# Patient Record
Sex: MALE | Race: WHITE | NOT HISPANIC OR LATINO | Employment: OTHER | URBAN - METROPOLITAN AREA
[De-identification: names, ages, dates, MRNs, and addresses within clinical notes are randomized per-mention and may not be internally consistent; named-entity substitution may affect disease eponyms.]

---

## 2017-01-12 ENCOUNTER — GENERIC CONVERSION - ENCOUNTER (OUTPATIENT)
Dept: OTHER | Facility: OTHER | Age: 62
End: 2017-01-12

## 2017-01-16 ENCOUNTER — APPOINTMENT (OUTPATIENT)
Dept: LAB | Facility: MEDICAL CENTER | Age: 62
End: 2017-01-16
Payer: COMMERCIAL

## 2017-01-16 ENCOUNTER — TRANSCRIBE ORDERS (OUTPATIENT)
Dept: ADMINISTRATIVE | Facility: HOSPITAL | Age: 62
End: 2017-01-16

## 2017-01-16 DIAGNOSIS — I10 ESSENTIAL (PRIMARY) HYPERTENSION: ICD-10-CM

## 2017-01-16 DIAGNOSIS — E03.9 HYPOTHYROIDISM: ICD-10-CM

## 2017-01-16 DIAGNOSIS — L29.9 PRURITUS: ICD-10-CM

## 2017-01-16 LAB
ALBUMIN SERPL BCP-MCNC: 3.9 G/DL (ref 3.5–5)
ALP SERPL-CCNC: 67 U/L (ref 46–116)
ALT SERPL W P-5'-P-CCNC: 40 U/L (ref 12–78)
ANION GAP SERPL CALCULATED.3IONS-SCNC: 7 MMOL/L (ref 4–13)
AST SERPL W P-5'-P-CCNC: 14 U/L (ref 5–45)
BASOPHILS # BLD MANUAL: 0.09 THOUSAND/UL (ref 0–0.1)
BASOPHILS NFR MAR MANUAL: 1 % (ref 0–1)
BILIRUB SERPL-MCNC: 0.5 MG/DL (ref 0.2–1)
BUN SERPL-MCNC: 20 MG/DL (ref 5–25)
CALCIUM SERPL-MCNC: 9.2 MG/DL (ref 8.3–10.1)
CHLORIDE SERPL-SCNC: 108 MMOL/L (ref 100–108)
CHOLEST SERPL-MCNC: 200 MG/DL (ref 50–200)
CO2 SERPL-SCNC: 28 MMOL/L (ref 21–32)
CREAT SERPL-MCNC: 1.1 MG/DL (ref 0.6–1.3)
EOSINOPHIL # BLD MANUAL: 0 THOUSAND/UL (ref 0–0.4)
EOSINOPHIL NFR BLD MANUAL: 0 % (ref 0–6)
ERYTHROCYTE [DISTWIDTH] IN BLOOD BY AUTOMATED COUNT: 13.3 % (ref 11.6–15.1)
GFR SERPL CREATININE-BSD FRML MDRD: >60 ML/MIN/1.73SQ M
GLUCOSE SERPL-MCNC: 96 MG/DL (ref 65–140)
HCT VFR BLD AUTO: 48.4 % (ref 36.5–49.3)
HDLC SERPL-MCNC: 45 MG/DL (ref 40–60)
HGB BLD-MCNC: 17 G/DL (ref 12–17)
LDLC SERPL CALC-MCNC: 122 MG/DL (ref 0–100)
LYMPHOCYTES # BLD AUTO: 2.72 THOUSAND/UL (ref 0.6–4.47)
LYMPHOCYTES # BLD AUTO: 32 % (ref 14–44)
MCH RBC QN AUTO: 31.1 PG (ref 26.8–34.3)
MCHC RBC AUTO-ENTMCNC: 35.1 G/DL (ref 31.4–37.4)
MCV RBC AUTO: 89 FL (ref 82–98)
METAMYELOCYTES NFR BLD MANUAL: 1 % (ref 0–1)
MONOCYTES # BLD AUTO: 0.43 THOUSAND/UL (ref 0–1.22)
MONOCYTES NFR BLD: 5 % (ref 4–12)
NEUTROPHILS # BLD MANUAL: 5.19 THOUSAND/UL (ref 1.85–7.62)
NEUTS BAND NFR BLD MANUAL: 1 % (ref 0–8)
NEUTS SEG NFR BLD AUTO: 60 % (ref 43–75)
NRBC BLD AUTO-RTO: 0 /100 WBCS
PLATELET # BLD AUTO: 168 THOUSANDS/UL (ref 149–390)
PLATELET BLD QL SMEAR: ADEQUATE
PMV BLD AUTO: 9.7 FL (ref 8.9–12.7)
POTASSIUM SERPL-SCNC: 4.6 MMOL/L (ref 3.5–5.3)
PROT SERPL-MCNC: 7.5 G/DL (ref 6.4–8.2)
RBC # BLD AUTO: 5.47 MILLION/UL (ref 3.88–5.62)
RBC MORPH BLD: NORMAL
SODIUM SERPL-SCNC: 143 MMOL/L (ref 136–145)
T4 FREE SERPL-MCNC: 0.98 NG/DL (ref 0.76–1.46)
TRIGL SERPL-MCNC: 164 MG/DL
TSH SERPL DL<=0.05 MIU/L-ACNC: 4.29 UIU/ML (ref 0.36–3.74)
WBC # BLD AUTO: 8.5 THOUSAND/UL (ref 4.31–10.16)

## 2017-01-16 PROCEDURE — 80061 LIPID PANEL: CPT

## 2017-01-16 PROCEDURE — 80053 COMPREHEN METABOLIC PANEL: CPT

## 2017-01-16 PROCEDURE — 84439 ASSAY OF FREE THYROXINE: CPT

## 2017-01-16 PROCEDURE — 85007 BL SMEAR W/DIFF WBC COUNT: CPT

## 2017-01-16 PROCEDURE — 36415 COLL VENOUS BLD VENIPUNCTURE: CPT

## 2017-01-16 PROCEDURE — 85027 COMPLETE CBC AUTOMATED: CPT

## 2017-01-16 PROCEDURE — 84443 ASSAY THYROID STIM HORMONE: CPT

## 2017-01-17 ENCOUNTER — GENERIC CONVERSION - ENCOUNTER (OUTPATIENT)
Dept: OTHER | Facility: OTHER | Age: 62
End: 2017-01-17

## 2017-01-19 ENCOUNTER — GENERIC CONVERSION - ENCOUNTER (OUTPATIENT)
Dept: OTHER | Facility: OTHER | Age: 62
End: 2017-01-19

## 2017-02-06 ENCOUNTER — ALLSCRIPTS OFFICE VISIT (OUTPATIENT)
Dept: OTHER | Facility: OTHER | Age: 62
End: 2017-02-06

## 2017-02-06 DIAGNOSIS — M79.672 PAIN OF LEFT FOOT: ICD-10-CM

## 2017-02-06 DIAGNOSIS — T46.5X1A POISONING BY OTHER ANTIHYPERTENSIVE DRUGS, ACCIDENTAL (UNINTENTIONAL), INITIAL ENCOUNTER: ICD-10-CM

## 2017-02-06 DIAGNOSIS — L29.9 PRURITUS: ICD-10-CM

## 2017-02-06 DIAGNOSIS — R20.2 PARESTHESIA OF SKIN: ICD-10-CM

## 2017-02-06 DIAGNOSIS — D64.9 ANEMIA: ICD-10-CM

## 2017-02-06 DIAGNOSIS — E55.9 VITAMIN D DEFICIENCY: ICD-10-CM

## 2017-02-06 DIAGNOSIS — M54.9 DORSALGIA: ICD-10-CM

## 2017-02-06 DIAGNOSIS — G62.9 POLYNEUROPATHY: ICD-10-CM

## 2017-02-06 DIAGNOSIS — E03.9 HYPOTHYROIDISM: ICD-10-CM

## 2017-02-11 ENCOUNTER — APPOINTMENT (OUTPATIENT)
Dept: LAB | Facility: MEDICAL CENTER | Age: 62
End: 2017-02-11
Payer: COMMERCIAL

## 2017-02-11 DIAGNOSIS — E03.9 HYPOTHYROIDISM: ICD-10-CM

## 2017-02-11 DIAGNOSIS — R20.2 PARESTHESIA OF SKIN: ICD-10-CM

## 2017-02-11 DIAGNOSIS — M79.672 PAIN OF LEFT FOOT: ICD-10-CM

## 2017-02-11 LAB
ALBUMIN SERPL BCP-MCNC: 3.8 G/DL (ref 3.5–5)
ALP SERPL-CCNC: 72 U/L (ref 46–116)
ALT SERPL W P-5'-P-CCNC: 50 U/L (ref 12–78)
ANION GAP SERPL CALCULATED.3IONS-SCNC: 7 MMOL/L (ref 4–13)
AST SERPL W P-5'-P-CCNC: 29 U/L (ref 5–45)
BASOPHILS # BLD AUTO: 0.03 THOUSANDS/ΜL (ref 0–0.1)
BASOPHILS NFR BLD AUTO: 1 % (ref 0–1)
BILIRUB SERPL-MCNC: 0.67 MG/DL (ref 0.2–1)
BUN SERPL-MCNC: 13 MG/DL (ref 5–25)
CALCIUM SERPL-MCNC: 9 MG/DL (ref 8.3–10.1)
CHLORIDE SERPL-SCNC: 109 MMOL/L (ref 100–108)
CO2 SERPL-SCNC: 28 MMOL/L (ref 21–32)
CREAT SERPL-MCNC: 1.05 MG/DL (ref 0.6–1.3)
CRP SERPL QL: <3 MG/L
EOSINOPHIL # BLD AUTO: 0 THOUSAND/ΜL (ref 0–0.61)
EOSINOPHIL NFR BLD AUTO: 0 % (ref 0–6)
ERYTHROCYTE [DISTWIDTH] IN BLOOD BY AUTOMATED COUNT: 12.9 % (ref 11.6–15.1)
GFR SERPL CREATININE-BSD FRML MDRD: >60 ML/MIN/1.73SQ M
GLUCOSE SERPL-MCNC: 97 MG/DL (ref 65–140)
HCT VFR BLD AUTO: 46.3 % (ref 36.5–49.3)
HGB BLD-MCNC: 15.9 G/DL (ref 12–17)
LYMPHOCYTES # BLD AUTO: 1.97 THOUSANDS/ΜL (ref 0.6–4.47)
LYMPHOCYTES NFR BLD AUTO: 33 % (ref 14–44)
MAGNESIUM SERPL-MCNC: 2.5 MG/DL (ref 1.6–2.6)
MCH RBC QN AUTO: 30.4 PG (ref 26.8–34.3)
MCHC RBC AUTO-ENTMCNC: 34.3 G/DL (ref 31.4–37.4)
MCV RBC AUTO: 89 FL (ref 82–98)
MONOCYTES # BLD AUTO: 0.42 THOUSAND/ΜL (ref 0.17–1.22)
MONOCYTES NFR BLD AUTO: 7 % (ref 4–12)
NEUTROPHILS # BLD AUTO: 3.47 THOUSANDS/ΜL (ref 1.85–7.62)
NEUTS SEG NFR BLD AUTO: 59 % (ref 43–75)
NRBC BLD AUTO-RTO: 0 /100 WBCS
PLATELET # BLD AUTO: 139 THOUSANDS/UL (ref 149–390)
PMV BLD AUTO: 8.9 FL (ref 8.9–12.7)
POTASSIUM SERPL-SCNC: 4.5 MMOL/L (ref 3.5–5.3)
PROT SERPL-MCNC: 7.1 G/DL (ref 6.4–8.2)
RBC # BLD AUTO: 5.23 MILLION/UL (ref 3.88–5.62)
SODIUM SERPL-SCNC: 144 MMOL/L (ref 136–145)
TSH SERPL DL<=0.05 MIU/L-ACNC: 2.21 UIU/ML (ref 0.36–3.74)
URATE SERPL-MCNC: 4.6 MG/DL (ref 4.2–8)
VIT B12 SERPL-MCNC: 500 PG/ML (ref 100–900)
WBC # BLD AUTO: 5.9 THOUSAND/UL (ref 4.31–10.16)

## 2017-02-11 PROCEDURE — 83735 ASSAY OF MAGNESIUM: CPT

## 2017-02-11 PROCEDURE — 85025 COMPLETE CBC W/AUTO DIFF WBC: CPT

## 2017-02-11 PROCEDURE — 36415 COLL VENOUS BLD VENIPUNCTURE: CPT

## 2017-02-11 PROCEDURE — 84443 ASSAY THYROID STIM HORMONE: CPT

## 2017-02-11 PROCEDURE — 86140 C-REACTIVE PROTEIN: CPT

## 2017-02-11 PROCEDURE — 82607 VITAMIN B-12: CPT

## 2017-02-11 PROCEDURE — 80053 COMPREHEN METABOLIC PANEL: CPT

## 2017-02-11 PROCEDURE — 84550 ASSAY OF BLOOD/URIC ACID: CPT

## 2017-02-11 PROCEDURE — 82652 VIT D 1 25-DIHYDROXY: CPT

## 2017-02-13 LAB — 1,25(OH)2D3 SERPL-MCNC: 46.2 PG/ML (ref 19.9–79.3)

## 2017-03-02 ENCOUNTER — ALLSCRIPTS OFFICE VISIT (OUTPATIENT)
Dept: OTHER | Facility: OTHER | Age: 62
End: 2017-03-02

## 2017-03-04 ENCOUNTER — TRANSCRIBE ORDERS (OUTPATIENT)
Dept: ADMINISTRATIVE | Facility: HOSPITAL | Age: 62
End: 2017-03-04

## 2017-03-04 ENCOUNTER — LAB (OUTPATIENT)
Dept: LAB | Facility: MEDICAL CENTER | Age: 62
End: 2017-03-04
Payer: COMMERCIAL

## 2017-03-04 DIAGNOSIS — T46.5X1A POISONING BY OTHER ANTIHYPERTENSIVE DRUGS, ACCIDENTAL (UNINTENTIONAL), INITIAL ENCOUNTER: ICD-10-CM

## 2017-03-04 DIAGNOSIS — E55.9 UNSPECIFIED VITAMIN D DEFICIENCY: ICD-10-CM

## 2017-03-04 DIAGNOSIS — E55.9 VITAMIN D DEFICIENCY: ICD-10-CM

## 2017-03-04 DIAGNOSIS — D64.9 ANEMIA: ICD-10-CM

## 2017-03-04 DIAGNOSIS — L29.9 PRURITUS: ICD-10-CM

## 2017-03-04 DIAGNOSIS — D64.9 ANEMIA, UNSPECIFIED: ICD-10-CM

## 2017-03-04 DIAGNOSIS — R20.2 PARESTHESIA: ICD-10-CM

## 2017-03-04 DIAGNOSIS — E55.9 UNSPECIFIED VITAMIN D DEFICIENCY: Primary | ICD-10-CM

## 2017-03-04 DIAGNOSIS — R20.2 PARESTHESIA OF SKIN: ICD-10-CM

## 2017-03-04 DIAGNOSIS — G62.9 POLYNEUROPATHY: ICD-10-CM

## 2017-03-04 LAB
FOLATE SERPL-MCNC: >20 NG/ML (ref 3.1–17.5)
HCYS SERPL-SCNC: 9.1 UMOL/L (ref 5.3–14.2)
IRON SERPL-MCNC: 105 UG/DL (ref 65–175)
T3FREE SERPL-MCNC: 2.23 PG/ML (ref 2.3–4.2)

## 2017-03-04 PROCEDURE — 86431 RHEUMATOID FACTOR QUANT: CPT

## 2017-03-04 PROCEDURE — 82175 ASSAY OF ARSENIC: CPT

## 2017-03-04 PROCEDURE — 84165 PROTEIN E-PHORESIS SERUM: CPT

## 2017-03-04 PROCEDURE — 83090 ASSAY OF HOMOCYSTEINE: CPT

## 2017-03-04 PROCEDURE — 83825 ASSAY OF MERCURY: CPT

## 2017-03-04 PROCEDURE — 84432 ASSAY OF THYROGLOBULIN: CPT

## 2017-03-04 PROCEDURE — 82746 ASSAY OF FOLIC ACID SERUM: CPT

## 2017-03-04 PROCEDURE — 86430 RHEUMATOID FACTOR TEST QUAL: CPT

## 2017-03-04 PROCEDURE — 86376 MICROSOMAL ANTIBODY EACH: CPT

## 2017-03-04 PROCEDURE — 36415 COLL VENOUS BLD VENIPUNCTURE: CPT

## 2017-03-04 PROCEDURE — 86618 LYME DISEASE ANTIBODY: CPT

## 2017-03-04 PROCEDURE — 83540 ASSAY OF IRON: CPT

## 2017-03-04 PROCEDURE — 86800 THYROGLOBULIN ANTIBODY: CPT

## 2017-03-04 PROCEDURE — 83655 ASSAY OF LEAD: CPT

## 2017-03-04 PROCEDURE — 84481 FREE ASSAY (FT-3): CPT

## 2017-03-04 PROCEDURE — 86235 NUCLEAR ANTIGEN ANTIBODY: CPT

## 2017-03-04 PROCEDURE — 86038 ANTINUCLEAR ANTIBODIES: CPT

## 2017-03-05 LAB — THYROPEROXIDASE AB SERPL-ACNC: 13 IU/ML (ref 0–34)

## 2017-03-06 ENCOUNTER — GENERIC CONVERSION - ENCOUNTER (OUTPATIENT)
Dept: OTHER | Facility: OTHER | Age: 62
End: 2017-03-06

## 2017-03-06 LAB
B BURGDOR IGG SER IA-ACNC: 0.19
B BURGDOR IGM SER IA-ACNC: 0.42
CRYOGLOB RF SER-ACNC: ABNORMAL [IU]/ML
ENA SCL70 AB SER-ACNC: <0.2 AI (ref 0–0.9)
ENA SS-A AB SER-ACNC: <0.2 AI (ref 0–0.9)
ENA SS-B AB SER-ACNC: <0.2 AI (ref 0–0.9)
RHEUMATOID FACT SER QL LA: POSITIVE
RYE IGE QN: NEGATIVE

## 2017-03-07 LAB
ALBUMIN SERPL ELPH-MCNC: 4.52 G/DL (ref 3.5–5)
ALBUMIN SERPL ELPH-MCNC: 64.5 % (ref 52–65)
ALPHA1 GLOB SERPL ELPH-MCNC: 0.27 G/DL (ref 0.1–0.4)
ALPHA1 GLOB SERPL ELPH-MCNC: 3.8 % (ref 2.5–5)
ALPHA2 GLOB SERPL ELPH-MCNC: 0.66 G/DL (ref 0.4–1.2)
ALPHA2 GLOB SERPL ELPH-MCNC: 9.4 % (ref 7–13)
ARSENIC BLD-MCNC: 11 UG/L (ref 2–23)
BETA GLOB ABNORMAL SERPL ELPH-MCNC: 0.37 G/DL (ref 0.4–0.8)
BETA1 GLOB SERPL ELPH-MCNC: 5.3 % (ref 5–13)
BETA2 GLOB SERPL ELPH-MCNC: 4.1 % (ref 2–8)
BETA2+GAMMA GLOB SERPL ELPH-MCNC: 0.29 G/DL (ref 0.2–0.5)
GAMMA GLOB ABNORMAL SERPL ELPH-MCNC: 0.9 G/DL (ref 0.5–1.6)
GAMMA GLOB SERPL ELPH-MCNC: 12.9 % (ref 12–22)
IGG/ALB SER: 1.82 {RATIO} (ref 1.1–1.8)
LEAD BLD-MCNC: NORMAL UG/DL (ref 0–19)
MERCURY BLD-MCNC: NORMAL UG/L (ref 0–14.9)
PROT PATTERN SERPL ELPH-IMP: ABNORMAL
PROT SERPL-MCNC: 7 G/DL (ref 6.4–8.2)
THYROGLOB AB SERPL-ACNC: <1 IU/ML (ref 0–0.9)
THYROGLOB SERPL-MCNC: 0.1 NG/ML (ref 1.4–29.2)

## 2017-03-11 ENCOUNTER — TRANSCRIBE ORDERS (OUTPATIENT)
Dept: ADMINISTRATIVE | Facility: HOSPITAL | Age: 62
End: 2017-03-11

## 2017-03-11 ENCOUNTER — APPOINTMENT (OUTPATIENT)
Dept: LAB | Facility: HOSPITAL | Age: 62
End: 2017-03-11
Attending: PSYCHIATRY & NEUROLOGY
Payer: COMMERCIAL

## 2017-03-11 DIAGNOSIS — G62.9 ACQUIRED POLYNEUROPATHY: Primary | ICD-10-CM

## 2017-03-11 DIAGNOSIS — G62.9 ACQUIRED POLYNEUROPATHY: ICD-10-CM

## 2017-03-11 LAB
GLUCOSE 2H P 75 G GLC PO SERPL-MCNC: 84 MG/DL (ref 70–183)
GLUCOSE P FAST SERPL-MCNC: 111 MG/DL (ref 65–99)

## 2017-03-11 PROCEDURE — 36415 COLL VENOUS BLD VENIPUNCTURE: CPT

## 2017-03-11 PROCEDURE — 82950 GLUCOSE TEST: CPT

## 2017-03-11 PROCEDURE — 82947 ASSAY GLUCOSE BLOOD QUANT: CPT

## 2017-03-15 DIAGNOSIS — E03.9 HYPOTHYROIDISM: ICD-10-CM

## 2017-03-25 ENCOUNTER — TRANSCRIBE ORDERS (OUTPATIENT)
Dept: ADMINISTRATIVE | Facility: HOSPITAL | Age: 62
End: 2017-03-25

## 2017-03-25 ENCOUNTER — APPOINTMENT (OUTPATIENT)
Dept: LAB | Facility: MEDICAL CENTER | Age: 62
End: 2017-03-25
Payer: COMMERCIAL

## 2017-03-25 DIAGNOSIS — R10.11 ABDOMINAL PAIN, RIGHT UPPER QUADRANT: Primary | ICD-10-CM

## 2017-03-25 DIAGNOSIS — E03.9 HYPOTHYROIDISM: ICD-10-CM

## 2017-03-25 DIAGNOSIS — R10.11 ABDOMINAL PAIN, RIGHT UPPER QUADRANT: ICD-10-CM

## 2017-03-25 LAB — TSH SERPL DL<=0.05 MIU/L-ACNC: 1.6 UIU/ML (ref 0.36–3.74)

## 2017-03-25 PROCEDURE — 86200 CCP ANTIBODY: CPT

## 2017-03-25 PROCEDURE — 36415 COLL VENOUS BLD VENIPUNCTURE: CPT

## 2017-03-25 PROCEDURE — 84443 ASSAY THYROID STIM HORMONE: CPT

## 2017-03-26 LAB — CCP IGA+IGG SERPL IA-ACNC: 7 UNITS (ref 0–19)

## 2017-04-10 ENCOUNTER — ALLSCRIPTS OFFICE VISIT (OUTPATIENT)
Dept: OTHER | Facility: OTHER | Age: 62
End: 2017-04-10

## 2017-05-01 ENCOUNTER — ALLSCRIPTS OFFICE VISIT (OUTPATIENT)
Dept: OTHER | Facility: OTHER | Age: 62
End: 2017-05-01

## 2017-06-20 ENCOUNTER — TRANSCRIBE ORDERS (OUTPATIENT)
Dept: ADMINISTRATIVE | Facility: HOSPITAL | Age: 62
End: 2017-06-20

## 2017-06-20 DIAGNOSIS — R31.0 GROSS HEMATURIA: Primary | ICD-10-CM

## 2017-06-20 DIAGNOSIS — N20.0 URIC ACID NEPHROLITHIASIS: ICD-10-CM

## 2017-06-26 ENCOUNTER — HOSPITAL ENCOUNTER (OUTPATIENT)
Dept: CT IMAGING | Facility: HOSPITAL | Age: 62
Discharge: HOME | End: 2017-06-26
Attending: UROLOGY
Payer: COMMERCIAL

## 2017-06-26 DIAGNOSIS — R31.0 GROSS HEMATURIA: ICD-10-CM

## 2017-06-26 PROCEDURE — 74178 CT ABD&PLV WO CNTR FLWD CNTR: CPT

## 2017-06-26 RX ADMIN — IOHEXOL 100 ML: 350 INJECTION, SOLUTION INTRAVENOUS at 17:12

## 2017-07-01 ENCOUNTER — APPOINTMENT (OUTPATIENT)
Dept: RADIOLOGY | Facility: MEDICAL CENTER | Age: 62
End: 2017-07-01
Payer: COMMERCIAL

## 2017-07-01 ENCOUNTER — APPOINTMENT (OUTPATIENT)
Dept: LAB | Facility: MEDICAL CENTER | Age: 62
End: 2017-07-01
Payer: COMMERCIAL

## 2017-07-01 ENCOUNTER — TRANSCRIBE ORDERS (OUTPATIENT)
Dept: ADMINISTRATIVE | Facility: HOSPITAL | Age: 62
End: 2017-07-01

## 2017-07-01 DIAGNOSIS — N20.0 URIC ACID NEPHROLITHIASIS: ICD-10-CM

## 2017-07-01 DIAGNOSIS — G60.3 IDIOPATHIC PROGRESSIVE POLYNEUROPATHY: ICD-10-CM

## 2017-07-01 DIAGNOSIS — M25.572 LEFT ANKLE PAIN, UNSPECIFIED CHRONICITY: Primary | ICD-10-CM

## 2017-07-01 DIAGNOSIS — R31.0 GROSS HEMATURIA: ICD-10-CM

## 2017-07-01 DIAGNOSIS — M25.572 LEFT ANKLE PAIN, UNSPECIFIED CHRONICITY: ICD-10-CM

## 2017-07-01 LAB
CRP SERPL QL: <3 MG/L
ERYTHROCYTE [SEDIMENTATION RATE] IN BLOOD: 5 MM/HOUR (ref 0–10)

## 2017-07-01 PROCEDURE — 86200 CCP ANTIBODY: CPT

## 2017-07-01 PROCEDURE — 86430 RHEUMATOID FACTOR TEST QUAL: CPT

## 2017-07-01 PROCEDURE — 86038 ANTINUCLEAR ANTIBODIES: CPT

## 2017-07-01 PROCEDURE — 85652 RBC SED RATE AUTOMATED: CPT

## 2017-07-01 PROCEDURE — 86039 ANTINUCLEAR ANTIBODIES (ANA): CPT

## 2017-07-01 PROCEDURE — 36415 COLL VENOUS BLD VENIPUNCTURE: CPT

## 2017-07-01 PROCEDURE — 86140 C-REACTIVE PROTEIN: CPT

## 2017-07-01 PROCEDURE — 74000 HB X-RAY EXAM OF ABDOMEN (SINGLE ANTEROPOSTERIOR VIEW): CPT

## 2017-07-01 PROCEDURE — 86431 RHEUMATOID FACTOR QUANT: CPT

## 2017-07-01 PROCEDURE — 86235 NUCLEAR ANTIGEN ANTIBODY: CPT

## 2017-07-03 LAB
ANA HOMOGEN SER QL IF: NORMAL
ANA HOMOGEN TITR SER: NORMAL {TITER}
CRYOGLOB RF SER-ACNC: ABNORMAL [IU]/ML
ENA SS-A AB SER-ACNC: <0.2 AI (ref 0–0.9)
ENA SS-B AB SER-ACNC: <0.2 AI (ref 0–0.9)
RHEUMATOID FACT SER QL LA: POSITIVE
RYE IGE QN: POSITIVE

## 2017-07-04 LAB — CCP IGA+IGG SERPL IA-ACNC: 8 UNITS (ref 0–19)

## 2017-07-06 ENCOUNTER — ALLSCRIPTS OFFICE VISIT (OUTPATIENT)
Dept: OTHER | Facility: OTHER | Age: 62
End: 2017-07-06

## 2017-07-07 ENCOUNTER — HOSPITAL ENCOUNTER (OUTPATIENT)
Dept: RADIOLOGY | Facility: HOSPITAL | Age: 62
Discharge: HOME | End: 2017-07-07
Attending: UROLOGY
Payer: COMMERCIAL

## 2017-07-07 ENCOUNTER — TRANSCRIBE ORDERS (OUTPATIENT)
Dept: ADMINISTRATIVE | Facility: HOSPITAL | Age: 62
End: 2017-07-07

## 2017-07-07 DIAGNOSIS — N20.0 URIC ACID NEPHROLITHIASIS: Primary | ICD-10-CM

## 2017-07-07 DIAGNOSIS — N20.0 URIC ACID NEPHROLITHIASIS: ICD-10-CM

## 2017-07-07 PROCEDURE — 74000 HB X-RAY EXAM OF ABDOMEN (SINGLE ANTEROPOSTERIOR VIEW): CPT

## 2017-07-20 ENCOUNTER — ANESTHESIA EVENT (OUTPATIENT)
Dept: PERIOP | Facility: HOSPITAL | Age: 62
End: 2017-07-20
Payer: COMMERCIAL

## 2017-07-20 ENCOUNTER — TRANSCRIBE ORDERS (OUTPATIENT)
Dept: ADMINISTRATIVE | Facility: HOSPITAL | Age: 62
End: 2017-07-20

## 2017-07-20 ENCOUNTER — APPOINTMENT (OUTPATIENT)
Dept: LAB | Facility: HOSPITAL | Age: 62
End: 2017-07-20
Attending: UROLOGY
Payer: COMMERCIAL

## 2017-07-20 ENCOUNTER — APPOINTMENT (OUTPATIENT)
Dept: PREADMISSION TESTING | Facility: HOSPITAL | Age: 62
End: 2017-07-20
Payer: COMMERCIAL

## 2017-07-20 VITALS
TEMPERATURE: 97.2 F | SYSTOLIC BLOOD PRESSURE: 130 MMHG | HEIGHT: 74 IN | HEART RATE: 65 BPM | DIASTOLIC BLOOD PRESSURE: 80 MMHG | WEIGHT: 290 LBS | BODY MASS INDEX: 37.22 KG/M2 | RESPIRATION RATE: 18 BRPM

## 2017-07-20 DIAGNOSIS — N20.0 URIC ACID NEPHROLITHIASIS: ICD-10-CM

## 2017-07-20 DIAGNOSIS — Z01.818 PREOP EXAMINATION: ICD-10-CM

## 2017-07-20 DIAGNOSIS — Z01.818 PREOP EXAMINATION: Primary | ICD-10-CM

## 2017-07-20 LAB
ALBUMIN SERPL BCP-MCNC: 4.1 G/DL (ref 3.5–5)
ALP SERPL-CCNC: 66 U/L (ref 46–116)
ALT SERPL W P-5'-P-CCNC: 41 U/L (ref 12–78)
ANION GAP SERPL CALCULATED.3IONS-SCNC: 6 MMOL/L (ref 4–13)
AST SERPL W P-5'-P-CCNC: 22 U/L (ref 5–45)
BASOPHILS # BLD AUTO: 0.04 THOUSANDS/ΜL (ref 0–0.1)
BASOPHILS NFR BLD AUTO: 0 % (ref 0–1)
BILIRUB SERPL-MCNC: 0.66 MG/DL (ref 0.2–1)
BUN SERPL-MCNC: 23 MG/DL (ref 5–25)
CALCIUM SERPL-MCNC: 9 MG/DL (ref 8.3–10.1)
CHLORIDE SERPL-SCNC: 106 MMOL/L (ref 100–108)
CO2 SERPL-SCNC: 28 MMOL/L (ref 21–32)
CREAT SERPL-MCNC: 1.13 MG/DL (ref 0.6–1.3)
EOSINOPHIL # BLD AUTO: 0 THOUSAND/ΜL (ref 0–0.61)
EOSINOPHIL NFR BLD AUTO: 0 % (ref 0–6)
ERYTHROCYTE [DISTWIDTH] IN BLOOD BY AUTOMATED COUNT: 13.2 % (ref 11.6–15.1)
GFR SERPL CREATININE-BSD FRML MDRD: >60 ML/MIN/1.73SQ M
GLUCOSE SERPL-MCNC: 92 MG/DL (ref 65–140)
HCT VFR BLD AUTO: 43.7 % (ref 36.5–49.3)
HGB BLD-MCNC: 15.4 G/DL (ref 12–17)
LYMPHOCYTES # BLD AUTO: 2.31 THOUSANDS/ΜL (ref 0.6–4.47)
LYMPHOCYTES NFR BLD AUTO: 24 % (ref 14–44)
MCH RBC QN AUTO: 31.2 PG (ref 26.8–34.3)
MCHC RBC AUTO-ENTMCNC: 35.2 G/DL (ref 31.4–37.4)
MCV RBC AUTO: 89 FL (ref 82–98)
MONOCYTES # BLD AUTO: 0.65 THOUSAND/ΜL (ref 0.17–1.22)
MONOCYTES NFR BLD AUTO: 7 % (ref 4–12)
NEUTROPHILS # BLD AUTO: 6.73 THOUSANDS/ΜL (ref 1.85–7.62)
NEUTS SEG NFR BLD AUTO: 69 % (ref 43–75)
NRBC BLD AUTO-RTO: 0 /100 WBCS
PLATELET # BLD AUTO: 136 THOUSANDS/UL (ref 149–390)
PMV BLD AUTO: 8.9 FL (ref 8.9–12.7)
POTASSIUM SERPL-SCNC: 4.4 MMOL/L (ref 3.5–5.3)
PROT SERPL-MCNC: 7.2 G/DL (ref 6.4–8.2)
RBC # BLD AUTO: 4.93 MILLION/UL (ref 3.88–5.62)
SODIUM SERPL-SCNC: 140 MMOL/L (ref 136–145)
WBC # BLD AUTO: 9.73 THOUSAND/UL (ref 4.31–10.16)

## 2017-07-20 PROCEDURE — 85025 COMPLETE CBC W/AUTO DIFF WBC: CPT

## 2017-07-20 PROCEDURE — 36415 COLL VENOUS BLD VENIPUNCTURE: CPT

## 2017-07-20 PROCEDURE — 80053 COMPREHEN METABOLIC PANEL: CPT

## 2017-07-20 RX ORDER — LEVOTHYROXINE SODIUM 0.2 MG/1
200 TABLET ORAL DAILY
COMMUNITY
End: 2018-02-14 | Stop reason: SDUPTHER

## 2017-07-20 RX ORDER — MULTIVITAMIN
1 TABLET ORAL DAILY
COMMUNITY

## 2017-07-20 RX ORDER — OMEPRAZOLE 20 MG/1
20 CAPSULE, DELAYED RELEASE ORAL DAILY PRN
COMMUNITY
End: 2018-02-14 | Stop reason: SDUPTHER

## 2017-07-20 RX ORDER — VERAPAMIL HYDROCHLORIDE 180 MG/1
180 CAPSULE, EXTENDED RELEASE ORAL
COMMUNITY
End: 2018-02-14 | Stop reason: SDUPTHER

## 2017-07-20 RX ORDER — GABAPENTIN 300 MG/1
100 CAPSULE ORAL
COMMUNITY
End: 2018-03-27 | Stop reason: SDUPTHER

## 2017-07-20 RX ORDER — DUTASTERIDE 0.5 MG/1
0.5 CAPSULE, LIQUID FILLED ORAL
COMMUNITY
End: 2020-07-01 | Stop reason: SDUPTHER

## 2017-07-20 RX ORDER — SILODOSIN 8 MG/1
8 CAPSULE ORAL
COMMUNITY
End: 2020-07-01 | Stop reason: SDUPTHER

## 2017-07-20 RX ORDER — IBUPROFEN 200 MG
200 TABLET ORAL EVERY 6 HOURS PRN
COMMUNITY
End: 2019-08-15

## 2017-07-20 RX ORDER — ACETAMINOPHEN 325 MG/1
650 TABLET ORAL EVERY 6 HOURS PRN
COMMUNITY

## 2017-07-20 RX ORDER — CHLORDIAZEPOXIDE HYDROCHLORIDE AND CLIDINIUM BROMIDE 5; 2.5 MG/1; MG/1
1 CAPSULE ORAL AS NEEDED
COMMUNITY

## 2017-07-20 RX ORDER — LEVOTHYROXINE SODIUM 0.03 MG/1
25 TABLET ORAL DAILY
COMMUNITY
End: 2018-02-14 | Stop reason: ALTCHOICE

## 2017-07-20 RX ORDER — SODIUM CHLORIDE 9 MG/ML
125 INJECTION, SOLUTION INTRAVENOUS CONTINUOUS
Status: CANCELLED | OUTPATIENT
Start: 2017-07-27

## 2017-07-27 ENCOUNTER — APPOINTMENT (OUTPATIENT)
Dept: RADIOLOGY | Facility: HOSPITAL | Age: 62
End: 2017-07-27
Payer: COMMERCIAL

## 2017-07-27 ENCOUNTER — HOSPITAL ENCOUNTER (OUTPATIENT)
Facility: HOSPITAL | Age: 62
Setting detail: OUTPATIENT SURGERY
Discharge: HOME | End: 2017-07-27
Attending: UROLOGY | Admitting: UROLOGY
Payer: COMMERCIAL

## 2017-07-27 ENCOUNTER — ANESTHESIA (OUTPATIENT)
Dept: PERIOP | Facility: HOSPITAL | Age: 62
End: 2017-07-27
Payer: COMMERCIAL

## 2017-07-27 VITALS
DIASTOLIC BLOOD PRESSURE: 73 MMHG | OXYGEN SATURATION: 96 % | TEMPERATURE: 97.5 F | SYSTOLIC BLOOD PRESSURE: 118 MMHG | HEIGHT: 74 IN | BODY MASS INDEX: 37.22 KG/M2 | HEART RATE: 56 BPM | WEIGHT: 290 LBS | RESPIRATION RATE: 20 BRPM

## 2017-07-27 PROCEDURE — 74000 HB X-RAY EXAM OF ABDOMEN (SINGLE ANTEROPOSTERIOR VIEW): CPT

## 2017-07-27 RX ORDER — CIPROFLOXACIN 2 MG/ML
400 INJECTION, SOLUTION INTRAVENOUS ONCE
Status: COMPLETED | OUTPATIENT
Start: 2017-07-27 | End: 2017-07-27

## 2017-07-27 RX ORDER — ONDANSETRON 2 MG/ML
4 INJECTION INTRAMUSCULAR; INTRAVENOUS EVERY 6 HOURS PRN
Status: DISCONTINUED | OUTPATIENT
Start: 2017-07-27 | End: 2017-07-27 | Stop reason: HOSPADM

## 2017-07-27 RX ORDER — ACETAMINOPHEN 325 MG/1
650 TABLET ORAL EVERY 6 HOURS PRN
Status: DISCONTINUED | OUTPATIENT
Start: 2017-07-27 | End: 2017-07-27 | Stop reason: HOSPADM

## 2017-07-27 RX ORDER — FENTANYL CITRATE/PF 50 MCG/ML
50 SYRINGE (ML) INJECTION
Status: DISCONTINUED | OUTPATIENT
Start: 2017-07-27 | End: 2017-07-27 | Stop reason: HOSPADM

## 2017-07-27 RX ORDER — PROPOFOL 10 MG/ML
INJECTION, EMULSION INTRAVENOUS AS NEEDED
Status: DISCONTINUED | OUTPATIENT
Start: 2017-07-27 | End: 2017-07-27 | Stop reason: SURG

## 2017-07-27 RX ORDER — ONDANSETRON 2 MG/ML
4 INJECTION INTRAMUSCULAR; INTRAVENOUS ONCE AS NEEDED
Status: COMPLETED | OUTPATIENT
Start: 2017-07-27 | End: 2017-07-27

## 2017-07-27 RX ORDER — SODIUM CHLORIDE 9 MG/ML
125 INJECTION, SOLUTION INTRAVENOUS CONTINUOUS
Status: DISCONTINUED | OUTPATIENT
Start: 2017-07-27 | End: 2017-07-27 | Stop reason: HOSPADM

## 2017-07-27 RX ORDER — FENTANYL CITRATE 50 UG/ML
INJECTION, SOLUTION INTRAMUSCULAR; INTRAVENOUS AS NEEDED
Status: DISCONTINUED | OUTPATIENT
Start: 2017-07-27 | End: 2017-07-27 | Stop reason: SURG

## 2017-07-27 RX ORDER — GLYCOPYRROLATE 0.2 MG/ML
INJECTION INTRAMUSCULAR; INTRAVENOUS AS NEEDED
Status: DISCONTINUED | OUTPATIENT
Start: 2017-07-27 | End: 2017-07-27 | Stop reason: SURG

## 2017-07-27 RX ORDER — ALBUTEROL SULFATE 2.5 MG/3ML
2.5 SOLUTION RESPIRATORY (INHALATION) ONCE AS NEEDED
Status: DISCONTINUED | OUTPATIENT
Start: 2017-07-27 | End: 2017-07-27 | Stop reason: HOSPADM

## 2017-07-27 RX ADMIN — ONDANSETRON 4 MG: 2 INJECTION INTRAMUSCULAR; INTRAVENOUS at 10:06

## 2017-07-27 RX ADMIN — FENTANYL CITRATE 25 MCG: 50 INJECTION, SOLUTION INTRAMUSCULAR; INTRAVENOUS at 09:17

## 2017-07-27 RX ADMIN — LIDOCAINE HYDROCHLORIDE 60 MG: 20 INJECTION, SOLUTION INTRAVENOUS at 08:56

## 2017-07-27 RX ADMIN — SODIUM CHLORIDE: 0.9 INJECTION, SOLUTION INTRAVENOUS at 08:49

## 2017-07-27 RX ADMIN — FENTANYL CITRATE 25 MCG: 50 INJECTION, SOLUTION INTRAMUSCULAR; INTRAVENOUS at 09:14

## 2017-07-27 RX ADMIN — CIPROFLOXACIN: 2 INJECTION INTRAVENOUS at 08:49

## 2017-07-27 RX ADMIN — SODIUM CHLORIDE 125 ML/HR: 0.9 INJECTION, SOLUTION INTRAVENOUS at 07:14

## 2017-07-27 RX ADMIN — GLYCOPYRROLATE 0.2 MG: 0.2 INJECTION, SOLUTION INTRAMUSCULAR; INTRAVENOUS at 09:02

## 2017-07-27 RX ADMIN — SODIUM CHLORIDE: 0.9 INJECTION, SOLUTION INTRAVENOUS at 09:40

## 2017-07-27 RX ADMIN — FENTANYL CITRATE 50 MCG: 50 INJECTION, SOLUTION INTRAMUSCULAR; INTRAVENOUS at 08:56

## 2017-07-27 RX ADMIN — PROPOFOL 300 MG: 10 INJECTION, EMULSION INTRAVENOUS at 08:56

## 2017-08-05 ENCOUNTER — TRANSCRIBE ORDERS (OUTPATIENT)
Dept: ADMINISTRATIVE | Facility: HOSPITAL | Age: 62
End: 2017-08-05

## 2017-08-05 ENCOUNTER — HOSPITAL ENCOUNTER (OUTPATIENT)
Dept: RADIOLOGY | Facility: HOSPITAL | Age: 62
Discharge: HOME | End: 2017-08-05
Attending: UROLOGY
Payer: COMMERCIAL

## 2017-08-05 DIAGNOSIS — N20.0 URIC ACID NEPHROLITHIASIS: Primary | ICD-10-CM

## 2017-08-05 DIAGNOSIS — N20.0 URIC ACID NEPHROLITHIASIS: ICD-10-CM

## 2017-08-05 PROCEDURE — 74000 HB X-RAY EXAM OF ABDOMEN (SINGLE ANTEROPOSTERIOR VIEW): CPT

## 2017-08-10 ENCOUNTER — ALLSCRIPTS OFFICE VISIT (OUTPATIENT)
Dept: OTHER | Facility: OTHER | Age: 62
End: 2017-08-10

## 2017-08-10 DIAGNOSIS — G57.93 MONONEUROPATHY OF BOTH LOWER EXTREMITIES: ICD-10-CM

## 2017-08-10 DIAGNOSIS — I10 ESSENTIAL (PRIMARY) HYPERTENSION: ICD-10-CM

## 2017-08-10 DIAGNOSIS — E03.9 HYPOTHYROIDISM: ICD-10-CM

## 2017-08-10 DIAGNOSIS — K21.9 GASTRO-ESOPHAGEAL REFLUX DISEASE WITHOUT ESOPHAGITIS: ICD-10-CM

## 2017-08-10 DIAGNOSIS — M79.672 PAIN OF LEFT FOOT: ICD-10-CM

## 2017-10-09 ENCOUNTER — TRANSCRIBE ORDERS (OUTPATIENT)
Dept: ADMINISTRATIVE | Facility: HOSPITAL | Age: 62
End: 2017-10-09

## 2017-10-09 ENCOUNTER — HOSPITAL ENCOUNTER (OUTPATIENT)
Dept: RADIOLOGY | Facility: HOSPITAL | Age: 62
Discharge: HOME | End: 2017-10-09
Attending: UROLOGY
Payer: COMMERCIAL

## 2017-10-09 DIAGNOSIS — N20.0 KIDNEY STONES: ICD-10-CM

## 2017-10-09 DIAGNOSIS — N20.0 KIDNEY STONES: Primary | ICD-10-CM

## 2017-10-09 PROCEDURE — 74000 HB X-RAY EXAM OF ABDOMEN (SINGLE ANTEROPOSTERIOR VIEW): CPT

## 2018-01-12 NOTE — RESULT NOTES
Message  Elevated RF    WIll refer to rheumatology      Plan  Elevated rheumatoid factor    · *1 - [facility] Physician Referral  Consult Only: the expectation is that the  referring provider will communicate back to the patient on treatment options  Evaluation  and Treatment: the expectation is that the referred to provider will communicate back  to the patient on treatment options    Status: Active  Requested for: [id number]  Care Summary provided  : Yes    Results/Data     (1) RHEUMATOID FACTOR SCREEN   RHEUMATOID FACTOR: Positive  Abnormal Reference Range Negative  RF QUANTITATION: 40 IU/mL  Abnormal Reference Range (none)    Signatures   Electronically signed by : Dorian Calderón MD; Mar  6 2017 12:25PM EST                       (Author)

## 2018-01-13 VITALS
SYSTOLIC BLOOD PRESSURE: 129 MMHG | BODY MASS INDEX: 38.12 KG/M2 | RESPIRATION RATE: 20 BRPM | DIASTOLIC BLOOD PRESSURE: 85 MMHG | HEART RATE: 61 BPM | HEIGHT: 74 IN | WEIGHT: 297 LBS

## 2018-01-13 VITALS
WEIGHT: 302.38 LBS | TEMPERATURE: 98.2 F | BODY MASS INDEX: 38.81 KG/M2 | SYSTOLIC BLOOD PRESSURE: 132 MMHG | HEIGHT: 74 IN | DIASTOLIC BLOOD PRESSURE: 70 MMHG

## 2018-01-13 VITALS
WEIGHT: 289 LBS | BODY MASS INDEX: 35.93 KG/M2 | DIASTOLIC BLOOD PRESSURE: 68 MMHG | HEIGHT: 75 IN | SYSTOLIC BLOOD PRESSURE: 124 MMHG

## 2018-01-13 VITALS
HEIGHT: 74 IN | WEIGHT: 302.38 LBS | SYSTOLIC BLOOD PRESSURE: 120 MMHG | BODY MASS INDEX: 38.81 KG/M2 | DIASTOLIC BLOOD PRESSURE: 82 MMHG | TEMPERATURE: 97.8 F

## 2018-01-13 VITALS
SYSTOLIC BLOOD PRESSURE: 137 MMHG | HEIGHT: 74 IN | BODY MASS INDEX: 37.73 KG/M2 | DIASTOLIC BLOOD PRESSURE: 81 MMHG | RESPIRATION RATE: 18 BRPM | WEIGHT: 294 LBS | HEART RATE: 65 BPM

## 2018-01-13 VITALS — BODY MASS INDEX: 39.16 KG/M2 | WEIGHT: 305 LBS | DIASTOLIC BLOOD PRESSURE: 68 MMHG | SYSTOLIC BLOOD PRESSURE: 132 MMHG

## 2018-01-15 NOTE — RESULT NOTES
Message   Call patient  Patient with borderline elevated blood sugar as well as elevated cholesterol on laboratory studies  Recommend low-fat low-cholesterol diet and watching sugar intake  Patient will need laboratory studies at follow-up visit in 6 months     Verified Results  (1) COMPREHENSIVE METABOLIC PANEL [id number] 77:90MM Terrence Marie Order Number: [id number]  TW Order Number: [id number] Order Number: [id number] Order Number: [id number] Order Number: [id number]     Test Name Result Flag Reference   GLUCOSE,RANDM 112 mg/dL     If the patient is fasting, the ADA then defines impaired fasting glucose as > 100 mg/dL and diabetes as > or equal to 123 mg/dL  SODIUM 142 mmol/L  136-145   POTASSIUM 4 5 mmol/L  3 5-5 3   CHLORIDE 107 mmol/L  100-108   CARBON DIOXIDE 28 mmol/L  21-32   ANION GAP (CALC) 7 mmol/L  4-13   BLOOD UREA NITROGEN 19 mg/dL  5-25   CREATININE 0 97 mg/dL  0 60-1 30   Standardized to IDMS reference method   CALCIUM 8 5 mg/dL  8 3-10 1   BILI, TOTAL 0 73 mg/dL  0 20-1 00   ALK PHOSPHATAS 67 U/L     ALT (SGPT) 56 U/L  12-78   AST(SGOT) 29 U/L  5-45   ALBUMIN 4 1 g/dL  3 5-5 0   TOTAL PROTEIN 7 1 g/dL  6 4-8 2   eGFR Non-African American      >60 0 ml/min/1 73sq m   [facility] [employer] Disease Education Program recommendations are as follows:  GFR calculation is accurate only with a steady state creatinine  Chronic Kidney disease less than 60 ml/min/1 73 sq  meters  Kidney failure less than 15 ml/min/1 73 sq  meters       (1) CBC/PLT/DIFF [id number] 10:10AM Terrence Marie Order Number: [id number]  TW Order Number: [id number]     Test Name Result Flag Reference   WBC COUNT 7 84 Thousand/uL  4 31-10 16   RBC COUNT 5 33 Million/uL  3 88-5 62   HEMOGLOBIN 16 1 g/dL  12 0-17 0   HEMATOCRIT 46 6 %  36 5-49 3   MCV 87 fL  82-98   MCH 30 2 pg  26 8-34 3   MCHC 34 5 g/dL  31 4-37 4   RDW 13 0 %  11 6-15 1   MPV 8 8 fL L 8 9-12 7   PLATELET COUNT 534 Thousands/uL  149-390 nRBC AUTOMATED 0 /100 WBCs     NEUTROPHILS RELATIVE PERCENT 65 %  43-75   LYMPHOCYTES RELATIVE PERCENT 28 %  14-44   MONOCYTES RELATIVE PERCENT 6 %  4-12   EOSINOPHILS RELATIVE PERCENT 0 %  0-6   BASOPHILS RELATIVE PERCENT 1 %  0-1   NEUTROPHILS ABSOLUTE COUNT 5 09 Thousands/?L  1 85-7 62   LYMPHOCYTES ABSOLUTE COUNT 2 16 Thousands/?L  0 60-4 47   MONOCYTES ABSOLUTE COUNT 0 50 Thousand/?L  0 17-1 22   EOSINOPHILS ABSOLUTE COUNT 0 01 Thousand/?L  0 00-0 61   BASOPHILS ABSOLUTE COUNT 0 05 Thousands/?L  0 00-0 10     (1) LIPID PANEL FASTING W DIRECT LDL REFLEX [id number] 10:10AM Sylvia Kehr Order Number: [id number]  TW Order Number: [id number] Order Number: [id number] Order Number: [id number] Order Number: [id number]     Test Name Result Flag Reference   CHOLESTEROL 216 mg/dL H    LDL CHOLESTEROL CALCULATED 147 mg/dL H 0-100   Triglyceride:         Normal              <150 mg/dl       Borderline High    150-199 mg/dl       High               200-499 mg/dl       Very High          >499 mg/dl  Cholesterol:         Desirable        <200 mg/dl      Borderline High  200-239 mg/dl      High             >239 mg/dl  HDL Cholesterol:        High    >59 mg/dL      Low     <41 mg/dL  LDL Cholesterol:        Optimal          <100 mg/dl        Near Optimal     100-129 mg/dl        Above Optimal          Borderline High   130-159 mg/dl          High              160-189 mg/dl          Very High        >189 mg/dl  LDL CALCULATED:    This screening LDL is a calculated result  It does not have the accuracy of the Direct Measured LDL in the monitoring of patients with hyperlipidemia and/or statin therapy  Direct Measure LDL (OWY253) must be ordered separately in these patients  TRIGLYCERIDES 103 mg/dL  <=150   Specimen collection should occur prior to N-Acetylcysteine or Metamizole administration due to the potential for falsely depressed results     HDL,DIRECT 48 mg/dL  40-60   Specimen collection should occur prior to Metamizole administration due to the potential for falsely depressed results       (1) TSH WITH FT4 REFLEX [id number] 10:10AM Wily Porteous   TW Order Number: [id number]  TW Order Number: [id number] Order Number: [id number] Order Number: [id number] Order Number: [id number]     Test Name Result Flag Reference   TSH 2 390 uIU/mL  0 358-3 740     (1) VITAMIN B12 [id number] 10:10AM Delphine Flash Order Number: [id number]  TW Order Number: [id number] Order Number: [id number] Order Number: [id number] Order Number: [id number]     Test Name Result Flag Reference   VITAMIN B12 406 pg/mL  100-900     (1) VITAMIN D 25-HYDROXY [id number] 10:10AM Delphine Flash Order Number: [id number]  TW Order Number: [id number]     Test Name Result Flag Reference   VIT D 25-HYDROX 24 5 ng/mL L 30 0-100 0     (1) PSA (SCREEN) (Dx V76 44 Screen for Prostate Cancer) [id number] 10:10AM Delphine Flash Order Number: [id number]  TW Order Number: [id number]     Test Name Result Flag Reference   PROSTATE SPECIFIC ANTIGEN 0 3 ng/mL  0 0-4 0

## 2018-02-10 ENCOUNTER — APPOINTMENT (OUTPATIENT)
Dept: LAB | Facility: MEDICAL CENTER | Age: 63
End: 2018-02-10
Payer: COMMERCIAL

## 2018-02-10 ENCOUNTER — TRANSCRIBE ORDERS (OUTPATIENT)
Dept: ADMINISTRATIVE | Facility: HOSPITAL | Age: 63
End: 2018-02-10

## 2018-02-10 DIAGNOSIS — N13.8 ENLARGED PROSTATE WITH URINARY OBSTRUCTION: ICD-10-CM

## 2018-02-10 DIAGNOSIS — R39.12 WEAK URINARY STREAM: ICD-10-CM

## 2018-02-10 DIAGNOSIS — I10 ESSENTIAL (PRIMARY) HYPERTENSION: ICD-10-CM

## 2018-02-10 DIAGNOSIS — N40.1 ENLARGED PROSTATE WITH URINARY OBSTRUCTION: ICD-10-CM

## 2018-02-10 DIAGNOSIS — R35.1 NOCTURIA: ICD-10-CM

## 2018-02-10 DIAGNOSIS — K21.9 GASTRO-ESOPHAGEAL REFLUX DISEASE WITHOUT ESOPHAGITIS: ICD-10-CM

## 2018-02-10 DIAGNOSIS — R39.12 WEAK URINARY STREAM: Primary | ICD-10-CM

## 2018-02-10 DIAGNOSIS — G57.93 MONONEUROPATHY OF BOTH LOWER EXTREMITIES: ICD-10-CM

## 2018-02-10 DIAGNOSIS — M79.672 PAIN OF LEFT FOOT: ICD-10-CM

## 2018-02-10 DIAGNOSIS — E03.9 HYPOTHYROIDISM: ICD-10-CM

## 2018-02-10 LAB
ALBUMIN SERPL BCP-MCNC: 3.7 G/DL (ref 3.5–5)
ALP SERPL-CCNC: 61 U/L (ref 46–116)
ALT SERPL W P-5'-P-CCNC: 39 U/L (ref 12–78)
ANION GAP SERPL CALCULATED.3IONS-SCNC: 8 MMOL/L (ref 4–13)
AST SERPL W P-5'-P-CCNC: 23 U/L (ref 5–45)
BASOPHILS # BLD AUTO: 0.02 THOUSANDS/ΜL (ref 0–0.1)
BASOPHILS NFR BLD AUTO: 0 % (ref 0–1)
BILIRUB SERPL-MCNC: 0.32 MG/DL (ref 0.2–1)
BUN SERPL-MCNC: 19 MG/DL (ref 5–25)
CALCIUM SERPL-MCNC: 8.7 MG/DL (ref 8.3–10.1)
CHLORIDE SERPL-SCNC: 108 MMOL/L (ref 100–108)
CHOLEST SERPL-MCNC: 201 MG/DL (ref 50–200)
CO2 SERPL-SCNC: 27 MMOL/L (ref 21–32)
CREAT SERPL-MCNC: 0.94 MG/DL (ref 0.6–1.3)
EOSINOPHIL # BLD AUTO: 0.01 THOUSAND/ΜL (ref 0–0.61)
EOSINOPHIL NFR BLD AUTO: 0 % (ref 0–6)
ERYTHROCYTE [DISTWIDTH] IN BLOOD BY AUTOMATED COUNT: 13.2 % (ref 11.6–15.1)
GFR SERPL CREATININE-BSD FRML MDRD: 87 ML/MIN/1.73SQ M
GLUCOSE P FAST SERPL-MCNC: 97 MG/DL (ref 65–99)
HCT VFR BLD AUTO: 45.3 % (ref 36.5–49.3)
HDLC SERPL-MCNC: 44 MG/DL (ref 40–60)
HGB BLD-MCNC: 15.7 G/DL (ref 12–17)
LDLC SERPL CALC-MCNC: 129 MG/DL (ref 0–100)
LYMPHOCYTES # BLD AUTO: 2.27 THOUSANDS/ΜL (ref 0.6–4.47)
LYMPHOCYTES NFR BLD AUTO: 32 % (ref 14–44)
MCH RBC QN AUTO: 30.8 PG (ref 26.8–34.3)
MCHC RBC AUTO-ENTMCNC: 34.7 G/DL (ref 31.4–37.4)
MCV RBC AUTO: 89 FL (ref 82–98)
MONOCYTES # BLD AUTO: 0.5 THOUSAND/ΜL (ref 0.17–1.22)
MONOCYTES NFR BLD AUTO: 7 % (ref 4–12)
NEUTROPHILS # BLD AUTO: 4.25 THOUSANDS/ΜL (ref 1.85–7.62)
NEUTS SEG NFR BLD AUTO: 61 % (ref 43–75)
NRBC BLD AUTO-RTO: 0 /100 WBCS
PLATELET # BLD AUTO: 141 THOUSANDS/UL (ref 149–390)
PMV BLD AUTO: 8.7 FL (ref 8.9–12.7)
POTASSIUM SERPL-SCNC: 4.5 MMOL/L (ref 3.5–5.3)
PROT SERPL-MCNC: 6.9 G/DL (ref 6.4–8.2)
PSA SERPL-MCNC: 0.2 NG/ML (ref 0–4)
RBC # BLD AUTO: 5.09 MILLION/UL (ref 3.88–5.62)
SODIUM SERPL-SCNC: 143 MMOL/L (ref 136–145)
T4 FREE SERPL-MCNC: 0.85 NG/DL (ref 0.76–1.46)
TRIGL SERPL-MCNC: 142 MG/DL
TSH SERPL DL<=0.05 MIU/L-ACNC: 3.76 UIU/ML (ref 0.36–3.74)
WBC # BLD AUTO: 7.06 THOUSAND/UL (ref 4.31–10.16)

## 2018-02-10 PROCEDURE — 84439 ASSAY OF FREE THYROXINE: CPT

## 2018-02-10 PROCEDURE — 80061 LIPID PANEL: CPT

## 2018-02-10 PROCEDURE — 84153 ASSAY OF PSA TOTAL: CPT

## 2018-02-10 PROCEDURE — 85025 COMPLETE CBC W/AUTO DIFF WBC: CPT

## 2018-02-10 PROCEDURE — 36415 COLL VENOUS BLD VENIPUNCTURE: CPT

## 2018-02-10 PROCEDURE — 84443 ASSAY THYROID STIM HORMONE: CPT

## 2018-02-10 PROCEDURE — 80053 COMPREHEN METABOLIC PANEL: CPT

## 2018-02-14 ENCOUNTER — OFFICE VISIT (OUTPATIENT)
Dept: FAMILY MEDICINE CLINIC | Facility: CLINIC | Age: 63
End: 2018-02-14
Payer: COMMERCIAL

## 2018-02-14 VITALS
WEIGHT: 300 LBS | SYSTOLIC BLOOD PRESSURE: 130 MMHG | BODY MASS INDEX: 38.5 KG/M2 | TEMPERATURE: 96.8 F | HEART RATE: 74 BPM | OXYGEN SATURATION: 96 % | DIASTOLIC BLOOD PRESSURE: 82 MMHG | HEIGHT: 74 IN

## 2018-02-14 DIAGNOSIS — G57.93 NEUROPATHY INVOLVING BOTH LOWER EXTREMITIES: ICD-10-CM

## 2018-02-14 DIAGNOSIS — E78.5 DYSLIPIDEMIA: ICD-10-CM

## 2018-02-14 DIAGNOSIS — I10 ESSENTIAL HYPERTENSION: ICD-10-CM

## 2018-02-14 DIAGNOSIS — E03.9 HYPOTHYROIDISM, UNSPECIFIED TYPE: Primary | ICD-10-CM

## 2018-02-14 PROBLEM — R79.89 LOW VITAMIN D LEVEL: Status: ACTIVE | Noted: 2017-03-02

## 2018-02-14 PROCEDURE — 99214 OFFICE O/P EST MOD 30 MIN: CPT | Performed by: FAMILY MEDICINE

## 2018-02-14 RX ORDER — LEVOTHYROXINE SODIUM 0.2 MG/1
200 TABLET ORAL DAILY
Qty: 90 TABLET | Refills: 1 | Status: SHIPPED | OUTPATIENT
Start: 2018-02-14 | End: 2018-04-12 | Stop reason: SDUPTHER

## 2018-02-14 RX ORDER — OMEPRAZOLE 20 MG/1
20 CAPSULE, DELAYED RELEASE ORAL DAILY
Qty: 90 CAPSULE | Refills: 1 | Status: SHIPPED | OUTPATIENT
Start: 2018-02-14 | End: 2020-02-26 | Stop reason: SDUPTHER

## 2018-02-14 RX ORDER — VERAPAMIL HYDROCHLORIDE 180 MG/1
180 CAPSULE, EXTENDED RELEASE ORAL
Qty: 90 CAPSULE | Refills: 1 | Status: SHIPPED | OUTPATIENT
Start: 2018-02-14 | End: 2018-07-23

## 2018-02-14 NOTE — PROGRESS NOTES
Assessment/Plan:   labs reviewed with patient  Patient still with neuropathy  Patient using Motrin and Tylenol as needed  Patient will have the labs prior to next visit  No problem-specific Assessment & Plan notes found for this encounter  Diagnoses and all orders for this visit:    Hypothyroidism, unspecified type  -     TSH, 3rd generation with T4 reflex; Future  -     levothyroxine 200 mcg tablet; Take 1 tablet (200 mcg total) by mouth daily    Essential hypertension  -     Comprehensive metabolic panel; Future  -     Hemoglobin A1c; Future  -     Lipid panel; Future  -     TSH, 3rd generation with T4 reflex; Future  -     Microalbumin / creatinine urine ratio  -     verapamil (VERELAN PM) 180 MG 24 hr capsule; Take 1 capsule (180 mg total) by mouth daily at bedtime    Dyslipidemia  -     Lipid panel; Future  -     TSH, 3rd generation with T4 reflex; Future    Neuropathy involving both lower extremities  -     Comprehensive metabolic panel; Future  -     Hemoglobin A1c; Future  -     Lipid panel; Future  -     TSH, 3rd generation with T4 reflex; Future  -     Microalbumin / creatinine urine ratio  -     omeprazole (PriLOSEC) 20 mg delayed release capsule; Take 1 capsule (20 mg total) by mouth daily          Subjective:      Patient ID: Jagdish Francis  is a 58 y o  male  Patient follow-up on hypothyroidism hypertension hyperlipidemia  Patient doing fairly well overall  Patient still with some neuropathy in the feet day   Patient otherwise feeling well  All review systems negative  The following portions of the patient's history were reviewed and updated as appropriate: allergies, current medications, past family history, past medical history, past social history, past surgical history and problem list     Review of Systems   Constitutional: Negative  HENT: Negative  Eyes: Negative  Respiratory: Negative  Cardiovascular: Negative  Gastrointestinal: Negative      Endocrine: Negative  Genitourinary: Negative  Musculoskeletal: Negative  Skin: Negative  Allergic/Immunologic: Negative  Neurological: Positive for numbness  Hematological: Negative  Psychiatric/Behavioral: Negative  Objective:    Vitals:    02/14/18 1530   BP: 130/82   Pulse: 74   Temp: (!) 96 8 °F (36 °C)   SpO2: 96%        Physical Exam   Constitutional: He is oriented to person, place, and time  He appears well-developed and well-nourished  No distress  HENT:   Head: Normocephalic  Right Ear: External ear normal    Left Ear: External ear normal    Mouth/Throat: Oropharynx is clear and moist  No oropharyngeal exudate  Eyes: EOM are normal  Pupils are equal, round, and reactive to light  Right eye exhibits no discharge  Left eye exhibits no discharge  No scleral icterus  Neck: Normal range of motion  Neck supple  No thyromegaly present  Cardiovascular: Normal rate, regular rhythm, normal heart sounds and intact distal pulses  Exam reveals no gallop and no friction rub  No murmur heard  Pulmonary/Chest: Effort normal and breath sounds normal  No respiratory distress  He has no wheezes  He has no rales  He exhibits no tenderness  Abdominal: Soft  Bowel sounds are normal  He exhibits no distension  There is no tenderness  There is no rebound and no guarding  Musculoskeletal: Normal range of motion  He exhibits tenderness  He exhibits no edema  Pain with palpation left foot laterally   Lymphadenopathy:     He has no cervical adenopathy  Neurological: He is oriented to person, place, and time  No cranial nerve deficit  He exhibits normal muscle tone  Coordination normal    Skin: Skin is warm and dry  No rash noted  He is not diaphoretic  No erythema  No pallor  Psychiatric: He has a normal mood and affect  His behavior is normal  Judgment and thought content normal    Nursing note and vitals reviewed

## 2018-02-22 ENCOUNTER — OFFICE VISIT (OUTPATIENT)
Dept: FAMILY MEDICINE CLINIC | Facility: CLINIC | Age: 63
End: 2018-02-22
Payer: COMMERCIAL

## 2018-02-22 VITALS
DIASTOLIC BLOOD PRESSURE: 80 MMHG | BODY MASS INDEX: 39.45 KG/M2 | WEIGHT: 307.4 LBS | SYSTOLIC BLOOD PRESSURE: 130 MMHG | HEIGHT: 74 IN

## 2018-02-22 DIAGNOSIS — M54.50 ACUTE LOW BACK PAIN WITHOUT SCIATICA, UNSPECIFIED BACK PAIN LATERALITY: Primary | ICD-10-CM

## 2018-02-22 PROCEDURE — 99213 OFFICE O/P EST LOW 20 MIN: CPT | Performed by: FAMILY MEDICINE

## 2018-02-22 RX ORDER — CYCLOBENZAPRINE HCL 10 MG
10 TABLET ORAL
Qty: 30 TABLET | Refills: 0 | Status: SHIPPED | OUTPATIENT
Start: 2018-02-22 | End: 2018-03-21 | Stop reason: SDUPTHER

## 2018-02-22 RX ORDER — NAPROXEN 250 MG/1
250 TABLET ORAL 2 TIMES DAILY WITH MEALS
COMMUNITY
End: 2018-02-22 | Stop reason: SDUPTHER

## 2018-02-22 RX ORDER — NAPROXEN 250 MG/1
500 TABLET ORAL 2 TIMES DAILY WITH MEALS
Qty: 60 TABLET | Refills: 0 | Status: SHIPPED | OUTPATIENT
Start: 2018-02-22 | End: 2018-06-07

## 2018-02-22 NOTE — PROGRESS NOTES
Assessment/Plan:   note for patient to return to work on Monday  No problem-specific Assessment & Plan notes found for this encounter  Diagnoses and all orders for this visit:    Acute low back pain without sciatica, unspecified back pain laterality  -     naproxen (NAPROSYN) 250 mg tablet; Take 2 tablets (500 mg total) by mouth 2 (two) times a day with meals  -     cyclobenzaprine (FLEXERIL) 10 mg tablet; Take 1 tablet (10 mg total) by mouth daily at bedtime    Other orders  -     Discontinue: naproxen (NAPROSYN) 250 mg tablet; Take 250 mg by mouth 2 (two) times a day with meals          Subjective:      Patient ID: Rohit Miguel  is a 58 y o  male  Patient is here having low back pain and lumbar region beginning Saturday and worsening since then  Patient without any radicular symptoms in the legs  No change in urination or defecation  Patient is symptoms may have started after doing deep knee bends prior to exercise  Patient has used  The Motrin, naproxen and Tylenol  Back Pain         The following portions of the patient's history were reviewed and updated as appropriate: allergies, current medications, past family history, past medical history, past social history, past surgical history and problem list     Review of Systems   Constitutional: Negative  HENT: Negative  Eyes: Negative  Respiratory: Negative  Cardiovascular: Negative  Gastrointestinal: Negative  Endocrine: Negative  Genitourinary: Negative  Musculoskeletal: Positive for back pain  Skin: Negative  Allergic/Immunologic: Negative  Neurological: Negative  Hematological: Negative  Psychiatric/Behavioral: Negative  Objective:      /80 (BP Location: Left arm, Patient Position: Sitting, Cuff Size: Standard)   Ht 6' 2" (1 88 m)   Wt (!) 139 kg (307 lb 6 4 oz)   BMI 39 47 kg/m²          Physical Exam   Constitutional: He is oriented to person, place, and time   He appears well-developed and well-nourished  No distress  HENT:   Head: Normocephalic  Right Ear: External ear normal    Left Ear: External ear normal    Mouth/Throat: Oropharynx is clear and moist  No oropharyngeal exudate  Eyes: EOM are normal  Pupils are equal, round, and reactive to light  Right eye exhibits no discharge  Left eye exhibits no discharge  No scleral icterus  Neck: Normal range of motion  Neck supple  No thyromegaly present  Cardiovascular: Normal rate, regular rhythm, normal heart sounds and intact distal pulses  Exam reveals no gallop and no friction rub  No murmur heard  Pulmonary/Chest: Effort normal and breath sounds normal  No respiratory distress  He has no wheezes  He has no rales  He exhibits no tenderness  Abdominal: Soft  Bowel sounds are normal  He exhibits no distension  There is no tenderness  There is no rebound and no guarding  Musculoskeletal: Normal range of motion  He exhibits tenderness  He exhibits no edema  Paravertebral muscle spasm and tenderness in lumbar spine bilaterally  Lymphadenopathy:     He has no cervical adenopathy  Neurological: He is oriented to person, place, and time  No cranial nerve deficit  He exhibits normal muscle tone  Coordination normal    Skin: Skin is warm and dry  No rash noted  He is not diaphoretic  No erythema  No pallor  Psychiatric: He has a normal mood and affect  His behavior is normal  Judgment and thought content normal    Nursing note and vitals reviewed

## 2018-03-21 DIAGNOSIS — M54.50 ACUTE LOW BACK PAIN WITHOUT SCIATICA, UNSPECIFIED BACK PAIN LATERALITY: ICD-10-CM

## 2018-03-21 RX ORDER — CYCLOBENZAPRINE HCL 10 MG
TABLET ORAL
Qty: 30 TABLET | Refills: 0 | Status: SHIPPED | OUTPATIENT
Start: 2018-03-21 | End: 2018-04-19 | Stop reason: SDUPTHER

## 2018-03-22 ENCOUNTER — OFFICE VISIT (OUTPATIENT)
Dept: FAMILY MEDICINE CLINIC | Facility: CLINIC | Age: 63
End: 2018-03-22
Payer: COMMERCIAL

## 2018-03-22 VITALS
HEIGHT: 74 IN | SYSTOLIC BLOOD PRESSURE: 142 MMHG | RESPIRATION RATE: 20 BRPM | WEIGHT: 305 LBS | BODY MASS INDEX: 39.14 KG/M2 | DIASTOLIC BLOOD PRESSURE: 74 MMHG

## 2018-03-22 DIAGNOSIS — G89.29 CHRONIC PAIN OF BOTH KNEES: Primary | ICD-10-CM

## 2018-03-22 DIAGNOSIS — M25.562 CHRONIC PAIN OF BOTH KNEES: Primary | ICD-10-CM

## 2018-03-22 DIAGNOSIS — M25.561 CHRONIC PAIN OF BOTH KNEES: Primary | ICD-10-CM

## 2018-03-22 PROCEDURE — 99213 OFFICE O/P EST LOW 20 MIN: CPT | Performed by: FAMILY MEDICINE

## 2018-03-22 RX ORDER — TRAMADOL HYDROCHLORIDE 50 MG/1
50 TABLET ORAL EVERY 6 HOURS PRN
Qty: 30 TABLET | Refills: 0 | Status: SHIPPED | OUTPATIENT
Start: 2018-03-22 | End: 2018-06-07

## 2018-03-22 NOTE — PROGRESS NOTES
Assessment/Plan:    70-year-old male with:  Chronic bilateral knee pain  Discussed supportive care including heat and cold, stretching, anti-inflammatories with Tylenol and tramadol for breakthrough pain  Patient declines referral to physical therapy  Discussed use of bracing  Patient declines a shot at this point but will call back if he changes his mind  Patient also declines imaging at this point but will call back if no improvement or worsening within 4 weeks  No problem-specific Assessment & Plan notes found for this encounter  Diagnoses and all orders for this visit:    Chronic pain of both knees  -     traMADol (ULTRAM) 50 mg tablet; Take 1 tablet (50 mg total) by mouth every 6 (six) hours as needed for moderate pain          Subjective:   Chief Complaint   Patient presents with    Knee Pain     Right knee sudden onset of Meniscus pain X 1 week    Knee Pain     bothering right hamstring          Patient ID: Allan Singh  is a 58 y o  male  Patient is a 70-year-old male who presents for follow-up on worsening bilateral knee pain, right greater than left  Patient admits a longstanding history of knee injuries in the past including bilateral knee surgeries and torn meniscus  Patient admits he is very physically active but denies specific precipitating trauma or injury  No weakness or feelings of instability  Patient has been using naproxen and Flexeril with little relief  Knee Pain          The following portions of the patient's history were reviewed and updated as appropriate: allergies, current medications, past family history, past medical history, past social history, past surgical history and problem list     Review of Systems   Constitutional: Negative  HENT: Negative  Eyes: Negative  Respiratory: Negative  Cardiovascular: Negative  Gastrointestinal: Negative  Endocrine: Negative  Genitourinary: Negative  Musculoskeletal: Positive for arthralgias  Allergic/Immunologic: Negative  Neurological: Negative  Hematological: Negative  Psychiatric/Behavioral: Negative  All other systems reviewed and are negative  Objective:      /74 (BP Location: Right arm)   Resp 20   Ht 6' 2" (1 88 m)   Wt (!) 138 kg (305 lb)   BMI 39 16 kg/m²          Physical Exam   Constitutional: He is oriented to person, place, and time  He appears well-developed and well-nourished  HENT:   Head: Atraumatic  Right Ear: External ear normal    Left Ear: External ear normal    Eyes: Conjunctivae and EOM are normal  Pupils are equal, round, and reactive to light  Neck: Normal range of motion  Cardiovascular: Normal rate, regular rhythm and normal heart sounds  Pulmonary/Chest: Effort normal and breath sounds normal  No respiratory distress  Abdominal: Soft  Bowel sounds are normal  He exhibits no distension  There is no tenderness  There is no rebound and no guarding  Musculoskeletal: Normal range of motion  Decreased range of motion bilateral knees with palpable crepitus   Neurological: He is alert and oriented to person, place, and time  No cranial nerve deficit  Skin: Skin is warm and dry  Psychiatric: He has a normal mood and affect   His behavior is normal  Judgment and thought content normal

## 2018-03-27 DIAGNOSIS — G62.9 NEUROPATHY: Primary | ICD-10-CM

## 2018-03-27 RX ORDER — GABAPENTIN 300 MG/1
CAPSULE ORAL
Qty: 90 CAPSULE | Refills: 1 | Status: SHIPPED | OUTPATIENT
Start: 2018-03-27 | End: 2018-10-01 | Stop reason: SDUPTHER

## 2018-04-12 DIAGNOSIS — E03.9 HYPOTHYROIDISM, UNSPECIFIED TYPE: ICD-10-CM

## 2018-04-13 ENCOUNTER — OFFICE VISIT (OUTPATIENT)
Dept: FAMILY MEDICINE CLINIC | Facility: CLINIC | Age: 63
End: 2018-04-13
Payer: COMMERCIAL

## 2018-04-13 VITALS
HEIGHT: 74 IN | WEIGHT: 307.4 LBS | HEART RATE: 67 BPM | BODY MASS INDEX: 39.45 KG/M2 | SYSTOLIC BLOOD PRESSURE: 152 MMHG | DIASTOLIC BLOOD PRESSURE: 90 MMHG | OXYGEN SATURATION: 98 %

## 2018-04-13 DIAGNOSIS — M25.561 CHRONIC PAIN OF RIGHT KNEE: Primary | ICD-10-CM

## 2018-04-13 DIAGNOSIS — G89.29 CHRONIC PAIN OF RIGHT KNEE: Primary | ICD-10-CM

## 2018-04-13 PROCEDURE — 20610 DRAIN/INJ JOINT/BURSA W/O US: CPT | Performed by: FAMILY MEDICINE

## 2018-04-13 PROCEDURE — 99213 OFFICE O/P EST LOW 20 MIN: CPT | Performed by: FAMILY MEDICINE

## 2018-04-13 RX ORDER — LIDOCAINE HYDROCHLORIDE 10 MG/ML
2 INJECTION, SOLUTION INFILTRATION; PERINEURAL
Status: COMPLETED | OUTPATIENT
Start: 2018-04-13 | End: 2018-04-13

## 2018-04-13 RX ORDER — METHYLPREDNISOLONE ACETATE 40 MG/ML
1 INJECTION, SUSPENSION INTRA-ARTICULAR; INTRALESIONAL; INTRAMUSCULAR; SOFT TISSUE
Status: COMPLETED | OUTPATIENT
Start: 2018-04-13 | End: 2018-04-13

## 2018-04-13 RX ADMIN — METHYLPREDNISOLONE ACETATE 1 ML: 40 INJECTION, SUSPENSION INTRA-ARTICULAR; INTRALESIONAL; INTRAMUSCULAR; SOFT TISSUE at 15:02

## 2018-04-13 RX ADMIN — LIDOCAINE HYDROCHLORIDE 2 ML: 10 INJECTION, SOLUTION INFILTRATION; PERINEURAL at 15:02

## 2018-04-13 NOTE — PROGRESS NOTES
Assessment/Plan:   Betadine use  Arthrocentesis done  Diagnoses and all orders for this visit:    Chronic pain of right knee    Other orders  -     Large joint arthrocentesis          Subjective:      Patient ID: Duran Baker  is a 61 y o  male  Patient is here with right knee pain greater than left knee pain  Patient did have meniscal tear with surgery done roughly 35 years ago  Patient with worsening of pain over the past 3 weeks  Patient with difficulty with range of motion  Patient having difficulty doing job due to ambulation  No direct trauma  The patient is using Motrin        The following portions of the patient's history were reviewed and updated as appropriate: allergies, current medications, past family history, past medical history, past social history, past surgical history and problem list     Review of Systems   Constitutional: Negative  HENT: Negative  Eyes: Negative  Respiratory: Negative  Cardiovascular: Negative  Gastrointestinal: Negative  Endocrine: Negative  Genitourinary: Negative  Musculoskeletal: Positive for arthralgias and gait problem  Skin: Negative  Allergic/Immunologic: Negative  Hematological: Negative  Psychiatric/Behavioral: Negative  Objective:      /90 (BP Location: Right arm, Patient Position: Sitting, Cuff Size: Standard)   Pulse 67   Ht 6' 2 02" (1 88 m)   Wt (!) 139 kg (307 lb 6 4 oz)   SpO2 98%   BMI 39 45 kg/m²          Physical Exam   Constitutional: He is oriented to person, place, and time  He appears well-developed and well-nourished  No distress  HENT:   Head: Normocephalic  Right Ear: External ear normal    Left Ear: External ear normal    Mouth/Throat: Oropharynx is clear and moist  No oropharyngeal exudate  Eyes: EOM are normal  Pupils are equal, round, and reactive to light  Right eye exhibits no discharge  Left eye exhibits no discharge  No scleral icterus     Neck: Normal range of motion  Neck supple  No thyromegaly present  Cardiovascular: Normal rate, regular rhythm, normal heart sounds and intact distal pulses  Exam reveals no gallop and no friction rub  No murmur heard  Pulmonary/Chest: Effort normal and breath sounds normal  No respiratory distress  He has no wheezes  He has no rales  He exhibits no tenderness  Abdominal: Soft  Bowel sounds are normal  He exhibits no distension  There is no tenderness  There is no rebound and no guarding  Musculoskeletal: Normal range of motion  He exhibits tenderness  He exhibits no edema or deformity  Cicatrix midline right knee  Pain with palpation over the medial joint line  No pain over the lateral joint line  No calf tenderness  Lymphadenopathy:     He has no cervical adenopathy  Neurological: He is oriented to person, place, and time  No cranial nerve deficit  He exhibits normal muscle tone  Coordination normal    Skin: Skin is warm and dry  No rash noted  He is not diaphoretic  No erythema  No pallor  Psychiatric: He has a normal mood and affect  His behavior is normal  Judgment and thought content normal    Nursing note and vitals reviewed      Large joint arthrocentesis  Date/Time: 4/13/2018 3:02 PM  Consent given by: patient  Site marked: site marked  Supporting Documentation  Indications: pain   Procedure Details  Location: knee - R knee  Needle size: 25 G  Ultrasound guidance: no  Approach: anterolateral  Medications administered: 2 mL lidocaine 1 %; 1 mL methylPREDNISolone acetate 40 mg/mL    Patient tolerance: patient tolerated the procedure well with no immediate complications  Dressing:  Sterile dressing applied

## 2018-04-14 ENCOUNTER — APPOINTMENT (OUTPATIENT)
Dept: RADIOLOGY | Facility: MEDICAL CENTER | Age: 63
End: 2018-04-14
Payer: COMMERCIAL

## 2018-04-14 ENCOUNTER — TRANSCRIBE ORDERS (OUTPATIENT)
Dept: ADMINISTRATIVE | Facility: HOSPITAL | Age: 63
End: 2018-04-14

## 2018-04-14 DIAGNOSIS — M25.561 CHRONIC PAIN OF RIGHT KNEE: ICD-10-CM

## 2018-04-14 DIAGNOSIS — G89.29 CHRONIC PAIN OF RIGHT KNEE: ICD-10-CM

## 2018-04-14 PROCEDURE — 73562 X-RAY EXAM OF KNEE 3: CPT

## 2018-04-16 RX ORDER — LEVOTHYROXINE SODIUM 200 MCG
TABLET ORAL
Qty: 90 TABLET | Refills: 1 | Status: SHIPPED | OUTPATIENT
Start: 2018-04-16 | End: 2018-08-08 | Stop reason: SDUPTHER

## 2018-04-17 ENCOUNTER — TELEPHONE (OUTPATIENT)
Dept: FAMILY MEDICINE CLINIC | Facility: CLINIC | Age: 63
End: 2018-04-17

## 2018-04-17 DIAGNOSIS — G89.29 CHRONIC PAIN OF RIGHT KNEE: Primary | ICD-10-CM

## 2018-04-17 DIAGNOSIS — M17.11 OSTEOARTHRITIS OF RIGHT KNEE, UNSPECIFIED OSTEOARTHRITIS TYPE: Primary | ICD-10-CM

## 2018-04-17 DIAGNOSIS — M25.561 CHRONIC PAIN OF RIGHT KNEE: Primary | ICD-10-CM

## 2018-04-17 NOTE — TELEPHONE ENCOUNTER
----- Message from Rashmi Hernandez DO sent at 4/17/2018 12:18 PM EDT -----  Call patient  Patient with mild-to-moderate osteoarthritis right knee

## 2018-04-17 NOTE — TELEPHONE ENCOUNTER
----- Message from Mary Prieto DO sent at 4/17/2018 12:18 PM EDT -----  Call patient  Patient with mild-to-moderate osteoarthritis right knee

## 2018-04-19 DIAGNOSIS — M54.50 ACUTE LOW BACK PAIN WITHOUT SCIATICA, UNSPECIFIED BACK PAIN LATERALITY: ICD-10-CM

## 2018-04-19 RX ORDER — CYCLOBENZAPRINE HCL 10 MG
TABLET ORAL
Qty: 30 TABLET | Refills: 0 | Status: SHIPPED | OUTPATIENT
Start: 2018-04-19 | End: 2018-06-07

## 2018-05-07 NOTE — PROGRESS NOTES
Call patient  Patient with total tear of ACL right knee as well as osteoarthritis    Recommend referral to Orthopedics

## 2018-05-12 ENCOUNTER — APPOINTMENT (OUTPATIENT)
Dept: LAB | Facility: MEDICAL CENTER | Age: 63
End: 2018-05-12
Payer: COMMERCIAL

## 2018-05-12 DIAGNOSIS — E03.9 HYPOTHYROIDISM, UNSPECIFIED TYPE: ICD-10-CM

## 2018-05-12 DIAGNOSIS — G57.93 NEUROPATHY INVOLVING BOTH LOWER EXTREMITIES: ICD-10-CM

## 2018-05-12 DIAGNOSIS — E78.5 DYSLIPIDEMIA: ICD-10-CM

## 2018-05-12 DIAGNOSIS — I10 ESSENTIAL HYPERTENSION: ICD-10-CM

## 2018-05-12 LAB
ALBUMIN SERPL BCP-MCNC: 3.6 G/DL (ref 3.5–5)
ALP SERPL-CCNC: 61 U/L (ref 46–116)
ALT SERPL W P-5'-P-CCNC: 38 U/L (ref 12–78)
ANION GAP SERPL CALCULATED.3IONS-SCNC: 5 MMOL/L (ref 4–13)
AST SERPL W P-5'-P-CCNC: 16 U/L (ref 5–45)
BILIRUB SERPL-MCNC: 0.56 MG/DL (ref 0.2–1)
BUN SERPL-MCNC: 18 MG/DL (ref 5–25)
CALCIUM SERPL-MCNC: 8.3 MG/DL (ref 8.3–10.1)
CHLORIDE SERPL-SCNC: 111 MMOL/L (ref 100–108)
CHOLEST SERPL-MCNC: 176 MG/DL (ref 50–200)
CO2 SERPL-SCNC: 27 MMOL/L (ref 21–32)
CREAT SERPL-MCNC: 0.91 MG/DL (ref 0.6–1.3)
CREAT UR-MCNC: 108 MG/DL
EST. AVERAGE GLUCOSE BLD GHB EST-MCNC: 108 MG/DL
GFR SERPL CREATININE-BSD FRML MDRD: 89 ML/MIN/1.73SQ M
GLUCOSE P FAST SERPL-MCNC: 96 MG/DL (ref 65–99)
HBA1C MFR BLD: 5.4 % (ref 4.2–6.3)
HDLC SERPL-MCNC: 47 MG/DL (ref 40–60)
LDLC SERPL CALC-MCNC: 93 MG/DL (ref 0–100)
MICROALBUMIN UR-MCNC: 7.7 MG/L (ref 0–20)
MICROALBUMIN/CREAT 24H UR: 7 MG/G CREATININE (ref 0–30)
NONHDLC SERPL-MCNC: 129 MG/DL
POTASSIUM SERPL-SCNC: 4.7 MMOL/L (ref 3.5–5.3)
PROT SERPL-MCNC: 6.6 G/DL (ref 6.4–8.2)
SODIUM SERPL-SCNC: 143 MMOL/L (ref 136–145)
TRIGL SERPL-MCNC: 181 MG/DL
TSH SERPL DL<=0.05 MIU/L-ACNC: 3.13 UIU/ML (ref 0.36–3.74)

## 2018-05-12 PROCEDURE — 36415 COLL VENOUS BLD VENIPUNCTURE: CPT

## 2018-05-12 PROCEDURE — 80053 COMPREHEN METABOLIC PANEL: CPT

## 2018-05-12 PROCEDURE — 82043 UR ALBUMIN QUANTITATIVE: CPT | Performed by: FAMILY MEDICINE

## 2018-05-12 PROCEDURE — 84443 ASSAY THYROID STIM HORMONE: CPT

## 2018-05-12 PROCEDURE — 82570 ASSAY OF URINE CREATININE: CPT | Performed by: FAMILY MEDICINE

## 2018-05-12 PROCEDURE — 83036 HEMOGLOBIN GLYCOSYLATED A1C: CPT

## 2018-05-12 PROCEDURE — 80061 LIPID PANEL: CPT

## 2018-05-23 ENCOUNTER — OFFICE VISIT (OUTPATIENT)
Dept: FAMILY MEDICINE CLINIC | Facility: CLINIC | Age: 63
End: 2018-05-23
Payer: COMMERCIAL

## 2018-05-23 VITALS
HEIGHT: 74 IN | RESPIRATION RATE: 18 BRPM | BODY MASS INDEX: 39.01 KG/M2 | OXYGEN SATURATION: 95 % | TEMPERATURE: 98 F | WEIGHT: 304 LBS | SYSTOLIC BLOOD PRESSURE: 132 MMHG | HEART RATE: 70 BPM | DIASTOLIC BLOOD PRESSURE: 72 MMHG

## 2018-05-23 DIAGNOSIS — L30.9 DERMATITIS: ICD-10-CM

## 2018-05-23 DIAGNOSIS — S83.511A RUPTURE OF ANTERIOR CRUCIATE LIGAMENT OF RIGHT KNEE, INITIAL ENCOUNTER: Primary | ICD-10-CM

## 2018-05-23 PROCEDURE — 99213 OFFICE O/P EST LOW 20 MIN: CPT | Performed by: FAMILY MEDICINE

## 2018-05-23 RX ORDER — BETAMETHASONE DIPROPIONATE 0.5 MG/G
CREAM TOPICAL 2 TIMES DAILY
Qty: 30 G | Refills: 0 | Status: SHIPPED | OUTPATIENT
Start: 2018-05-23 | End: 2019-04-10 | Stop reason: SDUPTHER

## 2018-05-23 NOTE — PROGRESS NOTES
Assessment/Plan:   MRI discussed with the patient  Patient with total ACL tear right knee  Patient also with meniscal tear laterally  Patient with severe osteoarthritis medially  Diagnoses and all orders for this visit:    Rupture of anterior cruciate ligament of right knee, initial encounter  -     Ambulatory referral to Orthopedic Surgery; Future          Subjective:      Patient ID: Amanda Benítez  is a 61 y o  male  Patient is here to follow-up on right knee pain  Patient status post MR I  Patient does notice instability and pain  The following portions of the patient's history were reviewed and updated as appropriate: allergies, current medications, past family history, past medical history, past social history, past surgical history and problem list     Review of Systems   Constitutional: Negative  HENT: Negative  Eyes: Negative  Respiratory: Negative  Cardiovascular: Negative  Gastrointestinal: Negative  Endocrine: Negative  Genitourinary: Negative  Musculoskeletal: Positive for arthralgias  Skin: Negative  Allergic/Immunologic: Negative  Neurological: Negative  Hematological: Negative  Psychiatric/Behavioral: Negative  Objective:      /72 (BP Location: Right arm)   Pulse 70   Temp 98 °F (36 7 °C)   Resp 18   Ht 6' 2" (1 88 m)   Wt (!) 138 kg (304 lb)   SpO2 (!) 68%   BMI 39 03 kg/m²          Physical Exam   Constitutional: He appears well-developed and well-nourished  HENT:   Head: Normocephalic and atraumatic  Cardiovascular: Normal rate and regular rhythm  Pulmonary/Chest: Effort normal and breath sounds normal    Musculoskeletal: He exhibits tenderness  The right knee positive Lachman  Nursing note and vitals reviewed

## 2018-05-31 ENCOUNTER — OFFICE VISIT (OUTPATIENT)
Dept: OBGYN CLINIC | Facility: OTHER | Age: 63
End: 2018-05-31
Payer: COMMERCIAL

## 2018-05-31 VITALS
HEART RATE: 57 BPM | DIASTOLIC BLOOD PRESSURE: 68 MMHG | SYSTOLIC BLOOD PRESSURE: 130 MMHG | WEIGHT: 307 LBS | BODY MASS INDEX: 39.42 KG/M2

## 2018-05-31 DIAGNOSIS — M17.11 PRIMARY OSTEOARTHRITIS OF RIGHT KNEE: Primary | ICD-10-CM

## 2018-05-31 PROCEDURE — 99203 OFFICE O/P NEW LOW 30 MIN: CPT | Performed by: ORTHOPAEDIC SURGERY

## 2018-05-31 NOTE — PROGRESS NOTES
Assessment  Diagnoses and all orders for this visit:    Primary osteoarthritis of right knee        Discussion and Plan:    The patient has osteoarthritis of his right knee and I explained to him that his ACL deficiency and meniscal pathology are not something that surgical intervention will likely improve instead the only surgical intervention that is indicated for his knee would be total knee arthroplasty  He does not require that at this time and will be seen in 2 to 3 months for repeat evaluation and a repeat corticosteroid injection could be considered, viscosupplementation was also discussed and he will continue wear the brace for comfort  We will check his progress in 2 to 3 months    Subjective:   Patient ID: Jack Banks  is a 61 y o  male      The patient presents with a chief complaint of right knee pain  He has an MRI scan and x-rays showing osteoarthritis of the right knee and was sent to see me for further evaluation  I performed arthroscopic rotator cuff repair of his left shoulder in 2015 with excellent results  He says the pain is knee is sharp is localized the medial and lateral joint line, it is intermittent timing, it is worse activity relieved by rest   It is not associated with catching or locking or giving way, he does find wearing a brace is helpful when he is at work  He did receive a corticosteroid injection 6 weeks ago from his primary physician and it did help his symptoms            The following portions of the patient's history were reviewed and updated as appropriate: allergies, current medications, past family history, past medical history, past social history, past surgical history and problem list     Review of Systems   Constitutional: Negative for chills and fever  HENT: Negative for hearing loss  Eyes: Negative for visual disturbance  Respiratory: Negative for shortness of breath  Cardiovascular: Negative for chest pain     Gastrointestinal: Negative for abdominal pain  Musculoskeletal:        As reviewed in the HPI   Skin: Negative for rash  Neurological:        As reviewed in the HPI   Psychiatric/Behavioral: Negative for agitation  Objective:  Right Knee Exam   Swelling: None  Effusion: No    Tenderness   The patient is experiencing tenderness in the medial joint line, lateral joint line  Range of Motion   Extension: 0  Flexion:     110    Tests   McMurrays:  Medial - Positive      Lateral - Positive  Lachman:  Anterior - Positive      Drawer:       Posterior - Negative  Varus:  Negative  Valgus: Negative  Pivot Shift: Negative  Patellar Apprehension: No    Comments:  Well-healed transverse incision over medial joint consistent with prior open meniscectomy          Physical Exam   Constitutional: He is oriented to person, place, and time  He appears well-developed and well-nourished  HENT:   Head: Normocephalic and atraumatic  Neck: Normal range of motion  Neck supple  Cardiovascular: Normal rate and regular rhythm  Pulmonary/Chest: Effort normal  No respiratory distress  Abdominal: Soft  He exhibits no distension  Musculoskeletal:        Right knee: Medial joint line and lateral joint line tenderness noted  Neurological: He is alert and oriented to person, place, and time  Skin: Skin is warm and dry  Psychiatric: He has a normal mood and affect  His behavior is normal    Nursing note and vitals reviewed  I have personally reviewed pertinent films in PACS and my interpretation is as follows      Three views right knee weight-bearing show loss of medial joint space with osteophyte formation as well as degenerative change been on the patellofemoral joint    MRI scan right knee shows an ACL deficiency with extensive degenerative change tricompartmentally including meniscal pathology

## 2018-06-07 ENCOUNTER — PROCEDURE VISIT (OUTPATIENT)
Dept: FAMILY MEDICINE CLINIC | Facility: CLINIC | Age: 63
End: 2018-06-07
Payer: COMMERCIAL

## 2018-06-07 VITALS
HEIGHT: 74 IN | TEMPERATURE: 97.7 F | BODY MASS INDEX: 39.5 KG/M2 | SYSTOLIC BLOOD PRESSURE: 124 MMHG | WEIGHT: 307.8 LBS | DIASTOLIC BLOOD PRESSURE: 80 MMHG

## 2018-06-07 DIAGNOSIS — D49.2 SKIN NEOPLASM: Primary | ICD-10-CM

## 2018-06-07 PROCEDURE — 88305 TISSUE EXAM BY PATHOLOGIST: CPT | Performed by: PATHOLOGY

## 2018-06-07 PROCEDURE — 11301 SHAVE SKIN LESION 0.6-1.0 CM: CPT | Performed by: FAMILY MEDICINE

## 2018-06-07 NOTE — PROGRESS NOTES
Assessment/Plan:         Diagnoses and all orders for this visit:    Skin neoplasm  -     Tissue Exam; Future  -     ciclopirox (LOPROX) 0 77 % cream; Apply topically 2 (two) times a day    Other orders  -     Shave Removal          Subjective:      Patient ID: Jack Banks  is a 61 y o  male  Patient is here for removal of lesion on left leg  The following portions of the patient's history were reviewed and updated as appropriate: allergies, current medications, past family history, past medical history, past social history, past surgical history and problem list     Review of Systems   Constitutional: Negative  HENT: Negative  Eyes: Negative  Respiratory: Negative  Cardiovascular: Negative  Gastrointestinal: Negative  Endocrine: Negative  Genitourinary: Negative  Musculoskeletal: Negative  Allergic/Immunologic: Negative  Neurological: Negative  Hematological: Negative  Psychiatric/Behavioral: Negative  Objective:      /80 (BP Location: Left arm, Patient Position: Sitting, Cuff Size: Large)   Temp 97 7 °F (36 5 °C) (Tympanic)   Ht 6' 2" (1 88 m)   Wt (!) 140 kg (307 lb 12 8 oz)   BMI 39 52 kg/m²          Physical Exam   Constitutional: He appears well-developed  Skin:   Skin neoplasm medial aspect of left lower leg   Nursing note and vitals reviewed  Shave lesion  Date/Time: 6/7/2018 5:02 PM  Performed by: Joanne Rocha  Authorized by: Joanne Rocha     Number of Lesions: 1  Lesion 1:     Body area: lower extremity    Lower extremity location: L lower leg    Initial size (mm): 8    Final defect size (mm): 10    Malignancy: benign lesion      Destruction method: shave removal      Comments:  Informed consent obtained  Betadine and alcohol use  Shave excision done after 1 cc of lidocaine 1 percent with epinephrine used  Patient tolerated well  Neosporin and Band-Aid applied  Patient to keep area clean and dry use Neosporin daily

## 2018-06-22 ENCOUNTER — OFFICE VISIT (OUTPATIENT)
Dept: FAMILY MEDICINE CLINIC | Facility: CLINIC | Age: 63
End: 2018-06-22

## 2018-06-22 VITALS
SYSTOLIC BLOOD PRESSURE: 122 MMHG | WEIGHT: 305 LBS | BODY MASS INDEX: 39.14 KG/M2 | DIASTOLIC BLOOD PRESSURE: 80 MMHG | TEMPERATURE: 96 F | HEIGHT: 74 IN

## 2018-06-22 DIAGNOSIS — L82.1 SEBORRHEIC KERATOSIS: Primary | ICD-10-CM

## 2018-06-22 PROCEDURE — 3008F BODY MASS INDEX DOCD: CPT | Performed by: FAMILY MEDICINE

## 2018-06-22 PROCEDURE — 99024 POSTOP FOLLOW-UP VISIT: CPT | Performed by: FAMILY MEDICINE

## 2018-06-22 NOTE — PROGRESS NOTES
Assessment/Plan:  Pathology discussed with the pt  Diagnoses and all orders for this visit:    Seborrheic keratosis          Subjective:      Patient ID: Schuyler Lamas  is a 61 y o  male  Pt  Is here for followup on biopsy  Pt is doing well without redness or discharge  The following portions of the patient's history were reviewed and updated as appropriate: allergies, current medications, past family history, past medical history, past social history, past surgical history and problem list     Review of Systems   Constitutional: Negative  HENT: Negative  Eyes: Negative  Respiratory: Negative  Cardiovascular: Negative  Gastrointestinal: Negative  Endocrine: Negative  Genitourinary: Negative  Musculoskeletal: Negative  Skin: Negative  Allergic/Immunologic: Negative  Neurological: Negative  Hematological: Negative  Psychiatric/Behavioral: Negative  Objective:      /80 (BP Location: Left arm, Patient Position: Sitting, Cuff Size: Large)   Temp (!) 96 °F (35 6 °C)   Ht 6' 2" (1 88 m)   Wt (!) 138 kg (305 lb)   BMI 39 16 kg/m²          Physical Exam   Constitutional: He appears well-developed  Skin:   Left leg biopsy site healed well

## 2018-06-24 DIAGNOSIS — I10 ESSENTIAL HYPERTENSION: Primary | ICD-10-CM

## 2018-07-23 RX ORDER — VITAMIN E 268 MG
400 CAPSULE ORAL DAILY
COMMUNITY

## 2018-07-24 ENCOUNTER — ANESTHESIA EVENT (OUTPATIENT)
Dept: PERIOP | Facility: HOSPITAL | Age: 63
End: 2018-07-24
Payer: COMMERCIAL

## 2018-07-25 ENCOUNTER — HOSPITAL ENCOUNTER (OUTPATIENT)
Facility: HOSPITAL | Age: 63
Setting detail: OUTPATIENT SURGERY
Discharge: HOME | End: 2018-07-25
Attending: SURGERY | Admitting: SURGERY
Payer: COMMERCIAL

## 2018-07-25 ENCOUNTER — ANESTHESIA (OUTPATIENT)
Dept: PERIOP | Facility: HOSPITAL | Age: 63
End: 2018-07-25
Payer: COMMERCIAL

## 2018-07-25 VITALS
WEIGHT: 305 LBS | HEART RATE: 60 BPM | OXYGEN SATURATION: 96 % | SYSTOLIC BLOOD PRESSURE: 135 MMHG | HEIGHT: 74 IN | TEMPERATURE: 97.4 F | BODY MASS INDEX: 39.14 KG/M2 | DIASTOLIC BLOOD PRESSURE: 66 MMHG | RESPIRATION RATE: 18 BRPM

## 2018-07-25 DIAGNOSIS — R10.32 LEFT LOWER QUADRANT PAIN: ICD-10-CM

## 2018-07-25 PROBLEM — Z12.11 SCREENING FOR COLORECTAL CANCER: Status: ACTIVE | Noted: 2018-07-25

## 2018-07-25 PROBLEM — Z12.11 SCREENING FOR COLORECTAL CANCER: Status: RESOLVED | Noted: 2018-07-25 | Resolved: 2018-07-25

## 2018-07-25 PROBLEM — Z12.12 SCREENING FOR COLORECTAL CANCER: Status: RESOLVED | Noted: 2018-07-25 | Resolved: 2018-07-25

## 2018-07-25 PROBLEM — Z12.12 SCREENING FOR COLORECTAL CANCER: Status: ACTIVE | Noted: 2018-07-25

## 2018-07-25 PROCEDURE — 88305 TISSUE EXAM BY PATHOLOGIST: CPT | Performed by: PATHOLOGY

## 2018-07-25 RX ORDER — PROPOFOL 10 MG/ML
INJECTION, EMULSION INTRAVENOUS CONTINUOUS PRN
Status: DISCONTINUED | OUTPATIENT
Start: 2018-07-25 | End: 2018-07-25

## 2018-07-25 RX ORDER — PROPOFOL 10 MG/ML
INJECTION, EMULSION INTRAVENOUS AS NEEDED
Status: DISCONTINUED | OUTPATIENT
Start: 2018-07-25 | End: 2018-07-25 | Stop reason: SURG

## 2018-07-25 RX ORDER — SODIUM CHLORIDE 9 MG/ML
50 INJECTION, SOLUTION INTRAVENOUS CONTINUOUS
Status: DISCONTINUED | OUTPATIENT
Start: 2018-07-25 | End: 2018-07-26 | Stop reason: HOSPADM

## 2018-07-25 RX ORDER — SODIUM CHLORIDE 9 MG/ML
50 INJECTION, SOLUTION INTRAVENOUS CONTINUOUS
Status: DISCONTINUED | OUTPATIENT
Start: 2018-07-26 | End: 2018-07-25

## 2018-07-25 RX ADMIN — SODIUM CHLORIDE 50 ML/HR: 9 INJECTION, SOLUTION INTRAVENOUS at 07:53

## 2018-07-25 RX ADMIN — PROPOFOL 20 MG: 10 INJECTION, EMULSION INTRAVENOUS at 09:23

## 2018-07-25 RX ADMIN — PROPOFOL 20 MG: 10 INJECTION, EMULSION INTRAVENOUS at 09:36

## 2018-07-25 RX ADMIN — PROPOFOL 30 MG: 10 INJECTION, EMULSION INTRAVENOUS at 09:24

## 2018-07-25 RX ADMIN — PROPOFOL 50 MG: 10 INJECTION, EMULSION INTRAVENOUS at 09:33

## 2018-07-25 RX ADMIN — PROPOFOL 100 MG: 10 INJECTION, EMULSION INTRAVENOUS at 09:22

## 2018-07-25 RX ADMIN — PROPOFOL 100 MG: 10 INJECTION, EMULSION INTRAVENOUS at 09:17

## 2018-07-25 RX ADMIN — PROPOFOL 40 MG: 10 INJECTION, EMULSION INTRAVENOUS at 09:29

## 2018-07-25 NOTE — ANESTHESIA POSTPROCEDURE EVALUATION
Post-Op Assessment Note      CV Status:  Stable    Mental Status:  Alert and awake    Hydration Status:  Euvolemic    PONV Controlled:  Controlled    Airway Patency:  Patent    Post Op Vitals Reviewed: Yes          Staff: CRNA           /64 (07/25/18 0945)    Temp     Pulse (!) 54 (07/25/18 0945)   Resp 20 (07/25/18 0945)    SpO2 95 % (07/25/18 0945)

## 2018-07-25 NOTE — DISCHARGE INSTRUCTIONS
Colonoscopy   WHAT YOU NEED TO KNOW:   A colonoscopy is a procedure to examine the inside of your colon (intestine) with a scope  Polyps or tissue growths may have been removed during your colonoscopy  It is normal to feel bloated and to have some abdominal discomfort  You should be passing gas  If you have hemorrhoids or you had polyps removed, you may have a small amount of bleeding  DISCHARGE INSTRUCTIONS:   Seek care immediately if:   · You have a large amount of bright red blood in your bowel movements  · Your abdomen is hard and firm and you have severe pain  · You have sudden trouble breathing  Contact your healthcare provider if:   · You develop a rash or hives  · You have a fever within 24 hours of your procedure  · You have not had a bowel movement for 3 days after your procedure  · You have questions or concerns about your condition or care  Activity:   · Do not lift, strain, or run  for 3 days after your procedure  · Rest after your procedure  You have been given medicine to relax you  Do not  drive or make important decisions until the day after your procedure  Return to your normal activity as directed  · Relieve gas and discomfort from bloating  by lying on your right side with a heating pad on your abdomen  You may need to take short walks to help the gas move out  Eat small meals until bloating is relieved  If you had polyps removed: For 7 days after your procedure:  · Do not  take aspirin  · Do not  go on long car rides  Help prevent constipation:   · Eat a variety of healthy foods  Healthy foods include fruit, vegetables, whole-grain breads, low-fat dairy products, beans, lean meat, and fish  Ask if you need to be on a special diet  Your healthcare provider may recommend that you eat high-fiber foods such as cooked beans  Fiber helps you have regular bowel movements  · Drink liquids as directed    Adults should drink between 9 and 13 eight-ounce cups of liquid every day  Ask what amount is best for you  For most people, good liquids to drink are water, juice, and milk  · Exercise as directed  Talk to your healthcare provider about the best exercise plan for you  Exercise can help prevent constipation, decrease your blood pressure and improve your health  Follow up with your healthcare provider as directed:  Write down your questions so you remember to ask them during your visits  © 2017 [employer] Information is for End User's use only and may not be sold, redistributed or otherwise used for commercial purposes  All illustrations and images included in CareNotes® are the copyrighted property of [employer]  or Johnnie Mendiola  The above information is an  only  It is not intended as medical advice for individual conditions or treatments  Talk to your doctor, nurse or pharmacist before following any medical regimen to see if it is safe and effective for you

## 2018-07-25 NOTE — ANESTHESIA PREPROCEDURE EVALUATION
Review of Systems/Medical History      No history of anesthetic complications     Cardiovascular  Exercise tolerance (METS): >4,  No Hyperlipidemia, Hypertension ,    Pulmonary       GI/Hepatic    GERD ,             Endo/Other  History of thyroid disease (had total thyroidectomy for thyroid CA) , hypothyroidism,      GYN       Hematology   Musculoskeletal    Arthritis     Neurology   Psychology   Anxiety,                   Anesthesia Plan  ASA Score- 2     Anesthesia Type- IV sedation with anesthesia with ASA Monitors  Additional Monitors:   Airway Plan:         Plan Factors-Patient not instructed to abstain from smoking on day of procedure  Patient did not smoke on day of surgery  Induction- intravenous  Postoperative Plan-     Informed Consent- Anesthetic plan and risks discussed with patient                Lab Results   Component Value Date    HGBA1C 5 4 05/12/2018       Lab Results   Component Value Date     05/12/2018    K 4 7 05/12/2018     (H) 05/12/2018    CO2 27 05/12/2018    ANIONGAP 5 05/12/2018    BUN 18 05/12/2018    CREATININE 0 91 05/12/2018    GLUCOSE 92 07/20/2017    GLUF 96 05/12/2018    CALCIUM 8 3 05/12/2018    AST 16 05/12/2018    ALT 38 05/12/2018    ALKPHOS 61 05/12/2018    PROT 6 6 05/12/2018    BILITOT 0 56 05/12/2018    EGFR 89 05/12/2018       Lab Results   Component Value Date    WBC 7 06 02/10/2018    HGB 15 7 02/10/2018    HCT 45 3 02/10/2018    MCV 89 02/10/2018     (L) 02/10/2018

## 2018-07-25 NOTE — OP NOTE
OPERATIVE REPORT  PATIENT NAME: Jonah Josue  :  1955  MRN: [id number]  Pt Location:  GI ROOM 02    SURGERY DATE: 2018    Surgeon(s) and Role:     DO Sarah Nunez Primary    Preop Diagnosis:  Left lower quadrant pain [R10 32]    Post-Op Diagnosis Codes:     * Left lower quadrant pain [R10 32]    Procedure(s) (LRB):  COLONOSCOPY (N/A)    Specimen(s):  ID Type Source Tests Collected by Time Destination   1 : 50cm descending colon Tissue Polyp, Colorectal TISSUE EXAM Opal Cooks, DO 2018 0773    2 : proximal transverse colon Tissue Polyp, Colorectal TISSUE EXAM Opal Swarm Mobiles, DO 2018 0931        Estimated Blood Loss:   Minimal    Drains:       Anesthesia Type:   IV Sedation with Anesthesia    Operative Indications:  Left lower quadrant pain [R10 32]  History of colonic polyps  Operative Findings:  Diverticulosis coli  Colonic polyp descending colon 50 cm from the anal verge  Polyp proximal transverse colon  Complications:   None    Procedure and Technique: With the patient in the left lateral position digital rectal exam was carried out there was no bleeding on the examining finger no palpable masses were present  The flexible fiberoptic colonoscope was inserted its entire length until tip of the scope within the cecum  A pedunculated polyp approximately 2-3 mm in size was removed in the descending colon 50 cm from the anal verge with snare cautery technique  It was saved and sent to pathology for tissue diagnosis  A 2nd polyp in the proximal transverse colon was removed with 2 passes of the cold biopsy forceps this tissue also was saved and sent to pathology lab for tissue diagnosis  However the tip of the scope in the cecum 2  Normal again very carefully inspecting the mucosa scattered diverticula were noted throughout the entire colon  The scope was then brought back to the rectum no lesions were seen    Scope was removed and she was taken to the repeat cover area in stable condition     I was present for the entire procedure    Patient Disposition:  hemodynamically stable    SIGNATURE: Jarad Maya DO  DATE: July 25, 2018  TIME: 9:43 AM

## 2018-07-25 NOTE — NURSING NOTE
Patient tolerated po food/fluids  Patient out of bed to bathroom, voided  Passing flatus  Patient and wife seen previously by Dr Sharda Kessler  Patient and wife verbalized understanding of discharge instructions

## 2018-08-08 DIAGNOSIS — I10 ESSENTIAL HYPERTENSION: ICD-10-CM

## 2018-08-08 DIAGNOSIS — E03.9 HYPOTHYROIDISM, UNSPECIFIED TYPE: ICD-10-CM

## 2018-08-08 RX ORDER — LEVOTHYROXINE SODIUM 200 MCG
200 TABLET ORAL DAILY
Qty: 90 TABLET | Refills: 0 | Status: SHIPPED | OUTPATIENT
Start: 2018-08-08 | End: 2018-10-02 | Stop reason: SDUPTHER

## 2018-08-09 ENCOUNTER — OFFICE VISIT (OUTPATIENT)
Dept: OBGYN CLINIC | Facility: OTHER | Age: 63
End: 2018-08-09
Payer: COMMERCIAL

## 2018-08-09 VITALS
DIASTOLIC BLOOD PRESSURE: 82 MMHG | BODY MASS INDEX: 37.75 KG/M2 | WEIGHT: 294 LBS | SYSTOLIC BLOOD PRESSURE: 133 MMHG | HEART RATE: 59 BPM

## 2018-08-09 DIAGNOSIS — M17.11 PRIMARY OSTEOARTHRITIS OF RIGHT KNEE: Primary | ICD-10-CM

## 2018-08-09 PROCEDURE — 99213 OFFICE O/P EST LOW 20 MIN: CPT | Performed by: ORTHOPAEDIC SURGERY

## 2018-08-09 PROCEDURE — 20610 DRAIN/INJ JOINT/BURSA W/O US: CPT | Performed by: ORTHOPAEDIC SURGERY

## 2018-08-09 RX ORDER — BETAMETHASONE SODIUM PHOSPHATE AND BETAMETHASONE ACETATE 3; 3 MG/ML; MG/ML
6 INJECTION, SUSPENSION INTRA-ARTICULAR; INTRALESIONAL; INTRAMUSCULAR; SOFT TISSUE
Status: COMPLETED | OUTPATIENT
Start: 2018-08-09 | End: 2018-08-09

## 2018-08-09 RX ORDER — BUPIVACAINE HYDROCHLORIDE 2.5 MG/ML
2 INJECTION, SOLUTION INFILTRATION; PERINEURAL
Status: COMPLETED | OUTPATIENT
Start: 2018-08-09 | End: 2018-08-09

## 2018-08-09 RX ADMIN — BUPIVACAINE HYDROCHLORIDE 2 ML: 2.5 INJECTION, SOLUTION INFILTRATION; PERINEURAL at 11:24

## 2018-08-09 RX ADMIN — BETAMETHASONE SODIUM PHOSPHATE AND BETAMETHASONE ACETATE 6 MG: 3; 3 INJECTION, SUSPENSION INTRA-ARTICULAR; INTRALESIONAL; INTRAMUSCULAR; SOFT TISSUE at 11:24

## 2018-08-09 NOTE — PATIENT INSTRUCTIONS

## 2018-08-09 NOTE — PROGRESS NOTES
Assessment:  1  Primary osteoarthritis of right knee  Large joint arthrocentesis       Plan:  Right knee injected today  Ice today  Patient will call when needed      To do next visit:  Return if symptoms worsen or fail to improve  Scribe Attestation    I,:   Era Ricks am acting as a scribe while in the presence of the attending physician :        I,:   Florencia Lai MD personally performed the services described in this documentation    as scribed in my presence :              Subjective:   Shani Lennon  is a 61 y o  male who presents for follow of right knee pain  Patient is treating conservatively for right knee arthritis  It was explained to the patient that  His ACL deficiency and meniscal pathology are not something that surgical intervention will likely improve instead the only surgical intervention that is indicated for his knee would be total knee arthroplasty  Patient has been treating conservatively with cortisone injection  Patient states he has been trying to loose weight and had been biking for exercise  He plans on going on a hike in [place] in the near future         Review of systems negative unless otherwise specified in HPI      Past Medical History:   Diagnosis Date    Anxiety     Arthritis     Athlete's foot     Benign polyp of large intestine     Cancer (HCC)     thyroid    Curvature of spine     DDD (degenerative disc disease), lumbar     Disease of thyroid gland     removed    Dyslipidemia     Enlarged prostate     GERD (gastroesophageal reflux disease)     Hypertension     IBS (irritable bowel syndrome)     Kidney stone     Migraines     Neuropathy     Numbness and tingling of both feet     Seasonal allergies     Tinea pedis     Urinary frequency     Urinary urgency     Vitamin D deficiency     Wears glasses        Past Surgical History:   Procedure Laterality Date    CHOLECYSTECTOMY      COLONOSCOPY      KNEE ARTHROSCOPY Bilateral knee tim    WY COLONOSCOPY FLX DX W/COLLJ SPEC WHEN PFRMD N/A 7/25/2018    Procedure: COLONOSCOPY;  Surgeon: Deep Anders DO;  Location: [facility]; Service: General    WY FRAGMENT KIDNEY STONE/ ESWL Left 7/27/2017    Procedure: LITHROTRIPSY EXTRACORPORAL SHOCKWAVE (ESWL); Surgeon: Jennie Cook DO;  Location: [facility];  Service: Urology    ROTATOR CUFF REPAIR Left     THYROIDECTOMY      Subtotal and Total Thyroidectomy both mentioned in allscripts       Family History   Problem Relation Age of Onset    Diabetes Mother     Heart disease Father     Breast cancer Sister        Social History     Occupational History    Not on file       Social History Main Topics    Smoking status: Former Smoker     Years: 4 00     Quit date: 6/20/2014    Smokeless tobacco: Never Used    Alcohol use Yes      Comment: occasionally    Drug use: No    Sexual activity: Not on file         Current Outpatient Prescriptions:     acetaminophen (TYLENOL) 325 mg tablet, Take 650 mg by mouth every 6 (six) hours as needed for mild pain, Disp: , Rfl:     betamethasone dipropionate (DIPROSONE) 0 05 % cream, Apply topically 2 (two) times a day, Disp: 30 g, Rfl: 0    chlordiazepoxide-clidinium (LIBRAX) 5-2 5 mg per capsule, Take 1 capsule by mouth as needed for indigestion, Disp: , Rfl:     Cholecalciferol (VITAMIN D3 PO), Take 1 tablet by mouth daily, Disp: , Rfl:     ciclopirox (LOPROX) 0 77 % cream, Apply topically 2 (two) times a day, Disp: 15 g, Rfl: 0    dutasteride (AVODART) 0 5 mg capsule, Take 0 5 mg by mouth daily at bedtime  , Disp: , Rfl:     gabapentin (NEURONTIN) 300 mg capsule, TAKE ONE CAPSULE AT BEDTIME, Disp: 90 capsule, Rfl: 1    ibuprofen (MOTRIN) 200 mg tablet, Take 200 mg by mouth every 6 (six) hours as needed for mild pain  , Disp: , Rfl:     Multiple Vitamin (MULTIVITAMIN) tablet, Take 1 tablet by mouth daily, Disp: , Rfl:     Omega-3 Fatty Acids (FISH OIL PO), Take 1 capsule by mouth daily, Disp: , Rfl:     omeprazole (PriLOSEC) 20 mg delayed release capsule, Take 1 capsule (20 mg total) by mouth daily, Disp: 90 capsule, Rfl: 1    Silodosin (RAPAFLO) 8 MG CAPS, Take 8 mg by mouth daily at bedtime  , Disp: , Rfl:     SYNTHROID 200 MCG tablet, Take 1 tablet (200 mcg total) by mouth daily, Disp: 90 tablet, Rfl: 0    verapamil (CALAN-SR) 180 mg CR tablet, Take 1 tablet (180 mg total) by mouth daily As directed, Disp: 90 tablet, Rfl: 0    vitamin E, tocopherol, 400 units capsule, Take 400 Units by mouth daily, Disp: , Rfl:     No Known Allergies         Vitals:    08/09/18 1101   BP: 133/82   Pulse: 59       Objective:          Physical Exam                    Right Knee Exam     Tenderness   The patient is experiencing tenderness in the medial joint line and lateral joint line  Range of Motion   Extension: 0   Flexion: 110     Tests   Wesley:  Medial - positive Lateral - positive  Lachman:  Anterior - positive      Drawer:       Posterior - negative  Varus: negative  Valgus: negative  Pivot Shift: negative  Patellar Apprehension: negative    Comments:   Well healed scar from prior surgery            Diagnostics, reviewed and taken today if performed as documented:    None performed      Procedures, if performed today:  Large joint arthrocentesis  Date/Time: 8/9/2018 11:24 AM  Consent given by: patient  Site marked: site marked  Timeout: Immediately prior to procedure a time out was called to verify the correct patient, procedure, equipment, support staff and site/side marked as required   Supporting Documentation  Indications: pain   Procedure Details  Location: knee - R knee  Preparation: Patient was prepped and draped in the usual sterile fashion  Needle size: 22 G  Approach: lateral  Medications administered: 2 mL bupivacaine 0 25 %; 6 mg betamethasone acetate-betamethasone sodium phosphate 6 (3-3) mg/mL    Patient tolerance: patient tolerated the procedure well with no immediate complications  Dressing:  Sterile dressing applied

## 2018-08-15 ENCOUNTER — OFFICE VISIT (OUTPATIENT)
Dept: FAMILY MEDICINE CLINIC | Facility: CLINIC | Age: 63
End: 2018-08-15
Payer: COMMERCIAL

## 2018-08-15 VITALS
WEIGHT: 290.8 LBS | HEIGHT: 73 IN | BODY MASS INDEX: 38.54 KG/M2 | SYSTOLIC BLOOD PRESSURE: 126 MMHG | DIASTOLIC BLOOD PRESSURE: 80 MMHG

## 2018-08-15 DIAGNOSIS — E06.3 HYPOTHYROIDISM DUE TO HASHIMOTO'S THYROIDITIS: ICD-10-CM

## 2018-08-15 DIAGNOSIS — E78.5 DYSLIPIDEMIA: ICD-10-CM

## 2018-08-15 DIAGNOSIS — E03.8 HYPOTHYROIDISM DUE TO HASHIMOTO'S THYROIDITIS: ICD-10-CM

## 2018-08-15 DIAGNOSIS — I10 ESSENTIAL HYPERTENSION: Primary | ICD-10-CM

## 2018-08-15 PROCEDURE — 99214 OFFICE O/P EST MOD 30 MIN: CPT | Performed by: FAMILY MEDICINE

## 2018-08-15 PROCEDURE — 3074F SYST BP LT 130 MM HG: CPT | Performed by: FAMILY MEDICINE

## 2018-08-15 PROCEDURE — 3079F DIAST BP 80-89 MM HG: CPT | Performed by: FAMILY MEDICINE

## 2018-08-15 PROCEDURE — 1036F TOBACCO NON-USER: CPT | Performed by: FAMILY MEDICINE

## 2018-08-15 NOTE — PROGRESS NOTES
Assessment/Plan:    Blood pressure, hypothyroidism, hyperlipidemia all stable at present time  Continue current regimen of medications  Guidance given overall  Patient will observe contusion to left leg  Refills will be given as needed  Follow-up in 6 months       Diagnoses and all orders for this visit:    Essential hypertension    Hypothyroidism due to Hashimoto's thyroiditis    Dyslipidemia          Subjective:      Patient ID: Cisco Cerna  is a 61 y o  male  Patient follow-up on hypertension hyperlipidemia hypothyroidism  Patient doing well overall  Patient status post contusion when on a ladder to his left lower leg with some swelling and black and blue area  Patient also with varicose veins of bilateral lower extremities  Patient's  right knee is doing well with cortisone injections right knee  No chest pain or shortness of breath  No difficulty with urination or defecation  Patient is trying to lose weight  Vision stable overall  The all other review systems negative  Labs reviewed  The following portions of the patient's history were reviewed and updated as appropriate: allergies, current medications, past family history, past medical history, past social history, past surgical history and problem list     Review of Systems   Constitutional: Negative  HENT: Negative  Eyes: Negative  Respiratory: Negative  Cardiovascular: Negative  Gastrointestinal: Negative  Endocrine: Negative  Genitourinary: Negative  Musculoskeletal: Positive for arthralgias  Skin: Negative  Allergic/Immunologic: Negative  Neurological: Negative  Hematological: Negative  Psychiatric/Behavioral: Negative  Objective:      /80 (BP Location: Right arm, Patient Position: Sitting, Cuff Size: Large)   Ht 6' 1 25" (1 861 m)   Wt 132 kg (290 lb 12 8 oz)   BMI 38 10 kg/m²          Physical Exam   Constitutional: He is oriented to person, place, and time   He appears well-developed and well-nourished  No distress  HENT:   Head: Normocephalic  Right Ear: External ear normal    Left Ear: External ear normal    Mouth/Throat: Oropharynx is clear and moist  No oropharyngeal exudate  Eyes: EOM are normal  Pupils are equal, round, and reactive to light  Right eye exhibits no discharge  Left eye exhibits no discharge  No scleral icterus  Neck: Normal range of motion  Neck supple  No thyromegaly present  Cardiovascular: Normal rate, regular rhythm, normal heart sounds and intact distal pulses  Exam reveals no gallop and no friction rub  No murmur heard  Pulmonary/Chest: Effort normal and breath sounds normal  No respiratory distress  He has no wheezes  He has no rales  He exhibits no tenderness  Abdominal: Soft  Bowel sounds are normal  He exhibits no distension  There is no tenderness  There is no rebound and no guarding  Musculoskeletal: Normal range of motion  He exhibits no edema or tenderness  Lymphadenopathy:     He has no cervical adenopathy  Neurological: He is oriented to person, place, and time  No cranial nerve deficit  He exhibits normal muscle tone  Coordination normal    Skin: Skin is warm and dry  No rash noted  He is not diaphoretic  No erythema  No pallor  Psychiatric: He has a normal mood and affect  His behavior is normal  Judgment and thought content normal    Nursing note and vitals reviewed

## 2018-10-01 DIAGNOSIS — G62.9 NEUROPATHY: ICD-10-CM

## 2018-10-02 DIAGNOSIS — E03.9 HYPOTHYROIDISM, UNSPECIFIED TYPE: ICD-10-CM

## 2018-10-02 RX ORDER — GABAPENTIN 300 MG/1
300 CAPSULE ORAL
Qty: 90 CAPSULE | Refills: 1 | Status: SHIPPED | OUTPATIENT
Start: 2018-10-02 | End: 2019-03-13 | Stop reason: SDUPTHER

## 2018-10-02 RX ORDER — LEVOTHYROXINE SODIUM 200 MCG
200 TABLET ORAL DAILY
Qty: 90 TABLET | Refills: 1 | Status: SHIPPED | OUTPATIENT
Start: 2018-10-02 | End: 2019-05-24 | Stop reason: SDUPTHER

## 2018-11-19 ENCOUNTER — TRANSCRIBE ORDERS (OUTPATIENT)
Dept: ADMINISTRATIVE | Facility: HOSPITAL | Age: 63
End: 2018-11-19

## 2018-11-19 DIAGNOSIS — N20.0 URIC ACID NEPHROLITHIASIS: Primary | ICD-10-CM

## 2018-11-23 ENCOUNTER — APPOINTMENT (OUTPATIENT)
Dept: RADIOLOGY | Facility: MEDICAL CENTER | Age: 63
End: 2018-11-23
Payer: COMMERCIAL

## 2018-11-23 ENCOUNTER — HOSPITAL ENCOUNTER (OUTPATIENT)
Dept: ULTRASOUND IMAGING | Facility: MEDICAL CENTER | Age: 63
Discharge: HOME | End: 2018-11-23
Payer: COMMERCIAL

## 2018-11-23 DIAGNOSIS — N20.0 URIC ACID NEPHROLITHIASIS: ICD-10-CM

## 2018-11-23 PROCEDURE — 76770 US EXAM ABDO BACK WALL COMP: CPT

## 2018-11-23 PROCEDURE — 74018 RADEX ABDOMEN 1 VIEW: CPT

## 2018-12-17 DIAGNOSIS — I10 ESSENTIAL HYPERTENSION: ICD-10-CM

## 2019-02-15 ENCOUNTER — OFFICE VISIT (OUTPATIENT)
Dept: FAMILY MEDICINE CLINIC | Facility: CLINIC | Age: 64
End: 2019-02-15
Payer: COMMERCIAL

## 2019-02-15 VITALS
SYSTOLIC BLOOD PRESSURE: 120 MMHG | BODY MASS INDEX: 40.42 KG/M2 | HEIGHT: 73 IN | DIASTOLIC BLOOD PRESSURE: 84 MMHG | WEIGHT: 305 LBS | TEMPERATURE: 98.4 F

## 2019-02-15 DIAGNOSIS — G57.93 NEUROPATHY INVOLVING BOTH LOWER EXTREMITIES: ICD-10-CM

## 2019-02-15 DIAGNOSIS — E78.5 DYSLIPIDEMIA: ICD-10-CM

## 2019-02-15 DIAGNOSIS — D49.2 SKIN NEOPLASM: ICD-10-CM

## 2019-02-15 DIAGNOSIS — G57.52 TARSAL TUNNEL SYNDROME OF LEFT SIDE: ICD-10-CM

## 2019-02-15 DIAGNOSIS — E06.3 HYPOTHYROIDISM DUE TO HASHIMOTO'S THYROIDITIS: ICD-10-CM

## 2019-02-15 DIAGNOSIS — Z23 ENCOUNTER FOR VACCINATION: Primary | ICD-10-CM

## 2019-02-15 DIAGNOSIS — E03.8 HYPOTHYROIDISM DUE TO HASHIMOTO'S THYROIDITIS: ICD-10-CM

## 2019-02-15 DIAGNOSIS — J01.00 ACUTE NON-RECURRENT MAXILLARY SINUSITIS: ICD-10-CM

## 2019-02-15 DIAGNOSIS — G57.51 TARSAL TUNNEL SYNDROME OF RIGHT SIDE: ICD-10-CM

## 2019-02-15 DIAGNOSIS — I10 ESSENTIAL HYPERTENSION: ICD-10-CM

## 2019-02-15 DIAGNOSIS — B35.1 ONYCHOMYCOSIS: ICD-10-CM

## 2019-02-15 DIAGNOSIS — E66.01 SEVERE OBESITY (BMI 35.0-39.9) WITH COMORBIDITY (HCC): ICD-10-CM

## 2019-02-15 PROCEDURE — 99214 OFFICE O/P EST MOD 30 MIN: CPT | Performed by: FAMILY MEDICINE

## 2019-02-15 PROCEDURE — 3074F SYST BP LT 130 MM HG: CPT | Performed by: FAMILY MEDICINE

## 2019-02-15 PROCEDURE — 3079F DIAST BP 80-89 MM HG: CPT | Performed by: FAMILY MEDICINE

## 2019-02-15 RX ORDER — AMOXICILLIN 500 MG/1
1000 CAPSULE ORAL EVERY 12 HOURS SCHEDULED
Qty: 40 CAPSULE | Refills: 0 | Status: SHIPPED | OUTPATIENT
Start: 2019-02-15 | End: 2019-02-25

## 2019-02-15 NOTE — PROGRESS NOTES
Assessment/Plan:  Nails trimmed the  Patient will be set up for removal of skin lesion  The patient go for laboratory studies  Refills for other chronic conditions as needed  Follow-up in 6 months     Diagnoses and all orders for this visit:    Encounter for vaccination  -     PNEUMOCOCCAL CONJUGATE VACCINE 13-VALENT GREATER THAN 6 MONTHS    Hypothyroidism due to Hashimoto's thyroiditis  -     CBC and differential; Future  -     Comprehensive metabolic panel; Future  -     Hemoglobin A1C; Future  -     Lipid panel; Future  -     TSH, 3rd generation with Free T4 reflex; Future  -     Microalbumin / creatinine urine ratio    Essential hypertension  -     CBC and differential; Future  -     Comprehensive metabolic panel; Future  -     Hemoglobin A1C; Future  -     Lipid panel; Future  -     TSH, 3rd generation with Free T4 reflex; Future  -     Microalbumin / creatinine urine ratio    Tarsal tunnel syndrome of right side    Tarsal tunnel syndrome of left side    Neuropathy involving both lower extremities  -     CBC and differential; Future  -     Comprehensive metabolic panel; Future  -     Hemoglobin A1C; Future  -     Lipid panel; Future  -     TSH, 3rd generation with Free T4 reflex; Future  -     Microalbumin / creatinine urine ratio    Dyslipidemia  -     CBC and differential; Future  -     Comprehensive metabolic panel; Future  -     Hemoglobin A1C; Future  -     Lipid panel; Future  -     TSH, 3rd generation with Free T4 reflex; Future  -     Microalbumin / creatinine urine ratio    Skin neoplasm    Onychomycosis    Acute non-recurrent maxillary sinusitis  -     amoxicillin (AMOXIL) 500 mg capsule; Take 2 capsules (1,000 mg total) by mouth every 12 (twelve) hours for 10 days          Subjective:      Patient ID: Angela Murphy  is a 61 y o  male  Patient here to follow-up on chronic conditions including hypertension hyperlipidemia hypothyroidism    Patient also with toenails which need to be clipped on the right toe 1st and 2nd  Patient also lesion behind right ear over the past month or so  No bleeding or itching  No treatment used for this  Patient's meds reviewed at this time  Some labs reviewed also  The patient is getting some nasal bleeding from the bilateral nares  Patient with some sinus issues also  The patient has used Vicks NyQuil  The following portions of the patient's history were reviewed and updated as appropriate: allergies, current medications, past family history, past medical history, past social history, past surgical history and problem list     Review of Systems   Constitutional: Negative  HENT: Positive for congestion and postnasal drip  Eyes: Negative  Respiratory: Negative  Cardiovascular: Negative  Gastrointestinal: Negative  Endocrine: Negative  Genitourinary: Negative  Musculoskeletal: Negative  Skin: Positive for color change  Allergic/Immunologic: Negative  Neurological: Positive for headaches  Hematological: Negative  Psychiatric/Behavioral: Negative  Objective:      /84 (BP Location: Left arm, Patient Position: Sitting, Cuff Size: Large)   Temp 98 4 °F (36 9 °C)   Ht 6' 1 25" (1 861 m)   Wt (!) 138 kg (305 lb)   BMI 39 97 kg/m²          Physical Exam   Constitutional: He is oriented to person, place, and time  He appears well-developed and well-nourished  No distress  HENT:   Head: Normocephalic  Right Ear: External ear normal    Left Ear: External ear normal    Mouth/Throat: Oropharynx is clear and moist  No oropharyngeal exudate  Eyes: Pupils are equal, round, and reactive to light  EOM are normal  Right eye exhibits no discharge  Left eye exhibits no discharge  No scleral icterus  Neck: Normal range of motion  Neck supple  No thyromegaly present  Cardiovascular: Normal rate, regular rhythm, normal heart sounds and intact distal pulses  Exam reveals no gallop and no friction rub     No murmur heard   Pulmonary/Chest: Effort normal and breath sounds normal  No respiratory distress  He has no wheezes  He has no rales  He exhibits no tenderness  Abdominal: Soft  Bowel sounds are normal  He exhibits no distension  There is no tenderness  There is no rebound and no guarding  Musculoskeletal: Normal range of motion  He exhibits no edema or tenderness  Lymphadenopathy:     He has no cervical adenopathy  Neurological: He is oriented to person, place, and time  No cranial nerve deficit  He exhibits normal muscle tone  Coordination normal    Skin: Skin is warm and dry  No rash noted  He is not diaphoretic  No erythema  No pallor  Irregular lesion behind right ear  Onychomycosis right toenails   Psychiatric: He has a normal mood and affect  His behavior is normal  Judgment and thought content normal    Nursing note and vitals reviewed  BMI Counseling: Body mass index is 39 97 kg/m²  Discussed the patient's BMI with him  The BMI is above average  BMI counseling and education was provided to the patient  Nutrition recommendations include reducing portion sizes

## 2019-02-15 NOTE — PATIENT INSTRUCTIONS

## 2019-03-01 ENCOUNTER — OFFICE VISIT (OUTPATIENT)
Dept: FAMILY MEDICINE CLINIC | Facility: CLINIC | Age: 64
End: 2019-03-01
Payer: COMMERCIAL

## 2019-03-01 VITALS
BODY MASS INDEX: 40.45 KG/M2 | HEIGHT: 73 IN | WEIGHT: 305.2 LBS | HEART RATE: 63 BPM | OXYGEN SATURATION: 98 % | DIASTOLIC BLOOD PRESSURE: 86 MMHG | RESPIRATION RATE: 16 BRPM | SYSTOLIC BLOOD PRESSURE: 120 MMHG

## 2019-03-01 DIAGNOSIS — D49.2 SKIN NEOPLASM: Primary | ICD-10-CM

## 2019-03-01 PROCEDURE — 11306 SHAVE SKIN LESION 0.6-1.0 CM: CPT | Performed by: FAMILY MEDICINE

## 2019-03-01 PROCEDURE — 88305 TISSUE EXAM BY PATHOLOGIST: CPT | Performed by: PATHOLOGY

## 2019-03-01 RX ORDER — AMOXICILLIN 500 MG/1
CAPSULE ORAL
Refills: 0 | Status: ON HOLD | COMMUNITY
Start: 2019-02-25 | End: 2019-07-23 | Stop reason: ALTCHOICE

## 2019-03-01 NOTE — PROGRESS NOTES
Assessment/Plan:     Diagnoses and all orders for this visit:    Skin neoplasm    Other orders  -     amoxicillin (AMOXIL) 500 mg capsule; TAKE 2 CAPSULES BY MOUTH EVERY 12 HOURS FOR 10 DAYS          Subjective:      Patient ID: Segundo Guerra  is a 61 y o  male  Patient is here for removal of skin lesion behind right ear  The following portions of the patient's history were reviewed and updated as appropriate: allergies, current medications, past family history, past medical history, past social history, past surgical history and problem list     Review of Systems   Constitutional: Negative  HENT: Negative  Eyes: Negative  Respiratory: Negative  Cardiovascular: Negative  Gastrointestinal: Negative  Endocrine: Negative  Genitourinary: Negative  Musculoskeletal: Negative  Skin: Positive for color change  Allergic/Immunologic: Negative  Neurological: Negative  Hematological: Negative  Psychiatric/Behavioral: Negative  Objective:      /86 (BP Location: Right arm, Patient Position: Sitting, Cuff Size: Large)   Pulse 63   Resp 16   Ht 6' 1 25" (1 861 m)   Wt (!) 138 kg (305 lb 3 2 oz)   SpO2 98%   BMI 39 99 kg/m²          Physical Exam   Constitutional: He appears well-developed and well-nourished  Skin:   Irregular nevi behind right ear measuring 1 cm       Shave lesion  Date/Time: 3/1/2019 4:29 PM  Performed by: Rico Cobos DO  Authorized by: Rico Cobos DO     Number of Lesions: 1  Lesion 1:     Body area: head/neck    Head/neck location: neck    Initial size (mm): 10    Final defect size (mm): 10    Malignancy: malignancy unknown      Destruction method: shave removal      Comments:  Informed consent obtained  Betadine use  1 5 cc of lidocaine with epinephrine use  Shave excision done at this time with specimen sent to pathology  Electrodesiccation done  Antibiotic ointment and dressing applied    Patient keep area clean and dry and use antibiotic ointment daily    Patient will follow up in 2 weeks

## 2019-03-13 DIAGNOSIS — G62.9 NEUROPATHY: ICD-10-CM

## 2019-03-13 RX ORDER — GABAPENTIN 300 MG/1
CAPSULE ORAL
Qty: 90 CAPSULE | Refills: 1 | Status: SHIPPED | OUTPATIENT
Start: 2019-03-13 | End: 2019-08-29 | Stop reason: SDUPTHER

## 2019-03-15 ENCOUNTER — OFFICE VISIT (OUTPATIENT)
Dept: FAMILY MEDICINE CLINIC | Facility: CLINIC | Age: 64
End: 2019-03-15
Payer: COMMERCIAL

## 2019-03-15 VITALS
WEIGHT: 306 LBS | SYSTOLIC BLOOD PRESSURE: 128 MMHG | HEIGHT: 73 IN | DIASTOLIC BLOOD PRESSURE: 80 MMHG | BODY MASS INDEX: 40.56 KG/M2 | TEMPERATURE: 97.9 F

## 2019-03-15 DIAGNOSIS — M17.11 PRIMARY OSTEOARTHRITIS OF RIGHT KNEE: Primary | ICD-10-CM

## 2019-03-15 PROCEDURE — 3008F BODY MASS INDEX DOCD: CPT | Performed by: FAMILY MEDICINE

## 2019-03-15 PROCEDURE — 99213 OFFICE O/P EST LOW 20 MIN: CPT | Performed by: FAMILY MEDICINE

## 2019-03-15 PROCEDURE — 1036F TOBACCO NON-USER: CPT | Performed by: FAMILY MEDICINE

## 2019-03-15 NOTE — PROGRESS NOTES
Assessment/Plan:  Informed consent obtained  Betadine use  Arthrocentesis right knee with 1 cc of Depo-Medrol 80 and 2 cc of lidocaine 2%  Patient use ice 10 minutes at a time 3 times a day     Diagnoses and all orders for this visit:    Primary osteoarthritis of right knee          Subjective:      Patient ID: Dash rFanks  is a 61 y o  male  The patient is here with swelling and pain right knee  Patient has noticed increasing pain  Patient has had steroid injections in the past and wishes 1 today  The following portions of the patient's history were reviewed and updated as appropriate: allergies, current medications, past family history, past medical history, past social history, past surgical history and problem list     Review of Systems   Constitutional: Negative  HENT: Negative  Eyes: Negative  Respiratory: Negative  Cardiovascular: Negative  Gastrointestinal: Negative  Endocrine: Negative  Genitourinary: Negative  Musculoskeletal: Positive for gait problem  Skin: Negative  Allergic/Immunologic: Negative  Hematological: Negative  Psychiatric/Behavioral: Negative  Objective:      /80 (BP Location: Left arm, Patient Position: Sitting, Cuff Size: Large)   Temp 97 9 °F (36 6 °C) (Tympanic)   Ht 6' 1 25" (1 861 m)   Wt (!) 139 kg (306 lb)   BMI 40 10 kg/m²          Physical Exam   Constitutional: He appears well-developed and well-nourished  Musculoskeletal: He exhibits tenderness  Osteoarthritic changes right knee with pain over medial joint line  Swelling/effusion noted

## 2019-04-10 DIAGNOSIS — L30.9 DERMATITIS: ICD-10-CM

## 2019-04-10 RX ORDER — BETAMETHASONE DIPROPIONATE 0.5 MG/G
CREAM TOPICAL 2 TIMES DAILY
Qty: 30 G | Refills: 0 | Status: SHIPPED | OUTPATIENT
Start: 2019-04-10 | End: 2020-08-26 | Stop reason: SDUPTHER

## 2019-05-24 DIAGNOSIS — E03.9 HYPOTHYROIDISM, UNSPECIFIED TYPE: ICD-10-CM

## 2019-05-24 RX ORDER — LEVOTHYROXINE SODIUM 200 MCG
TABLET ORAL
Qty: 90 TABLET | Refills: 1 | Status: SHIPPED | OUTPATIENT
Start: 2019-05-24 | End: 2019-11-20 | Stop reason: SDUPTHER

## 2019-05-28 ENCOUNTER — OFFICE VISIT (OUTPATIENT)
Dept: FAMILY MEDICINE CLINIC | Facility: CLINIC | Age: 64
End: 2019-05-28
Payer: COMMERCIAL

## 2019-05-28 VITALS
HEIGHT: 73 IN | WEIGHT: 301 LBS | DIASTOLIC BLOOD PRESSURE: 88 MMHG | TEMPERATURE: 98.2 F | SYSTOLIC BLOOD PRESSURE: 148 MMHG | BODY MASS INDEX: 39.89 KG/M2

## 2019-05-28 DIAGNOSIS — R10.31 RIGHT LOWER QUADRANT PAIN: ICD-10-CM

## 2019-05-28 DIAGNOSIS — J01.00 ACUTE NON-RECURRENT MAXILLARY SINUSITIS: Primary | ICD-10-CM

## 2019-05-28 PROCEDURE — 99213 OFFICE O/P EST LOW 20 MIN: CPT | Performed by: FAMILY MEDICINE

## 2019-05-28 PROCEDURE — 1036F TOBACCO NON-USER: CPT | Performed by: FAMILY MEDICINE

## 2019-05-28 PROCEDURE — 3008F BODY MASS INDEX DOCD: CPT | Performed by: FAMILY MEDICINE

## 2019-05-28 RX ORDER — CELECOXIB 200 MG/1
200 CAPSULE ORAL DAILY
Qty: 30 CAPSULE | Refills: 1 | Status: SHIPPED | OUTPATIENT
Start: 2019-05-28 | End: 2019-10-10

## 2019-05-28 RX ORDER — AMOXICILLIN AND CLAVULANATE POTASSIUM 875; 125 MG/1; MG/1
1 TABLET, FILM COATED ORAL EVERY 12 HOURS SCHEDULED
Qty: 14 TABLET | Refills: 0 | Status: SHIPPED | OUTPATIENT
Start: 2019-05-28 | End: 2019-06-04

## 2019-06-01 ENCOUNTER — HOSPITAL ENCOUNTER (OUTPATIENT)
Dept: CT IMAGING | Facility: HOSPITAL | Age: 64
Discharge: HOME | End: 2019-06-01
Payer: COMMERCIAL

## 2019-06-01 ENCOUNTER — APPOINTMENT (OUTPATIENT)
Dept: LAB | Facility: HOSPITAL | Age: 64
End: 2019-06-01
Payer: COMMERCIAL

## 2019-06-01 DIAGNOSIS — G57.93 NEUROPATHY INVOLVING BOTH LOWER EXTREMITIES: ICD-10-CM

## 2019-06-01 DIAGNOSIS — E78.5 DYSLIPIDEMIA: ICD-10-CM

## 2019-06-01 DIAGNOSIS — E03.8 HYPOTHYROIDISM DUE TO HASHIMOTO'S THYROIDITIS: ICD-10-CM

## 2019-06-01 DIAGNOSIS — E06.3 HYPOTHYROIDISM DUE TO HASHIMOTO'S THYROIDITIS: ICD-10-CM

## 2019-06-01 DIAGNOSIS — J01.00 ACUTE NON-RECURRENT MAXILLARY SINUSITIS: ICD-10-CM

## 2019-06-01 DIAGNOSIS — I10 ESSENTIAL HYPERTENSION: ICD-10-CM

## 2019-06-01 LAB
ALBUMIN SERPL BCP-MCNC: 3.9 G/DL (ref 3.5–5)
ALP SERPL-CCNC: 57 U/L (ref 46–116)
ALT SERPL W P-5'-P-CCNC: 42 U/L (ref 12–78)
ANION GAP SERPL CALCULATED.3IONS-SCNC: 8 MMOL/L (ref 4–13)
AST SERPL W P-5'-P-CCNC: 24 U/L (ref 5–45)
BASOPHILS # BLD AUTO: 0.05 THOUSANDS/ΜL (ref 0–0.1)
BASOPHILS NFR BLD AUTO: 1 % (ref 0–1)
BILIRUB SERPL-MCNC: 0.55 MG/DL (ref 0.2–1)
BUN SERPL-MCNC: 20 MG/DL (ref 5–25)
CALCIUM SERPL-MCNC: 9.3 MG/DL (ref 8.3–10.1)
CHLORIDE SERPL-SCNC: 107 MMOL/L (ref 100–108)
CHOLEST SERPL-MCNC: 202 MG/DL (ref 50–200)
CO2 SERPL-SCNC: 26 MMOL/L (ref 21–32)
CREAT SERPL-MCNC: 1.03 MG/DL (ref 0.6–1.3)
CREAT UR-MCNC: 195 MG/DL
EOSINOPHIL # BLD AUTO: 0.01 THOUSAND/ΜL (ref 0–0.61)
EOSINOPHIL NFR BLD AUTO: 0 % (ref 0–6)
ERYTHROCYTE [DISTWIDTH] IN BLOOD BY AUTOMATED COUNT: 12.6 % (ref 11.6–15.1)
EST. AVERAGE GLUCOSE BLD GHB EST-MCNC: 108 MG/DL
GFR SERPL CREATININE-BSD FRML MDRD: 76 ML/MIN/1.73SQ M
GLUCOSE P FAST SERPL-MCNC: 95 MG/DL (ref 65–99)
HBA1C MFR BLD: 5.4 % (ref 4.2–6.3)
HCT VFR BLD AUTO: 47.2 % (ref 36.5–49.3)
HDLC SERPL-MCNC: 41 MG/DL (ref 40–60)
HGB BLD-MCNC: 15.8 G/DL (ref 12–17)
IMM GRANULOCYTES # BLD AUTO: 0.02 THOUSAND/UL (ref 0–0.2)
IMM GRANULOCYTES NFR BLD AUTO: 0 % (ref 0–2)
LDLC SERPL CALC-MCNC: 132 MG/DL (ref 0–100)
LYMPHOCYTES # BLD AUTO: 2.08 THOUSANDS/ΜL (ref 0.6–4.47)
LYMPHOCYTES NFR BLD AUTO: 28 % (ref 14–44)
MCH RBC QN AUTO: 30.7 PG (ref 26.8–34.3)
MCHC RBC AUTO-ENTMCNC: 33.5 G/DL (ref 31.4–37.4)
MCV RBC AUTO: 92 FL (ref 82–98)
MICROALBUMIN UR-MCNC: 17.6 MG/L (ref 0–20)
MICROALBUMIN/CREAT 24H UR: 9 MG/G CREATININE (ref 0–30)
MONOCYTES # BLD AUTO: 0.49 THOUSAND/ΜL (ref 0.17–1.22)
MONOCYTES NFR BLD AUTO: 7 % (ref 4–12)
NEUTROPHILS # BLD AUTO: 4.86 THOUSANDS/ΜL (ref 1.85–7.62)
NEUTS SEG NFR BLD AUTO: 64 % (ref 43–75)
NONHDLC SERPL-MCNC: 161 MG/DL
NRBC BLD AUTO-RTO: 0 /100 WBCS
PLATELET # BLD AUTO: 171 THOUSANDS/UL (ref 149–390)
PMV BLD AUTO: 8.2 FL (ref 8.9–12.7)
POTASSIUM SERPL-SCNC: 4.6 MMOL/L (ref 3.5–5.3)
PROT SERPL-MCNC: 7.5 G/DL (ref 6.4–8.2)
RBC # BLD AUTO: 5.14 MILLION/UL (ref 3.88–5.62)
SODIUM SERPL-SCNC: 141 MMOL/L (ref 136–145)
T4 FREE SERPL-MCNC: 0.92 NG/DL (ref 0.76–1.46)
TRIGL SERPL-MCNC: 144 MG/DL
TSH SERPL DL<=0.05 MIU/L-ACNC: 5.18 UIU/ML (ref 0.36–3.74)
WBC # BLD AUTO: 7.51 THOUSAND/UL (ref 4.31–10.16)

## 2019-06-01 PROCEDURE — 80061 LIPID PANEL: CPT

## 2019-06-01 PROCEDURE — 83036 HEMOGLOBIN GLYCOSYLATED A1C: CPT

## 2019-06-01 PROCEDURE — 84439 ASSAY OF FREE THYROXINE: CPT

## 2019-06-01 PROCEDURE — 74176 CT ABD & PELVIS W/O CONTRAST: CPT

## 2019-06-01 PROCEDURE — 36415 COLL VENOUS BLD VENIPUNCTURE: CPT

## 2019-06-01 PROCEDURE — 82043 UR ALBUMIN QUANTITATIVE: CPT | Performed by: FAMILY MEDICINE

## 2019-06-01 PROCEDURE — 85025 COMPLETE CBC W/AUTO DIFF WBC: CPT

## 2019-06-01 PROCEDURE — 82570 ASSAY OF URINE CREATININE: CPT | Performed by: FAMILY MEDICINE

## 2019-06-01 PROCEDURE — 80053 COMPREHEN METABOLIC PANEL: CPT

## 2019-06-01 PROCEDURE — 84443 ASSAY THYROID STIM HORMONE: CPT

## 2019-06-03 DIAGNOSIS — E03.9 HYPOTHYROIDISM, UNSPECIFIED TYPE: Primary | ICD-10-CM

## 2019-06-03 DIAGNOSIS — L30.9 DERMATITIS: ICD-10-CM

## 2019-06-04 RX ORDER — LEVOTHYROXINE SODIUM 0.03 MG/1
25 TABLET ORAL DAILY
Qty: 90 TABLET | Refills: 1 | Status: SHIPPED | OUTPATIENT
Start: 2019-06-04 | End: 2020-02-24 | Stop reason: SDUPTHER

## 2019-07-09 ENCOUNTER — TELEPHONE (OUTPATIENT)
Dept: FAMILY MEDICINE CLINIC | Facility: CLINIC | Age: 64
End: 2019-07-09

## 2019-07-09 NOTE — TELEPHONE ENCOUNTER
Pt's wife called stating the pt went for a CT on 6/1/19  At that time they requested the results be sent to Dr Arcelia Stack  As of this point, the results have not made it there  The patient has an appt there tomorrow  It was requested the results of the CT be faxed to Dr Arcelia Stack  This has been done  The results were faxed to [phone]

## 2019-07-10 ENCOUNTER — TRANSCRIBE ORDERS (OUTPATIENT)
Dept: ADMINISTRATIVE | Facility: HOSPITAL | Age: 64
End: 2019-07-10

## 2019-07-10 ENCOUNTER — APPOINTMENT (OUTPATIENT)
Dept: RADIOLOGY | Facility: MEDICAL CENTER | Age: 64
End: 2019-07-10
Payer: COMMERCIAL

## 2019-07-10 DIAGNOSIS — N20.0 STONE, KIDNEY: Primary | ICD-10-CM

## 2019-07-10 DIAGNOSIS — N20.0 STONE, KIDNEY: ICD-10-CM

## 2019-07-10 PROCEDURE — 74018 RADEX ABDOMEN 1 VIEW: CPT

## 2019-07-22 ENCOUNTER — ANESTHESIA EVENT (OUTPATIENT)
Dept: PERIOP | Facility: HOSPITAL | Age: 64
End: 2019-07-22
Payer: COMMERCIAL

## 2019-07-22 NOTE — ANESTHESIA PREPROCEDURE EVALUATION
Review of Systems/Medical History      No history of anesthetic complications     Cardiovascular  Exercise tolerance (METS): >4,  Hypertension controlled,    Pulmonary  Negative pulmonary ROS        GI/Hepatic  Negative GI/hepatic ROS   GERD well controlled,        Kidney stones,        Endo/Other  History of thyroid disease , hypothyroidism,   Obesity    GYN       Hematology  Negative hematology ROS      Musculoskeletal  Negative musculoskeletal ROS   Arthritis     Neurology  Negative neurology ROS   Headaches,    Psychology   Negative psychology ROS Anxiety,              Physical Exam    Airway    Mallampati score: II  TM Distance: >3 FB  Neck ROM: full     Dental       Cardiovascular  Cardiovascular exam normal    Pulmonary  Pulmonary exam normal     Other Findings        Anesthesia Plan  ASA Score- 2     Anesthesia Type- general with ASA Monitors  Additional Monitors:   Airway Plan: LMA  Plan Factors-Patient not instructed to abstain from smoking on day of procedure  Patient did not smoke on day of surgery  Induction- intravenous  Postoperative Plan- Plan for postoperative opioid use  Informed Consent- Anesthetic plan and risks discussed with patient and spouse  I personally reviewed this patient with the CRNA  Discussed and agreed on the Anesthesia Plan with the CRNA             Lab Results   Component Value Date    HGBA1C 5 4 06/01/2019       Lab Results   Component Value Date     06/27/2015    K 4 6 06/01/2019     06/01/2019    CO2 26 06/01/2019    ANIONGAP 6 06/27/2015    BUN 20 06/01/2019    CREATININE 1 03 06/01/2019    GLUCOSE 91 06/27/2015    GLUF 95 06/01/2019    CALCIUM 9 3 06/01/2019    AST 24 06/01/2019    ALT 42 06/01/2019    ALKPHOS 57 06/01/2019    PROT 7 3 06/27/2015    BILITOT 0 5 06/27/2015    EGFR 76 06/01/2019       Lab Results   Component Value Date    WBC 7 51 06/01/2019    HGB 15 8 06/01/2019    HCT 47 2 06/01/2019    MCV 92 06/01/2019     06/01/2019

## 2019-07-22 NOTE — PRE-PROCEDURE INSTRUCTIONS
Pre-Surgery Instructions:   Medication Instructions    omeprazole (PriLOSEC) 20 mg delayed release capsule Instructed patient per Anesthesia Guidelines   verapamil (CALAN-SR) 180 mg CR tablet Instructed patient per Anesthesia Guidelines  Pre-op Showering Instructions for Surgery Patients    Before your operation, you play an important role in decreasing your risk for infection by washing with special antiseptic soap  This is an effective way to reduce bacteria on the skin which may help to prevent infections at the surgical site  Please read the following directions in advance  1  In the week before your operation, purchase a 4 ounce bottle of antiseptic soap containing chlorhexidine gluconate (CHG)  4%  Some brand names include: Aplicare®, Endure, and Hibiclens®  The cost is usually less than $5 00   For your convenience, the [employer] carries the soap   It may also be available at your doctors office or pre-admission testing center, and at most retail pharmacies   If you are allergic or sensitive to soaps containing CHG, please let your doctor know so another antiseptic can be suggested   CHG antiseptic soap is for external use only  2  The day before your operation, follow these instructions carefully to get ready   Please clean linens (sheets) on your bed; you should sleep on clean sheets after your evening shower   Get clean towels and washcloth ready - you need enough for 2 showers   Set aside clean underwear, pajamas, and clothing to wear after the showers     Reminders:   DO NOT use any other soap or body rinse on your skin during or after the antiseptic showers   DO NOT use lotion, powder, deodorant, or perfume/aftershave of any kind on your skin after your antiseptic shower   DO NOT shave any body parts in the 24 hours/day before your operation   DO NOT get the antiseptic soap in your eyes, ears, nose, mouth, or vaginal area    3   You will need to shower the night before AND the morning of your surgery  Shower 1:   The first evening before the operation, take the first shower   First, shampoo your hair with regular shampoo and rinse it completely before you use the antiseptic soap  After washing and rinsing your hair, rinse your body   Next, use a clean washcloth to apply the antiseptic soap and wash your body from the neck down to your toes using ½ bottle of the antiseptic soap   You will use the other ½ bottle for the second shower   Clean the area where your incision will be; lather this area well for about 2 minutes   If you are having head or neck surgery, wash areas with the antiseptic soap   Rinse yourself completely with running water   Use a clean towel to dry off   Wear clean underwear and clothing/pajamas  Shower 2   The morning of your operation, take the second shower following the same steps as Shower 1 using the second ½ of the bottle of antiseptic soap   Use clean cloths and towels to wash and dry yourself   Wear clean underwear and clothing

## 2019-07-23 ENCOUNTER — HOSPITAL ENCOUNTER (OUTPATIENT)
Facility: HOSPITAL | Age: 64
Setting detail: OUTPATIENT SURGERY
Discharge: HOME | End: 2019-07-23
Attending: SURGERY | Admitting: SURGERY
Payer: COMMERCIAL

## 2019-07-23 ENCOUNTER — ANESTHESIA (OUTPATIENT)
Dept: PERIOP | Facility: HOSPITAL | Age: 64
End: 2019-07-23
Payer: COMMERCIAL

## 2019-07-23 VITALS
WEIGHT: 297 LBS | BODY MASS INDEX: 38.12 KG/M2 | HEART RATE: 61 BPM | SYSTOLIC BLOOD PRESSURE: 140 MMHG | HEIGHT: 74 IN | OXYGEN SATURATION: 98 % | DIASTOLIC BLOOD PRESSURE: 69 MMHG | RESPIRATION RATE: 16 BRPM | TEMPERATURE: 97.1 F

## 2019-07-23 DIAGNOSIS — K40.90 UNILATERAL INGUINAL HERNIA WITHOUT OBSTRUCTION OR GANGRENE: ICD-10-CM

## 2019-07-23 LAB
ATRIAL RATE: 56 BPM
P AXIS: 14 DEGREES
PR INTERVAL: 178 MS
QRS AXIS: -12 DEGREES
QRSD INTERVAL: 84 MS
QT INTERVAL: 410 MS
QTC INTERVAL: 395 MS
T WAVE AXIS: 27 DEGREES
VENTRICULAR RATE: 56 BPM

## 2019-07-23 PROCEDURE — 93005 ELECTROCARDIOGRAM TRACING: CPT

## 2019-07-23 PROCEDURE — 88302 TISSUE EXAM BY PATHOLOGIST: CPT | Performed by: PATHOLOGY

## 2019-07-23 PROCEDURE — 93010 ELECTROCARDIOGRAM REPORT: CPT | Performed by: INTERNAL MEDICINE

## 2019-07-23 RX ORDER — LIDOCAINE HYDROCHLORIDE 10 MG/ML
INJECTION, SOLUTION INFILTRATION; PERINEURAL AS NEEDED
Status: DISCONTINUED | OUTPATIENT
Start: 2019-07-23 | End: 2019-07-23 | Stop reason: SURG

## 2019-07-23 RX ORDER — MEPERIDINE HYDROCHLORIDE 25 MG/ML
12.5 INJECTION INTRAMUSCULAR; INTRAVENOUS; SUBCUTANEOUS
Status: DISCONTINUED | OUTPATIENT
Start: 2019-07-23 | End: 2019-07-23 | Stop reason: HOSPADM

## 2019-07-23 RX ORDER — ROCURONIUM BROMIDE 10 MG/ML
INJECTION, SOLUTION INTRAVENOUS AS NEEDED
Status: DISCONTINUED | OUTPATIENT
Start: 2019-07-23 | End: 2019-07-23 | Stop reason: SURG

## 2019-07-23 RX ORDER — DEXAMETHASONE SODIUM PHOSPHATE 4 MG/ML
INJECTION, SOLUTION INTRA-ARTICULAR; INTRALESIONAL; INTRAMUSCULAR; INTRAVENOUS; SOFT TISSUE AS NEEDED
Status: DISCONTINUED | OUTPATIENT
Start: 2019-07-23 | End: 2019-07-23 | Stop reason: SURG

## 2019-07-23 RX ORDER — MORPHINE SULFATE 4 MG/ML
4 INJECTION, SOLUTION INTRAMUSCULAR; INTRAVENOUS EVERY 4 HOURS PRN
Status: DISCONTINUED | OUTPATIENT
Start: 2019-07-23 | End: 2019-07-23 | Stop reason: HOSPADM

## 2019-07-23 RX ORDER — PROMETHAZINE HYDROCHLORIDE 25 MG/ML
12.5 INJECTION, SOLUTION INTRAMUSCULAR; INTRAVENOUS ONCE AS NEEDED
Status: DISCONTINUED | OUTPATIENT
Start: 2019-07-23 | End: 2019-07-23 | Stop reason: HOSPADM

## 2019-07-23 RX ORDER — MAGNESIUM HYDROXIDE 1200 MG/15ML
LIQUID ORAL AS NEEDED
Status: DISCONTINUED | OUTPATIENT
Start: 2019-07-23 | End: 2019-07-23 | Stop reason: HOSPADM

## 2019-07-23 RX ORDER — ONDANSETRON 2 MG/ML
INJECTION INTRAMUSCULAR; INTRAVENOUS AS NEEDED
Status: DISCONTINUED | OUTPATIENT
Start: 2019-07-23 | End: 2019-07-23 | Stop reason: SURG

## 2019-07-23 RX ORDER — ONDANSETRON 2 MG/ML
4 INJECTION INTRAMUSCULAR; INTRAVENOUS ONCE AS NEEDED
Status: DISCONTINUED | OUTPATIENT
Start: 2019-07-23 | End: 2019-07-23 | Stop reason: HOSPADM

## 2019-07-23 RX ORDER — OXYCODONE HYDROCHLORIDE AND ACETAMINOPHEN 5; 325 MG/1; MG/1
1 TABLET ORAL EVERY 4 HOURS PRN
Status: DISCONTINUED | OUTPATIENT
Start: 2019-07-23 | End: 2019-07-23 | Stop reason: HOSPADM

## 2019-07-23 RX ORDER — CEFAZOLIN SODIUM 2 G/50ML
2000 SOLUTION INTRAVENOUS ONCE
Status: DISCONTINUED | OUTPATIENT
Start: 2019-07-23 | End: 2019-07-23 | Stop reason: HOSPADM

## 2019-07-23 RX ORDER — GLYCOPYRROLATE 0.2 MG/ML
INJECTION INTRAMUSCULAR; INTRAVENOUS AS NEEDED
Status: DISCONTINUED | OUTPATIENT
Start: 2019-07-23 | End: 2019-07-23 | Stop reason: SURG

## 2019-07-23 RX ORDER — SODIUM CHLORIDE, SODIUM LACTATE, POTASSIUM CHLORIDE, CALCIUM CHLORIDE 600; 310; 30; 20 MG/100ML; MG/100ML; MG/100ML; MG/100ML
50 INJECTION, SOLUTION INTRAVENOUS CONTINUOUS
Status: DISCONTINUED | OUTPATIENT
Start: 2019-07-23 | End: 2019-07-23 | Stop reason: HOSPADM

## 2019-07-23 RX ORDER — MIDAZOLAM HYDROCHLORIDE 1 MG/ML
INJECTION INTRAMUSCULAR; INTRAVENOUS AS NEEDED
Status: DISCONTINUED | OUTPATIENT
Start: 2019-07-23 | End: 2019-07-23 | Stop reason: SURG

## 2019-07-23 RX ORDER — HYDROMORPHONE HCL/PF 1 MG/ML
0.5 SYRINGE (ML) INJECTION
Status: DISCONTINUED | OUTPATIENT
Start: 2019-07-23 | End: 2019-07-23 | Stop reason: HOSPADM

## 2019-07-23 RX ORDER — PROPOFOL 10 MG/ML
INJECTION, EMULSION INTRAVENOUS AS NEEDED
Status: DISCONTINUED | OUTPATIENT
Start: 2019-07-23 | End: 2019-07-23 | Stop reason: SURG

## 2019-07-23 RX ORDER — NEOSTIGMINE METHYLSULFATE 1 MG/ML
INJECTION INTRAVENOUS AS NEEDED
Status: DISCONTINUED | OUTPATIENT
Start: 2019-07-23 | End: 2019-07-23 | Stop reason: SURG

## 2019-07-23 RX ORDER — FENTANYL CITRATE 50 UG/ML
INJECTION, SOLUTION INTRAMUSCULAR; INTRAVENOUS AS NEEDED
Status: DISCONTINUED | OUTPATIENT
Start: 2019-07-23 | End: 2019-07-23 | Stop reason: SURG

## 2019-07-23 RX ORDER — BUPIVACAINE HYDROCHLORIDE 5 MG/ML
INJECTION, SOLUTION PERINEURAL AS NEEDED
Status: DISCONTINUED | OUTPATIENT
Start: 2019-07-23 | End: 2019-07-23 | Stop reason: HOSPADM

## 2019-07-23 RX ADMIN — SODIUM CHLORIDE, SODIUM LACTATE, POTASSIUM CHLORIDE, AND CALCIUM CHLORIDE 50 ML/HR: .6; .31; .03; .02 INJECTION, SOLUTION INTRAVENOUS at 11:22

## 2019-07-23 RX ADMIN — LIDOCAINE HYDROCHLORIDE 50 MG: 10 INJECTION, SOLUTION INFILTRATION; PERINEURAL at 11:46

## 2019-07-23 RX ADMIN — NEOSTIGMINE METHYLSULFATE 4 MG: 1 INJECTION INTRAVENOUS at 12:58

## 2019-07-23 RX ADMIN — Medication 3000 MG: at 11:44

## 2019-07-23 RX ADMIN — GLYCOPYRROLATE 0.2 MG: 0.2 INJECTION, SOLUTION INTRAMUSCULAR; INTRAVENOUS at 11:49

## 2019-07-23 RX ADMIN — MIDAZOLAM HYDROCHLORIDE 2 MG: 1 INJECTION, SOLUTION INTRAMUSCULAR; INTRAVENOUS at 11:40

## 2019-07-23 RX ADMIN — FENTANYL CITRATE 50 MCG: 50 INJECTION INTRAMUSCULAR; INTRAVENOUS at 11:40

## 2019-07-23 RX ADMIN — DEXAMETHASONE SODIUM PHOSPHATE 4 MG: 4 INJECTION, SOLUTION INTRA-ARTICULAR; INTRALESIONAL; INTRAMUSCULAR; INTRAVENOUS; SOFT TISSUE at 12:26

## 2019-07-23 RX ADMIN — FENTANYL CITRATE 50 MCG: 50 INJECTION INTRAMUSCULAR; INTRAVENOUS at 11:46

## 2019-07-23 RX ADMIN — OXYCODONE HYDROCHLORIDE AND ACETAMINOPHEN 1 TABLET: 5; 325 TABLET ORAL at 14:01

## 2019-07-23 RX ADMIN — ROCURONIUM BROMIDE 10 MG: 10 INJECTION, SOLUTION INTRAVENOUS at 12:39

## 2019-07-23 RX ADMIN — ROCURONIUM BROMIDE 70 MG: 10 INJECTION, SOLUTION INTRAVENOUS at 11:46

## 2019-07-23 RX ADMIN — GLYCOPYRROLATE 0.4 MG: 0.2 INJECTION, SOLUTION INTRAMUSCULAR; INTRAVENOUS at 12:58

## 2019-07-23 RX ADMIN — HYDROMORPHONE HYDROCHLORIDE 0.5 MG: 1 INJECTION, SOLUTION INTRAMUSCULAR; INTRAVENOUS; SUBCUTANEOUS at 13:27

## 2019-07-23 RX ADMIN — PROPOFOL 200 MG: 10 INJECTION, EMULSION INTRAVENOUS at 11:46

## 2019-07-23 RX ADMIN — ONDANSETRON HYDROCHLORIDE 4 MG: 2 INJECTION, SOLUTION INTRAMUSCULAR; INTRAVENOUS at 12:26

## 2019-07-23 NOTE — OP NOTE
OPERATIVE REPORT  PATIENT NAME: Amanda Benítez  :  1955  MRN: [id number]  Pt Location: [facility] OR ROOM 10    SURGERY DATE: 2019    Surgeon(s) and Role:     Ayaan Courtney DO - Primary    Preop Diagnosis:  Unilateral inguinal hernia without obstruction or gangrene [K40 90]    Post-Op Diagnosis Codes:     * Unilateral inguinal hernia without obstruction or gangrene [K40 90]    Procedure(s) (LRB):  REPAIR INGUINAL HERNIA  DIRECT NO MESH (Right)    Specimen(s):  ID Type Source Tests Collected by Time Destination   1 : PRE-PERITONEAL HERNIA Tissue Hernia Sac, Right Inguinal TISSUE EXAM Raman Whitfield DO 2019 1228        Estimated Blood Loss:   Minimal    Drains:  * No LDAs found *    Anesthesia Type:   General    Operative Indications:  Unilateral inguinal hernia without obstruction or gangrene [K40 90]  Right groin pain  Operative Findings:  Direct right inguinal hernia    Complications:   None    Procedure and Technique: With the patient in the supine position prepped and draped in usual manner a transverse incision was made 2 fingerbreadths above the right pubic tubercle  Dissection was carried down through the subcutaneous tissue and Mary's fascia  The patient is obese and dissection was laborious  The roof of the inguinal canal was opened in the direction of its fibers and the spermatic cord was encircled with a Penrose drain at the level of the pubic tubercle  A the lipoma of the spermatic cord was dissected free  It was amputated saved and sent to pathology for tissue diagnosis  An indirect hernia was looked for however none was found  A direct hernia was noted in this floor of Hesselbach's triangle and this was repaired by invaginating it  The shelving portion of the inguinal ligament was then sewn to the conjoined tendon with a running suture of 2 0 Prolene from the pubic tubercle up to the deep inguinal ring  No mesh was used      The cord was then dropped back down into the inguinal canal into for the canal was closed with a running suture of 2 0 PDS  The subcutaneous tissue was approximated with simple sutures of 3 0 Vicryl and skin was closed carefully with skin staples  The patient tolerated the procedure well  All sponge and counts were correct  The patient was taken to PACU in stable condition     I was present for the entire procedure    Patient Disposition:  PACU  and hemodynamically stable    SIGNATURE: Autry Jeans, DO  DATE: July 23, 2019  TIME: 1:07 PM

## 2019-07-23 NOTE — ANESTHESIA POSTPROCEDURE EVALUATION
Post-Op Assessment Note    CV Status:  Stable    Pain management: adequate     Mental Status:  Alert   Hydration Status:  Stable   PONV Controlled:  Controlled   Airway Patency:  Patent   Post Op Vitals Reviewed: Yes      Staff: CRNA           BP      Temp     Pulse     Resp      SpO2

## 2019-07-23 NOTE — DISCHARGE INSTRUCTIONS
Inguinal Hernia   AMBULATORY CARE:   An inguinal hernia  happens when organs or abdominal tissue push through a weak spot in the abdominal wall  The abdominal wall is made of fat and muscle  It holds the intestines in place  The hernia may contain fluid, tissue from the abdomen, or part of an organ (such as an intestine)  Common signs and symptoms:  A hernia may happen over time or it may happen suddenly  Some movements can make symptoms worse  Examples include when you cough, sneeze, strain to have a bowel movement, lift, or stand for a long time  You may have any of the following:  · A soft lump or bulge in your groin, lower abdomen, or scrotum     · Pain or burning in your abdomen  Seek care immediately if:   · You have severe abdominal pain with nausea and vomiting  · Your abdomen is larger than usual      · Your hernia gets bigger or is purple or blue  · You see blood in your bowel movements  · You feel weak, dizzy, or faint  Contact your healthcare provider if:   · You have a fever  · You have questions or concerns about your condition or care  Treatment for an inguinal hernia  usually involves surgery  Surgery can be done to place the hernia back inside the abdominal wall  Before you have surgery, you may be given medicine or have a manual reduction  Manual reduction means your healthcare provider will use his hands to put firm, steady pressure on your hernia  He will continue until the hernia disappears inside the abdominal wall  You may  need the following:  · NSAIDs , such as ibuprofen, help decrease swelling, pain, and fever  NSAIDs can cause stomach bleeding or kidney problems in certain people  If you take blood thinner medicine, always ask your healthcare provider if NSAIDs are safe for you  Always read the medicine label and follow directions  · Take your medicine as directed    Contact your healthcare provider if you think your medicine is not helping or if you have side effects  Tell him or her if you are allergic to any medicine  Keep a list of the medicines, vitamins, and herbs you take  Include the amounts, and when and why you take them  Bring the list or the pill bottles to follow-up visits  Carry your medicine list with you in case of an emergency  Follow up with your healthcare provider as directed:  Write down your questions so you remember to ask them during your visits  Manage your symptoms and prevent another hernia:   · Do not lift anything heavy  Heavy lifting can make your hernia worse or cause another hernia  Ask your healthcare provider how much is safe for you to lift  · Drink liquids as directed  Liquids may prevent constipation and straining during a bowel movement  Ask how much liquid to drink each day and which liquids are best for you  · Eat foods high in fiber  Fiber may prevent constipation and straining during a bowel movement  Foods that contain fiber include fruits, vegetables, beans, lentils, and whole grains  · Maintain a healthy weight  If you are overweight, weight loss may prevent your hernia from getting worse  It may also prevent another hernia  Talk to your healthcare provider about exercise and how to lose weight safely if you are overweight  · Do not smoke  Nicotine and other chemicals in cigarettes and cigars can weaken the abdominal wall  This may increase your risk for another hernia  Ask your healthcare provider for information if you currently smoke and need help to quit  E-cigarettes or smokeless tobacco still contain nicotine  Talk to your healthcare provider before you use these products  © 2017 [employer] Information is for End User's use only and may not be sold, redistributed or otherwise used for commercial purposes  All illustrations and images included in CareNotes® are the copyrighted property of [employer]  or [employer] Mendiola  The above information is an  only  It is not intended as medical advice for individual conditions or treatments  Talk to your doctor, nurse or pharmacist before following any medical regimen to see if it is safe and effective for you

## 2019-07-23 NOTE — H&P
H&P Exam - General Surgery   Leno Gonzalez 59 y o  male MRN: [id number]  Unit/Bed#: OR POOL Encounter: [id number]    Assessment/Plan     Assessment:  Right inguinal hernia  Plan:  Right inguinal hernioplasty possible mesh    History of Present Illness   History, ROS and PFSH unobtainable from any source due to none  HPI:  Leno Gonzalez  is a 59 y o  male who presents with right groin pain  Found to have right inguinal hernia on CT scan and on physical exam   The patient denies nausea and vomiting  There is no change in the color size or consistency of the stool  He has no urinary complaints       Review of Systems   All other systems reviewed and are negative  Historical Information   Past Medical History:   Diagnosis Date    Anxiety     Arthritis     Athlete's foot     Benign polyp of large intestine     Cancer (HCC)     thyroid, skin    Chronic pain disorder     right knee    Colon polyp     Curvature of spine     DDD (degenerative disc disease), lumbar     Disease of thyroid gland     removed    Dyslipidemia     Enlarged prostate     GERD (gastroesophageal reflux disease)     Hypertension     IBS (irritable bowel syndrome)     Kidney stone     still has on the left    Migraines     Migraines     more sinus related    Neuropathy     Numbness and tingling of both feet     Seasonal allergies     Tinea pedis     Urinary frequency     Urinary urgency     Vitamin D deficiency     Wears glasses      Past Surgical History:   Procedure Laterality Date    CHOLECYSTECTOMY      COLONOSCOPY      KNEE ARTHROSCOPY Bilateral     knee tim    RI COLONOSCOPY FLX DX W/COLLJ SPEC WHEN PFRMD N/A 7/25/2018    Procedure: COLONOSCOPY;  Surgeon: Samira Irving DO;  Location: [facility]; Service: General    RI FRAGMENT KIDNEY STONE/ ESWL Left 7/27/2017    Procedure: LITHROTRIPSY EXTRACORPORAL SHOCKWAVE (ESWL);   Surgeon: Dexter Godoy DO;  Location: [facility];  Service: Urology    ROTATOR CUFF REPAIR Left     THYROIDECTOMY      Subtotal and Total Thyroidectomy both mentioned in allscripts     Social History   Social History     Substance and Sexual Activity   Alcohol Use Yes    Comment: occasionally     Social History     Substance and Sexual Activity   Drug Use No     Social History     Tobacco Use   Smoking Status Former Smoker    Years: 4 00    Last attempt to quit: 2014    Years since quittin 0   Smokeless Tobacco Never Used     Family History: non-contributory    Meds/Allergies   all medications and allergies reviewed  No Known Allergies    Objective   First Vitals:   Height: 6' 2" (188 cm) (19)  Weight - Scale: 135 kg (297 lb) (19)    Current Vitals:   Height: 6' 2" (188 cm) (19)  Weight - Scale: 135 kg (297 lb) (19)    No intake or output data in the 24 hours ending 19    Invasive Devices     None                 Physical Exam   Constitutional: He is oriented to person, place, and time  He appears well-developed and well-nourished  Neck: No JVD present  Cardiovascular: Normal heart sounds  Pulmonary/Chest: Breath sounds normal    Abdominal: Soft  There is no tenderness  Genitourinary:   Genitourinary Comments: Positive right inguinal hernia  Musculoskeletal: Normal range of motion  Neurological: He is alert and oriented to person, place, and time  Skin: Skin is warm and dry  Lab Results: I have personally reviewed pertinent lab results  Imaging: I have personally reviewed pertinent reports  EKG, Pathology, and Other Studies: I have personally reviewed pertinent reports  Code Status: No Order  Advance Directive and Living Will:      Power of :    POLST:      Counseling / Coordination of Care  Total floor / unit time spent today 20 minutes  Greater than 50% of total time was spent with the patient and / or family counseling and / or coordination of care    A description of the counseling / coordination of care:  Right inguinal hernioplasty possible mesh  Armando Spruce

## 2019-07-23 NOTE — PERIOPERATIVE NURSING NOTE
oob to br and voided  Dressing dry and intact  Wants to go home  Discharged via w/c after discharge instructions given and verbalizes an understanding of same

## 2019-07-25 ENCOUNTER — TELEPHONE (OUTPATIENT)
Dept: FAMILY MEDICINE CLINIC | Facility: CLINIC | Age: 64
End: 2019-07-25

## 2019-07-25 NOTE — TELEPHONE ENCOUNTER
FYI: Pt had hernia surgery on 7/23/19 @ [place]  Pt's wife indicated this was the cause of his pain on the right side  She also noted the kidney stone he has is still too small to do anything with

## 2019-08-05 ENCOUNTER — TRANSCRIBE ORDERS (OUTPATIENT)
Dept: ADMINISTRATIVE | Facility: HOSPITAL | Age: 64
End: 2019-08-05

## 2019-08-05 DIAGNOSIS — R31.0 GROSS HEMATURIA: Primary | ICD-10-CM

## 2019-08-07 ENCOUNTER — HOSPITAL ENCOUNTER (OUTPATIENT)
Dept: ULTRASOUND IMAGING | Facility: MEDICAL CENTER | Age: 64
Discharge: HOME | End: 2019-08-07
Payer: COMMERCIAL

## 2019-08-07 DIAGNOSIS — R31.0 GROSS HEMATURIA: ICD-10-CM

## 2019-08-07 PROCEDURE — 76770 US EXAM ABDO BACK WALL COMP: CPT

## 2019-08-12 ENCOUNTER — TRANSCRIBE ORDERS (OUTPATIENT)
Dept: ADMINISTRATIVE | Facility: HOSPITAL | Age: 64
End: 2019-08-12

## 2019-08-12 DIAGNOSIS — R31.0 GROSS HEMATURIA: Primary | ICD-10-CM

## 2019-08-15 ENCOUNTER — OFFICE VISIT (OUTPATIENT)
Dept: FAMILY MEDICINE CLINIC | Facility: CLINIC | Age: 64
End: 2019-08-15
Payer: COMMERCIAL

## 2019-08-15 VITALS
TEMPERATURE: 97.4 F | DIASTOLIC BLOOD PRESSURE: 80 MMHG | WEIGHT: 300 LBS | HEIGHT: 74 IN | BODY MASS INDEX: 38.5 KG/M2 | SYSTOLIC BLOOD PRESSURE: 124 MMHG

## 2019-08-15 DIAGNOSIS — M17.11 PRIMARY OSTEOARTHRITIS OF RIGHT KNEE: Primary | ICD-10-CM

## 2019-08-15 PROCEDURE — 99213 OFFICE O/P EST LOW 20 MIN: CPT | Performed by: FAMILY MEDICINE

## 2019-08-15 PROCEDURE — 3008F BODY MASS INDEX DOCD: CPT | Performed by: FAMILY MEDICINE

## 2019-08-15 PROCEDURE — 1036F TOBACCO NON-USER: CPT | Performed by: FAMILY MEDICINE

## 2019-08-15 NOTE — PROGRESS NOTES
Assessment/Plan:  Informed consent obtained  Betadine use  The arthrocentesis bilateral knees  The 1 cc of Depo-Medrol 80 along with 2 cc of lidocaine 1% without epinephrine use  Patient tolerated well  Diagnoses and all orders for this visit:    Primary osteoarthritis of right knee            Subjective:        Patient ID: Sarita Munreo  is a 59 y o  male  Patient here status post right ankle surgery the 3 weeks ago Dr Javi Gu  The patient still with a stone left kidney  Patient did have gross hematuria  Patient did see Urology  The patient having about a the knee pain  The patient wishes to have injections  The following portions of the patient's history were reviewed and updated as appropriate: allergies, current medications, past family history, past medical history, past social history, past surgical history and problem list       Review of Systems   Constitutional: Negative  HENT: Negative  Eyes: Negative  Respiratory: Negative  Cardiovascular: Negative  Gastrointestinal: Negative  Endocrine: Negative  Genitourinary: Negative  Musculoskeletal: Positive for arthralgias  Skin: Negative  Allergic/Immunologic: Negative  Neurological: Negative  Hematological: Negative  Psychiatric/Behavioral: Negative  Objective:      BMI Counseling: Body mass index is 38 52 kg/m²  Discussed the patient's BMI with him  The BMI is above average  BMI counseling and education was provided to the patient  Nutrition recommendations include reducing portion sizes  Depression Screening Follow-up Plan: Patient's depression screening was positive with a PHQ-2 score of   Their PHQ-9 score was   Patient assessed for underlying major depression  They have no active suicidal ideations  Brief counseling provided and recommend additional follow-up/re-evaluation next office visit        /80 (BP Location: Right arm, Patient Position: Sitting, Cuff Size: Large) Temp (!) 97 4 °F (36 3 °C) (Tympanic)   Ht 6' 2" (1 88 m)   Wt 136 kg (300 lb)   BMI 38 52 kg/m²          Physical Exam   Constitutional: He appears well-developed and well-nourished  Musculoskeletal:   Osteoarthritic changes bilateral knees  Nursing note and vitals reviewed

## 2019-08-16 ENCOUNTER — HOSPITAL ENCOUNTER (OUTPATIENT)
Dept: CT IMAGING | Facility: HOSPITAL | Age: 64
Discharge: HOME | End: 2019-08-16
Attending: UROLOGY
Payer: COMMERCIAL

## 2019-08-16 DIAGNOSIS — R31.0 GROSS HEMATURIA: ICD-10-CM

## 2019-08-16 PROCEDURE — 74178 CT ABD&PLV WO CNTR FLWD CNTR: CPT

## 2019-08-16 RX ADMIN — IOHEXOL 100 ML: 350 INJECTION, SOLUTION INTRAVENOUS at 18:02

## 2019-08-29 DIAGNOSIS — G62.9 NEUROPATHY: ICD-10-CM

## 2019-08-29 RX ORDER — GABAPENTIN 300 MG/1
CAPSULE ORAL
Qty: 90 CAPSULE | Refills: 4 | Status: SHIPPED | OUTPATIENT
Start: 2019-08-29 | End: 2020-10-04

## 2019-09-20 ENCOUNTER — OFFICE VISIT (OUTPATIENT)
Dept: FAMILY MEDICINE CLINIC | Facility: CLINIC | Age: 64
End: 2019-09-20
Payer: COMMERCIAL

## 2019-09-20 VITALS
DIASTOLIC BLOOD PRESSURE: 78 MMHG | TEMPERATURE: 99.1 F | BODY MASS INDEX: 37.35 KG/M2 | HEIGHT: 74 IN | WEIGHT: 291 LBS | SYSTOLIC BLOOD PRESSURE: 136 MMHG

## 2019-09-20 DIAGNOSIS — J01.00 ACUTE NON-RECURRENT MAXILLARY SINUSITIS: Primary | ICD-10-CM

## 2019-09-20 PROCEDURE — 99213 OFFICE O/P EST LOW 20 MIN: CPT | Performed by: FAMILY MEDICINE

## 2019-09-20 RX ORDER — AMOXICILLIN 500 MG/1
1000 CAPSULE ORAL EVERY 12 HOURS SCHEDULED
Qty: 40 CAPSULE | Refills: 0 | Status: SHIPPED | OUTPATIENT
Start: 2019-09-20 | End: 2019-09-30

## 2019-09-20 NOTE — PROGRESS NOTES
Assessment/Plan:  Guidance given overall  The note for patient return to work on Tuesday  Diagnoses and all orders for this visit:    Acute non-recurrent maxillary sinusitis  -     amoxicillin (AMOXIL) 500 mg capsule; Take 2 capsules (1,000 mg total) by mouth every 12 (twelve) hours for 10 days            Subjective:        Patient ID: Celia Tran  is a 59 y o  male  Patient is here with since Wednesday  Patient also with other symptoms including cough with some red to brownish sputum production and muscle aches  Patient with some diarrhea  Patient with headache, nasal congestion rhinorrhea postnasal drip  Patient started on amoxicillin and Tessalon Perles  Patient did have a fever also  Patient also use Tylenol  The following portions of the patient's history were reviewed and updated as appropriate: allergies, current medications, past family history, past medical history, past social history, past surgical history and problem list       Review of Systems   Constitutional: Positive for fever  HENT: Positive for congestion, postnasal drip, rhinorrhea, sinus pressure and sinus pain  Respiratory: Positive for cough  Gastrointestinal: Positive for diarrhea  Objective:      BMI Counseling: Body mass index is 37 36 kg/m²  Discussed the patient's BMI with him  The BMI is above normal  Nutrition recommendations include reducing portion sizes  /78 (BP Location: Right arm)   Temp 99 1 °F (37 3 °C)   Ht 6' 2" (1 88 m)   Wt 132 kg (291 lb)   BMI 37 36 kg/m²          Physical Exam   Constitutional: He is oriented to person, place, and time  He appears well-developed and well-nourished  No distress  HENT:   Head: Normocephalic  Right Ear: External ear normal    Left Ear: External ear normal    Mouth/Throat: Oropharyngeal exudate present  Eyes: Pupils are equal, round, and reactive to light  EOM are normal  Right eye exhibits no discharge   Left eye exhibits no discharge  No scleral icterus  Neck: Normal range of motion  Neck supple  No thyromegaly present  Cardiovascular: Normal rate, regular rhythm, normal heart sounds and intact distal pulses  Exam reveals no gallop and no friction rub  No murmur heard  Pulmonary/Chest: Effort normal and breath sounds normal  No respiratory distress  He has no wheezes  He has no rales  He exhibits no tenderness  Musculoskeletal: Normal range of motion  He exhibits no edema or tenderness  Lymphadenopathy:     He has no cervical adenopathy  Neurological: He is oriented to person, place, and time  No cranial nerve deficit  He exhibits normal muscle tone  Coordination normal    Skin: Skin is warm and dry  No rash noted  He is not diaphoretic  No erythema  No pallor  Psychiatric: He has a normal mood and affect  His behavior is normal  Judgment and thought content normal    Nursing note and vitals reviewed

## 2019-10-10 ENCOUNTER — OFFICE VISIT (OUTPATIENT)
Dept: FAMILY MEDICINE CLINIC | Facility: CLINIC | Age: 64
End: 2019-10-10
Payer: COMMERCIAL

## 2019-10-10 VITALS
WEIGHT: 293 LBS | SYSTOLIC BLOOD PRESSURE: 124 MMHG | TEMPERATURE: 97.6 F | DIASTOLIC BLOOD PRESSURE: 76 MMHG | HEIGHT: 74 IN | BODY MASS INDEX: 37.6 KG/M2

## 2019-10-10 DIAGNOSIS — R60.0 LOWER EXTREMITY EDEMA: ICD-10-CM

## 2019-10-10 DIAGNOSIS — L03.115 CELLULITIS OF RIGHT LOWER EXTREMITY: Primary | ICD-10-CM

## 2019-10-10 PROCEDURE — 99213 OFFICE O/P EST LOW 20 MIN: CPT | Performed by: FAMILY MEDICINE

## 2019-10-10 RX ORDER — CEPHALEXIN 500 MG/1
500 CAPSULE ORAL EVERY 8 HOURS SCHEDULED
Qty: 30 CAPSULE | Refills: 0 | Status: SHIPPED | OUTPATIENT
Start: 2019-10-10 | End: 2019-10-20

## 2019-10-10 NOTE — PROGRESS NOTES
Assessment/Plan:  Patient try to elevate leg and with the use warm compresses as well as Keflex  The the patient have Doppler study to rule out DVT  Follow-up as needed     Diagnoses and all orders for this visit:    Cellulitis of right lower extremity  -     cephalexin (KEFLEX) 500 mg capsule; Take 1 capsule (500 mg total) by mouth every 8 (eight) hours for 10 days    Lower extremity edema  -     VAS lower limb venous duplex study, unilateral/limited; Future            Subjective:        Patient ID: Michele Vasquez  is a 59 y o  male  Some patient is here with redness and some discomfort as well as swelling over the right foot over the past 24 hours  The no fever noted  Patient with slight chill  Patient also with some discomfort in the thigh posteriorly  Patient did use low-dose aspirin last night the th   Patient also pulled out needle added his plantar aspect of his ball of his foot roughly 2 weeks ago           The following portions of the patient's history were reviewed and updated as appropriate: allergies, current medications, past family history, past medical history, past social history, past surgical history and problem list       Review of Systems   Constitutional: Negative  HENT: Negative  Eyes: Negative  Respiratory: Negative  Cardiovascular: Positive for leg swelling  Gastrointestinal: Negative  Endocrine: Negative  Genitourinary: Negative  Musculoskeletal: Negative  Skin: Positive for color change  Allergic/Immunologic: Negative  Neurological: Negative  Hematological: Negative  Psychiatric/Behavioral: Negative  Objective:      BMI Counseling: Body mass index is 37 62 kg/m²  Discussed the patient's BMI with him  The BMI is above normal  Nutrition recommendations include reducing portion sizes      /76 (BP Location: Right arm, Patient Position: Sitting, Cuff Size: Adult)   Temp 97 6 °F (36 4 °C) (Tympanic)   Ht 6' 2" (1 88 m)   Wt 133 kg (293 lb)   BMI 37 62 kg/m²          Physical Exam   Constitutional: He appears well-developed and well-nourished  Musculoskeletal: He exhibits tenderness  The no calf tenderness  Skin: There is erythema  The redness of the right foot  The some swelling noted  Nursing note and vitals reviewed

## 2019-10-11 ENCOUNTER — HOSPITAL ENCOUNTER (OUTPATIENT)
Dept: NON INVASIVE DIAGNOSTICS | Facility: HOSPITAL | Age: 64
Discharge: HOME | End: 2019-10-11
Payer: COMMERCIAL

## 2019-10-11 DIAGNOSIS — R60.0 LOWER EXTREMITY EDEMA: ICD-10-CM

## 2019-10-11 PROCEDURE — 93971 EXTREMITY STUDY: CPT | Performed by: SURGERY

## 2019-10-11 PROCEDURE — 93971 EXTREMITY STUDY: CPT

## 2019-10-29 ENCOUNTER — OFFICE VISIT (OUTPATIENT)
Dept: FAMILY MEDICINE CLINIC | Facility: CLINIC | Age: 64
End: 2019-10-29
Payer: COMMERCIAL

## 2019-10-29 VITALS
OXYGEN SATURATION: 98 % | SYSTOLIC BLOOD PRESSURE: 120 MMHG | TEMPERATURE: 96.6 F | HEIGHT: 74 IN | BODY MASS INDEX: 39.17 KG/M2 | WEIGHT: 305.2 LBS | HEART RATE: 53 BPM | DIASTOLIC BLOOD PRESSURE: 80 MMHG

## 2019-10-29 DIAGNOSIS — L03.115 CELLULITIS OF RIGHT LOWER EXTREMITY: Primary | ICD-10-CM

## 2019-10-29 DIAGNOSIS — R22.41 LOCALIZED SWELLING OF RIGHT FOOT: ICD-10-CM

## 2019-10-29 PROCEDURE — 99213 OFFICE O/P EST LOW 20 MIN: CPT | Performed by: FAMILY MEDICINE

## 2019-10-29 PROCEDURE — 3008F BODY MASS INDEX DOCD: CPT | Performed by: FAMILY MEDICINE

## 2019-10-29 RX ORDER — CLINDAMYCIN HYDROCHLORIDE 150 MG/1
150 CAPSULE ORAL EVERY 8 HOURS SCHEDULED
Qty: 30 CAPSULE | Refills: 0 | Status: SHIPPED | OUTPATIENT
Start: 2019-10-29 | End: 2019-11-08

## 2019-10-29 RX ORDER — CYCLOBENZAPRINE HCL 10 MG
10 TABLET ORAL
Qty: 90 TABLET | Refills: 0 | Status: SHIPPED | OUTPATIENT
Start: 2019-10-29 | End: 2020-03-06

## 2019-10-29 RX ORDER — PREDNISONE 10 MG/1
TABLET ORAL
Qty: 30 TABLET | Refills: 0 | Status: SHIPPED | OUTPATIENT
Start: 2019-10-29 | End: 2020-02-29 | Stop reason: HOSPADM

## 2019-10-29 NOTE — PROGRESS NOTES
Assessment/Plan:        Diagnoses and all orders for this visit:    Cellulitis of right lower extremity  -     XR foot 3+ vw right; Future  -     CBC and differential; Future  -     Comprehensive metabolic panel; Future  -     Uric acid; Future  -     clindamycin (CLEOCIN) 150 mg capsule; Take 1 capsule (150 mg total) by mouth every 8 (eight) hours for 10 days  -     predniSONE 10 mg tablet; 5 pills daily for 2 days, 4 for 2 days, 3 for 2 days, 2 for 2 days, 1 for 2 days  Localized swelling of right foot  -     XR foot 3+ vw right; Future  -     CBC and differential; Future  -     Comprehensive metabolic panel; Future  -     Uric acid; Future  -     clindamycin (CLEOCIN) 150 mg capsule; Take 1 capsule (150 mg total) by mouth every 8 (eight) hours for 10 days  -     predniSONE 10 mg tablet; 5 pills daily for 2 days, 4 for 2 days, 3 for 2 days, 2 for 2 days, 1 for 2 days  Subjective:        Patient ID: Amber Reyes  is a 59 y o  male  Patient is here with swelling and redness of the right 2nd toe and the distal metatarsal region  Patient had negative Doppler study done recently  The patient take any Keflex  The following portions of the patient's history were reviewed and updated as appropriate: allergies, current medications, past family history, past medical history, past social history, past surgical history and problem list       Review of Systems   Constitutional: Negative  HENT: Negative  Eyes: Negative  Respiratory: Negative  Cardiovascular: Positive for leg swelling  Gastrointestinal: Negative  Endocrine: Negative  Genitourinary: Negative  Musculoskeletal: Negative  Skin: Positive for color change  Allergic/Immunologic: Negative  Neurological: Negative  Hematological: Negative  Psychiatric/Behavioral: Negative              Objective:      /80 (BP Location: Right arm, Patient Position: Sitting, Cuff Size: Large)   Pulse (!) 53   Temp Chelsie Duet ) 96 6 °F (35 9 °C) (Tympanic)   Ht 6' 2" (1 88 m)   Wt (!) 138 kg (305 lb 3 2 oz)   SpO2 98%   BMI 39 19 kg/m²          Physical Exam   Constitutional: He appears well-developed and well-nourished  Musculoskeletal: He exhibits tenderness  Swelling of the distal metatarsal region of the 2nd through 4th the ones on the right  Patient also with swelling over the 2nd toe on the right greater than the 3rd and 4th toe  Patient has some redness over this region  Nursing note and vitals reviewed

## 2019-11-02 ENCOUNTER — APPOINTMENT (OUTPATIENT)
Dept: RADIOLOGY | Facility: MEDICAL CENTER | Age: 64
End: 2019-11-02
Payer: COMMERCIAL

## 2019-11-02 ENCOUNTER — APPOINTMENT (OUTPATIENT)
Dept: LAB | Facility: MEDICAL CENTER | Age: 64
End: 2019-11-02
Payer: COMMERCIAL

## 2019-11-02 DIAGNOSIS — R22.41 LOCALIZED SWELLING OF RIGHT FOOT: ICD-10-CM

## 2019-11-02 DIAGNOSIS — L03.115 CELLULITIS OF RIGHT LOWER EXTREMITY: ICD-10-CM

## 2019-11-02 LAB
ALBUMIN SERPL BCP-MCNC: 3.7 G/DL (ref 3.5–5)
ALP SERPL-CCNC: 55 U/L (ref 46–116)
ALT SERPL W P-5'-P-CCNC: 38 U/L (ref 12–78)
ANION GAP SERPL CALCULATED.3IONS-SCNC: 4 MMOL/L (ref 4–13)
AST SERPL W P-5'-P-CCNC: 13 U/L (ref 5–45)
BASOPHILS # BLD AUTO: 0.07 THOUSANDS/ΜL (ref 0–0.1)
BASOPHILS NFR BLD AUTO: 1 % (ref 0–1)
BILIRUB SERPL-MCNC: 0.4 MG/DL (ref 0.2–1)
BUN SERPL-MCNC: 21 MG/DL (ref 5–25)
CALCIUM SERPL-MCNC: 8.7 MG/DL (ref 8.3–10.1)
CHLORIDE SERPL-SCNC: 111 MMOL/L (ref 100–108)
CO2 SERPL-SCNC: 28 MMOL/L (ref 21–32)
CREAT SERPL-MCNC: 0.89 MG/DL (ref 0.6–1.3)
EOSINOPHIL # BLD AUTO: 0.02 THOUSAND/ΜL (ref 0–0.61)
EOSINOPHIL NFR BLD AUTO: 0 % (ref 0–6)
ERYTHROCYTE [DISTWIDTH] IN BLOOD BY AUTOMATED COUNT: 13.2 % (ref 11.6–15.1)
GFR SERPL CREATININE-BSD FRML MDRD: 90 ML/MIN/1.73SQ M
GLUCOSE P FAST SERPL-MCNC: 93 MG/DL (ref 65–99)
HCT VFR BLD AUTO: 47.6 % (ref 36.5–49.3)
HGB BLD-MCNC: 15.8 G/DL (ref 12–17)
IMM GRANULOCYTES # BLD AUTO: 0.09 THOUSAND/UL (ref 0–0.2)
IMM GRANULOCYTES NFR BLD AUTO: 1 % (ref 0–2)
LYMPHOCYTES # BLD AUTO: 3.36 THOUSANDS/ΜL (ref 0.6–4.47)
LYMPHOCYTES NFR BLD AUTO: 28 % (ref 14–44)
MCH RBC QN AUTO: 29.9 PG (ref 26.8–34.3)
MCHC RBC AUTO-ENTMCNC: 33.2 G/DL (ref 31.4–37.4)
MCV RBC AUTO: 90 FL (ref 82–98)
MONOCYTES # BLD AUTO: 0.69 THOUSAND/ΜL (ref 0.17–1.22)
MONOCYTES NFR BLD AUTO: 6 % (ref 4–12)
NEUTROPHILS # BLD AUTO: 7.78 THOUSANDS/ΜL (ref 1.85–7.62)
NEUTS SEG NFR BLD AUTO: 64 % (ref 43–75)
NRBC BLD AUTO-RTO: 0 /100 WBCS
PLATELET # BLD AUTO: 181 THOUSANDS/UL (ref 149–390)
PMV BLD AUTO: 8.6 FL (ref 8.9–12.7)
POTASSIUM SERPL-SCNC: 4.3 MMOL/L (ref 3.5–5.3)
PROT SERPL-MCNC: 6.9 G/DL (ref 6.4–8.2)
RBC # BLD AUTO: 5.29 MILLION/UL (ref 3.88–5.62)
SODIUM SERPL-SCNC: 143 MMOL/L (ref 136–145)
URATE SERPL-MCNC: 3.8 MG/DL (ref 4.2–8)
WBC # BLD AUTO: 12.01 THOUSAND/UL (ref 4.31–10.16)

## 2019-11-02 PROCEDURE — 73630 X-RAY EXAM OF FOOT: CPT

## 2019-11-02 PROCEDURE — 80053 COMPREHEN METABOLIC PANEL: CPT

## 2019-11-02 PROCEDURE — 85025 COMPLETE CBC W/AUTO DIFF WBC: CPT

## 2019-11-02 PROCEDURE — 36415 COLL VENOUS BLD VENIPUNCTURE: CPT

## 2019-11-02 PROCEDURE — 84550 ASSAY OF BLOOD/URIC ACID: CPT

## 2019-11-12 ENCOUNTER — OFFICE VISIT (OUTPATIENT)
Dept: FAMILY MEDICINE CLINIC | Facility: CLINIC | Age: 64
End: 2019-11-12
Payer: COMMERCIAL

## 2019-11-12 VITALS
TEMPERATURE: 97.6 F | BODY MASS INDEX: 37.86 KG/M2 | HEIGHT: 74 IN | WEIGHT: 295 LBS | SYSTOLIC BLOOD PRESSURE: 122 MMHG | DIASTOLIC BLOOD PRESSURE: 88 MMHG

## 2019-11-12 DIAGNOSIS — I87.8 VENOUS STASIS: ICD-10-CM

## 2019-11-12 DIAGNOSIS — R22.41 LOCALIZED SWELLING OF RIGHT FOOT: Primary | ICD-10-CM

## 2019-11-12 PROCEDURE — 1036F TOBACCO NON-USER: CPT | Performed by: FAMILY MEDICINE

## 2019-11-12 PROCEDURE — 99213 OFFICE O/P EST LOW 20 MIN: CPT | Performed by: FAMILY MEDICINE

## 2019-11-12 RX ORDER — CLOTRIMAZOLE 1 %
CREAM (GRAM) TOPICAL DAILY PRN
COMMUNITY
End: 2020-08-26 | Stop reason: SDUPTHER

## 2019-11-20 ENCOUNTER — TELEPHONE (OUTPATIENT)
Dept: FAMILY MEDICINE CLINIC | Facility: CLINIC | Age: 64
End: 2019-11-20

## 2019-11-20 DIAGNOSIS — E03.9 HYPOTHYROIDISM, UNSPECIFIED TYPE: ICD-10-CM

## 2019-11-20 RX ORDER — LEVOTHYROXINE SODIUM 200 MCG
TABLET ORAL
Qty: 90 TABLET | Refills: 4 | Status: SHIPPED | OUTPATIENT
Start: 2019-11-20 | End: 2020-02-26 | Stop reason: SDUPTHER

## 2019-11-20 NOTE — TELEPHONE ENCOUNTER
Received a call from Darien Terry @ [address] [place] [address], [phone] regarding the order for ZINA stockings for this patient  The insurance has specific diagnosis for coverage and the two provided are not sufficient  Some allowable codes are venous stasis, venous insifficiency, thrombotic syndrome, edema, etc  The diagnosis provided were R22 41 (swelling of right foot) and I87 8 (unspecified disorder of vein)  I reached back out to Dory  I explained the code we have attached to the order indicates Venous stasis  I googled a more specific code for that diagnosis, which is I87 2, and provided that code  I also was able to provide her with R60 0 (lower extremity swelling)  This is now sufficient for coverage  She will resubmit to insurance

## 2019-12-23 ENCOUNTER — OFFICE VISIT (OUTPATIENT)
Dept: FAMILY MEDICINE CLINIC | Facility: CLINIC | Age: 64
End: 2019-12-23
Payer: COMMERCIAL

## 2019-12-23 VITALS
SYSTOLIC BLOOD PRESSURE: 122 MMHG | WEIGHT: 301 LBS | HEIGHT: 74 IN | DIASTOLIC BLOOD PRESSURE: 70 MMHG | HEART RATE: 68 BPM | OXYGEN SATURATION: 97 % | BODY MASS INDEX: 38.63 KG/M2

## 2019-12-23 DIAGNOSIS — T78.40XA ALLERGIC REACTION, INITIAL ENCOUNTER: Primary | ICD-10-CM

## 2019-12-23 PROCEDURE — 99213 OFFICE O/P EST LOW 20 MIN: CPT | Performed by: FAMILY MEDICINE

## 2019-12-23 PROCEDURE — 3008F BODY MASS INDEX DOCD: CPT | Performed by: FAMILY MEDICINE

## 2019-12-23 PROCEDURE — 1036F TOBACCO NON-USER: CPT | Performed by: FAMILY MEDICINE

## 2019-12-23 NOTE — PROGRESS NOTES
Assessment/Plan:   Supportive care  Follow up as needed  Diagnoses and all orders for this visit:    Allergic reaction, initial encounter         Subjective:     Patient ID: Khang Stone  is a 59 y o  male  He had his second she will is a vaccine recently  He started with a very red and warm reaction on his right arm  Sick heard within 12 hours  Review of Systems   Constitutional: Negative  HENT: Negative  Eyes: Negative  Respiratory: Negative  Cardiovascular: Negative  Gastrointestinal: Negative  Endocrine: Negative  Genitourinary: Negative  Musculoskeletal: Negative  Skin: Positive for rash  Allergic/Immunologic: Negative  Neurological: Negative  Hematological: Negative  Psychiatric/Behavioral: Negative  All other systems reviewed and are negative  Objective:     Physical Exam   Constitutional: He is oriented to person, place, and time  He appears well-developed and well-nourished  HENT:   Head: Normocephalic and atraumatic  Right Ear: External ear normal    Left Ear: External ear normal    Nose: Nose normal    Mouth/Throat: Oropharynx is clear and moist    Eyes: Pupils are equal, round, and reactive to light  Conjunctivae and EOM are normal    Neck: Normal range of motion  Neck supple  Cardiovascular: Normal rate, regular rhythm and normal heart sounds  Pulmonary/Chest: Effort normal and breath sounds normal    Abdominal: Soft  Bowel sounds are normal    Musculoskeletal: Normal range of motion  Neurological: He is alert and oriented to person, place, and time  He has normal reflexes  Skin: Skin is warm and dry  There is erythema  Psychiatric: He has a normal mood and affect  His behavior is normal    Nursing note and vitals reviewed

## 2020-02-24 DIAGNOSIS — E03.9 HYPOTHYROIDISM, UNSPECIFIED TYPE: ICD-10-CM

## 2020-02-24 DIAGNOSIS — I10 ESSENTIAL HYPERTENSION: ICD-10-CM

## 2020-02-24 RX ORDER — LEVOTHYROXINE SODIUM 0.03 MG/1
25 TABLET ORAL DAILY
Qty: 30 TABLET | Refills: 0 | Status: SHIPPED | OUTPATIENT
Start: 2020-02-24 | End: 2020-02-26 | Stop reason: SDUPTHER

## 2020-02-24 RX ORDER — LEVOTHYROXINE SODIUM 0.03 MG/1
25 TABLET ORAL DAILY
Qty: 90 TABLET | Refills: 1 | Status: SHIPPED | OUTPATIENT
Start: 2020-02-24 | End: 2020-02-26 | Stop reason: SDUPTHER

## 2020-02-24 NOTE — TELEPHONE ENCOUNTER
Pt's wife called stating he is out of the levothyroxine and only has 2-3 days worth of the verapamil left  She's requesting a 30 day supply be sent to [facility] and a 90 day to [facility]  Meds are prepped for approval - please authorize

## 2020-02-26 ENCOUNTER — OFFICE VISIT (OUTPATIENT)
Dept: FAMILY MEDICINE CLINIC | Facility: CLINIC | Age: 65
End: 2020-02-26
Payer: COMMERCIAL

## 2020-02-26 VITALS
DIASTOLIC BLOOD PRESSURE: 68 MMHG | BODY MASS INDEX: 40.04 KG/M2 | HEIGHT: 74 IN | HEART RATE: 62 BPM | WEIGHT: 312 LBS | SYSTOLIC BLOOD PRESSURE: 132 MMHG | OXYGEN SATURATION: 98 %

## 2020-02-26 DIAGNOSIS — G57.93 NEUROPATHY INVOLVING BOTH LOWER EXTREMITIES: ICD-10-CM

## 2020-02-26 DIAGNOSIS — E78.5 DYSLIPIDEMIA: Primary | ICD-10-CM

## 2020-02-26 DIAGNOSIS — K21.9 GASTROESOPHAGEAL REFLUX DISEASE WITHOUT ESOPHAGITIS: ICD-10-CM

## 2020-02-26 DIAGNOSIS — E03.9 HYPOTHYROIDISM, UNSPECIFIED TYPE: ICD-10-CM

## 2020-02-26 DIAGNOSIS — I10 ESSENTIAL HYPERTENSION: ICD-10-CM

## 2020-02-26 PROCEDURE — 99214 OFFICE O/P EST MOD 30 MIN: CPT | Performed by: FAMILY MEDICINE

## 2020-02-26 PROCEDURE — 3008F BODY MASS INDEX DOCD: CPT | Performed by: FAMILY MEDICINE

## 2020-02-26 PROCEDURE — 1036F TOBACCO NON-USER: CPT | Performed by: FAMILY MEDICINE

## 2020-02-26 PROCEDURE — 3078F DIAST BP <80 MM HG: CPT | Performed by: FAMILY MEDICINE

## 2020-02-26 PROCEDURE — 3075F SYST BP GE 130 - 139MM HG: CPT | Performed by: FAMILY MEDICINE

## 2020-02-26 RX ORDER — LEVOTHYROXINE SODIUM 0.03 MG/1
25 TABLET ORAL DAILY
Qty: 90 TABLET | Refills: 1 | Status: SHIPPED | OUTPATIENT
Start: 2020-02-26 | End: 2020-05-26 | Stop reason: SDUPTHER

## 2020-02-26 RX ORDER — OMEPRAZOLE 20 MG/1
20 CAPSULE, DELAYED RELEASE ORAL DAILY
Qty: 90 CAPSULE | Refills: 1 | Status: SHIPPED | OUTPATIENT
Start: 2020-02-26 | End: 2020-07-24

## 2020-02-26 RX ORDER — LEVOTHYROXINE SODIUM 200 MCG
200 TABLET ORAL DAILY
Qty: 90 TABLET | Refills: 3 | Status: SHIPPED | OUTPATIENT
Start: 2020-02-26 | End: 2021-03-18

## 2020-02-26 NOTE — PROGRESS NOTES
Assessment/Plan:       Diagnoses and all orders for this visit:    Dyslipidemia  -     CBC and differential; Future  -     Comprehensive metabolic panel; Future  -     Lipid panel; Future  -     TSH, 3rd generation with Free T4 reflex; Future  -     Microalbumin / creatinine urine ratio    Essential hypertension  -     verapamil (CALAN-SR) 180 mg CR tablet; Take 1 tablet (180 mg total) by mouth daily As directed  -     CBC and differential; Future  -     Comprehensive metabolic panel; Future  -     Lipid panel; Future  -     TSH, 3rd generation with Free T4 reflex; Future  -     Microalbumin / creatinine urine ratio    Hypothyroidism, unspecified type  -     SYNTHROID 200 MCG tablet; Take 1 tablet (200 mcg total) by mouth daily  -     levothyroxine 25 mcg tablet; Take 1 tablet (25 mcg total) by mouth daily  -     CBC and differential; Future  -     Comprehensive metabolic panel; Future  -     Lipid panel; Future  -     TSH, 3rd generation with Free T4 reflex; Future  -     Microalbumin / creatinine urine ratio    Gastroesophageal reflux disease without esophagitis  -     CBC and differential; Future  -     Comprehensive metabolic panel; Future  -     Lipid panel; Future  -     TSH, 3rd generation with Free T4 reflex; Future  -     Microalbumin / creatinine urine ratio    Neuropathy involving both lower extremities  -     omeprazole (PriLOSEC) 20 mg delayed release capsule; Take 1 capsule (20 mg total) by mouth daily  -     CBC and differential; Future  -     Comprehensive metabolic panel; Future  -     Lipid panel; Future  -     TSH, 3rd generation with Free T4 reflex; Future  -     Microalbumin / creatinine urine ratio            Subjective:        Patient ID: Julieta Urrutia  is a 59 y o  male  Patient is here to follow-up on hypertension hypothyroidism GERD and hyperlipidemia  Patient wishes to have laboratory studies done  Patient needs refills on medications  Patient feeling well overall    Patient did see urology  Patient does have nocturia  No chest pain or shortness of breath  No problems with urination or defecating at this time  The following portions of the patient's history were reviewed and updated as appropriate: allergies, current medications, past family history, past medical history, past social history, past surgical history and problem list       Review of Systems   Constitutional: Negative  HENT: Negative  Eyes: Negative  Respiratory: Negative  Cardiovascular: Negative  Gastrointestinal: Negative  Endocrine: Negative  Genitourinary:        The nocturia   Musculoskeletal: Negative  Skin: Negative  Allergic/Immunologic: Negative  Neurological: Negative  Hematological: Negative  Psychiatric/Behavioral: Negative  Objective:      BMI Counseling: Body mass index is 40 06 kg/m²  The BMI is above normal  Nutrition recommendations include decreasing portion sizes  Exercise recommendations include moderate physical activity 150 minutes/week  /68 (BP Location: Right arm)   Pulse 62   Ht 6' 2" (1 88 m)   Wt (!) 142 kg (312 lb)   SpO2 98%   BMI 40 06 kg/m²          Physical Exam   Constitutional: He appears well-developed and well-nourished  No distress  HENT:   Head: Normocephalic  Right Ear: External ear normal    Left Ear: External ear normal    Mouth/Throat: Oropharynx is clear and moist  No oropharyngeal exudate  Eyes: Pupils are equal, round, and reactive to light  EOM are normal  Right eye exhibits no discharge  Left eye exhibits no discharge  No scleral icterus  Neck: Normal range of motion  Neck supple  No thyromegaly present  Cardiovascular: Normal rate, regular rhythm, normal heart sounds and intact distal pulses  Exam reveals no gallop and no friction rub  No murmur heard  Pulmonary/Chest: Effort normal and breath sounds normal  No respiratory distress  He has no wheezes  He has no rales   He exhibits no tenderness  Abdominal: Soft  Bowel sounds are normal  He exhibits no distension  There is no tenderness  There is no rebound and no guarding  Musculoskeletal: Normal range of motion  He exhibits no edema or tenderness  Lymphadenopathy:     He has no cervical adenopathy  Neurological: He is alert  No cranial nerve deficit  He exhibits normal muscle tone  Coordination normal    Skin: Skin is warm and dry  No rash noted  He is not diaphoretic  No erythema  No pallor  Psychiatric: He has a normal mood and affect  His behavior is normal  Judgment and thought content normal    Nursing note and vitals reviewed

## 2020-02-27 ENCOUNTER — OFFICE VISIT (OUTPATIENT)
Dept: FAMILY MEDICINE CLINIC | Facility: CLINIC | Age: 65
End: 2020-02-27
Payer: COMMERCIAL

## 2020-02-27 ENCOUNTER — HOSPITAL ENCOUNTER (OUTPATIENT)
Facility: HOSPITAL | Age: 65
Setting detail: OBSERVATION
Discharge: HOME | End: 2020-02-29
Attending: EMERGENCY MEDICINE | Admitting: UROLOGY
Payer: COMMERCIAL

## 2020-02-27 VITALS
DIASTOLIC BLOOD PRESSURE: 70 MMHG | SYSTOLIC BLOOD PRESSURE: 130 MMHG | HEART RATE: 72 BPM | WEIGHT: 312 LBS | TEMPERATURE: 97.6 F | HEIGHT: 74 IN | BODY MASS INDEX: 40.04 KG/M2 | OXYGEN SATURATION: 96 %

## 2020-02-27 DIAGNOSIS — N28.9 ACUTE RENAL INSUFFICIENCY: ICD-10-CM

## 2020-02-27 DIAGNOSIS — K76.0 FATTY LIVER: ICD-10-CM

## 2020-02-27 DIAGNOSIS — N20.0 KIDNEY STONE ON LEFT SIDE: Primary | ICD-10-CM

## 2020-02-27 DIAGNOSIS — K76.0 HEPATIC STEATOSIS: ICD-10-CM

## 2020-02-27 DIAGNOSIS — R10.33 PERIUMBILICAL PAIN: Primary | ICD-10-CM

## 2020-02-27 DIAGNOSIS — N13.2 HYDRONEPHROSIS WITH OBSTRUCTING CALCULUS: ICD-10-CM

## 2020-02-27 DIAGNOSIS — N13.30 HYDRONEPHROSIS: ICD-10-CM

## 2020-02-27 LAB
BASOPHILS # BLD AUTO: 0.04 THOUSANDS/ΜL (ref 0–0.1)
BASOPHILS NFR BLD AUTO: 0 % (ref 0–1)
EOSINOPHIL # BLD AUTO: 0 THOUSAND/ΜL (ref 0–0.61)
EOSINOPHIL NFR BLD AUTO: 0 % (ref 0–6)
ERYTHROCYTE [DISTWIDTH] IN BLOOD BY AUTOMATED COUNT: 12.6 % (ref 11.6–15.1)
HCT VFR BLD AUTO: 48.2 % (ref 36.5–49.3)
HGB BLD-MCNC: 16 G/DL (ref 12–17)
IMM GRANULOCYTES # BLD AUTO: 0.06 THOUSAND/UL (ref 0–0.2)
IMM GRANULOCYTES NFR BLD AUTO: 1 % (ref 0–2)
LYMPHOCYTES # BLD AUTO: 1.29 THOUSANDS/ΜL (ref 0.6–4.47)
LYMPHOCYTES NFR BLD AUTO: 10 % (ref 14–44)
MCH RBC QN AUTO: 30 PG (ref 26.8–34.3)
MCHC RBC AUTO-ENTMCNC: 33.2 G/DL (ref 31.4–37.4)
MCV RBC AUTO: 90 FL (ref 82–98)
MONOCYTES # BLD AUTO: 1.01 THOUSAND/ΜL (ref 0.17–1.22)
MONOCYTES NFR BLD AUTO: 8 % (ref 4–12)
NEUTROPHILS # BLD AUTO: 10.07 THOUSANDS/ΜL (ref 1.85–7.62)
NEUTS SEG NFR BLD AUTO: 81 % (ref 43–75)
NRBC BLD AUTO-RTO: 0 /100 WBCS
PLATELET # BLD AUTO: 155 THOUSANDS/UL (ref 149–390)
PMV BLD AUTO: 8.1 FL (ref 8.9–12.7)
RBC # BLD AUTO: 5.33 MILLION/UL (ref 3.88–5.62)
WBC # BLD AUTO: 12.47 THOUSAND/UL (ref 4.31–10.16)

## 2020-02-27 PROCEDURE — 85025 COMPLETE CBC W/AUTO DIFF WBC: CPT | Performed by: EMERGENCY MEDICINE

## 2020-02-27 PROCEDURE — 3075F SYST BP GE 130 - 139MM HG: CPT | Performed by: FAMILY MEDICINE

## 2020-02-27 PROCEDURE — 36415 COLL VENOUS BLD VENIPUNCTURE: CPT | Performed by: EMERGENCY MEDICINE

## 2020-02-27 PROCEDURE — 83690 ASSAY OF LIPASE: CPT | Performed by: EMERGENCY MEDICINE

## 2020-02-27 PROCEDURE — 80048 BASIC METABOLIC PNL TOTAL CA: CPT | Performed by: EMERGENCY MEDICINE

## 2020-02-27 PROCEDURE — 1036F TOBACCO NON-USER: CPT | Performed by: FAMILY MEDICINE

## 2020-02-27 PROCEDURE — 80076 HEPATIC FUNCTION PANEL: CPT | Performed by: EMERGENCY MEDICINE

## 2020-02-27 PROCEDURE — 99285 EMERGENCY DEPT VISIT HI MDM: CPT | Performed by: EMERGENCY MEDICINE

## 2020-02-27 PROCEDURE — 99213 OFFICE O/P EST LOW 20 MIN: CPT | Performed by: FAMILY MEDICINE

## 2020-02-27 PROCEDURE — 3008F BODY MASS INDEX DOCD: CPT | Performed by: FAMILY MEDICINE

## 2020-02-27 PROCEDURE — 3078F DIAST BP <80 MM HG: CPT | Performed by: FAMILY MEDICINE

## 2020-02-27 PROCEDURE — 85730 THROMBOPLASTIN TIME PARTIAL: CPT | Performed by: EMERGENCY MEDICINE

## 2020-02-27 PROCEDURE — 96374 THER/PROPH/DIAG INJ IV PUSH: CPT

## 2020-02-27 PROCEDURE — 83735 ASSAY OF MAGNESIUM: CPT | Performed by: EMERGENCY MEDICINE

## 2020-02-27 PROCEDURE — 99285 EMERGENCY DEPT VISIT HI MDM: CPT

## 2020-02-27 PROCEDURE — 96375 TX/PRO/DX INJ NEW DRUG ADDON: CPT

## 2020-02-27 PROCEDURE — 85610 PROTHROMBIN TIME: CPT | Performed by: EMERGENCY MEDICINE

## 2020-02-27 RX ORDER — MORPHINE SULFATE 4 MG/ML
6 INJECTION, SOLUTION INTRAMUSCULAR; INTRAVENOUS ONCE
Status: COMPLETED | OUTPATIENT
Start: 2020-02-27 | End: 2020-02-27

## 2020-02-27 RX ORDER — ONDANSETRON 2 MG/ML
4 INJECTION INTRAMUSCULAR; INTRAVENOUS ONCE
Status: COMPLETED | OUTPATIENT
Start: 2020-02-27 | End: 2020-02-27

## 2020-02-27 RX ADMIN — MORPHINE SULFATE 6 MG: 4 INJECTION INTRAVENOUS at 23:50

## 2020-02-27 RX ADMIN — ONDANSETRON 4 MG: 2 INJECTION INTRAMUSCULAR; INTRAVENOUS at 23:49

## 2020-02-27 NOTE — LETTER
February 27, 2020     Patient: Macrina Machado  YOB: 1955   Date of Visit: 2/27/2020       To Whom it May Concern:    Augusto Santos is under my professional care  He was seen in my office on 2/27/2020  He may return to work on 03/02/2020  If you have any questions or concerns, please don't hesitate to call           Sincerely,          Ning Lynch DO        CC: No Recipients

## 2020-02-27 NOTE — PROGRESS NOTES
Assessment/Plan:  The note for patient to return to work on Monday  Patient will rest and increase fluids use Percocet only as needed  Diagnoses and all orders for this visit:    Periumbilical pain  -     CBC and differential; Future  -     Comprehensive metabolic panel; Future  -     Lipase; Future  -     Urinalysis with microscopic  -     URINALYSIS, REFLEX (REFL)  -     CT abdomen pelvis w contrast; Future            Subjective:        Patient ID: Kellee Staples  is a 59 y o  male  Patient is here with periumbilical pain which began last night at 10:00 p m  [place] Patient's pain lasted until 330  Patient did use Percocet at roughly 330 in the morning  Pain is less but still present  no vomiting or diarrhea  The patient use Tums as well as Pepto-Bismol without any significant improvement  No hematochezia  Normal urination  No fever  Patient did use MiraLax  Patient was able tolerate chicken soup at lunch  No pain associated with eating  The following portions of the patient's history were reviewed and updated as appropriate: allergies, current medications, past family history, past medical history, past social history, past surgical history and problem list       Review of Systems   Constitutional: Negative  HENT: Negative  Eyes: Negative  Respiratory: Negative  Cardiovascular: Negative  Gastrointestinal: Positive for abdominal pain  Endocrine: Negative  Genitourinary: Negative  Musculoskeletal: Negative  Skin: Negative  Allergic/Immunologic: Negative  Neurological: Negative  Hematological: Negative  Psychiatric/Behavioral: Negative              Objective:               /70 (BP Location: Right arm, Patient Position: Sitting, Cuff Size: Large)   Pulse 72   Temp 97 6 °F (36 4 °C) (Tympanic)   Ht 6' 2" (1 88 m)   Wt (!) 142 kg (312 lb)   SpO2 96%   BMI 40 06 kg/m²          Physical Exam   Constitutional: He is oriented to person, place, and time  He appears well-developed and well-nourished  No distress  HENT:   Head: Normocephalic  Right Ear: External ear normal    Left Ear: External ear normal    Mouth/Throat: Oropharynx is clear and moist  No oropharyngeal exudate  Eyes: Pupils are equal, round, and reactive to light  EOM are normal  Right eye exhibits no discharge  Left eye exhibits no discharge  No scleral icterus  Neck: Normal range of motion  Neck supple  No thyromegaly present  Cardiovascular: Normal rate, regular rhythm, normal heart sounds and intact distal pulses  Exam reveals no gallop and no friction rub  No murmur heard  Pulmonary/Chest: Effort normal and breath sounds normal  No respiratory distress  He has no wheezes  He has no rales  He exhibits no tenderness  Abdominal: Soft  Bowel sounds are normal  He exhibits no distension  There is tenderness in the periumbilical area  There is no rebound and no guarding  Musculoskeletal: Normal range of motion  He exhibits deformity  He exhibits no edema or tenderness  Lymphadenopathy:     He has no cervical adenopathy  Neurological: He is oriented to person, place, and time  No cranial nerve deficit  He exhibits normal muscle tone  Coordination normal    Skin: Skin is warm and dry  No rash noted  He is not diaphoretic  No erythema  No pallor  Psychiatric: He has a normal mood and affect   His behavior is normal  Judgment and thought content normal

## 2020-02-28 ENCOUNTER — TELEPHONE (OUTPATIENT)
Dept: OTHER | Facility: HOSPITAL | Age: 65
End: 2020-02-28

## 2020-02-28 ENCOUNTER — APPOINTMENT (EMERGENCY)
Dept: CT IMAGING | Facility: HOSPITAL | Age: 65
End: 2020-02-28
Payer: COMMERCIAL

## 2020-02-28 ENCOUNTER — ANESTHESIA EVENT (OUTPATIENT)
Dept: PERIOP | Facility: HOSPITAL | Age: 65
End: 2020-02-28
Payer: COMMERCIAL

## 2020-02-28 ENCOUNTER — APPOINTMENT (OUTPATIENT)
Dept: RADIOLOGY | Facility: HOSPITAL | Age: 65
End: 2020-02-28
Payer: COMMERCIAL

## 2020-02-28 ENCOUNTER — TELEPHONE (OUTPATIENT)
Dept: FAMILY MEDICINE CLINIC | Facility: CLINIC | Age: 65
End: 2020-02-28

## 2020-02-28 ENCOUNTER — ANESTHESIA (OUTPATIENT)
Dept: PERIOP | Facility: HOSPITAL | Age: 65
End: 2020-02-28
Payer: COMMERCIAL

## 2020-02-28 PROBLEM — D72.829 LEUKOCYTOSIS: Status: ACTIVE | Noted: 2020-02-28

## 2020-02-28 PROBLEM — R10.9 INTRACTABLE ABDOMINAL PAIN: Status: ACTIVE | Noted: 2020-02-28

## 2020-02-28 PROBLEM — K76.0 HEPATIC STEATOSIS: Status: ACTIVE | Noted: 2020-02-28

## 2020-02-28 PROBLEM — N20.0 KIDNEY STONE ON LEFT SIDE: Status: ACTIVE | Noted: 2020-02-27

## 2020-02-28 PROBLEM — N17.9 ACUTE KIDNEY INJURY (HCC): Status: ACTIVE | Noted: 2020-02-28

## 2020-02-28 PROBLEM — N13.2 HYDRONEPHROSIS WITH OBSTRUCTING CALCULUS: Chronic | Status: ACTIVE | Noted: 2020-02-28

## 2020-02-28 PROBLEM — N13.2 HYDRONEPHROSIS WITH OBSTRUCTING CALCULUS: Status: ACTIVE | Noted: 2020-02-28

## 2020-02-28 LAB
ALBUMIN SERPL BCP-MCNC: 4 G/DL (ref 3.5–5)
ALP SERPL-CCNC: 58 U/L (ref 46–116)
ALT SERPL W P-5'-P-CCNC: 39 U/L (ref 12–78)
ANION GAP SERPL CALCULATED.3IONS-SCNC: 10 MMOL/L (ref 4–13)
ANION GAP SERPL CALCULATED.3IONS-SCNC: 7 MMOL/L (ref 4–13)
ANION GAP SERPL CALCULATED.3IONS-SCNC: 9 MMOL/L (ref 4–13)
APTT PPP: 27 SECONDS (ref 23–37)
AST SERPL W P-5'-P-CCNC: 19 U/L (ref 5–45)
BASOPHILS # BLD AUTO: 0.04 THOUSANDS/ΜL (ref 0–0.1)
BASOPHILS NFR BLD AUTO: 0 % (ref 0–1)
BILIRUB DIRECT SERPL-MCNC: 0.16 MG/DL (ref 0–0.2)
BILIRUB SERPL-MCNC: 0.69 MG/DL (ref 0.2–1)
BILIRUB UR QL STRIP: NEGATIVE
BUN SERPL-MCNC: 23 MG/DL (ref 5–25)
BUN SERPL-MCNC: 25 MG/DL (ref 5–25)
BUN SERPL-MCNC: 26 MG/DL (ref 5–25)
CALCIUM SERPL-MCNC: 8.3 MG/DL (ref 8.3–10.1)
CALCIUM SERPL-MCNC: 9.2 MG/DL (ref 8.3–10.1)
CALCIUM SERPL-MCNC: 9.2 MG/DL (ref 8.3–10.1)
CHLORIDE SERPL-SCNC: 103 MMOL/L (ref 100–108)
CHLORIDE SERPL-SCNC: 104 MMOL/L (ref 100–108)
CHLORIDE SERPL-SCNC: 105 MMOL/L (ref 100–108)
CLARITY UR: CLEAR
CLARITY, POC: CLEAR
CO2 SERPL-SCNC: 26 MMOL/L (ref 21–32)
CO2 SERPL-SCNC: 26 MMOL/L (ref 21–32)
CO2 SERPL-SCNC: 28 MMOL/L (ref 21–32)
COLOR UR: YELLOW
COLOR, POC: YELLOW
CREAT SERPL-MCNC: 1.49 MG/DL (ref 0.6–1.3)
CREAT SERPL-MCNC: 1.62 MG/DL (ref 0.6–1.3)
CREAT SERPL-MCNC: 1.65 MG/DL (ref 0.6–1.3)
EOSINOPHIL # BLD AUTO: 0 THOUSAND/ΜL (ref 0–0.61)
EOSINOPHIL NFR BLD AUTO: 0 % (ref 0–6)
ERYTHROCYTE [DISTWIDTH] IN BLOOD BY AUTOMATED COUNT: 12.8 % (ref 11.6–15.1)
GFR SERPL CREATININE-BSD FRML MDRD: 43 ML/MIN/1.73SQ M
GFR SERPL CREATININE-BSD FRML MDRD: 44 ML/MIN/1.73SQ M
GFR SERPL CREATININE-BSD FRML MDRD: 49 ML/MIN/1.73SQ M
GLUCOSE P FAST SERPL-MCNC: 124 MG/DL (ref 65–99)
GLUCOSE SERPL-MCNC: 124 MG/DL (ref 65–140)
GLUCOSE SERPL-MCNC: 132 MG/DL (ref 65–140)
GLUCOSE SERPL-MCNC: 134 MG/DL (ref 65–140)
GLUCOSE UR STRIP-MCNC: NEGATIVE MG/DL
HCT VFR BLD AUTO: 46.8 % (ref 36.5–49.3)
HGB BLD-MCNC: 15.4 G/DL (ref 12–17)
HGB UR QL STRIP.AUTO: NEGATIVE
IMM GRANULOCYTES # BLD AUTO: 0.07 THOUSAND/UL (ref 0–0.2)
IMM GRANULOCYTES NFR BLD AUTO: 1 % (ref 0–2)
INR PPP: 1 (ref 0.84–1.19)
KETONES UR STRIP-MCNC: NEGATIVE MG/DL
LEUKOCYTE ESTERASE UR QL STRIP: NEGATIVE
LIPASE SERPL-CCNC: 94 U/L (ref 73–393)
LYMPHOCYTES # BLD AUTO: 2.08 THOUSANDS/ΜL (ref 0.6–4.47)
LYMPHOCYTES NFR BLD AUTO: 16 % (ref 14–44)
MAGNESIUM SERPL-MCNC: 2.2 MG/DL (ref 1.6–2.6)
MCH RBC QN AUTO: 30.6 PG (ref 26.8–34.3)
MCHC RBC AUTO-ENTMCNC: 32.9 G/DL (ref 31.4–37.4)
MCV RBC AUTO: 93 FL (ref 82–98)
MONOCYTES # BLD AUTO: 1.21 THOUSAND/ΜL (ref 0.17–1.22)
MONOCYTES NFR BLD AUTO: 9 % (ref 4–12)
NEUTROPHILS # BLD AUTO: 9.89 THOUSANDS/ΜL (ref 1.85–7.62)
NEUTS SEG NFR BLD AUTO: 74 % (ref 43–75)
NITRITE UR QL STRIP: NEGATIVE
NRBC BLD AUTO-RTO: 0 /100 WBCS
PH UR STRIP.AUTO: 5 [PH] (ref 4.5–8)
PLATELET # BLD AUTO: 163 THOUSANDS/UL (ref 149–390)
PMV BLD AUTO: 8.5 FL (ref 8.9–12.7)
POTASSIUM SERPL-SCNC: 4.4 MMOL/L (ref 3.5–5.3)
POTASSIUM SERPL-SCNC: 4.5 MMOL/L (ref 3.5–5.3)
POTASSIUM SERPL-SCNC: 5.1 MMOL/L (ref 3.5–5.3)
PROT SERPL-MCNC: 7.3 G/DL (ref 6.4–8.2)
PROT UR STRIP-MCNC: NEGATIVE MG/DL
PROTHROMBIN TIME: 12.8 SECONDS (ref 11.6–14.5)
RBC # BLD AUTO: 5.03 MILLION/UL (ref 3.88–5.62)
SODIUM SERPL-SCNC: 138 MMOL/L (ref 136–145)
SODIUM SERPL-SCNC: 139 MMOL/L (ref 136–145)
SODIUM SERPL-SCNC: 141 MMOL/L (ref 136–145)
SP GR UR STRIP.AUTO: 1.01 (ref 1–1.03)
UROBILINOGEN UR QL STRIP.AUTO: 0.2 E.U./DL
WBC # BLD AUTO: 13.29 THOUSAND/UL (ref 4.31–10.16)

## 2020-02-28 PROCEDURE — 74177 CT ABD & PELVIS W/CONTRAST: CPT

## 2020-02-28 PROCEDURE — 81003 URINALYSIS AUTO W/O SCOPE: CPT

## 2020-02-28 PROCEDURE — 85025 COMPLETE CBC W/AUTO DIFF WBC: CPT | Performed by: NURSE PRACTITIONER

## 2020-02-28 PROCEDURE — C1758 CATHETER, URETERAL: HCPCS | Performed by: UROLOGY

## 2020-02-28 PROCEDURE — C2617 STENT, NON-COR, TEM W/O DEL: HCPCS | Performed by: UROLOGY

## 2020-02-28 PROCEDURE — 80048 BASIC METABOLIC PNL TOTAL CA: CPT | Performed by: STUDENT IN AN ORGANIZED HEALTH CARE EDUCATION/TRAINING PROGRAM

## 2020-02-28 PROCEDURE — 80048 BASIC METABOLIC PNL TOTAL CA: CPT | Performed by: NURSE PRACTITIONER

## 2020-02-28 PROCEDURE — 99220 PR INITIAL OBSERVATION CARE/DAY 70 MINUTES: CPT | Performed by: STUDENT IN AN ORGANIZED HEALTH CARE EDUCATION/TRAINING PROGRAM

## 2020-02-28 PROCEDURE — C1769 GUIDE WIRE: HCPCS | Performed by: UROLOGY

## 2020-02-28 PROCEDURE — 96361 HYDRATE IV INFUSION ADD-ON: CPT

## 2020-02-28 PROCEDURE — 52351 CYSTOURETERO & OR PYELOSCOPE: CPT | Performed by: UROLOGY

## 2020-02-28 PROCEDURE — 74450 X-RAY URETHRA/BLADDER: CPT

## 2020-02-28 PROCEDURE — NC001 PR NO CHARGE: Performed by: PHYSICIAN ASSISTANT

## 2020-02-28 PROCEDURE — 52332 CYSTOSCOPY AND TREATMENT: CPT | Performed by: UROLOGY

## 2020-02-28 DEVICE — STENT CONTOUR INJ 6FR 30CM: Type: IMPLANTABLE DEVICE | Site: URETER | Status: FUNCTIONAL

## 2020-02-28 RX ORDER — LIDOCAINE HYDROCHLORIDE 20 MG/ML
INJECTION, SOLUTION EPIDURAL; INFILTRATION; INTRACAUDAL; PERINEURAL AS NEEDED
Status: DISCONTINUED | OUTPATIENT
Start: 2020-02-28 | End: 2020-02-28 | Stop reason: SURG

## 2020-02-28 RX ORDER — SODIUM CHLORIDE 9 MG/ML
125 INJECTION, SOLUTION INTRAVENOUS CONTINUOUS
Status: CANCELLED | OUTPATIENT
Start: 2020-02-28

## 2020-02-28 RX ORDER — MEPERIDINE HYDROCHLORIDE 50 MG/ML
12.5 INJECTION INTRAMUSCULAR; INTRAVENOUS; SUBCUTANEOUS
Status: DISCONTINUED | OUTPATIENT
Start: 2020-02-28 | End: 2020-02-28 | Stop reason: HOSPADM

## 2020-02-28 RX ORDER — DEXAMETHASONE SODIUM PHOSPHATE 4 MG/ML
INJECTION, SOLUTION INTRA-ARTICULAR; INTRALESIONAL; INTRAMUSCULAR; INTRAVENOUS; SOFT TISSUE AS NEEDED
Status: DISCONTINUED | OUTPATIENT
Start: 2020-02-28 | End: 2020-02-28 | Stop reason: SURG

## 2020-02-28 RX ORDER — FINASTERIDE 5 MG/1
5 TABLET, FILM COATED ORAL DAILY
Status: DISCONTINUED | OUTPATIENT
Start: 2020-02-28 | End: 2020-02-29 | Stop reason: HOSPADM

## 2020-02-28 RX ORDER — MAGNESIUM HYDROXIDE/ALUMINUM HYDROXICE/SIMETHICONE 120; 1200; 1200 MG/30ML; MG/30ML; MG/30ML
30 SUSPENSION ORAL EVERY 6 HOURS PRN
Status: DISCONTINUED | OUTPATIENT
Start: 2020-02-28 | End: 2020-02-29 | Stop reason: HOSPADM

## 2020-02-28 RX ORDER — NEOSTIGMINE METHYLSULFATE 1 MG/ML
INJECTION INTRAVENOUS AS NEEDED
Status: DISCONTINUED | OUTPATIENT
Start: 2020-02-28 | End: 2020-02-28 | Stop reason: SURG

## 2020-02-28 RX ORDER — KETOROLAC TROMETHAMINE 30 MG/ML
INJECTION, SOLUTION INTRAMUSCULAR; INTRAVENOUS AS NEEDED
Status: DISCONTINUED | OUTPATIENT
Start: 2020-02-28 | End: 2020-02-28 | Stop reason: SURG

## 2020-02-28 RX ORDER — ONDANSETRON 2 MG/ML
INJECTION INTRAMUSCULAR; INTRAVENOUS AS NEEDED
Status: DISCONTINUED | OUTPATIENT
Start: 2020-02-28 | End: 2020-02-28 | Stop reason: SURG

## 2020-02-28 RX ORDER — PROPOFOL 10 MG/ML
INJECTION, EMULSION INTRAVENOUS AS NEEDED
Status: DISCONTINUED | OUTPATIENT
Start: 2020-02-28 | End: 2020-02-28 | Stop reason: SURG

## 2020-02-28 RX ORDER — ONDANSETRON 2 MG/ML
4 INJECTION INTRAMUSCULAR; INTRAVENOUS ONCE AS NEEDED
Status: DISCONTINUED | OUTPATIENT
Start: 2020-02-28 | End: 2020-02-28 | Stop reason: HOSPADM

## 2020-02-28 RX ORDER — ROCURONIUM BROMIDE 10 MG/ML
INJECTION, SOLUTION INTRAVENOUS AS NEEDED
Status: DISCONTINUED | OUTPATIENT
Start: 2020-02-28 | End: 2020-02-28 | Stop reason: SURG

## 2020-02-28 RX ORDER — ACETAMINOPHEN 325 MG/1
650 TABLET ORAL EVERY 6 HOURS PRN
Status: DISCONTINUED | OUTPATIENT
Start: 2020-02-28 | End: 2020-02-29 | Stop reason: HOSPADM

## 2020-02-28 RX ORDER — GABAPENTIN 300 MG/1
300 CAPSULE ORAL
Status: DISCONTINUED | OUTPATIENT
Start: 2020-02-28 | End: 2020-02-28

## 2020-02-28 RX ORDER — TAMSULOSIN HYDROCHLORIDE 0.4 MG/1
0.4 CAPSULE ORAL
Status: DISCONTINUED | OUTPATIENT
Start: 2020-02-28 | End: 2020-02-29 | Stop reason: HOSPADM

## 2020-02-28 RX ORDER — GLYCOPYRROLATE 0.2 MG/ML
INJECTION INTRAMUSCULAR; INTRAVENOUS AS NEEDED
Status: DISCONTINUED | OUTPATIENT
Start: 2020-02-28 | End: 2020-02-28 | Stop reason: SURG

## 2020-02-28 RX ORDER — MAGNESIUM HYDROXIDE 1200 MG/15ML
LIQUID ORAL AS NEEDED
Status: DISCONTINUED | OUTPATIENT
Start: 2020-02-28 | End: 2020-02-28 | Stop reason: HOSPADM

## 2020-02-28 RX ORDER — ONDANSETRON 2 MG/ML
4 INJECTION INTRAMUSCULAR; INTRAVENOUS EVERY 6 HOURS PRN
Status: DISCONTINUED | OUTPATIENT
Start: 2020-02-28 | End: 2020-02-29 | Stop reason: HOSPADM

## 2020-02-28 RX ORDER — MIDAZOLAM HYDROCHLORIDE 2 MG/2ML
INJECTION, SOLUTION INTRAMUSCULAR; INTRAVENOUS AS NEEDED
Status: DISCONTINUED | OUTPATIENT
Start: 2020-02-28 | End: 2020-02-28 | Stop reason: SURG

## 2020-02-28 RX ORDER — TRAMADOL HYDROCHLORIDE 50 MG/1
50 TABLET ORAL EVERY 6 HOURS PRN
Status: DISCONTINUED | OUTPATIENT
Start: 2020-02-28 | End: 2020-02-29 | Stop reason: HOSPADM

## 2020-02-28 RX ORDER — ONDANSETRON 2 MG/ML
4 INJECTION INTRAMUSCULAR; INTRAVENOUS ONCE AS NEEDED
Status: CANCELLED | OUTPATIENT
Start: 2020-02-28

## 2020-02-28 RX ORDER — HYDROCODONE BITARTRATE AND ACETAMINOPHEN 5; 325 MG/1; MG/1
1 TABLET ORAL EVERY 6 HOURS PRN
Qty: 15 TABLET | Refills: 0 | Status: SHIPPED | OUTPATIENT
Start: 2020-02-28 | End: 2020-03-09

## 2020-02-28 RX ORDER — LEVOTHYROXINE SODIUM 0.1 MG/1
200 TABLET ORAL
Status: DISCONTINUED | OUTPATIENT
Start: 2020-02-28 | End: 2020-02-29 | Stop reason: HOSPADM

## 2020-02-28 RX ORDER — SODIUM CHLORIDE 9 MG/ML
100 INJECTION, SOLUTION INTRAVENOUS CONTINUOUS
Status: DISCONTINUED | OUTPATIENT
Start: 2020-02-28 | End: 2020-02-29 | Stop reason: HOSPADM

## 2020-02-28 RX ORDER — FENTANYL CITRATE 50 UG/ML
INJECTION, SOLUTION INTRAMUSCULAR; INTRAVENOUS AS NEEDED
Status: DISCONTINUED | OUTPATIENT
Start: 2020-02-28 | End: 2020-02-28 | Stop reason: SURG

## 2020-02-28 RX ORDER — FENTANYL CITRATE/PF 50 MCG/ML
25 SYRINGE (ML) INJECTION
Status: DISCONTINUED | OUTPATIENT
Start: 2020-02-28 | End: 2020-02-28 | Stop reason: HOSPADM

## 2020-02-28 RX ORDER — LEVOTHYROXINE SODIUM 0.03 MG/1
25 TABLET ORAL
Status: DISCONTINUED | OUTPATIENT
Start: 2020-02-28 | End: 2020-02-29 | Stop reason: HOSPADM

## 2020-02-28 RX ORDER — PANTOPRAZOLE SODIUM 20 MG/1
20 TABLET, DELAYED RELEASE ORAL
Status: DISCONTINUED | OUTPATIENT
Start: 2020-02-28 | End: 2020-02-29 | Stop reason: HOSPADM

## 2020-02-28 RX ORDER — GABAPENTIN 300 MG/1
300 CAPSULE ORAL
Status: DISCONTINUED | OUTPATIENT
Start: 2020-02-28 | End: 2020-02-29 | Stop reason: HOSPADM

## 2020-02-28 RX ORDER — CHLORAL HYDRATE 500 MG
1000 CAPSULE ORAL DAILY
Status: DISCONTINUED | OUTPATIENT
Start: 2020-02-28 | End: 2020-02-29 | Stop reason: HOSPADM

## 2020-02-28 RX ORDER — MORPHINE SULFATE 4 MG/ML
6 INJECTION, SOLUTION INTRAMUSCULAR; INTRAVENOUS ONCE AS NEEDED
Status: COMPLETED | OUTPATIENT
Start: 2020-02-28 | End: 2020-02-28

## 2020-02-28 RX ADMIN — ROCURONIUM BROMIDE 50 MG: 50 INJECTION, SOLUTION INTRAVENOUS at 08:41

## 2020-02-28 RX ADMIN — IOHEXOL 100 ML: 350 INJECTION, SOLUTION INTRAVENOUS at 00:47

## 2020-02-28 RX ADMIN — NEOSTIGMINE METHYLSULFATE 3 MG: 1 INJECTION, SOLUTION INTRAVENOUS at 09:28

## 2020-02-28 RX ADMIN — PANTOPRAZOLE SODIUM 20 MG: 20 TABLET, DELAYED RELEASE ORAL at 05:24

## 2020-02-28 RX ADMIN — LIDOCAINE HYDROCHLORIDE 60 MG: 20 INJECTION, SOLUTION EPIDURAL; INFILTRATION; INTRACAUDAL; PERINEURAL at 08:40

## 2020-02-28 RX ADMIN — LEVOTHYROXINE SODIUM 200 MCG: 100 TABLET ORAL at 05:24

## 2020-02-28 RX ADMIN — LEVOTHYROXINE SODIUM 25 MCG: 25 TABLET ORAL at 05:26

## 2020-02-28 RX ADMIN — SODIUM CHLORIDE 500 ML: 0.9 INJECTION, SOLUTION INTRAVENOUS at 00:48

## 2020-02-28 RX ADMIN — GENTAMICIN SULFATE 160 MG: 40 INJECTION, SOLUTION INTRAMUSCULAR; INTRAVENOUS at 08:32

## 2020-02-28 RX ADMIN — GLYCOPYRROLATE 0.6 MG: 0.2 INJECTION INTRAMUSCULAR; INTRAVENOUS at 09:28

## 2020-02-28 RX ADMIN — KETOROLAC TROMETHAMINE 30 MG: 30 INJECTION, SOLUTION INTRAMUSCULAR at 09:31

## 2020-02-28 RX ADMIN — PROPOFOL 200 MG: 10 INJECTION, EMULSION INTRAVENOUS at 08:40

## 2020-02-28 RX ADMIN — FENTANYL CITRATE 100 MCG: 50 INJECTION, SOLUTION INTRAMUSCULAR; INTRAVENOUS at 08:40

## 2020-02-28 RX ADMIN — SODIUM CHLORIDE: 0.9 INJECTION, SOLUTION INTRAVENOUS at 08:55

## 2020-02-28 RX ADMIN — SODIUM CHLORIDE 125 ML/HR: 0.9 INJECTION, SOLUTION INTRAVENOUS at 02:59

## 2020-02-28 RX ADMIN — ONDANSETRON 4 MG: 2 INJECTION INTRAMUSCULAR; INTRAVENOUS at 08:45

## 2020-02-28 RX ADMIN — Medication 3000 MG: at 08:30

## 2020-02-28 RX ADMIN — FENTANYL CITRATE 100 MCG: 50 INJECTION, SOLUTION INTRAMUSCULAR; INTRAVENOUS at 09:01

## 2020-02-28 RX ADMIN — SODIUM CHLORIDE 500 ML: 0.9 INJECTION, SOLUTION INTRAVENOUS at 01:10

## 2020-02-28 RX ADMIN — DEXAMETHASONE SODIUM PHOSPHATE 4 MG: 4 INJECTION, SOLUTION INTRAMUSCULAR; INTRAVENOUS at 08:45

## 2020-02-28 RX ADMIN — MORPHINE SULFATE 6 MG: 4 INJECTION INTRAVENOUS at 01:46

## 2020-02-28 RX ADMIN — MIDAZOLAM 2 MG: 1 INJECTION INTRAMUSCULAR; INTRAVENOUS at 08:27

## 2020-02-28 NOTE — DISCHARGE INSTRUCTIONS
Ureteral Stent Placement   WHAT YOU NEED TO KNOW:   Ureteral stent placement is a procedure to open a blocked or narrow ureter  The ureter is the tube that carries urine from your kidney into your bladder  A stent is a thin hollow plastic tube used to hold your ureter open and allow urine to flow  The stent may stay in for several weeks  DISCHARGE INSTRUCTIONS:   Medicines:   · Pain medicine  may be given to take away or decrease pain  Do not wait until the pain is severe before you take your medicine  · Antibiotics  help prevent infections  Your healthcare provider may prescribe these for you while your stent remains in  · Take your medicine as directed  Contact your healthcare provider if you think your medicine is not helping or if you have side effects  Tell him or her if you are allergic to any medicine  Keep a list of the medicines, vitamins, and herbs you take  Include the amounts, and when and why you take them  Bring the list or the pill bottles to follow-up visits  Carry your medicine list with you in case of an emergency  Follow up with your urologist as directed: You will need regular follow-up visits with your urologist as long as the stent remains in  He will check to make sure the stent is working properly  He may do urine cultures to check for infection  Write down your questions so you remember to ask them during your visits  Self-care:   · Drink liquids  as directed  Ask your healthcare provider how much liquid to drink each day and which liquids are best for you  Fluids such as cranberry or apple juice may be especially helpful to prevent urinary infections  · Return to normal activities  the day after your stent placement or as directed by your healthcare provider  · You may take a shower  the day after your stent placement if your healthcare provider says it is okay  Contact your healthcare provider or urologist if:   · You have a fever or chills      · You feel like you need to urinate often  · You have pain when you urinate or pain around your bladder or kidney  · You see blood in your urine or it looks cloudy  · You have questions or concerns about your condition or care  Seek care immediately or call 911 if:   · You urinate little or not at all  · You have severe pain in your abdomen  © 2017 [employer] Information is for End User's use only and may not be sold, redistributed or otherwise used for commercial purposes  All illustrations and images included in CareNotes® are the copyrighted property of [employer]  or Johnnie Mendiola  The above information is an  only  It is not intended as medical advice for individual conditions or treatments  Talk to your doctor, nurse or pharmacist before following any medical regimen to see if it is safe and effective for you

## 2020-02-28 NOTE — ASSESSMENT & PLAN NOTE
CT shows mild left hydronephrosis due to obstructive 4mm calculus in the left ureter  Nonobstructing right renal calculi measuring 2mm  · NPO, IV hydration  · Monitor intake and output  · Strain all urine  · P r n  Analgesics  · P r n   Antiemetics  · Urology consult

## 2020-02-28 NOTE — UTILIZATION REVIEW
Initial Clinical Review    Admission: Date/Time/Statement: Admission Orders (From admission, onward)     Ordered        02/28/20 0122  Place in Observation  Once                   Orders Placed This Encounter   Procedures    Place in Observation     Standing Status:   Standing     Number of Occurrences:   1     Order Specific Question:   Admitting Physician     Answer:   Moreno Rogers [[id number]]     Order Specific Question:   Level of Care     Answer:   Med Surg [[id number]]     ED Arrival Information     Expected Arrival Acuity Means of Arrival Escorted By Service Admission Type    - 2/27/2020 23:19 Urgent Walk-In Family Member General Medicine Urgent    Arrival Complaint    abd pain        Chief Complaint   Patient presents with    Abdominal Pain     Patient presents complaining of abdominal pain, reports at present it is "sharp" on the left side when he inhales  Previously periumbilical  Was at PCP today and referred to ED  Assessment/Plan: Hydronephrosis with obstructing calculus  Assessment & Plan  CT shows mild left hydronephrosis due to obstructive 4mm calculus in the left ureter  Nonobstructing right renal calculi measuring 2mm  · NPO, IV hydration  · Monitor intake and output  · Strain all urine  · P r n  Analgesics  · P r n  Antiemetics  · Urology consult     Leukocytosis  Assessment & Plan  WBC 12, likely reactive  UA negative, afebrile, will defer antibiotics  Continue to monitor WBC and fever curve     Acute kidney injury ([facility] [address] )  Assessment & Plan  Creatinine 1 6, baseline 0 8-1 0, likely post renal due to obstructing calculi  · IV hydration   · Monitor intake and output  · Monitor lytes  · Avoid NSAIDs, nephrotoxins and hypotension  · Monitor serum creatinine     Intractable abdominal pain  Assessment & Plan  Intractable abdominal pain due to kidney stone, see above plan hydronephrosis due to ureteral calculi  Hepatic steatosis  Assessment & Plan  CT shows hepatomegaly, normal LFTs    Outpatient follow-up with GI     Hypothyroidism  Assessment & Plan  Continue levothyroxine     Essential hypertension  Assessment & Plan  Maintained on verapamil     Dyslipidemia  Assessment & Plan  Continue fish oil    Ema Alaniz  is a 59 y o  male who presents with c/o bandlike abdominal pain across abdomen  CT shows left obstructing ureteral calculus with mild hydronephrosis  Patient denies flank pain, back pain, fever, dysuria, hematuria or oliguria  Seen by PMD today with complaints of abdominal pain, recommended increased fluids and Percocet as needed, patient taking Percocet without relief  SURGERY DATE: 2/28/2020  Procedure(s) (LRB):  CYSTOSCOPY URETEROSCOP RETROGRADE PYELOGRAM AND INSERTION STENT URETERAL (Left)    Operative Findings:  Unable to access stone, left ureteral stent placed          Per  Urology  Consult:     ED Triage Vitals   Temperature Pulse Respirations Blood Pressure SpO2   02/27/20 2328 02/27/20 2325 02/27/20 2325 02/27/20 2325 02/27/20 2325   99 °F (37 2 °C) 87 20 143/86 95 %      Temp Source Heart Rate Source Patient Position - Orthostatic VS BP Location FiO2 (%)   02/27/20 2328 02/27/20 2325 02/27/20 2325 02/27/20 2325 --   Oral Monitor Sitting Right arm       Pain Score       02/27/20 2326       Worst Possible Pain        Wt Readings from Last 1 Encounters:   02/28/20 (!) 141 kg (310 lb)     Additional Vital Signs:   02/28/20 0253            None (Room air)     02/28/20 0233  99 8 °F (37 7 °C)  83  18  162/95  93 %  None (Room air)  Lying   02/28/20 0122    73  18  135/68  94 %  None (Room air)  Lying   02/27/20 2328  99 °F (37 2 °C)               02/27/20 2325    87  20  143/86  95 %  None (Room air)  Sitting         Pertinent Labs/Diagnostic Test Results:   Ct  Abd/pelvis  ( 2/28)      Mild left hydronephrosis   Obstructing 4 mm calculus in the left ureter at the L3-4 level  2   Nonobstructing 2 mm right renal calculus  3   Hepatomegaly and steatosis    Results from last 7 days Lab Units 02/28/20  0513 02/27/20  2345   WBC Thousand/uL 13 29* 12 47*   HEMOGLOBIN g/dL 15 4 16 0   HEMATOCRIT % 46 8 48 2   PLATELETS Thousands/uL 163 155   NEUTROS ABS Thousands/µL 9 89* 10 07*         Results from last 7 days   Lab Units 02/28/20  0513 02/27/20  2345   SODIUM mmol/L 141 139   POTASSIUM mmol/L 4 5 4 4   CHLORIDE mmol/L 104 103   CO2 mmol/L 28 26   ANION GAP mmol/L 9 10   BUN mg/dL 25 26*   CREATININE mg/dL 1 65* 1 62*   EGFR ml/min/1 73sq m 43 44   CALCIUM mg/dL 9 2 9 2   MAGNESIUM mg/dL  --  2 2     Results from last 7 days   Lab Units 02/27/20  2345   AST U/L 19   ALT U/L 39   ALK PHOS U/L 58   TOTAL PROTEIN g/dL 7 3   ALBUMIN g/dL 4 0   TOTAL BILIRUBIN mg/dL 0 69   BILIRUBIN DIRECT mg/dL 0 16         Results from last 7 days   Lab Units 02/28/20  0513 02/27/20  2345   GLUCOSE RANDOM mg/dL 124 132         Results from last 7 days   Lab Units 02/27/20  2345   PROTIME seconds 12 8   INR  1 00   PTT seconds 27           Results from last 7 days   Lab Units 02/27/20  2345   LIPASE u/L 94             Results from last 7 days   Lab Units 02/28/20  0143 02/28/20  0142   CLARITY UA  Clear Clear   COLOR UA  Yellow Yellow   SPEC GRAV UA   --  1 010   PH UA   --  5 0   GLUCOSE UA mg/dl  --  Negative   KETONES UA mg/dl  --  Negative   BLOOD UA   --  Negative   PROTEIN UA mg/dl  --  Negative   NITRITE UA   --  Negative   BILIRUBIN UA   --  Negative   UROBILINOGEN UA E U /dl  --  0 2   LEUKOCYTES UA   --  Negative         ED Treatment:   Medication Administration from 02/27/2020 2319 to 02/28/2020 0215       Date/Time Order Dose Route Action Action by Comments     02/27/2020 2349 ondansetron (ZOFRAN) injection 4 mg 4 mg Intravenous Given Finn Olsen RN      02/27/2020 2350 morphine (PF) 4 mg/mL injection 6 mg 6 mg Intravenous Given Finn Olsen RN      02/28/2020 0144 sodium chloride 0 9 % bolus 500 mL 0 mL Intravenous Stopped Finn Olsen RN      02/28/2020 0048 sodium chloride 0 9 % bolus 500 mL 500 mL Intravenous Harpreet 37 Garrison Shah RN      02/28/2020 0047 iohexol (OMNIPAQUE) 350 MG/ML injection (MULTI-DOSE) 100 mL 100 mL Intravenous Given Hal Fan      02/28/2020 0144 sodium chloride 0 9 % bolus 500 mL 0 mL Intravenous Stopped Garrison Shah RN      02/28/2020 0110 sodium chloride 0 9 % bolus 500 mL 500 mL Intravenous R Preston White [address], [facility]      02/28/2020 1781 morphine (PF) 4 mg/mL injection 6 mg 6 mg Intravenous Given Garrison Shah RN         Past Medical History:   Diagnosis Date    Anxiety     Arthritis     Athlete's foot     Benign polyp of large intestine     Cancer (HCC)     thyroid, skin    Chronic pain disorder     right knee    Colon polyp     Curvature of spine     DDD (degenerative disc disease), lumbar     Disease of thyroid gland     removed    Dyslipidemia     Enlarged prostate     GERD (gastroesophageal reflux disease)     Hypertension     IBS (irritable bowel syndrome)     Kidney stone     still has on the left    Migraines     Migraines     more sinus related    Neuropathy     Numbness and tingling of both feet     Seasonal allergies     Tinea pedis     Urinary frequency     Urinary urgency     Vitamin D deficiency     Wears glasses      Present on Admission:   Intractable abdominal pain   Acute kidney injury ([facility] [address] )   Leukocytosis   Hydronephrosis with obstructing calculus   Hepatic steatosis   Dyslipidemia   Hypothyroidism   Essential hypertension      Admitting Diagnosis: Hydronephrosis [N13 30]  Fatty liver [K76 0]  Abdominal pain [R10 9]  Acute renal insufficiency [N28 9]  Kidney stone on left side [N20 0]  Hydronephrosis with obstructing calculus [N13 2]  Age/Sex: 59 y o  male  Admission Orders:  Scheduled Medications:    Medications:  enoxaparin 40 mg Subcutaneous Daily   finasteride 5 mg Oral Daily   fish oil 1,000 mg Oral Daily   levothyroxine 200 mcg Oral Early Morning   levothyroxine 25 mcg Oral Early Morning   pantoprazole 20 mg Oral Early Morning   tamsulosin 0 4 mg Oral Daily With Dinner   verapamil 180 mg Oral Daily     Continuous IV Infusions:    sodium chloride 125 mL/hr Intravenous Continuous     PRN Meds:    acetaminophen 650 mg Oral Q6H PRN   aluminum-magnesium hydroxide-simethicone 30 mL Oral Q6H PRN   gabapentin 300 mg Oral HS PRN   morphine injection 2 mg Intravenous Q4H PRN   ondansetron 4 mg Intravenous Q6H PRN   traMADol 50 mg Oral Q6H PRN       IP CONSULT TO UROLOGY     Screen  Urine  NPO    Network Utilization Review Department  [email or web address]  ATTENTION: Please call with any questions or concerns to [phone] and carefully listen to the prompts so that you are directed to the right person  All voicemails are confidential   Janessa Cheung all requests for admission clinical reviews, approved or denied determinations and any other requests to dedicated fax number below belonging to the campus where the patient is receiving treatment   List of dedicated fax numbers for the Facilities:  [address] [place] [address] DENIALS (Administrative/Medical Necessity) [phone]   [address] [place] [address] (Maternity/NICU/Pediatrics) [phone]   Kumar Becker [phone]   Svetlana Marcano [phone]   Dimas Hyde [phone]   Jose M Pelletier [phone]   [address] [address] [place] [address] [phone]   [facility]  [phone]   [address] [facility] [address]  [address] [facility] [address] [facility] [phone]

## 2020-02-28 NOTE — OP NOTE
OPERATIVE REPORT  PATIENT NAME: Khang Stone  :  1955  MRN: [id number]  Pt Location: [facility] OR ROOM 08    SURGERY DATE: 2020    Surgeon(s) and Role:     * Payal Davies MD - Primary    Preop Diagnosis:  Kidney stone on left side [N20 0]    Post-Op Diagnosis Codes:     * Kidney stone on left side [N20 0]    Procedure(s) (LRB):  CYSTOSCOPY URETEROSCOP RETROGRADE PYELOGRAM AND INSERTION STENT URETERAL (Left)    Specimen(s):  * No specimens in log *    Estimated Blood Loss:   Minimal    Drains:  Ureteral Drain/Stent Left ureter 6 Fr  (Active)   Number of days: 0       Anesthesia Type:   General    Operative Indications:  Ureteral stone on left side [N20 0]       Operative Findings:  Unable to access stone, left ureteral stent placed    Complications:   None    Procedure and Technique:  Patient was seen in the holding area and I discussed options of continued expulsive therapy with watchful waiting versus cystoscopy left ureteroscopy stone extraction or laser lithotripsy of the calculus with stent insertion  The patient agreed to that procedure  I did inform him that if circumstances warranted that cystoscopy and left ureteral stent insertion may be performed if the stone is unable to be accessed for whatever reason  Patient understood that cystoscopy and stent insertion may also be performed  The patient was taken to the operating room identified by the surgeon prepped draped usual sterile fashion in the dorsal lithotomy position  Preoperatively Ancef and gentamicin were administered intravenously  I reviewed the patient's CT stone study and other than a nonobstructing right 2 mm renal calculus there was a 4 mm proximal to mid ureteral calculus on the left causing left hydronephrosis  After the patient was prepped and draped usual sterile fashion a 25 [place] cystoscope with a 30 degree telescope was placed per urethra into the urinary bladder    The urethra was within normal limits without stricture lesion  The prostatic urethra revealed mild bilobar enlargement of the lateral lobes of the prostate without visual occlusion of the bladder outlet  There was some median lobe enlargement as well  The urinary bladder was examined using a 30 and 70 degree telescope was found to be mildly trabeculated without intrinsic lesion or evidence of extrinsic mass compression  At this point fluoroscopic imaging of the region of the left mid to proximal ureter took place  Various radiopacities were identified the could have represented the stone  At this point a 0 035 Rodrigo-coated floppy tip guidewire was inserted up the left ureteral orifice under fluoroscopic guidance and manipulated up into the renal pelvis  Inasmuch as the exact position of the stone was not known other than by her earlier CT scan ureteroscopy was to be performed  A long semi rigid SmartShooto ureteral scope was inserted per urethra into the urinary bladder and retrograde up the left ureter  At the level of the pelvic brim I was unable to advance the ureteral scope any further retrograde due to the ureteral scope being tight and not very compliant  There appeared to be a mild stricture  At this point another 0 035 Rodrigo-coated floppy tip guidewire was inserted through the ureteral scope and passed into the lumen of the left ureter and advanced up to the renal pelvis as well  The semi rigid ureteral scope still could not pass this area and therefore the ureteral scope was removed  Over the working wire a double lumen catheter was passed numerous times to dilate the distal ureter  A flexible ureteral scope was passed up over the wire but again the area of the pelvic brim could not be passed  At this point it was decided to leave a left ureteral stent indwelling and come back another day for further ureteroscopy after soft dilation can be achieved  At this point all equipment was removed from the urinary tract except the 2 wires  The working wire was backloaded into the 25 [place] cystoscope was placed per urethra into the urinary bladder and then over the wire a 6 [place] 10 stent was placed  Contrast injection confirmed good positioning both proximally and distally  No string was left on the end of the stent  After internalization of the left ureteral stent the bladder was emptied all other equipment was removed from the urinary bladder and urethra  Imaging of the kidney and bladder revealed good positioning proximally and distally with respect to the ureteral stent  The patient was then extubated and transferred to the recovery room in good condition  Plan will be for patient discharge today as per Internal Medicine with instructions for the patient to contact my office next week so that repeat ureteroscopy and office follow-up can be scheduled     I was present for the entire procedure and I was present for all critical portions of the procedure    Patient Disposition:  PACU     SIGNATURE: Ranulfo Dale MD  DATE: February 28, 2020  TIME: 9:31 AM

## 2020-02-28 NOTE — H&P
H&P- Jorge Alberto Busby 1955, 59 y o  male MRN: [id number]    Unit/Bed#: E2 -01 Encounter: [id number]    Primary Care Provider: Constantine Jones DO   Date and time admitted to hospital: 2/27/2020 11:22 PM      * Hydronephrosis with obstructing calculus  Assessment & Plan  CT shows mild left hydronephrosis due to obstructive 4mm calculus in the left ureter  Nonobstructing right renal calculi measuring 2mm  · NPO, IV hydration  · Monitor intake and output  · Strain all urine  · P r n  Analgesics  · P r n  Antiemetics  · Urology consult    Leukocytosis  Assessment & Plan  WBC 12, likely reactive  UA negative, afebrile, will defer antibiotics  Continue to monitor WBC and fever curve    Acute kidney injury ([facility] [address] )  Assessment & Plan  Creatinine 1 6, baseline 0 8-1 0, likely post renal due to obstructing calculi  · IV hydration   · Monitor intake and output  · Monitor lytes  · Avoid NSAIDs, nephrotoxins and hypotension  · Monitor serum creatinine    Intractable abdominal pain  Assessment & Plan  Intractable abdominal pain due to kidney stone, see above plan hydronephrosis due to ureteral calculi    Hepatic steatosis  Assessment & Plan  CT shows hepatomegaly, normal LFTs  Outpatient follow-up with GI    Hypothyroidism  Assessment & Plan  Continue levothyroxine    Essential hypertension  Assessment & Plan  Maintained on verapamil    Dyslipidemia  Assessment & Plan  Continue fish oil    VTE Prophylaxis: Enoxaparin (Lovenox)  / reason for no mechanical VTE prophylaxis Ambulate   Code Status: FC  POLST: POLST is not applicable to this patient  Discussion with family:  Spouse at bedside    Anticipated Length of Stay:  Patient will be admitted on an Observation basis with an anticipated length of stay of  < 2 midnights  Justification for Hospital Stay:  Hydronephrosis due to obstructing calculus causing intractable abdominal pain    Total Time for Visit, including Counseling / Coordination of Care: 45 minutes  Greater than 50% of this total time spent on direct patient counseling and coordination of care  Chief Complaint:   Severe abdominal pain    History of Present Illness:    Khang Stone  is a 59 y o  male who presents with c/o bandlike abdominal pain across abdomen  CT shows left obstructing ureteral calculus with mild hydronephrosis  Patient denies flank pain, back pain, fever, dysuria, hematuria or oliguria  Seen by PMD today with complaints of abdominal pain, recommended increased fluids and Percocet as needed, patient taking Percocet without relief  Review of Systems:    Review of Systems   Constitutional: Negative for fever  Respiratory: Negative  Cardiovascular: Negative  Gastrointestinal: Positive for abdominal pain  Negative for constipation, diarrhea, nausea and vomiting  Genitourinary: Negative for decreased urine volume, difficulty urinating, dysuria, flank pain, frequency and hematuria  Pelvic pain   Neurological: Negative          Past Medical and Surgical History:     Past Medical History:   Diagnosis Date    Anxiety     Arthritis     Athlete's foot     Benign polyp of large intestine     Cancer (HCC)     thyroid, skin    Chronic pain disorder     right knee    Colon polyp     Curvature of spine     DDD (degenerative disc disease), lumbar     Disease of thyroid gland     removed    Dyslipidemia     Enlarged prostate     GERD (gastroesophageal reflux disease)     Hypertension     IBS (irritable bowel syndrome)     Kidney stone     still has on the left    Migraines     Migraines     more sinus related    Neuropathy     Numbness and tingling of both feet     Seasonal allergies     Tinea pedis     Urinary frequency     Urinary urgency     Vitamin D deficiency     Wears glasses        Past Surgical History:   Procedure Laterality Date    CHOLECYSTECTOMY      COLONOSCOPY      KNEE ARTHROSCOPY Bilateral     knee tim    [place] COLONOSCOPY FLX DX W/COLLJ SPEC WHEN PFRMD N/A 7/25/2018    Procedure: COLONOSCOPY;  Surgeon: Eda Atkins DO;  Location: [address] [place] [address] GI LAB; Service: General    [place] FRAGMENT KIDNEY STONE/ ESWL Left 7/27/2017    Procedure: LITHROTRIPSY EXTRACORPORAL SHOCKWAVE (ESWL); Surgeon: Bridgette Gitelman, DO;  [facility];  Service: Urology    [place] REPAIR [address] HERNIA,5+Y/O,REDUCIBL Right 7/23/2019    Procedure: REPAIR INGUINAL HERNIA  DIRECT NO MESH;  Surgeon: Eda Atkins DO;  Location: [address] [place] [address] MAIN OR;  Service: General    ROTATOR CUFF REPAIR Left     THYROIDECTOMY      Subtotal and Total Thyroidectomy both mentioned in allscripts       Meds/Allergies:    Prior to Admission medications    Medication Sig Start Date End Date Taking?  Authorizing Provider   acetaminophen (TYLENOL) 325 mg tablet Take 650 mg by mouth every 6 (six) hours as needed for mild pain   Yes Historical Provider, MD   betamethasone dipropionate (DIPROSONE) 0 05 % cream Apply topically 2 (two) times a day 4/10/19  Yes Colon Hugo, DO   chlordiazepoxide-clidinium (LIBRAX) 5-2 5 mg per capsule Take 1 capsule by mouth as needed for indigestion   Yes Historical Provider, MD   ciclopirox (LOPROX) 0 77 % cream Apply topically 2 (two) times a day 6/7/18  Yes Colon Hugo, DO   clotrimazole (LOTRIMIN) 1 % cream Apply topically daily   Yes Historical Provider, MD   cyclobenzaprine (FLEXERIL) 10 mg tablet Take 1 tablet (10 mg total) by mouth daily at bedtime 10/29/19  Yes Colon Hugo, DO   dutasteride (AVODART) 0 5 mg capsule Take 0 5 mg by mouth daily at bedtime     Yes Historical Provider, MD   gabapentin (NEURONTIN) 300 mg capsule TAKE 1 CAPSULE DAILY AT BEDTIME 8/29/19  Yes Colon Hugo, DO   levothyroxine 25 mcg tablet Take 1 tablet (25 mcg total) by mouth daily 2/26/20  Yes Colon Hugo, DO   Multiple Vitamin (MULTIVITAMIN) tablet Take 1 tablet by mouth daily   Yes Historical Provider, MD   Omega-3 Fatty Acids (FISH OIL PO) Take 1 capsule by mouth daily   Yes Historical Provider, MD   omeprazole (PriLOSEC) 20 mg delayed release capsule Take 1 capsule (20 mg total) by mouth daily 20  Yes Jhonatan Alvarenga DO   Silodosin (RAPAFLO) 8 MG CAPS Take 8 mg by mouth daily at bedtime     Yes Historical Provider, MD   SYNTHROID 200 MCG tablet Take 1 tablet (200 mcg total) by mouth daily 20  Yes Jhonatan Alvarenga DO   verapamil (CALAN-SR) 180 mg CR tablet Take 1 tablet (180 mg total) by mouth daily As directed 20  Yes Jhonatan Alvarenga DO   vitamin E, tocopherol, 400 units capsule Take 400 Units by mouth daily   Yes Historical Provider, MD   predniSONE 10 mg tablet 5 pills daily for 2 days, 4 for 2 days, 3 for 2 days, 2 for 2 days, 1 for 2 days  Patient not taking: Reported on 2019 10/29/19   Jhonatan Alvarenga DO     I have reviewed home medications with patient family member      Allergies: No Known Allergies    Social History:     Marital Status: /Civil Union   Occupation:  School security  Patient Pre-hospital Living Situation:  Resides at home with spouse and mother-in-law  Patient Pre-hospital Level of Mobility:  Ambulatory  Patient Pre-hospital Diet Restrictions:   Substance Use History:   Social History     Substance and Sexual Activity   Alcohol Use Yes    Comment: occasionally     Social History     Tobacco Use   Smoking Status Former Smoker    Years: 4 00    Last attempt to quit: 2014    Years since quittin 6   Smokeless Tobacco Never Used     Social History     Substance and Sexual Activity   Drug Use No       Family History:    Family History   Problem Relation Age of Onset    Diabetes Mother     Heart disease Father     Breast cancer Sister        Physical Exam:     Vitals:   Blood Pressure: 162/95 (20)  Pulse: 83 (20)  Temperature: 99 8 °F (37 7 °C) (20)  Temp Source: Tympanic (20)  Respirations: 18 (20)  Height: 6' 2" (188 cm) (20)  Weight - Scale: (!) 141 kg (310 lb) (20)  SpO2: 93 % (20 1998)    Physical Exam   Constitutional: He is oriented to person, place, and time  He appears well-developed  No distress  Obese   HENT:   Head: Normocephalic and atraumatic  Eyes: No scleral icterus  Neck: Neck supple  Cardiovascular: Normal rate, regular rhythm and normal heart sounds  No murmur heard  Pulmonary/Chest: Effort normal and breath sounds normal  No stridor  No respiratory distress  He has no wheezes  He has no rales  Abdominal: Soft  Bowel sounds are normal  He exhibits no distension and no mass  There is tenderness  There is no guarding  Mild tenderness on palpation of lower quadrants   Genitourinary:   Genitourinary Comments: No CVA tenderness   Musculoskeletal: He exhibits no edema  Neurological: He is alert and oriented to person, place, and time  Skin: Skin is warm and dry  He is not diaphoretic  Psychiatric: He has a normal mood and affect  His behavior is normal  Judgment and thought content normal      Additional Data:     Lab Results: I have personally reviewed pertinent reports  Results from last 7 days   Lab Units 02/27/20  2345   WBC Thousand/uL 12 47*   HEMOGLOBIN g/dL 16 0   HEMATOCRIT % 48 2   PLATELETS Thousands/uL 155   NEUTROS PCT % 81*   LYMPHS PCT % 10*   MONOS PCT % 8   EOS PCT % 0     Results from last 7 days   Lab Units 02/27/20  2345   SODIUM mmol/L 139   POTASSIUM mmol/L 4 4   CHLORIDE mmol/L 103   CO2 mmol/L 26   BUN mg/dL 26*   CREATININE mg/dL 1 62*   ANION GAP mmol/L 10   CALCIUM mg/dL 9 2   ALBUMIN g/dL 4 0   TOTAL BILIRUBIN mg/dL 0 69   ALK PHOS U/L 58   ALT U/L 39   AST U/L 19   GLUCOSE RANDOM mg/dL 132     Results from last 7 days   Lab Units 02/27/20  2345   INR  1 00                   Imaging: I have personally reviewed pertinent reports  CT abdomen pelvis with contrast   Final Result by Raffy Miller MD (02/28 0058)         1  Mild left hydronephrosis  Obstructing 4 mm calculus in the left ureter at the L3-4 level     2  Nonobstructing 2 mm right renal calculus  3   Hepatomegaly and steatosis  Workstation performed: [id number]             EKG, Pathology, and Other Studies Reviewed on Admission:   · CT     Allscripts / Epic Records Reviewed: Yes     ** Please Note: This note has been constructed using a voice recognition system   **

## 2020-02-28 NOTE — ED PROVIDER NOTES
History  Chief Complaint   Patient presents with    Abdominal Pain     Patient presents complaining of abdominal pain, reports at present it is "sharp" on the left side when he inhales  Previously periumbilical  Was at PCP today and referred to ED  History provided by:  Patient   used: No    Abdominal Pain   Pain location:  LLQ and periumbilical  Pain quality: aching, bloating and sharp    Pain radiation: Sometimes tooward the left back  Pain severity:  Moderate  Onset quality:  Sudden  Duration:  1 day  Timing:  Intermittent  Progression:  Worsening  Chronicity:  New  Context: previous surgery    Context: not trauma    Relieved by:  Nothing  Worsened by: Movement and palpation  Ineffective treatments: percocet  Associated symptoms: anorexia, hematuria and nausea    Associated symptoms: no chest pain, no chills, no constipation, no cough, no diarrhea, no dysuria, no fever, no shortness of breath and no vomiting    Associated symptoms comment:  Had episodes of hematuria in December/january time but none since and didn't have it evaluated  Has a history of stones  Risk factors: multiple surgeries    Risk factors: no alcohol abuse        Prior to Admission Medications   Prescriptions Last Dose Informant Patient Reported? Taking?    Multiple Vitamin (MULTIVITAMIN) tablet  Self Yes Yes   Sig: Take 1 tablet by mouth daily   Omega-3 Fatty Acids (FISH OIL PO)  Self Yes Yes   Sig: Take 1 capsule by mouth daily   SYNTHROID 200 MCG tablet   No Yes   Sig: Take 1 tablet (200 mcg total) by mouth daily   Silodosin (RAPAFLO) 8 MG CAPS  Self Yes Yes   Sig: Take 8 mg by mouth daily at bedtime     acetaminophen (TYLENOL) 325 mg tablet  Self Yes Yes   Sig: Take 650 mg by mouth every 6 (six) hours as needed for mild pain   betamethasone dipropionate (DIPROSONE) 0 05 % cream   No Yes   Sig: Apply topically 2 (two) times a day   chlordiazepoxide-clidinium (LIBRAX) 5-2 5 mg per capsule  Self Yes Yes   Sig: Take 1 capsule by mouth as needed for indigestion   ciclopirox (LOPROX) 0 77 % cream  Self No Yes   Sig: Apply topically 2 (two) times a day   clotrimazole (LOTRIMIN) 1 % cream  Self Yes Yes   Sig: Apply topically daily   cyclobenzaprine (FLEXERIL) 10 mg tablet   No Yes   Sig: Take 1 tablet (10 mg total) by mouth daily at bedtime   dutasteride (AVODART) 0 5 mg capsule  Self Yes Yes   Sig: Take 0 5 mg by mouth daily at bedtime     gabapentin (NEURONTIN) 300 mg capsule   No Yes   Sig: TAKE 1 CAPSULE DAILY AT BEDTIME   levothyroxine 25 mcg tablet   No Yes   Sig: Take 1 tablet (25 mcg total) by mouth daily   omeprazole (PriLOSEC) 20 mg delayed release capsule   No Yes   Sig: Take 1 capsule (20 mg total) by mouth daily   predniSONE 10 mg tablet Not Taking at Unknown time  No No   Si pills daily for 2 days, 4 for 2 days, 3 for 2 days, 2 for 2 days, 1 for 2 days     Patient not taking: Reported on 2019   verapamil (CALAN-SR) 180 mg CR tablet   No Yes   Sig: Take 1 tablet (180 mg total) by mouth daily As directed   vitamin E, tocopherol, 400 units capsule  Self Yes Yes   Sig: Take 400 Units by mouth daily      Facility-Administered Medications: None       Past Medical History:   Diagnosis Date    Anxiety     Arthritis     Athlete's foot     Benign polyp of large intestine     Cancer ([facility] [address] )     thyroid, skin    Chronic pain disorder     right knee    Colon polyp     Curvature of spine     DDD (degenerative disc disease), lumbar     Disease of thyroid gland     removed    Dyslipidemia     Enlarged prostate     GERD (gastroesophageal reflux disease)     Hypertension     IBS (irritable bowel syndrome)     Kidney stone     still has on the left    Migraines     Migraines     more sinus related    Neuropathy     Numbness and tingling of both feet     Seasonal allergies     Tinea pedis     Urinary frequency     Urinary urgency     Vitamin D deficiency     Wears glasses        Past Surgical History:   Procedure Laterality Date    CHOLECYSTECTOMY      COLONOSCOPY      KNEE ARTHROSCOPY Bilateral     knee tim    [place] COLONOSCOPY FLX DX W/COLLJ SPEC WHEN PFRMD N/A 2018    Procedure: COLONOSCOPY;  Surgeon: Moi Bhatti DO;  Location: [address] [place] [address] GI LAB; Service: General    [place] FRAGMENT KIDNEY STONE/ ESWL Left 2017    Procedure: LITHROTRIPSY EXTRACORPORAL SHOCKWAVE (ESWL); Surgeon: Grupo Villalobos DO;  [facility];  Service: Urology    [place] REPAIR ING HERNIA,5+Y/O,REDUCIBL Right 2019    Procedure: REPAIR INGUINAL HERNIA  DIRECT NO MESH;  Surgeon: Moi Bhatti DO;  Location: [address] [place] [address] MAIN OR;  Service: General    ROTATOR CUFF REPAIR Left     THYROIDECTOMY      Subtotal and Total Thyroidectomy both mentioned in allscripts       Family History   Problem Relation Age of Onset    Diabetes Mother     Heart disease Father     Breast cancer Sister      I have reviewed and agree with the history as documented  E-Cigarette/Vaping     E-Cigarette/Vaping Substances     Social History     Tobacco Use    Smoking status: Former Smoker     Years: 4 00     Last attempt to quit: 2014     Years since quittin 6    Smokeless tobacco: Never Used   Substance Use Topics    Alcohol use: Yes     Comment: occasionally    Drug use: No       Review of Systems   Constitutional: Positive for appetite change  Negative for chills and fever  Respiratory: Negative for cough, chest tightness and shortness of breath  Cardiovascular: Negative for chest pain  Gastrointestinal: Positive for abdominal distention, abdominal pain, anorexia and nausea  Negative for constipation, diarrhea and vomiting  Genitourinary: Positive for hematuria  Negative for difficulty urinating and dysuria  No hematuria since january   Neurological: Negative for headaches  All other systems reviewed and are negative  Physical Exam  Physical Exam   Constitutional: He appears well-developed and well-nourished   He is cooperative  Non-toxic appearance  He does not have a sickly appearance  He does not appear ill  No distress  HENT:   Head: Normocephalic and atraumatic  Right Ear: Hearing normal    Left Ear: Hearing normal    Mouth/Throat: Mucous membranes are normal    Eyes: Conjunctivae and lids are normal  Right eye exhibits no discharge  Left eye exhibits no discharge  Neck: Trachea normal and normal range of motion  No JVD present  Cardiovascular: Normal rate, regular rhythm, normal heart sounds, intact distal pulses and normal pulses  Exam reveals no gallop and no friction rub  No murmur heard  Pulmonary/Chest: Effort normal and breath sounds normal  No stridor  No respiratory distress  He has no wheezes  He has no rales  Abdominal: Soft  Normal appearance  He exhibits distension  There is tenderness in the periumbilical area and left lower quadrant  There is no rebound, no guarding and no CVA tenderness  Obese/distended/soft   Musculoskeletal: Normal range of motion  He exhibits no edema  Lymphadenopathy:     He has no cervical adenopathy  Neurological: He is alert  He has normal strength  He exhibits normal muscle tone  GCS eye subscore is 4  GCS verbal subscore is 5  GCS motor subscore is 6  Skin: Skin is warm, dry and intact  No rash noted  He is not diaphoretic  No pallor  Psychiatric: He has a normal mood and affect  His speech is normal  Cognition and memory are normal    Nursing note and vitals reviewed        Vital Signs  ED Triage Vitals   Temperature Pulse Respirations Blood Pressure SpO2   02/27/20 2328 02/27/20 2325 02/27/20 2325 02/27/20 2325 02/27/20 2325   99 °F (37 2 °C) 87 20 143/86 95 %      Temp Source Heart Rate Source Patient Position - Orthostatic VS BP Location FiO2 (%)   02/27/20 2328 02/27/20 2325 02/27/20 2325 02/27/20 2325 --   Oral Monitor Sitting Right arm       Pain Score       02/27/20 2326       Worst Possible Pain           Vitals:    02/27/20 2325   BP: 143/86 Pulse: 87   Patient Position - Orthostatic VS: Sitting         Visual Acuity      ED Medications  Medications   sodium chloride 0 9 % bolus 500 mL (500 mL Intravenous New Bag 2/28/20 0048)   sodium chloride 0 9 % bolus 500 mL (500 mL Intravenous New Bag 2/28/20 0110)   ondansetron (ZOFRAN) injection 4 mg (4 mg Intravenous Given 2/27/20 2349)   morphine (PF) 4 mg/mL injection 6 mg (6 mg Intravenous Given 2/27/20 2350)   iohexol (OMNIPAQUE) 350 MG/ML injection (MULTI-DOSE) 100 mL (100 mL Intravenous Given 2/28/20 0047)       Diagnostic Studies  Results Reviewed     Procedure Component Value Units Date/Time    Basic metabolic panel [[id number]]  (Abnormal) Collected:  02/27/20 2345    Lab Status:  Final result Specimen:  Blood from Arm, Right Updated:  02/28/20 0021     Sodium 139 mmol/L      Potassium 4 4 mmol/L      Chloride 103 mmol/L      CO2 26 mmol/L      ANION GAP 10 mmol/L      BUN 26 mg/dL      Creatinine 1 62 mg/dL      Glucose 132 mg/dL      Calcium 9 2 mg/dL      eGFR 44 ml/min/1 73sq m     Narrative:       [place] guidelines for Chronic Kidney Disease (CKD):     Stage 1 with normal or high GFR (GFR > 90 mL/min/1 73 square meters)    Stage 2 Mild CKD (GFR = 60-89 mL/min/1 73 square meters)    Stage 3A Moderate CKD (GFR = 45-59 mL/min/1 73 square meters)    Stage 3B Moderate CKD (GFR = 30-44 mL/min/1 73 square meters)    Stage 4 Severe CKD (GFR = 15-29 mL/min/1 73 square meters)    Stage 5 End Stage CKD (GFR <15 mL/min/1 73 square meters)  Note: GFR calculation is accurate only with a steady state creatinine    Hepatic function panel [[id number]]  (Normal) Collected:  02/27/20 2345    Lab Status:  Final result Specimen:  Blood from Arm, Right Updated:  02/28/20 0021     Total Bilirubin 0 69 mg/dL      Bilirubin, Direct 0 16 mg/dL      Alkaline Phosphatase 58 U/L      AST 19 U/L      ALT 39 U/L      Total Protein 7 3 g/dL      Albumin 4 0 g/dL     Lipase [[id number]]  (Normal) Collected:  02/27/20 2345    Lab Status:  Final result Specimen:  Blood from Arm, Right Updated:  02/28/20 0021     Lipase 94 u/L     Magnesium [[id number]]  (Normal) Collected:  02/27/20 2345    Lab Status:  Final result Specimen:  Blood from Arm, Right Updated:  02/28/20 0021     Magnesium 2 2 mg/dL     CBC and differential [[id number]]  (Abnormal) Collected:  02/27/20 2345    Lab Status:  Final result Specimen:  Blood from Arm, Right Updated:  02/27/20 2358     WBC 12 47 Thousand/uL      RBC 5 33 Million/uL      Hemoglobin 16 0 g/dL      Hematocrit 48 2 %      MCV 90 fL      MCH 30 0 pg      MCHC 33 2 g/dL      RDW 12 6 %      MPV 8 1 fL      Platelets 494 Thousands/uL      nRBC 0 /100 WBCs      Neutrophils Relative 81 %      Immat GRANS % 1 %      Lymphocytes Relative 10 %      Monocytes Relative 8 %      Eosinophils Relative 0 %      Basophils Relative 0 %      Neutrophils Absolute 10 07 Thousands/µL      Immature Grans Absolute 0 06 Thousand/uL      Lymphocytes Absolute 1 29 Thousands/µL      Monocytes Absolute 1 01 Thousand/µL      Eosinophils Absolute 0 00 Thousand/µL      Basophils Absolute 0 04 Thousands/µL     Protime-INR [[id number]] Collected:  02/27/20 2345    Lab Status: In process Specimen:  Blood from Arm, Right Updated:  02/27/20 2354    APTT [[id number]] Collected:  02/27/20 2345    Lab Status: In process Specimen:  Blood from Arm, Right Updated:  02/27/20 2354    POCT urinalysis dipstick [[id number]]     Lab Status:  No result Specimen:  Urine                  CT abdomen pelvis with contrast   Final Result by Marta Yuen MD (02/28 0058)         1  Mild left hydronephrosis  Obstructing 4 mm calculus in the left ureter at the L3-4 level  2   Nonobstructing 2 mm right renal calculus  3   Hepatomegaly and steatosis              Workstation performed: [id number]                    Procedures  Procedures         ED Course                               MDM  Number of Diagnoses or Management Options  Acute renal insufficiency:   Fatty liver:   Hydronephrosis:   Kidney stone on left side:   Diagnosis management comments: Still having pain even after dose of morphine  Intractable pain  Has a stone with hydro and acute renal insufficiency  Will admit to Internal Medicine for urology consultation  He does see Dr Shwetha Nettles for Urology  May need procedure  Will hydrate as well as the patient may also be dehydrated  He also has not given us a urine sample yet  Patient informed of the results of the CT including the kidney stones and fatty liver as well as his renal insufficiency  Amount and/or Complexity of Data Reviewed  Clinical lab tests: ordered and reviewed  Tests in the radiology section of CPT®: ordered and reviewed  Discuss the patient with other providers: yes    Patient Progress  Patient progress: stable        Disposition  Final diagnoses:   Kidney stone on left side   Hydronephrosis   Acute renal insufficiency   Fatty liver     Time reflects when diagnosis was documented in both MDM as applicable and the Disposition within this note     Time User Action Codes Description Comment    2/28/2020  1:20 AM Saskia PISANO Add [N20 0] Kidney stone on left side     2/28/2020  1:20 AM Saskia PISAON Add [N13 30] Hydronephrosis     2/28/2020  1:22 AM Saskia Kwan Add [N28 9] Acute renal insufficiency     2/28/2020  1:23 AM Saskia Kwan Add [K76 0] Fatty liver       ED Disposition     ED Disposition Condition Date/Time Comment    Admit Stable Fri Feb 28, 2020  1:21 AM Case was discussed with SHIV and the patient's admission status was agreed to be Admission Status: observation status to the service of Dr Mary Cam   Follow-up Information    None         Patient's Medications   Discharge Prescriptions    No medications on file     No discharge procedures on file      PDMP Review     None          ED Provider  Electronically Signed by           Stalin Pritchard Mark Mcclelland MD  02/28/20 0971

## 2020-02-28 NOTE — CONSULTS
Treatment Plan - Urology   Amber Reyes  59 y o  male MRN: [id number]  Unit/Bed#: E2 -01 Encounter: [id number]    Treatment Plan:  Please see Consult Note - "Interval H&P" by Dr Roberta Hardin PA-C  Date: 2/28/2020 Time: 11:03 AM

## 2020-02-28 NOTE — ASSESSMENT & PLAN NOTE
Creatinine 1 6, baseline 0 8-1 0, likely post renal due to obstructing calculi  · IV hydration   · Monitor intake and output  · Monitor lytes  · Avoid NSAIDs, nephrotoxins and hypotension  · Monitor serum creatinine

## 2020-02-28 NOTE — TELEPHONE ENCOUNTER
Patient underwent a left ureteral stent placement  He will require outpatient ureteroscopy  Please schedule follow-up with Dr Tatiana Vickers or other MD in the [facility] office      Carlton Freeman PA-C  Date: 2/28/2020 Time: 11:05 AM

## 2020-02-28 NOTE — PLAN OF CARE
Problem: Potential for Falls  Goal: Patient will remain free of falls  Description  INTERVENTIONS:  - Assess patient frequently for physical needs  -  Identify cognitive and physical deficits and behaviors that affect risk of falls  -  [place] fall precautions as indicated by assessment   - Educate patient/family on patient safety including physical limitations  - Instruct patient to call for assistance with activity based on assessment  - Modify environment to reduce risk of injury  - Consider OT/PT consult to assist with strengthening/mobility  Outcome: Progressing     Problem: GENITOURINARY - ADULT  Goal: Maintains or returns to baseline urinary function  Description  INTERVENTIONS:  - Assess urinary function  - Encourage oral fluids to ensure adequate hydration if ordered  - Administer IV fluids as ordered to ensure adequate hydration  - Administer ordered medications as needed  - Offer frequent toileting  - Follow urinary retention protocol if ordered  Outcome: Progressing     Problem: PAIN - ADULT  Goal: Verbalizes/displays adequate comfort level or baseline comfort level  Description  Interventions:  - Encourage patient to monitor pain and request assistance  - Assess pain using appropriate pain scale  - Administer analgesics based on type and severity of pain and evaluate response  - Implement non-pharmacological measures as appropriate and evaluate response  - Consider cultural and social influences on pain and pain management  - Notify physician/advanced practitioner if interventions unsuccessful or patient reports new pain  Outcome: Progressing     Problem: SAFETY ADULT  Goal: Patient will remain free of falls  Description  INTERVENTIONS:  - Assess patient frequently for physical needs  -  Identify cognitive and physical deficits and behaviors that affect risk of falls    -  [place]oyd fall precautions as indicated by assessment   - Educate patient/family on patient safety including physical limitations  - Instruct patient to call for assistance with activity based on assessment  - Modify environment to reduce risk of injury  - Consider OT/PT consult to assist with strengthening/mobility  Outcome: Progressing  Goal: Maintain or return to baseline ADL function  Description  INTERVENTIONS:  -  Assess patient's ability to carry out ADLs; assess patient's baseline for ADL function and identify physical deficits which impact ability to perform ADLs (bathing, care of mouth/teeth, toileting, grooming, dressing, etc )  - Assess/evaluate cause of self-care deficits   - Assess range of motion  - Assess patient's mobility; develop plan if impaired  - Assess patient's need for assistive devices and provide as appropriate  - Encourage maximum independence but intervene and supervise when necessary  - Involve family in performance of ADLs  - Assess for home care needs following discharge   - Consider OT consult to assist with ADL evaluation and planning for discharge  - Provide patient education as appropriate  Outcome: Progressing  Goal: Maintain or return mobility status to optimal level  Description  INTERVENTIONS:  - Assess patient's baseline mobility status (ambulation, transfers, stairs, etc )    - Identify cognitive and physical deficits and behaviors that affect mobility  - Identify mobility aids required to assist with transfers and/or ambulation (gait belt, sit-to-stand, lift, walker, cane, etc )  - [place] fall precautions as indicated by assessment  - Record patient progress and toleration of activity level on Mobility SBAR; progress patient to next Phase/Stage  - Instruct patient to call for assistance with activity based on assessment  - Consider rehabilitation consult to assist with strengthening/weightbearing, etc   Outcome: Progressing     Problem: DISCHARGE PLANNING  Goal: Discharge to home or other facility with appropriate resources  Description  INTERVENTIONS:  - Identify barriers to discharge w/patient and caregiver  - Arrange for needed discharge resources and transportation as appropriate  - Identify discharge learning needs (meds, wound care, etc )  - Arrange for interpretive services to assist at discharge as needed  - Refer to Case Management Department for coordinating discharge planning if the patient needs post-hospital services based on physician/advanced practitioner order or complex needs related to functional status, cognitive ability, or social support system  Outcome: Progressing     Problem: Knowledge Deficit  Goal: Patient/family/caregiver demonstrates understanding of disease process, treatment plan, medications, and discharge instructions  Description  Complete learning assessment and assess knowledge base    Interventions:  - Provide teaching at level of understanding  - Provide teaching via preferred learning methods  Outcome: Progressing BLUNTED

## 2020-02-28 NOTE — TELEPHONE ENCOUNTER
Patient is scheduled with Dr Tatiana Vickers on 3/12/2020 at 3pm for this reason   Patient will need to notified of this appointment once discharged from hospital

## 2020-02-28 NOTE — ASSESSMENT & PLAN NOTE
Intractable abdominal pain due to kidney stone, see above plan hydronephrosis due to ureteral calculi

## 2020-02-28 NOTE — ASSESSMENT & PLAN NOTE
WBC 12, likely reactive  UA negative, afebrile, will defer antibiotics    Continue to monitor WBC and fever curve

## 2020-02-28 NOTE — ANESTHESIA PREPROCEDURE EVALUATION
Review of Systems/Medical History          Cardiovascular  Hypertension ,    Pulmonary  Smoker ex-smoker  Cumulative Pack Years: 45,        GI/Hepatic    GERD well controlled, Liver disease ,   Comment: Fatty Liver     Kidney stones,        Endo/Other  History of thyroid disease , hypothyroidism,   Obesity  morbid obesity   GYN       Hematology  Negative hematology ROS      Musculoskeletal    Arthritis     Neurology    Headaches,   Comment: Hx of migraines currently has one  Psychology   Anxiety,              Physical Exam    Airway       Dental   Comment: Crowns on rear teeth,     Cardiovascular  Rhythm: regular, Rate: normal, Murmur,     Pulmonary  Breath sounds clear to auscultation,     Other Findings        Anesthesia Plan  ASA Score- 3     Anesthesia Type- general with ASA Monitors  Additional Monitors:   Airway Plan: ETT  Plan Factors-    Induction- intravenous  Postoperative Plan- Plan for postoperative opioid use  Planned trial extubation    Informed Consent- Anesthetic plan and risks discussed with patient

## 2020-02-28 NOTE — H&P (VIEW-ONLY)
History  Chief Complaint   Patient presents with    Abdominal Pain     Patient presents complaining of abdominal pain, reports at present it is "sharp" on the left side when he inhales  Previously periumbilical  Was at PCP today and referred to ED  History provided by:  Patient   used: No    Abdominal Pain   Pain location:  LLQ and periumbilical  Pain quality: aching, bloating and sharp    Pain radiation: Sometimes tooward the left back  Pain severity:  Moderate  Onset quality:  Sudden  Duration:  1 day  Timing:  Intermittent  Progression:  Worsening  Chronicity:  New  Context: previous surgery    Context: not trauma    Relieved by:  Nothing  Worsened by: Movement and palpation  Ineffective treatments: percocet  Associated symptoms: anorexia, hematuria and nausea    Associated symptoms: no chest pain, no chills, no constipation, no cough, no diarrhea, no dysuria, no fever, no shortness of breath and no vomiting    Associated symptoms comment:  Had episodes of hematuria in December/january time but none since and didn't have it evaluated  Has a history of stones  Risk factors: multiple surgeries    Risk factors: no alcohol abuse        Prior to Admission Medications   Prescriptions Last Dose Informant Patient Reported? Taking?    Multiple Vitamin (MULTIVITAMIN) tablet  Self Yes Yes   Sig: Take 1 tablet by mouth daily   Omega-3 Fatty Acids (FISH OIL PO)  Self Yes Yes   Sig: Take 1 capsule by mouth daily   SYNTHROID 200 MCG tablet   No Yes   Sig: Take 1 tablet (200 mcg total) by mouth daily   Silodosin (RAPAFLO) 8 MG CAPS  Self Yes Yes   Sig: Take 8 mg by mouth daily at bedtime     acetaminophen (TYLENOL) 325 mg tablet  Self Yes Yes   Sig: Take 650 mg by mouth every 6 (six) hours as needed for mild pain   betamethasone dipropionate (DIPROSONE) 0 05 % cream   No Yes   Sig: Apply topically 2 (two) times a day   chlordiazepoxide-clidinium (LIBRAX) 5-2 5 mg per capsule  Self Yes Yes   Sig: Take 1 capsule by mouth as needed for indigestion   ciclopirox (LOPROX) 0 77 % cream  Self No Yes   Sig: Apply topically 2 (two) times a day   clotrimazole (LOTRIMIN) 1 % cream  Self Yes Yes   Sig: Apply topically daily   cyclobenzaprine (FLEXERIL) 10 mg tablet   No Yes   Sig: Take 1 tablet (10 mg total) by mouth daily at bedtime   dutasteride (AVODART) 0 5 mg capsule  Self Yes Yes   Sig: Take 0 5 mg by mouth daily at bedtime     gabapentin (NEURONTIN) 300 mg capsule   No Yes   Sig: TAKE 1 CAPSULE DAILY AT BEDTIME   levothyroxine 25 mcg tablet   No Yes   Sig: Take 1 tablet (25 mcg total) by mouth daily   omeprazole (PriLOSEC) 20 mg delayed release capsule   No Yes   Sig: Take 1 capsule (20 mg total) by mouth daily   predniSONE 10 mg tablet Not Taking at Unknown time  No No   Si pills daily for 2 days, 4 for 2 days, 3 for 2 days, 2 for 2 days, 1 for 2 days     Patient not taking: Reported on 2019   verapamil (CALAN-SR) 180 mg CR tablet   No Yes   Sig: Take 1 tablet (180 mg total) by mouth daily As directed   vitamin E, tocopherol, 400 units capsule  Self Yes Yes   Sig: Take 400 Units by mouth daily      Facility-Administered Medications: None       Past Medical History:   Diagnosis Date    Anxiety     Arthritis     Athlete's foot     Benign polyp of large intestine     Cancer ([facility] [address] )     thyroid, skin    Chronic pain disorder     right knee    Colon polyp     Curvature of spine     DDD (degenerative disc disease), lumbar     Disease of thyroid gland     removed    Dyslipidemia     Enlarged prostate     GERD (gastroesophageal reflux disease)     Hypertension     IBS (irritable bowel syndrome)     Kidney stone     still has on the left    Migraines     Migraines     more sinus related    Neuropathy     Numbness and tingling of both feet     Seasonal allergies     Tinea pedis     Urinary frequency     Urinary urgency     Vitamin D deficiency     Wears glasses        Past Surgical History:   Procedure Laterality Date    CHOLECYSTECTOMY      COLONOSCOPY      KNEE ARTHROSCOPY Bilateral     knee tim    AK COLONOSCOPY FLX DX W/COLLJ SPEC WHEN PFRMD N/A 2018    Procedure: COLONOSCOPY;  Surgeon: Galina Vogel DO;  Location: [address] [place] [address] GI LAB; Service: General    AK FRAGMENT KIDNEY STONE/ ESWL Left 2017    Procedure: LITHROTRIPSY EXTRACORPORAL SHOCKWAVE (ESWL); Surgeon: Khushboo Linares DO;  Location: [facility];  Service: Urology    AK REPAIR ING HERNIA,5+Y/O,REDUCIBL Right 2019    Procedure: REPAIR INGUINAL HERNIA  DIRECT NO MESH;  Surgeon: Galina Vogel DO;  Location: [address] [place] [address] MAIN OR;  Service: General    ROTATOR CUFF REPAIR Left     THYROIDECTOMY      Subtotal and Total Thyroidectomy both mentioned in allscripts       Family History   Problem Relation Age of Onset    Diabetes Mother     Heart disease Father     Breast cancer Sister      I have reviewed and agree with the history as documented  E-Cigarette/Vaping     E-Cigarette/Vaping Substances     Social History     Tobacco Use    Smoking status: Former Smoker     Years: 4 00     Last attempt to quit: 2014     Years since quittin 6    Smokeless tobacco: Never Used   Substance Use Topics    Alcohol use: Yes     Comment: occasionally    Drug use: No       Review of Systems   Constitutional: Positive for appetite change  Negative for chills and fever  Respiratory: Negative for cough, chest tightness and shortness of breath  Cardiovascular: Negative for chest pain  Gastrointestinal: Positive for abdominal distention, abdominal pain, anorexia and nausea  Negative for constipation, diarrhea and vomiting  Genitourinary: Positive for hematuria  Negative for difficulty urinating and dysuria  No hematuria since january   Neurological: Negative for headaches  All other systems reviewed and are negative  Physical Exam  Physical Exam   Constitutional: He appears well-developed and well-nourished   He is cooperative  Non-toxic appearance  He does not have a sickly appearance  He does not appear ill  No distress  HENT:   Head: Normocephalic and atraumatic  Right Ear: Hearing normal    Left Ear: Hearing normal    Mouth/Throat: Mucous membranes are normal    Eyes: Conjunctivae and lids are normal  Right eye exhibits no discharge  Left eye exhibits no discharge  Neck: Trachea normal and normal range of motion  No JVD present  Cardiovascular: Normal rate, regular rhythm, normal heart sounds, intact distal pulses and normal pulses  Exam reveals no gallop and no friction rub  No murmur heard  Pulmonary/Chest: Effort normal and breath sounds normal  No stridor  No respiratory distress  He has no wheezes  He has no rales  Abdominal: Soft  Normal appearance  He exhibits distension  There is tenderness in the periumbilical area and left lower quadrant  There is no rebound, no guarding and no CVA tenderness  Obese/distended/soft   Musculoskeletal: Normal range of motion  He exhibits no edema  Lymphadenopathy:     He has no cervical adenopathy  Neurological: He is alert  He has normal strength  He exhibits normal muscle tone  GCS eye subscore is 4  GCS verbal subscore is 5  GCS motor subscore is 6  Skin: Skin is warm, dry and intact  No rash noted  He is not diaphoretic  No pallor  Psychiatric: He has a normal mood and affect  His speech is normal  Cognition and memory are normal    Nursing note and vitals reviewed        Vital Signs  ED Triage Vitals   Temperature Pulse Respirations Blood Pressure SpO2   02/27/20 2328 02/27/20 2325 02/27/20 2325 02/27/20 2325 02/27/20 2325   99 °F (37 2 °C) 87 20 143/86 95 %      Temp Source Heart Rate Source Patient Position - Orthostatic VS BP Location FiO2 (%)   02/27/20 2328 02/27/20 2325 02/27/20 2325 02/27/20 2325 --   Oral Monitor Sitting Right arm       Pain Score       02/27/20 2326       Worst Possible Pain           Vitals:    02/27/20 2325   BP: 143/86 Pulse: 87   Patient Position - Orthostatic VS: Sitting         Visual Acuity      ED Medications  Medications   sodium chloride 0 9 % bolus 500 mL (500 mL Intravenous New Bag 2/28/20 0048)   sodium chloride 0 9 % bolus 500 mL (500 mL Intravenous New Bag 2/28/20 0110)   ondansetron (ZOFRAN) injection 4 mg (4 mg Intravenous Given 2/27/20 2349)   morphine (PF) 4 mg/mL injection 6 mg (6 mg Intravenous Given 2/27/20 2350)   iohexol (OMNIPAQUE) 350 MG/ML injection (MULTI-DOSE) 100 mL (100 mL Intravenous Given 2/28/20 0047)       Diagnostic Studies  Results Reviewed     Procedure Component Value Units Date/Time    Basic metabolic panel [[id number]]  (Abnormal) Collected:  02/27/20 2345    Lab Status:  Final result Specimen:  Blood from Arm, Right Updated:  02/28/20 0021     Sodium 139 mmol/L      Potassium 4 4 mmol/L      Chloride 103 mmol/L      CO2 26 mmol/L      ANION GAP 10 mmol/L      BUN 26 mg/dL      Creatinine 1 62 mg/dL      Glucose 132 mg/dL      Calcium 9 2 mg/dL      eGFR 44 ml/min/1 73sq m     Narrative:       [place] guidelines for Chronic Kidney Disease (CKD):     Stage 1 with normal or high GFR (GFR > 90 mL/min/1 73 square meters)    Stage 2 Mild CKD (GFR = 60-89 mL/min/1 73 square meters)    Stage 3A Moderate CKD (GFR = 45-59 mL/min/1 73 square meters)    Stage 3B Moderate CKD (GFR = 30-44 mL/min/1 73 square meters)    Stage 4 Severe CKD (GFR = 15-29 mL/min/1 73 square meters)    Stage 5 End Stage CKD (GFR <15 mL/min/1 73 square meters)  Note: GFR calculation is accurate only with a steady state creatinine    Hepatic function panel [[id number]]  (Normal) Collected:  02/27/20 2345    Lab Status:  Final result Specimen:  Blood from Arm, Right Updated:  02/28/20 0021     Total Bilirubin 0 69 mg/dL      Bilirubin, Direct 0 16 mg/dL      Alkaline Phosphatase 58 U/L      AST 19 U/L      ALT 39 U/L      Total Protein 7 3 g/dL      Albumin 4 0 g/dL     Lipase [[id number]]  (Normal) Collected:  02/27/20 2345    Lab Status:  Final result Specimen:  Blood from Arm, Right Updated:  02/28/20 0021     Lipase 94 u/L     Magnesium [[id number]]  (Normal) Collected:  02/27/20 2345    Lab Status:  Final result Specimen:  Blood from Arm, Right Updated:  02/28/20 0021     Magnesium 2 2 mg/dL     CBC and differential [[id number]]  (Abnormal) Collected:  02/27/20 2345    Lab Status:  Final result Specimen:  Blood from Arm, Right Updated:  02/27/20 2358     WBC 12 47 Thousand/uL      RBC 5 33 Million/uL      Hemoglobin 16 0 g/dL      Hematocrit 48 2 %      MCV 90 fL      MCH 30 0 pg      MCHC 33 2 g/dL      RDW 12 6 %      MPV 8 1 fL      Platelets 822 Thousands/uL      nRBC 0 /100 WBCs      Neutrophils Relative 81 %      Immat GRANS % 1 %      Lymphocytes Relative 10 %      Monocytes Relative 8 %      Eosinophils Relative 0 %      Basophils Relative 0 %      Neutrophils Absolute 10 07 Thousands/µL      Immature Grans Absolute 0 06 Thousand/uL      Lymphocytes Absolute 1 29 Thousands/µL      Monocytes Absolute 1 01 Thousand/µL      Eosinophils Absolute 0 00 Thousand/µL      Basophils Absolute 0 04 Thousands/µL     Protime-INR [[id number]] Collected:  02/27/20 2345    Lab Status: In process Specimen:  Blood from Arm, Right Updated:  02/27/20 2354    APTT [[id number]] Collected:  02/27/20 2345    Lab Status: In process Specimen:  Blood from Arm, Right Updated:  02/27/20 2354    POCT urinalysis dipstick [[id number]]     Lab Status:  No result Specimen:  Urine                  CT abdomen pelvis with contrast   Final Result by Pete Perkins MD (02/28 0058)         1  Mild left hydronephrosis  Obstructing 4 mm calculus in the left ureter at the L3-4 level  2   Nonobstructing 2 mm right renal calculus  3   Hepatomegaly and steatosis              Workstation performed: [id number]                    Procedures  Procedures         ED Course                               MDM  Number of Diagnoses or Management Options  Acute renal insufficiency:   Fatty liver:   Hydronephrosis:   Kidney stone on left side:   Diagnosis management comments: Still having pain even after dose of morphine  Intractable pain  Has a stone with hydro and acute renal insufficiency  Will admit to Internal Medicine for urology consultation  He does see Dr Meryl Pearson for Urology  May need procedure  Will hydrate as well as the patient may also be dehydrated  He also has not given us a urine sample yet  Patient informed of the results of the CT including the kidney stones and fatty liver as well as his renal insufficiency  Amount and/or Complexity of Data Reviewed  Clinical lab tests: ordered and reviewed  Tests in the radiology section of CPT®: ordered and reviewed  Discuss the patient with other providers: yes    Patient Progress  Patient progress: stable        Disposition  Final diagnoses:   Kidney stone on left side   Hydronephrosis   Acute renal insufficiency   Fatty liver     Time reflects when diagnosis was documented in both MDM as applicable and the Disposition within this note     Time User Action Codes Description Comment    2/28/2020  1:20 AM Job Darnell J Add [N20 0] Kidney stone on left side     2/28/2020  1:20 AM Job Darnell J Add [N13 30] Hydronephrosis     2/28/2020  1:22 AM Job Darnell Add [N28 9] Acute renal insufficiency     2/28/2020  1:23 AM Job Darnell Add [K76 0] Fatty liver       ED Disposition     ED Disposition Condition Date/Time Comment    Admit Stable Fri Feb 28, 2020  1:21 AM Case was discussed with SHIV and the patient's admission status was agreed to be Admission Status: observation status to the service of Dr Campos Sep   Follow-up Information    None         Patient's Medications   Discharge Prescriptions    No medications on file     No discharge procedures on file      PDMP Review     None          ED Provider  Electronically Signed by           Regina Norwood Himanshu Lynch MD  02/28/20 3264

## 2020-02-28 NOTE — INTERVAL H&P NOTE
H&P reviewed  After examining the patient I find no changes in the patients condition since the H&P had been written  /95 (BP Location: Right arm)   Pulse 83   Temp 99 8 °F (37 7 °C) (Tympanic)   Resp 18   Ht 6' 2" (1 88 m)   Wt (!) 141 kg (310 lb)   SpO2 93%   BMI 39 80 kg/m²       Vitals:    02/28/20 0233   BP: 162/95   Pulse: 83   Resp: 18   Temp: 99 8 °F (37 7 °C)   SpO2: 93%

## 2020-02-29 VITALS
SYSTOLIC BLOOD PRESSURE: 121 MMHG | OXYGEN SATURATION: 97 % | WEIGHT: 310 LBS | BODY MASS INDEX: 39.78 KG/M2 | RESPIRATION RATE: 20 BRPM | HEART RATE: 70 BPM | DIASTOLIC BLOOD PRESSURE: 87 MMHG | TEMPERATURE: 97.6 F | HEIGHT: 74 IN

## 2020-02-29 PROBLEM — N17.9 ACUTE KIDNEY INJURY (HCC): Status: RESOLVED | Noted: 2020-02-28 | Resolved: 2020-02-29

## 2020-02-29 PROBLEM — R10.9 INTRACTABLE ABDOMINAL PAIN: Status: RESOLVED | Noted: 2020-02-28 | Resolved: 2020-02-29

## 2020-02-29 PROBLEM — D72.829 LEUKOCYTOSIS: Status: RESOLVED | Noted: 2020-02-28 | Resolved: 2020-02-29

## 2020-02-29 PROBLEM — N13.2 HYDRONEPHROSIS WITH OBSTRUCTING CALCULUS: Chronic | Status: RESOLVED | Noted: 2020-02-28 | Resolved: 2020-02-29

## 2020-02-29 LAB
ANION GAP SERPL CALCULATED.3IONS-SCNC: 10 MMOL/L (ref 4–13)
BUN SERPL-MCNC: 16 MG/DL (ref 5–25)
CALCIUM SERPL-MCNC: 8.4 MG/DL (ref 8.3–10.1)
CHLORIDE SERPL-SCNC: 106 MMOL/L (ref 100–108)
CO2 SERPL-SCNC: 26 MMOL/L (ref 21–32)
CREAT SERPL-MCNC: 1.02 MG/DL (ref 0.6–1.3)
GFR SERPL CREATININE-BSD FRML MDRD: 77 ML/MIN/1.73SQ M
GLUCOSE SERPL-MCNC: 126 MG/DL (ref 65–140)
POTASSIUM SERPL-SCNC: 4.1 MMOL/L (ref 3.5–5.3)
SODIUM SERPL-SCNC: 142 MMOL/L (ref 136–145)

## 2020-02-29 PROCEDURE — 80048 BASIC METABOLIC PNL TOTAL CA: CPT | Performed by: STUDENT IN AN ORGANIZED HEALTH CARE EDUCATION/TRAINING PROGRAM

## 2020-02-29 PROCEDURE — 99225 PR SBSQ OBSERVATION CARE/DAY 25 MINUTES: CPT | Performed by: UROLOGY

## 2020-02-29 PROCEDURE — 99217 PR OBSERVATION CARE DISCHARGE MANAGEMENT: CPT | Performed by: STUDENT IN AN ORGANIZED HEALTH CARE EDUCATION/TRAINING PROGRAM

## 2020-02-29 RX ORDER — DOCUSATE SODIUM 100 MG/1
100 CAPSULE, LIQUID FILLED ORAL 2 TIMES DAILY
Qty: 30 CAPSULE | Refills: 0 | Status: SHIPPED | OUTPATIENT
Start: 2020-02-29 | End: 2021-11-23

## 2020-02-29 RX ORDER — TAMSULOSIN HYDROCHLORIDE 0.4 MG/1
0.4 CAPSULE ORAL
Qty: 30 CAPSULE | Refills: 0 | Status: SHIPPED | OUTPATIENT
Start: 2020-02-29 | End: 2020-05-26 | Stop reason: SDUPTHER

## 2020-02-29 RX ORDER — NAPROXEN 500 MG/1
500 TABLET ORAL 2 TIMES DAILY WITH MEALS
Qty: 10 TABLET | Refills: 0 | Status: SHIPPED | OUTPATIENT
Start: 2020-02-29 | End: 2022-02-10 | Stop reason: ALTCHOICE

## 2020-02-29 RX ADMIN — PANTOPRAZOLE SODIUM 20 MG: 20 TABLET, DELAYED RELEASE ORAL at 07:14

## 2020-02-29 RX ADMIN — LEVOTHYROXINE SODIUM 200 MCG: 100 TABLET ORAL at 07:13

## 2020-02-29 RX ADMIN — Medication 1000 MG: at 08:39

## 2020-02-29 RX ADMIN — FINASTERIDE 5 MG: 5 TABLET, FILM COATED ORAL at 08:39

## 2020-02-29 RX ADMIN — VERAPAMIL HYDROCHLORIDE 180 MG: 180 TABLET, FILM COATED, EXTENDED RELEASE ORAL at 08:39

## 2020-02-29 RX ADMIN — LEVOTHYROXINE SODIUM 25 MCG: 25 TABLET ORAL at 07:14

## 2020-02-29 RX ADMIN — ENOXAPARIN SODIUM 40 MG: 40 INJECTION SUBCUTANEOUS at 08:39

## 2020-02-29 NOTE — DISCHARGE SUMMARY
Discharge- Valeriy Dutton 1955, 59 y o  male MRN: [id number]  Unit/Bed#: E2 -01 Encounter: [id number]  Primary Care Provider: Mirta Del Castillo DO   Date and time admitted to hospital: 2/27/2020 11:22 PM    * Hydronephrosis with obstructing calculusresolved as of 2/29/2020  Assessment & Plan  CT shows mild left hydronephrosis due to obstructive 4mm calculus in the left ureter  Nonobstructing right renal calculi measuring 2mm  Urology consulted   S/p cystoscopy with left ureteral stent placement   Spoke with Dr Annabel Silverio this morning   Okay for discharge from urology standpoint with outpatient follow up with Dr Rossana Quezada for secondary ureteroscopy and stent removal       Acute kidney injury (HCC)resolved as of 2/29/2020  Assessment & Plan  Creatinine 1 6, baseline 0 8-1 0, likely post renal due to obstructing calculi  · Resolved after stent placement and IVF hydration       Dyslipidemia  Assessment & Plan  Continue fish oil    Essential hypertension  Assessment & Plan  Maintained on verapamil    Hypothyroidism  Assessment & Plan  Continue levothyroxine    Hepatic steatosis  Assessment & Plan  CT shows hepatomegaly, normal LFTs    Outpatient follow-up with GI        Discharging Physician / Practitioner: Princess Jess PA-C  PCP: Mirta Del Castillo DO  Admission Date:   Admission Orders (From admission, onward)     Ordered        02/28/20 0122  Place in Observation  Once                   Discharge Date: 02/29/20    Resolved Problems  Date Reviewed: 2/29/2020          Resolved    Intractable abdominal pain 2/29/2020     Resolved by  Princess Jess PA-C    Acute kidney injury ([address] ) 2/29/2020     Resolved by  Princess Jess PA-C    Leukocytosis 2/29/2020     Resolved by  Princess Jess PA-C    * (Principal) Hydronephrosis with obstructing calculus 2/29/2020     Resolved by  Princess Jess PA-C          Consultations During Hospital Stay:  · Urology     Procedures Performed:   · S/p cystoscopy and left ureteral stent placement on 2/28 by Dr Rosibel Marie Findings / Test Results:   · CT abdomen pelvis:   1  Mild left hydronephrosis  Obstructing 4 mm calculus in the left ureter at the L3-4 level  2   Nonobstructing 2 mm right renal calculus  3   Hepatomegaly and steatosis  Incidental Findings:   · none     Test Results Pending at Discharge (will require follow up):   · none     Outpatient Tests Requested:  · Urology   · PCP     Complications:  EL- resolved     Reason for Admission: left kidney stone     Hospital Course:     Marlee Lundborg  is a 59 y o  male patient who originally presented to the hospital on 2/27/2020 due to abdominal pain and left sided flank pain  He was found to have an obstructing 4mm calculus in left ureter  He was seen in consultation with Urology and underwent cystoscopy and left ureteral stent placement on 2/28 by Dr Evangelina Donahue  Patient was monitored overnight to monitor renal function which returned back to baseline with EL that resolved  He will follow up with Urology outpatient  for secondary ureteroscopy and stent removal        Please see above list of diagnoses and related plan for additional information  Condition at Discharge: stable     Discharge Day Visit / Exam:     Subjective:  Doing well  Urinating without difficulty  No fevers or chills  No flank pain  No other acute complaints  Vitals: Blood Pressure: 121/87 (02/29/20 0700)  Pulse: 70 (02/29/20 0700)  Temperature: 97 6 °F (36 4 °C) (02/29/20 0700)  Temp Source: Tympanic (02/29/20 0700)  Respirations: 20 (02/29/20 0700)  Height: 6' 2" (188 cm) (02/28/20 0233)  Weight - Scale: (!) 141 kg (310 lb) (02/28/20 0233)  SpO2: 97 % (02/29/20 0700)  Exam:   Physical Exam   Constitutional: No distress  HENT:   Head: Normocephalic  Eyes: Conjunctivae are normal    Neck: Neck supple  Cardiovascular: Normal rate and regular rhythm     Pulmonary/Chest: Effort normal and breath sounds normal    Abdominal: Soft  Bowel sounds are normal    Musculoskeletal: He exhibits no edema  Neurological: He is alert  Skin: Skin is warm  Nursing note and vitals reviewed  Discharge instructions/Information to patient and family:   See after visit summary for information provided to patient and family  Provisions for Follow-Up Care:  See after visit summary for information related to follow-up care and any pertinent home health orders  Disposition:     Home    For Discharges to [facility] SNF:   · Not Applicable to this Patient - Not Applicable to this Patient    Planned Readmission: none     Discharge Statement:  I spent 35 minutes discharging the patient  This time was spent on the day of discharge  I had direct contact with the patient on the day of discharge  Greater than 50% of the total time was spent examining patient, answering all patient questions, arranging and discussing plan of care with patient as well as directly providing post-discharge instructions  Additional time then spent on discharge activities  Discharge Medications:  See after visit summary for reconciled discharge medications provided to patient and family        ** Please Note: This note has been constructed using a voice recognition system **

## 2020-02-29 NOTE — ASSESSMENT & PLAN NOTE
CT shows mild left hydronephrosis due to obstructive 4mm calculus in the left ureter    Nonobstructing right renal calculi measuring 2mm  Urology consulted   S/p cystoscopy with left ureteral stent placement   Spoke with Dr Darrius Lott this morning   Okay for discharge from urology standpoint with outpatient follow up with Dr Jonathan Veloz for secondary ureteroscopy and stent removal

## 2020-02-29 NOTE — PROGRESS NOTES
UROLOGY PROGRESS NOTE   Patient Identifiers: Stephanie Bailon (MRN [id number])  Date of Service: 2/29/2020        Assessment:     1  4 mm left ureteral calculus   - status post ureteral stent insertion    2  Acute renal colic  - resolved    Patient is looking very well  Intraoperative findings discussed  He understands the plan for scheduling secondary ureteroscopy    Patient is stable for discharge home from a urologic perspective        Plan:   -stable for discharge home  -follow-up with Dr Mabel Boyd requested to coordinate secondary ureteroscopy in the elective setting  -a provided medications for discharge including tamsulosin and Naprosyn in addition to the oxycodone which was previously prescribed    Urology to sign off thank you for allowing us participate in patient's care        Subjective:     24 HR EVENTS:   no significant events  Patient has  no complaints        Objective:     VITALS:    Vitals:    02/29/20 0700   BP: 121/87   Pulse: 70   Resp: 20   Temp: 97 6 °F (36 4 °C)   SpO2: 97%       INS & OUTS:  [unfilled]    LABS:  Lab Results   Component Value Date    HGB 15 4 02/28/2020    HCT 46 8 02/28/2020    WBC 13 29 (H) 02/28/2020     02/28/2020   ]    Lab Results   Component Value Date     06/27/2015    K 4 1 02/29/2020     02/29/2020    CO2 26 02/29/2020    BUN 16 02/29/2020    CREATININE 1 02 02/29/2020    CALCIUM 8 4 02/29/2020    GLUCOSE 91 06/27/2015   ]    INPATIENT MEDS:    Current Facility-Administered Medications:     acetaminophen (TYLENOL) tablet 650 mg, 650 mg, Oral, Q6H PRN, José Manuel Salinas MD    aluminum-magnesium hydroxide-simethicone (MYLANTA) 200-200-20 mg/5 mL oral suspension 30 mL, 30 mL, Oral, Q6H PRN, José Manuel Salinas MD    enoxaparin (LOVENOX) subcutaneous injection 40 mg, 40 mg, Subcutaneous, Daily, José Manuel Salinas MD    finasteride (PROSCAR) tablet 5 mg, 5 mg, Oral, Daily, José Manuel Salinas MD    fish oil capsule 1,000 mg, 1,000 mg, Oral, Daily, Villa Mckeon MD    gabapentin (NEURONTIN) capsule 300 mg, 300 mg, Oral, HS PRN, Villa Mckeon MD    levothyroxine tablet 200 mcg, 200 mcg, Oral, Early Morning, Villa Mckeon MD, 200 mcg at 02/29/20 7718    levothyroxine tablet 25 mcg, 25 mcg, Oral, Early Morning, Villa Mckeon MD, 25 mcg at 02/29/20 2036    morphine injection 2 mg, 2 mg, Intravenous, Q4H PRN, Villa Mckeon MD    [facility]ansetron [facility]) injection 4 mg, 4 mg, Intravenous, Q6H PRN, Villa Mckeon MD    pantoprazole (PROTONIX) EC tablet 20 mg, 20 mg, Oral, Early Morning, Villa Mckeon MD, 20 mg at 02/29/20 7998    sodium chloride 0 9 % infusion, 100 mL/hr, Intravenous, Continuous, Thomas Kemp MD, Stopped at 02/29/20 0701    tamsulosin [facility]) capsule 0 4 mg, 0 4 mg, Oral, Daily With Valentina Marino MD, Stopped at 02/28/20 1903    traMADol (ULTRAM) tablet 50 mg, 50 mg, Oral, Q6H PRN, Villa Mckeon MD    verapamil (CALAN-SR) CR tablet 180 mg, 180 mg, Oral, Daily, Villa Mckeon MD      Physical Exam:   /87   Pulse 70   Temp 97 6 °F (36 4 °C) (Tympanic)   Resp 20   Ht 6' 2" (1 88 m)   Wt (!) 141 kg (310 lb)   SpO2 97%   BMI 39 80 kg/m²   GEN: resting comfortably  RESP: breathing comfortably with no accessory muscle use  CV: no significant peripheral edema  ABD: soft and appropriately tender to palpation  INCISION: none  DRAINS: none  KELLY: none

## 2020-02-29 NOTE — ASSESSMENT & PLAN NOTE
Creatinine 1 6, baseline 0 8-1 0, likely post renal due to obstructing calculi  · Resolved after stent placement and IVF hydration

## 2020-03-02 ENCOUNTER — TRANSITIONAL CARE MANAGEMENT (OUTPATIENT)
Dept: FAMILY MEDICINE CLINIC | Facility: CLINIC | Age: 65
End: 2020-03-02

## 2020-03-02 NOTE — TELEPHONE ENCOUNTER
Spoke with pt's wife who states pt would like to go to work  He has a stent and needs a second procedure in the OR  He is a  in a high school  Told her he would have to have restrictions to avoid exertion  He agrees to try it  Work note written and wife coming to office to get it  Wife understands all restrictions  F/U in office to schedule second procedure on 03- on schedule

## 2020-03-02 NOTE — TELEPHONE ENCOUNTER
Pt's wife Jaycee Zarate called requesting to speak with clinical regarding medications and other medical related questions

## 2020-03-06 ENCOUNTER — OFFICE VISIT (OUTPATIENT)
Dept: FAMILY MEDICINE CLINIC | Facility: CLINIC | Age: 65
End: 2020-03-06
Payer: COMMERCIAL

## 2020-03-06 VITALS
SYSTOLIC BLOOD PRESSURE: 122 MMHG | DIASTOLIC BLOOD PRESSURE: 68 MMHG | TEMPERATURE: 96.8 F | WEIGHT: 309 LBS | BODY MASS INDEX: 39.66 KG/M2 | HEIGHT: 74 IN

## 2020-03-06 DIAGNOSIS — E78.5 DYSLIPIDEMIA: ICD-10-CM

## 2020-03-06 DIAGNOSIS — N17.9 ACUTE KIDNEY INJURY (HCC): ICD-10-CM

## 2020-03-06 DIAGNOSIS — N20.0 KIDNEY STONE ON LEFT SIDE: ICD-10-CM

## 2020-03-06 DIAGNOSIS — I10 ESSENTIAL HYPERTENSION: ICD-10-CM

## 2020-03-06 DIAGNOSIS — K76.0 HEPATIC STEATOSIS: ICD-10-CM

## 2020-03-06 DIAGNOSIS — N13.2 HYDRONEPHROSIS WITH URINARY OBSTRUCTION DUE TO RENAL CALCULUS: Primary | ICD-10-CM

## 2020-03-06 PROCEDURE — 1111F DSCHRG MED/CURRENT MED MERGE: CPT | Performed by: FAMILY MEDICINE

## 2020-03-06 PROCEDURE — 99495 TRANSJ CARE MGMT MOD F2F 14D: CPT | Performed by: FAMILY MEDICINE

## 2020-03-06 NOTE — PROGRESS NOTES
Assessment/Plan: patient go for CMP  Patient stable overall and doing well status post hospitalization  Patient will follow with Urology next week  Guidance given overall  Follow-up per routine  Other chronic conditions stable overall  Diagnoses and all orders for this visit:    Hydronephrosis with urinary obstruction due to renal calculus    Kidney stone on left side    Hepatic steatosis    Essential hypertension    Acute kidney injury ([address] )    Dyslipidemia            Subjective:        Patient ID: Luisa Santana  is a 59 y o  male  Patient is here status post hospitalization from February 27 through the 29th for hydronephrosis with calculus in the left ureter  Patient had significant pain prior to ER visit  Patient ultimately seen by urology and had cystoscopy done and a left ureteral stent placed  Patient was in acute kidney injury/ failure  Patient's creatinine was 1 6 and his baseline is 0 8  Patient was given IV fluids  Patient is on Flomax  Patient had a CT scan which showed a 4 mm left ureteral stone and a 2 mm right  Renal stone  Urology was consulted  Patient also has follow-up appointment next week with Urology  Other conditions include hypertension hyperlipidemia attic steatosis which is stable overall  Patient did have leukocytosis which resolved  LFTs were normal in the hospital   Patient is asymptomatic at the present time  No abdominal pain fevers or chills nausea vomiting  Patient is urinating  No gross hematuria no        The following portions of the patient's history were reviewed and updated as appropriate: allergies, current medications, past family history, past medical history, past social history, past surgical history and problem list       Review of Systems   Constitutional: Negative  HENT: Negative  Eyes: Negative  Respiratory: Negative  Cardiovascular: Negative  Gastrointestinal: Negative  Endocrine: Negative  Genitourinary: Negative  Musculoskeletal: Negative  Skin: Negative  Allergic/Immunologic: Negative  Neurological: Negative  Hematological: Negative  Psychiatric/Behavioral: Negative  Objective:               /68 (BP Location: Right arm, Patient Position: Sitting, Cuff Size: Adult)   Temp (!) 96 8 °F (36 °C) (Tympanic)   Ht 6' 2" (1 88 m)   Wt (!) 140 kg (309 lb)   BMI 39 67 kg/m²          Physical Exam   Constitutional: He appears well-developed and well-nourished  No distress  HENT:   Head: Normocephalic  Right Ear: External ear normal    Left Ear: External ear normal    Mouth/Throat: Oropharynx is clear and moist  No oropharyngeal exudate  Eyes: Pupils are equal, round, and reactive to light  EOM are normal  Right eye exhibits no discharge  Left eye exhibits no discharge  No scleral icterus  Neck: Normal range of motion  Neck supple  No thyromegaly present  Cardiovascular: Normal rate, regular rhythm, normal heart sounds and intact distal pulses  Exam reveals no gallop and no friction rub  No murmur heard  Pulmonary/Chest: Effort normal and breath sounds normal  No respiratory distress  He has no wheezes  He has no rales  He exhibits no tenderness  Abdominal: Soft  Bowel sounds are normal  He exhibits no distension  There is no tenderness  There is no rebound and no guarding  Musculoskeletal: Normal range of motion  He exhibits no edema or tenderness  Lymphadenopathy:     He has no cervical adenopathy  Neurological: He is alert  No cranial nerve deficit  He exhibits normal muscle tone  Coordination normal    Skin: Skin is warm and dry  No rash noted  He is not diaphoretic  No erythema  No pallor  Psychiatric: He has a normal mood and affect  His behavior is normal  Judgment and thought content normal    Nursing note and vitals reviewed

## 2020-03-07 ENCOUNTER — APPOINTMENT (OUTPATIENT)
Dept: LAB | Facility: MEDICAL CENTER | Age: 65
End: 2020-03-07
Payer: COMMERCIAL

## 2020-03-07 DIAGNOSIS — E78.5 DYSLIPIDEMIA: ICD-10-CM

## 2020-03-07 DIAGNOSIS — E03.9 HYPOTHYROIDISM, UNSPECIFIED TYPE: ICD-10-CM

## 2020-03-07 DIAGNOSIS — N13.2 HYDRONEPHROSIS WITH URINARY OBSTRUCTION DUE TO RENAL CALCULUS: ICD-10-CM

## 2020-03-07 DIAGNOSIS — R10.33 PERIUMBILICAL PAIN: ICD-10-CM

## 2020-03-07 DIAGNOSIS — G57.93 NEUROPATHY INVOLVING BOTH LOWER EXTREMITIES: ICD-10-CM

## 2020-03-07 DIAGNOSIS — K21.9 GASTROESOPHAGEAL REFLUX DISEASE WITHOUT ESOPHAGITIS: ICD-10-CM

## 2020-03-07 DIAGNOSIS — I10 ESSENTIAL HYPERTENSION: ICD-10-CM

## 2020-03-07 DIAGNOSIS — N17.9 ACUTE KIDNEY INJURY (HCC): ICD-10-CM

## 2020-03-07 LAB
ALBUMIN SERPL BCP-MCNC: 3.6 G/DL (ref 3.5–5)
ALP SERPL-CCNC: 64 U/L (ref 46–116)
ALT SERPL W P-5'-P-CCNC: 59 U/L (ref 12–78)
ANION GAP SERPL CALCULATED.3IONS-SCNC: 4 MMOL/L (ref 4–13)
AST SERPL W P-5'-P-CCNC: 16 U/L (ref 5–45)
BACTERIA UR QL AUTO: ABNORMAL /HPF
BASOPHILS # BLD AUTO: 0.03 THOUSANDS/ΜL (ref 0–0.1)
BASOPHILS NFR BLD AUTO: 1 % (ref 0–1)
BILIRUB SERPL-MCNC: 0.42 MG/DL (ref 0.2–1)
BILIRUB UR QL STRIP: NEGATIVE
BUN SERPL-MCNC: 18 MG/DL (ref 5–25)
CALCIUM SERPL-MCNC: 8.9 MG/DL (ref 8.3–10.1)
CHLORIDE SERPL-SCNC: 110 MMOL/L (ref 100–108)
CHOLEST SERPL-MCNC: 188 MG/DL (ref 50–200)
CLARITY UR: CLEAR
CO2 SERPL-SCNC: 27 MMOL/L (ref 21–32)
COLOR UR: YELLOW
CREAT SERPL-MCNC: 1.1 MG/DL (ref 0.6–1.3)
CREAT UR-MCNC: 77.3 MG/DL
EOSINOPHIL # BLD AUTO: 0 THOUSAND/ΜL (ref 0–0.61)
EOSINOPHIL NFR BLD AUTO: 0 % (ref 0–6)
ERYTHROCYTE [DISTWIDTH] IN BLOOD BY AUTOMATED COUNT: 12.3 % (ref 11.6–15.1)
GFR SERPL CREATININE-BSD FRML MDRD: 71 ML/MIN/1.73SQ M
GLUCOSE P FAST SERPL-MCNC: 101 MG/DL (ref 65–99)
GLUCOSE UR STRIP-MCNC: NEGATIVE MG/DL
HCT VFR BLD AUTO: 48.4 % (ref 36.5–49.3)
HDLC SERPL-MCNC: 36 MG/DL
HGB BLD-MCNC: 15.6 G/DL (ref 12–17)
HGB UR QL STRIP.AUTO: ABNORMAL
HYALINE CASTS #/AREA URNS LPF: ABNORMAL /LPF
IMM GRANULOCYTES # BLD AUTO: 0.03 THOUSAND/UL (ref 0–0.2)
IMM GRANULOCYTES NFR BLD AUTO: 1 % (ref 0–2)
KETONES UR STRIP-MCNC: NEGATIVE MG/DL
LDLC SERPL CALC-MCNC: 128 MG/DL (ref 0–100)
LEUKOCYTE ESTERASE UR QL STRIP: NEGATIVE
LIPASE SERPL-CCNC: 116 U/L (ref 73–393)
LYMPHOCYTES # BLD AUTO: 1.94 THOUSANDS/ΜL (ref 0.6–4.47)
LYMPHOCYTES NFR BLD AUTO: 34 % (ref 14–44)
MCH RBC QN AUTO: 29.5 PG (ref 26.8–34.3)
MCHC RBC AUTO-ENTMCNC: 32.2 G/DL (ref 31.4–37.4)
MCV RBC AUTO: 92 FL (ref 82–98)
MICROALBUMIN UR-MCNC: 33.5 MG/L (ref 0–20)
MICROALBUMIN/CREAT 24H UR: 43 MG/G CREATININE (ref 0–30)
MONOCYTES # BLD AUTO: 0.38 THOUSAND/ΜL (ref 0.17–1.22)
MONOCYTES NFR BLD AUTO: 7 % (ref 4–12)
NEUTROPHILS # BLD AUTO: 3.27 THOUSANDS/ΜL (ref 1.85–7.62)
NEUTS SEG NFR BLD AUTO: 57 % (ref 43–75)
NITRITE UR QL STRIP: NEGATIVE
NON-SQ EPI CELLS URNS QL MICRO: ABNORMAL /HPF
NONHDLC SERPL-MCNC: 152 MG/DL
NRBC BLD AUTO-RTO: 0 /100 WBCS
PH UR STRIP.AUTO: 5.5 [PH]
PLATELET # BLD AUTO: 177 THOUSANDS/UL (ref 149–390)
PMV BLD AUTO: 8.5 FL (ref 8.9–12.7)
POTASSIUM SERPL-SCNC: 4.6 MMOL/L (ref 3.5–5.3)
PROT SERPL-MCNC: 7.1 G/DL (ref 6.4–8.2)
PROT UR STRIP-MCNC: NEGATIVE MG/DL
RBC # BLD AUTO: 5.28 MILLION/UL (ref 3.88–5.62)
RBC #/AREA URNS AUTO: ABNORMAL /HPF
SODIUM SERPL-SCNC: 141 MMOL/L (ref 136–145)
SP GR UR STRIP.AUTO: 1.01 (ref 1–1.03)
TRIGL SERPL-MCNC: 122 MG/DL
TSH SERPL DL<=0.05 MIU/L-ACNC: 0.43 UIU/ML (ref 0.36–3.74)
UROBILINOGEN UR QL STRIP.AUTO: 0.2 E.U./DL
WBC # BLD AUTO: 5.65 THOUSAND/UL (ref 4.31–10.16)
WBC #/AREA URNS AUTO: ABNORMAL /HPF

## 2020-03-07 PROCEDURE — 82043 UR ALBUMIN QUANTITATIVE: CPT | Performed by: FAMILY MEDICINE

## 2020-03-07 PROCEDURE — 82570 ASSAY OF URINE CREATININE: CPT | Performed by: FAMILY MEDICINE

## 2020-03-07 PROCEDURE — 84443 ASSAY THYROID STIM HORMONE: CPT

## 2020-03-07 PROCEDURE — 36415 COLL VENOUS BLD VENIPUNCTURE: CPT

## 2020-03-07 PROCEDURE — 80053 COMPREHEN METABOLIC PANEL: CPT

## 2020-03-07 PROCEDURE — 85025 COMPLETE CBC W/AUTO DIFF WBC: CPT

## 2020-03-07 PROCEDURE — 83690 ASSAY OF LIPASE: CPT

## 2020-03-07 PROCEDURE — 81001 URINALYSIS AUTO W/SCOPE: CPT

## 2020-03-07 PROCEDURE — 80061 LIPID PANEL: CPT

## 2020-03-12 ENCOUNTER — OFFICE VISIT (OUTPATIENT)
Dept: UROLOGY | Facility: MEDICAL CENTER | Age: 65
End: 2020-03-12
Payer: COMMERCIAL

## 2020-03-12 VITALS
BODY MASS INDEX: 38.89 KG/M2 | DIASTOLIC BLOOD PRESSURE: 82 MMHG | WEIGHT: 303 LBS | HEIGHT: 74 IN | SYSTOLIC BLOOD PRESSURE: 118 MMHG | HEART RATE: 62 BPM

## 2020-03-12 DIAGNOSIS — N20.1 LEFT URETERAL STONE: Primary | ICD-10-CM

## 2020-03-12 DIAGNOSIS — N17.9 ACUTE KIDNEY INJURY (HCC): ICD-10-CM

## 2020-03-12 DIAGNOSIS — N13.1 HYDRONEPHROSIS DUE TO OBSTRUCTION OF URETER: ICD-10-CM

## 2020-03-12 PROCEDURE — 99214 OFFICE O/P EST MOD 30 MIN: CPT | Performed by: UROLOGY

## 2020-03-12 PROCEDURE — 1111F DSCHRG MED/CURRENT MED MERGE: CPT | Performed by: UROLOGY

## 2020-03-12 PROCEDURE — 3074F SYST BP LT 130 MM HG: CPT | Performed by: UROLOGY

## 2020-03-12 PROCEDURE — 1036F TOBACCO NON-USER: CPT | Performed by: UROLOGY

## 2020-03-12 PROCEDURE — 3079F DIAST BP 80-89 MM HG: CPT | Performed by: UROLOGY

## 2020-03-12 PROCEDURE — 3008F BODY MASS INDEX DOCD: CPT | Performed by: UROLOGY

## 2020-03-12 NOTE — H&P
H&P Exam - Urology   Luisa Santana  59 y o  male MRN: [id number]  Unit/Bed#:  Encounter: [id number]    Assessment/Plan     Assessment:   left ureteral calculus   small nonobstructive right renal calculus  Plan:   cystoscopy left retrograde pyelography left ureteroscopic stone extraction or laser lithotripsy with stent exchange    History of Present Illness   HPI:  Luisa Santana  is a 59 y o  male who presents with  A left ureteral calculus  The patient was taken to the operating room on 02/28/2020 for cystoscopy and ureteroscopic stone manipulation  The patient had a 4 mm left ureteral calculus at approximately L3-L4 on CT with proximal hydronephrosis  Due to recurrent /persistent pain the patient was taken to the operating room at which time cystoscopy and left ureteroscopy was performed however both flexible and semi-rigid ureteral scope could not be advanced far enough retrograde to engage the stone  There is also some mild stricturing of the ureter at the level of the pelvic brim  A ureteral stent was left in place with the patient noting relief of his pain  Patient will now return for repeat cystoscopy left retrograde pyelogram left ureteroscopic laser lithotripsy and/or stone extraction with stent exchange  Patient understands risks of bleeding infection retained stone or stone fragments need for additional procedures scarring contracture stricture perforation  He agrees to the procedure as consented  Review of Systems   Gastrointestinal: Positive for abdominal distention and abdominal pain  Genitourinary: Positive for frequency and hematuria  All other systems reviewed and are negative        Historical Information   Past Medical History:   Diagnosis Date    Anxiety     Arthritis     Athlete's foot     Benign polyp of large intestine     Cancer (HCC)     thyroid, skin    Chronic pain disorder     right knee    Colon polyp     Curvature of spine     DDD (degenerative disc disease), lumbar     Disease of thyroid gland     removed    Dyslipidemia     Enlarged prostate     GERD (gastroesophageal reflux disease)     Hypertension     IBS (irritable bowel syndrome)     Kidney stone     still has on the left    Migraines     Migraines     more sinus related    Neuropathy     Numbness and tingling of both feet     Seasonal allergies     Tinea pedis     Urinary frequency     Urinary urgency     Vitamin D deficiency     Wears glasses      Past Surgical History:   Procedure Laterality Date    CHOLECYSTECTOMY      COLONOSCOPY      FL RETROGRADE URETHROCYSTOGRAM  2020    KNEE ARTHROSCOPY Bilateral     knee tim    HI COLONOSCOPY FLX DX W/COLLJ SPEC WHEN PFRMD N/A 2018    Procedure: COLONOSCOPY;  Surgeon: Kaylynn Perdue DO;  Location: [address] [place] [address] GI LAB; Service: General    HI CYSTO/URETERO W/LITHOTRIPSY &INDWELL STENT INSRT Left 2020    Procedure: CYSTOSCOPY URETEROSCOP RETROGRADE PYELOGRAM AND INSERTION STENT URETERAL;  Surgeon: Jovana Mclaughlin MD;  Location: [facility];  Service: Urology    HI FRAGMENT KIDNEY STONE/ ESWL Left 2017    Procedure: Keaton Rinaldi SHOCKWAVE (ESWL);   Surgeon: Jay Cantor DO;  Location: [facility];  Service: Urology    HI REPAIR [address] HERNIA,5+Y/O,REDUCIBL Right 2019    Procedure: REPAIR INGUINAL HERNIA  DIRECT NO MESH;  Surgeon: Kaylynn Perdue DO;  Location: [address] [place] [address] MAIN OR;  Service: General    ROTATOR CUFF REPAIR Left     THYROIDECTOMY      Subtotal and Total Thyroidectomy both mentioned in allscripts     Social History   Social History     Substance and Sexual Activity   Alcohol Use Yes    Comment: occasionally     Social History     Substance and Sexual Activity   Drug Use No     Social History     Tobacco Use   Smoking Status Former Smoker    Years: 4 00    Last attempt to quit: 2014    Years since quittin 7   Smokeless Tobacco Never Used     Family History:   Family History   Problem Relation Age of Onset    Diabetes Mother     Heart disease Father     Breast cancer Sister        Meds/Allergies   all medications and allergies reviewed  No Known Allergies    Objective   Vitals: Blood pressure 118/82, pulse 62, height 6' 2" (1 88 m), weight (!) 137 kg (303 lb)  [unfilled]    Invasive Devices     Drain            Ureteral Drain/Stent Left ureter 6 Fr  13 days                Physical Exam   Constitutional: He is oriented to person, place, and time  He appears well-developed and well-nourished  No distress  HENT:   Head: Normocephalic and atraumatic  Eyes: Pupils are equal, round, and reactive to light  EOM are normal    Neck: Neck supple  Cardiovascular: Normal rate, regular rhythm, normal heart sounds and intact distal pulses  Pulmonary/Chest: Effort normal and breath sounds normal  No respiratory distress  He has no wheezes  Abdominal: Soft  Bowel sounds are normal  He exhibits no distension  There is no tenderness  There is no guarding  Musculoskeletal: Normal range of motion  Neurological: He is alert and oriented to person, place, and time  Skin: Skin is dry  He is not diaphoretic  Psychiatric: He has a normal mood and affect  His behavior is normal  Judgment and thought content normal    Vitals reviewed  Lab Results: I have personally reviewed pertinent reports  Imaging: I have personally reviewed pertinent films in PACS  EKG, Pathology, and Other Studies: I have personally reviewed pertinent reports  VTE Prophylaxis: Sequential compression device Dayanna Paci)     Code Status: [unfilled]  Advance Directive and Living Will:      Power of :    POLST:      Counseling / Coordination of Care  Total floor / unit time spent today 30 minutes  Greater than 50% of total time was spent with the patient and / or family counseling and / or coordination of care  A description of the counseling / coordination of care: Estela Delgado

## 2020-03-12 NOTE — PROGRESS NOTES
H&P Exam - Urology   Shelia Roth  59 y o  male MRN: [id number]  Unit/Bed#:  Encounter: [id number]    Assessment/Plan     Assessment:   left ureteral calculus   small nonobstructive right renal calculus  Plan:   cystoscopy left retrograde pyelography left ureteroscopic stone extraction or laser lithotripsy with stent exchange    History of Present Illness   HPI:  Shelia Roth  is a 59 y o  male who presents with  A left ureteral calculus  The patient was taken to the operating room on 02/28/2020 for cystoscopy and ureteroscopic stone manipulation  The patient had a 4 mm left ureteral calculus at approximately L3-L4 on CT with proximal hydronephrosis  Due to recurrent /persistent pain the patient was taken to the operating room at which time cystoscopy and left ureteroscopy was performed however both flexible and semi-rigid ureteral scope could not be advanced far enough retrograde to engage the stone  There is also some mild stricturing of the ureter at the level of the pelvic brim  A ureteral stent was left in place with the patient noting relief of his pain  Patient will now return for repeat cystoscopy left retrograde pyelogram left ureteroscopic laser lithotripsy and/or stone extraction with stent exchange  Patient understands risks of bleeding infection retained stone or stone fragments need for additional procedures scarring contracture stricture perforation  He agrees to the procedure as consented  Review of Systems   Gastrointestinal: Positive for abdominal distention and abdominal pain  Genitourinary: Positive for frequency and hematuria  All other systems reviewed and are negative        Historical Information   Past Medical History:   Diagnosis Date    Anxiety     Arthritis     Athlete's foot     Benign polyp of large intestine     Cancer (HCC)     thyroid, skin    Chronic pain disorder     right knee    Colon polyp     Curvature of spine     DDD (degenerative disc disease), lumbar     Disease of thyroid gland     removed    Dyslipidemia     Enlarged prostate     GERD (gastroesophageal reflux disease)     Hypertension     IBS (irritable bowel syndrome)     Kidney stone     still has on the left    Migraines     Migraines     more sinus related    Neuropathy     Numbness and tingling of both feet     Seasonal allergies     Tinea pedis     Urinary frequency     Urinary urgency     Vitamin D deficiency     Wears glasses      Past Surgical History:   Procedure Laterality Date    CHOLECYSTECTOMY      COLONOSCOPY      FL RETROGRADE URETHROCYSTOGRAM  2020    KNEE ARTHROSCOPY Bilateral     knee tim    OR COLONOSCOPY FLX DX W/COLLJ SPEC WHEN PFRMD N/A 2018    Procedure: COLONOSCOPY;  Surgeon: Milton Menchaca DO;  Location: [address] [place] [address] GI LAB; Service: General    OR CYSTO/URETERO W/LITHOTRIPSY &INDWELL STENT INSRT Left 2020    Procedure: CYSTOSCOPY URETEROSCOP RETROGRADE PYELOGRAM AND INSERTION STENT URETERAL;  Surgeon: Roberto Carlos Hale MD;  [facility];  Service: Urology    OR FRAGMENT KIDNEY STONE/ ESWL Left 2017    Procedure: Josh  SHOCKWAVE (ESWL);   Surgeon: Castillo Melgar DO;  [facility];  Service: Urology    OR REPAIR [address] HERNIA,5+Y/O,REDUCIBL Right 2019    Procedure: REPAIR INGUINAL HERNIA  DIRECT NO MESH;  Surgeon: Milton Menchaca DO;  Location: [address] [place] [address] MAIN OR;  Service: General    ROTATOR CUFF REPAIR Left     THYROIDECTOMY      Subtotal and Total Thyroidectomy both mentioned in allscripts     Social History   Social History     Substance and Sexual Activity   Alcohol Use Yes    Comment: occasionally     Social History     Substance and Sexual Activity   Drug Use No     Social History     Tobacco Use   Smoking Status Former Smoker    Years: 4 00    Last attempt to quit: 2014    Years since quittin 7   Smokeless Tobacco Never Used     Family History:   Family History   Problem Relation Age of Onset    Diabetes Mother     Heart disease Father     Breast cancer Sister        Meds/Allergies   all medications and allergies reviewed  No Known Allergies    Objective   Vitals: Blood pressure 118/82, pulse 62, height 6' 2" (1 88 m), weight (!) 137 kg (303 lb)  [unfilled]    Invasive Devices     Drain            Ureteral Drain/Stent Left ureter 6 Fr  13 days                Physical Exam   Constitutional: He is oriented to person, place, and time  He appears well-developed and well-nourished  No distress  HENT:   Head: Normocephalic and atraumatic  Eyes: Pupils are equal, round, and reactive to light  EOM are normal    Neck: Neck supple  Cardiovascular: Normal rate, regular rhythm, normal heart sounds and intact distal pulses  Pulmonary/Chest: Effort normal and breath sounds normal  No respiratory distress  He has no wheezes  Abdominal: Soft  Bowel sounds are normal  He exhibits no distension  There is no tenderness  There is no guarding  Musculoskeletal: Normal range of motion  Neurological: He is alert and oriented to person, place, and time  Skin: Skin is dry  He is not diaphoretic  Psychiatric: He has a normal mood and affect  His behavior is normal  Judgment and thought content normal    Vitals reviewed  Lab Results: I have personally reviewed pertinent reports  Imaging: I have personally reviewed pertinent films in PACS  EKG, Pathology, and Other Studies: I have personally reviewed pertinent reports  VTE Prophylaxis: Sequential compression device Gerhardt Euler)     Code Status: [unfilled]  Advance Directive and Living Will:      Power of :    POLST:      Counseling / Coordination of Care  Total floor / unit time spent today 30 minutes  Greater than 50% of total time was spent with the patient and / or family counseling and / or coordination of care  A description of the counseling / coordination of care: Abena Ford

## 2020-03-12 NOTE — H&P (VIEW-ONLY)
H&P Exam - Urology   Raheel Ang  59 y o  male MRN: [id number]  Unit/Bed#:  Encounter: [id number]    Assessment/Plan     Assessment:   left ureteral calculus   small nonobstructive right renal calculus  Plan:   cystoscopy left retrograde pyelography left ureteroscopic stone extraction or laser lithotripsy with stent exchange    History of Present Illness   HPI:  Raheel Ang  is a 59 y o  male who presents with  A left ureteral calculus  The patient was taken to the operating room on 02/28/2020 for cystoscopy and ureteroscopic stone manipulation  The patient had a 4 mm left ureteral calculus at approximately L3-L4 on CT with proximal hydronephrosis  Due to recurrent /persistent pain the patient was taken to the operating room at which time cystoscopy and left ureteroscopy was performed however both flexible and semi-rigid ureteral scope could not be advanced far enough retrograde to engage the stone  There is also some mild stricturing of the ureter at the level of the pelvic brim  A ureteral stent was left in place with the patient noting relief of his pain  Patient will now return for repeat cystoscopy left retrograde pyelogram left ureteroscopic laser lithotripsy and/or stone extraction with stent exchange  Patient understands risks of bleeding infection retained stone or stone fragments need for additional procedures scarring contracture stricture perforation  He agrees to the procedure as consented  Review of Systems   Gastrointestinal: Positive for abdominal distention and abdominal pain  Genitourinary: Positive for frequency and hematuria  All other systems reviewed and are negative        Historical Information   Past Medical History:   Diagnosis Date    Anxiety     Arthritis     Athlete's foot     Benign polyp of large intestine     Cancer (HCC)     thyroid, skin    Chronic pain disorder     right knee    Colon polyp     Curvature of spine     DDD (degenerative disc disease), lumbar     Disease of thyroid gland     removed    Dyslipidemia     Enlarged prostate     GERD (gastroesophageal reflux disease)     Hypertension     IBS (irritable bowel syndrome)     Kidney stone     still has on the left    Migraines     Migraines     more sinus related    Neuropathy     Numbness and tingling of both feet     Seasonal allergies     Tinea pedis     Urinary frequency     Urinary urgency     Vitamin D deficiency     Wears glasses      Past Surgical History:   Procedure Laterality Date    CHOLECYSTECTOMY      COLONOSCOPY      FL RETROGRADE URETHROCYSTOGRAM  2020    KNEE ARTHROSCOPY Bilateral     knee tim    VT COLONOSCOPY FLX DX W/COLLJ SPEC WHEN PFRMD N/A 2018    Procedure: COLONOSCOPY;  Surgeon: Julieta Lakhani DO;  Location: [address] [place] [address] GI LAB; Service: General    VT CYSTO/URETERO W/LITHOTRIPSY &INDWELL STENT INSRT Left 2020    Procedure: CYSTOSCOPY URETEROSCOP RETROGRADE PYELOGRAM AND INSERTION STENT URETERAL;  Surgeon: Edwige Edwards MD;  [facility];  Service: Urology    VT FRAGMENT KIDNEY STONE/ ESWL Left 2017    Procedure: Aleshia Franklin SHOCKWAVE (ESWL);   Surgeon: Trinity Cunha DO;  [facility];  Service: Urology    VT REPAIR [address] HERNIA,5+Y/O,REDUCIBL Right 2019    Procedure: REPAIR INGUINAL HERNIA  DIRECT NO MESH;  Surgeon: Julieta Lakhani DO;  Location: [address] [place] [address] MAIN OR;  Service: General    ROTATOR CUFF REPAIR Left     THYROIDECTOMY      Subtotal and Total Thyroidectomy both mentioned in allscripts     Social History   Social History     Substance and Sexual Activity   Alcohol Use Yes    Comment: occasionally     Social History     Substance and Sexual Activity   Drug Use No     Social History     Tobacco Use   Smoking Status Former Smoker    Years: 4 00    Last attempt to quit: 2014    Years since quittin 7   Smokeless Tobacco Never Used     Family History:   Family History   Problem Relation Age of Onset    Diabetes Mother     Heart disease Father     Breast cancer Sister        Meds/Allergies   all medications and allergies reviewed  No Known Allergies    Objective   Vitals: Blood pressure 118/82, pulse 62, height 6' 2" (1 88 m), weight (!) 137 kg (303 lb)  [unfilled]    Invasive Devices     Drain            Ureteral Drain/Stent Left ureter 6 Fr  13 days                Physical Exam   Constitutional: He is oriented to person, place, and time  He appears well-developed and well-nourished  No distress  HENT:   Head: Normocephalic and atraumatic  Eyes: Pupils are equal, round, and reactive to light  EOM are normal    Neck: Neck supple  Cardiovascular: Normal rate, regular rhythm, normal heart sounds and intact distal pulses  Pulmonary/Chest: Effort normal and breath sounds normal  No respiratory distress  He has no wheezes  Abdominal: Soft  Bowel sounds are normal  He exhibits no distension  There is no tenderness  There is no guarding  Musculoskeletal: Normal range of motion  Neurological: He is alert and oriented to person, place, and time  Skin: Skin is dry  He is not diaphoretic  Psychiatric: He has a normal mood and affect  His behavior is normal  Judgment and thought content normal    Vitals reviewed  Lab Results: I have personally reviewed pertinent reports  Imaging: I have personally reviewed pertinent films in PACS  EKG, Pathology, and Other Studies: I have personally reviewed pertinent reports  VTE Prophylaxis: Sequential compression device Rubi Choi)     Code Status: [unfilled]  Advance Directive and Living Will:      Power of :    POLST:      Counseling / Coordination of Care  Total floor / unit time spent today 30 minutes  Greater than 50% of total time was spent with the patient and / or family counseling and / or coordination of care  A description of the counseling / coordination of care: Genaro Purdy

## 2020-03-17 ENCOUNTER — TELEPHONE (OUTPATIENT)
Dept: UROLOGY | Facility: MEDICAL CENTER | Age: 65
End: 2020-03-17

## 2020-03-17 ENCOUNTER — TELEPHONE (OUTPATIENT)
Dept: UROLOGY | Facility: AMBULATORY SURGERY CENTER | Age: 65
End: 2020-03-17

## 2020-03-17 NOTE — TELEPHONE ENCOUNTER
I spoke to the patients wife and scheduled his Cysto, Tre [place]uther[place], Stent for 3/25/2020 at [facility] with Dr Ayesha Solares     -instructions given verbally and mailed  -patient will have Urine C&S ASAP  -patient will avoid any potentially blood thinning meds as of 3/18/2020  -no clearances ordered  -HBS - No Auth required

## 2020-03-17 NOTE — TELEPHONE ENCOUNTER
Anneliese Swann, Patient moved his CT upto 3/19/2020 from 3/26/2020  I believe a Aleyda  is required

## 2020-03-17 NOTE — TELEPHONE ENCOUNTER
Spoke to Xeneta at [facility]; obtained prior auth for patient's CT renal stone study    [id number]  Effective 3/17/20-5/16/20  (case ID [id number])

## 2020-03-19 ENCOUNTER — TELEPHONE (OUTPATIENT)
Dept: UROLOGY | Facility: AMBULATORY SURGERY CENTER | Age: 65
End: 2020-03-19

## 2020-03-19 ENCOUNTER — HOSPITAL ENCOUNTER (OUTPATIENT)
Dept: CT IMAGING | Facility: HOSPITAL | Age: 65
Discharge: HOME | End: 2020-03-19
Attending: UROLOGY
Payer: COMMERCIAL

## 2020-03-19 DIAGNOSIS — N20.1 LEFT URETERAL STONE: ICD-10-CM

## 2020-03-19 PROCEDURE — 74176 CT ABD & PELVIS W/O CONTRAST: CPT

## 2020-03-23 ENCOUNTER — APPOINTMENT (OUTPATIENT)
Dept: LAB | Facility: MEDICAL CENTER | Age: 65
End: 2020-03-23
Attending: UROLOGY
Payer: COMMERCIAL

## 2020-03-23 DIAGNOSIS — N20.1 LEFT URETERAL STONE: ICD-10-CM

## 2020-03-23 PROCEDURE — 87086 URINE CULTURE/COLONY COUNT: CPT

## 2020-03-23 NOTE — PRE-PROCEDURE INSTRUCTIONS
Pre-Surgery Instructions:   Medication Instructions    acetaminophen (TYLENOL) 325 mg tablet Instructed patient per Anesthesia Guidelines   chlordiazepoxide-clidinium (LIBRAX) 5-2 5 mg per capsule Instructed patient per Anesthesia Guidelines   clotrimazole (LOTRIMIN) 1 % cream Instructed patient per Anesthesia Guidelines   dutasteride (AVODART) 0 5 mg capsule Instructed patient per Anesthesia Guidelines   gabapentin (NEURONTIN) 300 mg capsule Instructed patient per Anesthesia Guidelines   levothyroxine 25 mcg tablet Instructed patient per Anesthesia Guidelines   Multiple Vitamin (MULTIVITAMIN) tablet Instructed patient per Anesthesia Guidelines   Omega-3 Fatty Acids (FISH OIL PO) Instructed patient per Anesthesia Guidelines   omeprazole (PriLOSEC) 20 mg delayed release capsule Instructed patient per Anesthesia Guidelines   Silodosin (RAPAFLO) 8 MG CAPS Instructed patient per Anesthesia Guidelines   SYNTHROID 200 MCG tablet Instructed patient per Anesthesia Guidelines   verapamil (CALAN-SR) 180 mg CR tablet Instructed patient per Anesthesia Guidelines   vitamin E, tocopherol, 400 units capsule Instructed patient per Anesthesia Guidelines  Patient's wife and pt via tc/anesth guidelines  instructed to take levothyroxine*with a sip of water the morning of surgery  Patient given/ instructed on use of chlorhexidine soap per hospital protocol    Patient instructed to stop all ASA, NSAIDS, vitamins and herbal supplements one week prior to surgery or per Dr Kannan Dale

## 2020-03-24 ENCOUNTER — ANESTHESIA EVENT (OUTPATIENT)
Dept: PERIOP | Facility: HOSPITAL | Age: 65
End: 2020-03-24
Payer: COMMERCIAL

## 2020-03-24 LAB — BACTERIA UR CULT: NORMAL

## 2020-03-25 ENCOUNTER — ANESTHESIA (OUTPATIENT)
Dept: PERIOP | Facility: HOSPITAL | Age: 65
End: 2020-03-25
Payer: COMMERCIAL

## 2020-03-25 ENCOUNTER — APPOINTMENT (OUTPATIENT)
Dept: RADIOLOGY | Facility: HOSPITAL | Age: 65
End: 2020-03-25
Payer: COMMERCIAL

## 2020-03-25 ENCOUNTER — HOSPITAL ENCOUNTER (OUTPATIENT)
Facility: HOSPITAL | Age: 65
Setting detail: OUTPATIENT SURGERY
Discharge: HOME | End: 2020-03-25
Attending: UROLOGY | Admitting: UROLOGY
Payer: COMMERCIAL

## 2020-03-25 VITALS
HEART RATE: 56 BPM | TEMPERATURE: 97.6 F | WEIGHT: 303 LBS | BODY MASS INDEX: 37.67 KG/M2 | OXYGEN SATURATION: 98 % | DIASTOLIC BLOOD PRESSURE: 65 MMHG | SYSTOLIC BLOOD PRESSURE: 127 MMHG | RESPIRATION RATE: 18 BRPM | HEIGHT: 75 IN

## 2020-03-25 DIAGNOSIS — N20.1 LEFT URETERAL CALCULUS: ICD-10-CM

## 2020-03-25 DIAGNOSIS — N20.1 LEFT URETERAL STONE: ICD-10-CM

## 2020-03-25 DIAGNOSIS — N20.0 KIDNEY STONE ON LEFT SIDE: Primary | ICD-10-CM

## 2020-03-25 PROCEDURE — 82360 CALCULUS ASSAY QUANT: CPT | Performed by: UROLOGY

## 2020-03-25 PROCEDURE — C2617 STENT, NON-COR, TEM W/O DEL: HCPCS | Performed by: UROLOGY

## 2020-03-25 PROCEDURE — 52352 CYSTOURETERO W/STONE REMOVE: CPT | Performed by: UROLOGY

## 2020-03-25 PROCEDURE — 74018 RADEX ABDOMEN 1 VIEW: CPT

## 2020-03-25 PROCEDURE — 52332 CYSTOSCOPY AND TREATMENT: CPT | Performed by: UROLOGY

## 2020-03-25 DEVICE — VARIABLE LENGTH INJECTION STENT SET
Type: IMPLANTABLE DEVICE | Site: URETER | Status: FUNCTIONAL
Brand: CONTOUR VL™ INJECTION STENT SET

## 2020-03-25 RX ORDER — GENTAMICIN SULFATE 40 MG/ML
INJECTION, SOLUTION INTRAMUSCULAR; INTRAVENOUS AS NEEDED
Status: DISCONTINUED | OUTPATIENT
Start: 2020-03-25 | End: 2020-03-25 | Stop reason: SURG

## 2020-03-25 RX ORDER — OXYCODONE HYDROCHLORIDE AND ACETAMINOPHEN 5; 325 MG/1; MG/1
1 TABLET ORAL EVERY 4 HOURS PRN
Status: DISCONTINUED | OUTPATIENT
Start: 2020-03-25 | End: 2020-03-25 | Stop reason: HOSPADM

## 2020-03-25 RX ORDER — MAGNESIUM HYDROXIDE 1200 MG/15ML
LIQUID ORAL AS NEEDED
Status: DISCONTINUED | OUTPATIENT
Start: 2020-03-25 | End: 2020-03-25 | Stop reason: HOSPADM

## 2020-03-25 RX ORDER — MORPHINE SULFATE 10 MG/ML
4 INJECTION, SOLUTION INTRAMUSCULAR; INTRAVENOUS EVERY 6 HOURS PRN
Status: DISCONTINUED | OUTPATIENT
Start: 2020-03-25 | End: 2020-03-25 | Stop reason: HOSPADM

## 2020-03-25 RX ORDER — HYDROMORPHONE HCL 110MG/55ML
PATIENT CONTROLLED ANALGESIA SYRINGE INTRAVENOUS AS NEEDED
Status: DISCONTINUED | OUTPATIENT
Start: 2020-03-25 | End: 2020-03-25 | Stop reason: SURG

## 2020-03-25 RX ORDER — FENTANYL CITRATE 50 UG/ML
INJECTION, SOLUTION INTRAMUSCULAR; INTRAVENOUS AS NEEDED
Status: DISCONTINUED | OUTPATIENT
Start: 2020-03-25 | End: 2020-03-25 | Stop reason: SURG

## 2020-03-25 RX ORDER — CEFAZOLIN SODIUM 2 G/50ML
2000 SOLUTION INTRAVENOUS ONCE
Status: DISCONTINUED | OUTPATIENT
Start: 2020-03-25 | End: 2020-03-25

## 2020-03-25 RX ORDER — CEFAZOLIN SODIUM 2 G/50ML
SOLUTION INTRAVENOUS AS NEEDED
Status: DISCONTINUED | OUTPATIENT
Start: 2020-03-25 | End: 2020-03-25 | Stop reason: SURG

## 2020-03-25 RX ORDER — FENTANYL CITRATE/PF 50 MCG/ML
25 SYRINGE (ML) INJECTION
Status: DISCONTINUED | OUTPATIENT
Start: 2020-03-25 | End: 2020-03-25 | Stop reason: HOSPADM

## 2020-03-25 RX ORDER — ONDANSETRON 2 MG/ML
4 INJECTION INTRAMUSCULAR; INTRAVENOUS ONCE AS NEEDED
Status: DISCONTINUED | OUTPATIENT
Start: 2020-03-25 | End: 2020-03-25 | Stop reason: HOSPADM

## 2020-03-25 RX ORDER — DEXAMETHASONE SODIUM PHOSPHATE 10 MG/ML
INJECTION, SOLUTION INTRAMUSCULAR; INTRAVENOUS AS NEEDED
Status: DISCONTINUED | OUTPATIENT
Start: 2020-03-25 | End: 2020-03-25 | Stop reason: SURG

## 2020-03-25 RX ORDER — PROPOFOL 10 MG/ML
INJECTION, EMULSION INTRAVENOUS AS NEEDED
Status: DISCONTINUED | OUTPATIENT
Start: 2020-03-25 | End: 2020-03-25 | Stop reason: SURG

## 2020-03-25 RX ORDER — KETOROLAC TROMETHAMINE 30 MG/ML
INJECTION, SOLUTION INTRAMUSCULAR; INTRAVENOUS AS NEEDED
Status: DISCONTINUED | OUTPATIENT
Start: 2020-03-25 | End: 2020-03-25 | Stop reason: SURG

## 2020-03-25 RX ORDER — SODIUM CHLORIDE 9 MG/ML
125 INJECTION, SOLUTION INTRAVENOUS CONTINUOUS
Status: DISCONTINUED | OUTPATIENT
Start: 2020-03-25 | End: 2020-03-25 | Stop reason: HOSPADM

## 2020-03-25 RX ORDER — GENTAMICIN SULFATE 60 MG/50ML
1.5 INJECTION, SOLUTION INTRAVENOUS ONCE
Status: DISCONTINUED | OUTPATIENT
Start: 2020-03-25 | End: 2020-03-25

## 2020-03-25 RX ORDER — LIDOCAINE HYDROCHLORIDE 10 MG/ML
INJECTION, SOLUTION EPIDURAL; INFILTRATION; INTRACAUDAL; PERINEURAL AS NEEDED
Status: DISCONTINUED | OUTPATIENT
Start: 2020-03-25 | End: 2020-03-25 | Stop reason: SURG

## 2020-03-25 RX ORDER — HYDROCODONE BITARTRATE AND ACETAMINOPHEN 5; 325 MG/1; MG/1
1 TABLET ORAL EVERY 6 HOURS PRN
Qty: 10 TABLET | Refills: 0 | Status: SHIPPED | OUTPATIENT
Start: 2020-03-25 | End: 2020-04-04

## 2020-03-25 RX ORDER — MIDAZOLAM HYDROCHLORIDE 2 MG/2ML
INJECTION, SOLUTION INTRAMUSCULAR; INTRAVENOUS AS NEEDED
Status: DISCONTINUED | OUTPATIENT
Start: 2020-03-25 | End: 2020-03-25 | Stop reason: SURG

## 2020-03-25 RX ORDER — HEPARIN SODIUM 5000 [USP'U]/ML
5000 INJECTION, SOLUTION INTRAVENOUS; SUBCUTANEOUS ONCE
Status: COMPLETED | OUTPATIENT
Start: 2020-03-25 | End: 2020-03-25

## 2020-03-25 RX ORDER — SULFAMETHOXAZOLE AND TRIMETHOPRIM 800; 160 MG/1; MG/1
1 TABLET ORAL DAILY
Qty: 7 TABLET | Refills: 0 | Status: SHIPPED | OUTPATIENT
Start: 2020-03-25 | End: 2020-04-01

## 2020-03-25 RX ORDER — ONDANSETRON 2 MG/ML
INJECTION INTRAMUSCULAR; INTRAVENOUS AS NEEDED
Status: DISCONTINUED | OUTPATIENT
Start: 2020-03-25 | End: 2020-03-25 | Stop reason: SURG

## 2020-03-25 RX ADMIN — PROPOFOL 200 MG: 10 INJECTION, EMULSION INTRAVENOUS at 10:15

## 2020-03-25 RX ADMIN — LIDOCAINE HYDROCHLORIDE 60 MG: 10 INJECTION, SOLUTION EPIDURAL; INFILTRATION; INTRACAUDAL; PERINEURAL at 10:15

## 2020-03-25 RX ADMIN — KETOROLAC TROMETHAMINE 30 MG: 30 INJECTION, SOLUTION INTRAMUSCULAR at 10:36

## 2020-03-25 RX ADMIN — SODIUM CHLORIDE: 0.9 INJECTION, SOLUTION INTRAVENOUS at 10:48

## 2020-03-25 RX ADMIN — GENTAMICIN SULFATE 120 MG: 40 INJECTION, SOLUTION INTRAMUSCULAR; INTRAVENOUS at 10:10

## 2020-03-25 RX ADMIN — SODIUM CHLORIDE 125 ML/HR: 0.9 INJECTION, SOLUTION INTRAVENOUS at 08:03

## 2020-03-25 RX ADMIN — HYDROMORPHONE HYDROCHLORIDE 0.5 MG: 2 INJECTION, SOLUTION INTRAMUSCULAR; INTRAVENOUS; SUBCUTANEOUS at 10:36

## 2020-03-25 RX ADMIN — FENTANYL CITRATE 50 MCG: 50 INJECTION, SOLUTION INTRAMUSCULAR; INTRAVENOUS at 10:20

## 2020-03-25 RX ADMIN — MIDAZOLAM 2 MG: 1 INJECTION INTRAMUSCULAR; INTRAVENOUS at 10:09

## 2020-03-25 RX ADMIN — CEFAZOLIN SODIUM 2000 MG: 2 SOLUTION INTRAVENOUS at 10:10

## 2020-03-25 RX ADMIN — HEPARIN SODIUM 5000 UNITS: 5000 INJECTION INTRAVENOUS; SUBCUTANEOUS at 09:05

## 2020-03-25 RX ADMIN — FENTANYL CITRATE 50 MCG: 50 INJECTION, SOLUTION INTRAMUSCULAR; INTRAVENOUS at 10:15

## 2020-03-25 RX ADMIN — ONDANSETRON 4 MG: 2 INJECTION INTRAMUSCULAR; INTRAVENOUS at 10:28

## 2020-03-25 RX ADMIN — DEXAMETHASONE SODIUM PHOSPHATE 4 MG: 10 INJECTION, SOLUTION INTRAMUSCULAR; INTRAVENOUS at 10:28

## 2020-03-25 NOTE — OP NOTE
OPERATIVE REPORT  PATIENT NAME: Leann Rizzo  :  1955  MRN: [id number]  Pt Location: [facility]    SURGERY DATE: 3/25/2020    Surgeon(s) and Role:     * Arlet Urbina MD - Primary    Preop Diagnosis:  Left ureteral stone [N20 1]    Post-Op Diagnosis Codes:     * Left ureteral stone [N20 1]    Procedure(s) (LRB):  CYSTOSCOPY URETEROSCOPY WITH LITHOTRIPSY HOLMIUM LASER, RETROGRADE PYELOGRAM AND INSERTION STENT URETERAL (Left)    Specimen(s):  ID Type Source Tests Collected by Time Destination   A : left  ureter stone Calculus Ureter, Left STONE ANALYSIS Arlet Urbina MD 3/25/2020 1033        Estimated Blood Loss:   Minimal    Drains:  Ureteral Drain/Stent Left ureter 6 Fr  (Active)   Number of days: 26       Anesthesia Type:   General    Operative Indications:  Left ureteral stone [N20 1]       Operative Findings:  Preexistent left ureteral stent with left distal ureteral calculus  Complications:   None    Procedure and Technique:  Patient was seen in the holding area and agreed to the procedure as listed  He was taken to the operating room identified by the surgeon prepped and draped usual sterile fashion in dorsal lithotomy position and after appropriate time-out a 22 Azeri cystoscope with a 30 degree and 70 degree lens was used to evaluate the lower urinary tract  No urethral strictures were identified  The prostatic urethra revealed mild bilobar enlargement of the lateral lobes of the prostate with mild visual occlusion of the bladder outlet  The urinary bladder was free of any intrinsic lesions or evidence of extrinsic mass compression  A preexistent left ureteral stent was seen exiting the left ureteral orifice and was grasped using grasping forceps and brought out to the urethral meatus  A 0 035 Rodrigo-coated floppy tip guidewire was inserted retrograde up the stent under fluoroscopic control  The stent was removed    All other equipment other than the wire were left in the urinary tract and then a Rexine Nordland short ureteral scope was placed per urethra into the urinary bladder and retrograde up the left ureter where a small stone was seen in the distal left ureter  The stone was basketed using a flat wire Carver basket and removed atraumatically  The ureteral scope was then used again to intubate the lower urinary tract and the left ureteral orifice with the remainder the ureter being identified as free of any additional stones  All equipment was removed from the urinary tract except for the wire and the left ureter which was then backloaded into the cystoscope which was placed per urethra into the urinary bladder  The wire was used to introduce a 6 Western Carmella Kwart stent into the left urinary tract which was then internalized in good position  At the end of the procedure only the left ureteral stent with a trans urethral Dangler suture taped to the phallus was left in the patient's urinary tract  The patient was extubated transferred to the recovery room and recovered uneventfully  Patient will return in 1 week for removal of the left ureteral stent through the Dangler suture  There were no complications  The stone was sent for biochemical analysis     I was present for the entire procedure and I was present for all critical portions of the procedure    Patient Disposition:  PACU     SIGNATURE: Payal Davies MD  DATE: March 25, 2020  TIME: 10:43 AM

## 2020-03-25 NOTE — ANESTHESIA PREPROCEDURE EVALUATION
Review of Systems/Medical History          Cardiovascular  Hypertension ,    Pulmonary  Smoker ex-smoker  Cumulative Pack Years: 45,        GI/Hepatic    GERD well controlled, Liver disease ,   Comment: Fatty Liver     Kidney stones,        Endo/Other  History of thyroid disease , hypothyroidism,   Obesity  morbid obesity   GYN       Hematology  Negative hematology ROS      Musculoskeletal    Arthritis     Neurology    Headaches,   Comment: Hx of migraines currently has one  Psychology   Anxiety,              Physical Exam    Airway    Mallampati score: II  TM Distance: >3 FB  Neck ROM: full     Dental   Comment: Crowns on rear teeth,     Cardiovascular  Rhythm: regular, Rate: normal, No murmur,     Pulmonary  Pulmonary exam normal Breath sounds clear to auscultation,     Other Findings        Anesthesia Plan  ASA Score- 3     Anesthesia Type- general with ASA Monitors  Additional Monitors:   Airway Plan:         Plan Factors-    Induction- intravenous  Postoperative Plan- Plan for postoperative opioid use  Planned trial extubation    Informed Consent- Anesthetic plan and risks discussed with patient

## 2020-03-25 NOTE — INTERVAL H&P NOTE
H&P reviewed  After examining the patient I find no changes in the patients condition since the H&P had been written  /76   Pulse 57   Temp 97 8 °F (36 6 °C) (Temporal)   Resp 18   Ht 6' 3" (1 905 m)   Wt (!) 137 kg (303 lb)   SpO2 97%   BMI 37 87 kg/m²       Vitals:    03/25/20 0751   BP: 140/76   Pulse: 57   Resp: 18   Temp: 97 8 °F (36 6 °C)   SpO2: 97%

## 2020-03-25 NOTE — DISCHARGE INSTRUCTIONS
Ureteral Stent Placement   WHAT YOU NEED TO KNOW:   Ureteral stent placement is a procedure to open a blocked or narrow ureter  The ureter is the tube that carries urine from your kidney into your bladder  A stent is a thin hollow plastic tube used to hold your ureter open and allow urine to flow  The stent may stay in for several weeks  DISCHARGE INSTRUCTIONS:   Medicines:   · Pain medicine  may be given to take away or decrease pain  Do not wait until the pain is severe before you take your medicine  · Antibiotics  help prevent infections  Your healthcare provider may prescribe these for you while your stent remains in  · Take your medicine as directed  Contact your healthcare provider if you think your medicine is not helping or if you have side effects  Tell him or her if you are allergic to any medicine  Keep a list of the medicines, vitamins, and herbs you take  Include the amounts, and when and why you take them  Bring the list or the pill bottles to follow-up visits  Carry your medicine list with you in case of an emergency  Follow up with your urologist as directed: You will need regular follow-up visits with your urologist as long as the stent remains in  He will check to make sure the stent is working properly  He may do urine cultures to check for infection  Write down your questions so you remember to ask them during your visits  Self-care:   · Drink liquids  as directed  Ask your healthcare provider how much liquid to drink each day and which liquids are best for you  Fluids such as cranberry or apple juice may be especially helpful to prevent urinary infections  · Return to normal activities  the day after your stent placement or as directed by your healthcare provider  · You may take a shower  the day after your stent placement if your healthcare provider says it is okay  Contact your healthcare provider or urologist if:   · You have a fever or chills      · You feel like you need to urinate often  · You have pain when you urinate or pain around your bladder or kidney  · You see blood in your urine or it looks cloudy  · You have questions or concerns about your condition or care  Seek care immediately or call 911 if:   · You urinate little or not at all  · You have severe pain in your abdomen  © 2017 [employer] Information is for End User's use only and may not be sold, redistributed or otherwise used for commercial purposes  All illustrations and images included in CareNotes® are the copyrighted property of [employer]  or Johnnie Mendiola  The above information is an  only  It is not intended as medical advice for individual conditions or treatments  Talk to your doctor, nurse or pharmacist before following any medical regimen to see if it is safe and effective for you

## 2020-03-26 ENCOUNTER — TELEPHONE (OUTPATIENT)
Dept: UROLOGY | Facility: MEDICAL CENTER | Age: 65
End: 2020-03-26

## 2020-03-26 DIAGNOSIS — N13.1 HYDRONEPHROSIS DUE TO OBSTRUCTION OF URETER: ICD-10-CM

## 2020-03-26 DIAGNOSIS — N20.1 LEFT URETERAL STONE: Primary | ICD-10-CM

## 2020-03-26 NOTE — TELEPHONE ENCOUNTER
Patient of Dr Ayesha Solares seen at [facility]    Patients wife, Miceala Diez, calling to schedule post op appointment      Patient can be reached at [phone]

## 2020-03-26 NOTE — TELEPHONE ENCOUNTER
Called and spoke to pt's wife, Fatuma Olivares  Due to COVID 19 we are asking our pts to remove their stents at home  I instructed her on how to remove the stent and she feels comfortable removing it on 4/1/20  She knows she can talk to a nurse to walk her through it if needed  She will place the stent in a ZipLock bag and bring it to the office at follow up appt  Pt will need testing in 3 months  Renal US, CMP, Uric Acid, PTH, 24 hr Stone Risk Profile ordered and mailed to pt  She will schedule US in June and [facility] for follow up after testing is done

## 2020-04-01 NOTE — TELEPHONE ENCOUNTER
LM for pts wife  Previous note says they can place the stent in a zip lock back after removing from home they then can bring it to the office at his appointment

## 2020-04-01 NOTE — TELEPHONE ENCOUNTER
Patient's wife called  Inquired as to where she could keep the stent  She reported that the stent came out easily  No bleeding evident either  Please call to advise at [phone]    Thank you

## 2020-04-01 NOTE — TELEPHONE ENCOUNTER
Pts wife was informed of blood work, 24 hour urine and Renal U/s prior to appointment   Due to the COVID 19 she said they plan to wait to have it done and schedule appointment  She said she removed the stent without any problems

## 2020-04-07 LAB
COLOR STONE: NORMAL
COM MFR STONE: 100 %
COMMENT-STONE3: NORMAL
COMPOSITION: NORMAL
LABORATORY COMMENT REPORT: NORMAL
PHOTO: NORMAL
SIZE STONE: NORMAL MM
SPEC SOURCE SUBJ: NORMAL
STONE ANALYSIS-IMP: NORMAL
WT STONE: 25 MG

## 2020-05-15 ENCOUNTER — TELEPHONE (OUTPATIENT)
Dept: UROLOGY | Facility: AMBULATORY SURGERY CENTER | Age: 65
End: 2020-05-15

## 2020-05-26 DIAGNOSIS — N20.0 KIDNEY STONE ON LEFT SIDE: ICD-10-CM

## 2020-05-26 DIAGNOSIS — E03.9 HYPOTHYROIDISM, UNSPECIFIED TYPE: ICD-10-CM

## 2020-05-26 RX ORDER — LEVOTHYROXINE SODIUM 0.03 MG/1
25 TABLET ORAL DAILY
Qty: 90 TABLET | Refills: 2 | Status: SHIPPED | OUTPATIENT
Start: 2020-05-26 | End: 2021-01-04 | Stop reason: SDUPTHER

## 2020-05-26 RX ORDER — TAMSULOSIN HYDROCHLORIDE 0.4 MG/1
0.4 CAPSULE ORAL
Qty: 30 CAPSULE | Refills: 3 | Status: SHIPPED | OUTPATIENT
Start: 2020-05-26 | End: 2020-07-01

## 2020-05-29 ENCOUNTER — TELEPHONE (OUTPATIENT)
Dept: FAMILY MEDICINE CLINIC | Facility: CLINIC | Age: 65
End: 2020-05-29

## 2020-06-10 ENCOUNTER — TRANSCRIBE ORDERS (OUTPATIENT)
Dept: ADMINISTRATIVE | Facility: HOSPITAL | Age: 65
End: 2020-06-10

## 2020-06-10 ENCOUNTER — HOSPITAL ENCOUNTER (OUTPATIENT)
Dept: ULTRASOUND IMAGING | Facility: HOSPITAL | Age: 65
Discharge: HOME | End: 2020-06-10
Attending: UROLOGY
Payer: COMMERCIAL

## 2020-06-10 ENCOUNTER — APPOINTMENT (OUTPATIENT)
Dept: LAB | Facility: HOSPITAL | Age: 65
End: 2020-06-10
Attending: UROLOGY
Payer: COMMERCIAL

## 2020-06-10 DIAGNOSIS — G57.93 NEUROPATHY INVOLVING BOTH LOWER EXTREMITIES: ICD-10-CM

## 2020-06-10 DIAGNOSIS — N13.1 HYDRONEPHROSIS DUE TO OBSTRUCTION OF URETER: ICD-10-CM

## 2020-06-10 DIAGNOSIS — I10 ESSENTIAL HYPERTENSION, MALIGNANT: ICD-10-CM

## 2020-06-10 DIAGNOSIS — K21.9 GASTROESOPHAGEAL REFLUX DISEASE WITHOUT ESOPHAGITIS: ICD-10-CM

## 2020-06-10 DIAGNOSIS — N20.1 LEFT URETERAL STONE: ICD-10-CM

## 2020-06-10 DIAGNOSIS — E03.9 HYPOTHYROIDISM, ADULT: ICD-10-CM

## 2020-06-10 DIAGNOSIS — E78.5 DYSLIPIDEMIA: ICD-10-CM

## 2020-06-10 DIAGNOSIS — I10 ESSENTIAL HYPERTENSION, MALIGNANT: Primary | ICD-10-CM

## 2020-06-10 LAB
ALBUMIN SERPL BCP-MCNC: 3.7 G/DL (ref 3.5–5)
ALP SERPL-CCNC: 56 U/L (ref 46–116)
ALT SERPL W P-5'-P-CCNC: 35 U/L (ref 12–78)
ANION GAP SERPL CALCULATED.3IONS-SCNC: 6 MMOL/L (ref 4–13)
AST SERPL W P-5'-P-CCNC: 23 U/L (ref 5–45)
BASOPHILS # BLD AUTO: 0.04 THOUSANDS/ΜL (ref 0–0.1)
BASOPHILS NFR BLD AUTO: 1 % (ref 0–1)
BILIRUB SERPL-MCNC: 0.42 MG/DL (ref 0.2–1)
BUN SERPL-MCNC: 16 MG/DL (ref 5–25)
CALCIUM SERPL-MCNC: 8.7 MG/DL (ref 8.3–10.1)
CHLORIDE SERPL-SCNC: 105 MMOL/L (ref 100–108)
CHOLEST SERPL-MCNC: 174 MG/DL (ref 50–200)
CO2 SERPL-SCNC: 27 MMOL/L (ref 21–32)
CREAT SERPL-MCNC: 0.97 MG/DL (ref 0.6–1.3)
EOSINOPHIL # BLD AUTO: 0 THOUSAND/ΜL (ref 0–0.61)
EOSINOPHIL NFR BLD AUTO: 0 % (ref 0–6)
ERYTHROCYTE [DISTWIDTH] IN BLOOD BY AUTOMATED COUNT: 12.6 % (ref 11.6–15.1)
GFR SERPL CREATININE-BSD FRML MDRD: 82 ML/MIN/1.73SQ M
GLUCOSE P FAST SERPL-MCNC: 98 MG/DL (ref 65–99)
HCT VFR BLD AUTO: 44 % (ref 36.5–49.3)
HDLC SERPL-MCNC: 38 MG/DL
HGB BLD-MCNC: 14.8 G/DL (ref 12–17)
IMM GRANULOCYTES # BLD AUTO: 0.02 THOUSAND/UL (ref 0–0.2)
IMM GRANULOCYTES NFR BLD AUTO: 0 % (ref 0–2)
LDLC SERPL CALC-MCNC: 113 MG/DL (ref 0–100)
LYMPHOCYTES # BLD AUTO: 2.13 THOUSANDS/ΜL (ref 0.6–4.47)
LYMPHOCYTES NFR BLD AUTO: 34 % (ref 14–44)
MCH RBC QN AUTO: 31 PG (ref 26.8–34.3)
MCHC RBC AUTO-ENTMCNC: 33.6 G/DL (ref 31.4–37.4)
MCV RBC AUTO: 92 FL (ref 82–98)
MONOCYTES # BLD AUTO: 0.46 THOUSAND/ΜL (ref 0.17–1.22)
MONOCYTES NFR BLD AUTO: 7 % (ref 4–12)
NEUTROPHILS # BLD AUTO: 3.58 THOUSANDS/ΜL (ref 1.85–7.62)
NEUTS SEG NFR BLD AUTO: 58 % (ref 43–75)
NONHDLC SERPL-MCNC: 136 MG/DL
NRBC BLD AUTO-RTO: 0 /100 WBCS
PLATELET # BLD AUTO: 150 THOUSANDS/UL (ref 149–390)
PMV BLD AUTO: 8.6 FL (ref 8.9–12.7)
POTASSIUM SERPL-SCNC: 4.3 MMOL/L (ref 3.5–5.3)
PROT SERPL-MCNC: 6.7 G/DL (ref 6.4–8.2)
PTH-INTACT SERPL-MCNC: 50 PG/ML (ref 18.4–80.1)
RBC # BLD AUTO: 4.78 MILLION/UL (ref 3.88–5.62)
SODIUM SERPL-SCNC: 138 MMOL/L (ref 136–145)
TRIGL SERPL-MCNC: 114 MG/DL
TSH SERPL DL<=0.05 MIU/L-ACNC: 2.8 UIU/ML (ref 0.36–3.74)
URATE SERPL-MCNC: 5 MG/DL (ref 4.2–8)
WBC # BLD AUTO: 6.23 THOUSAND/UL (ref 4.31–10.16)

## 2020-06-10 PROCEDURE — 82131 AMINO ACIDS SINGLE QUANT: CPT

## 2020-06-10 PROCEDURE — 84105 ASSAY OF URINE PHOSPHORUS: CPT

## 2020-06-10 PROCEDURE — 82570 ASSAY OF URINE CREATININE: CPT

## 2020-06-10 PROCEDURE — 82507 ASSAY OF CITRATE: CPT

## 2020-06-10 PROCEDURE — 84133 ASSAY OF URINE POTASSIUM: CPT

## 2020-06-10 PROCEDURE — 81003 URINALYSIS AUTO W/O SCOPE: CPT

## 2020-06-10 PROCEDURE — 84392 ASSAY OF URINE SULFATE: CPT

## 2020-06-10 PROCEDURE — 76770 US EXAM ABDO BACK WALL COMP: CPT

## 2020-06-10 PROCEDURE — 80061 LIPID PANEL: CPT

## 2020-06-10 PROCEDURE — 82436 ASSAY OF URINE CHLORIDE: CPT

## 2020-06-10 PROCEDURE — 85025 COMPLETE CBC W/AUTO DIFF WBC: CPT

## 2020-06-10 PROCEDURE — 84560 ASSAY OF URINE/URIC ACID: CPT

## 2020-06-10 PROCEDURE — 84550 ASSAY OF BLOOD/URIC ACID: CPT

## 2020-06-10 PROCEDURE — 83935 ASSAY OF URINE OSMOLALITY: CPT

## 2020-06-10 PROCEDURE — 82340 ASSAY OF CALCIUM IN URINE: CPT

## 2020-06-10 PROCEDURE — 84300 ASSAY OF URINE SODIUM: CPT

## 2020-06-10 PROCEDURE — 36415 COLL VENOUS BLD VENIPUNCTURE: CPT

## 2020-06-10 PROCEDURE — 83970 ASSAY OF PARATHORMONE: CPT

## 2020-06-10 PROCEDURE — 80053 COMPREHEN METABOLIC PANEL: CPT

## 2020-06-10 PROCEDURE — 83735 ASSAY OF MAGNESIUM: CPT

## 2020-06-10 PROCEDURE — 84443 ASSAY THYROID STIM HORMONE: CPT

## 2020-06-10 PROCEDURE — 82140 ASSAY OF AMMONIA: CPT

## 2020-06-10 PROCEDURE — 83945 ASSAY OF OXALATE: CPT

## 2020-06-19 LAB
AMMONIA 24H UR-MRATE: 23 MEQ/24 HR
AMMONIA UR-SCNC: ABNORMAL UG/DL
CA H2 PHOS DIHYD CRY URNS QL MICRO: 0.43 RATIO (ref 0–3)
CALCIUM 24H UR-MCNC: 7.8 MG/DL
CALCIUM 24H UR-MRATE: 195 MG/24 HR (ref 100–300)
CHLORIDE 24H UR-SCNC: 53 MMOL/L
CHLORIDE 24H UR-SRATE: 133 MMOL/24 HR (ref 110–250)
CITRATE 24H UR-MCNC: 146 MG/L
CITRATE 24H UR-MRATE: 365 MG/24 HR (ref 320–1240)
COM CRY STONE QL IR: 4.62 RATIO (ref 0–6)
CREAT 24H UR-MCNC: 49.9 MG/DL
CREAT 24H UR-MRATE: 1247.5 MG/24 HR (ref 1000–2000)
CYSTINE 24H UR-MCNC: 6.72 MG/L
CYSTINE 24H UR-MRATE: 16.8 MG/24 HR (ref 10–100)
MAGNESIUM 24H UR-MRATE: 53 MG/24 HR (ref 12–293)
MAGNESIUM UR-MCNC: 2.1 MG/DL
NA URATE CRY STONE QL IR: 1.98 RATIO (ref 0–4)
OSMOLALITY UR: 374 MOSMOL/KG (ref 300–900)
OXALATE 24H UR-MRATE: 35 MG/24 HR (ref 7–44)
OXALATE UR-MCNC: 14 MG/L
PH 24H UR: 5.7 [PH]
PHOSPHATE 24H UR-MCNC: 34.8 MG/DL
PHOSPHATE 24H UR-MRATE: 870 MG/24 HR (ref 400–1300)
PLEASE NOTE (STONE RISK): ABNORMAL
POTASSIUM 24H UR-SCNC: 67.5 MMOL/24 HR (ref 25–125)
POTASSIUM UR-SCNC: 27 MMOL/L
PRESERVED URINE: 2500 ML/24 HR (ref 800–1800)
SODIUM 24H UR-SCNC: 62 MMOL/L
SODIUM 24H UR-SRATE: 155 MMOL/24 HR (ref 58–337)
SPECIMEN VOL 24H UR: 2500 ML/24 HR (ref 800–1800)
SULFATE 24H UR-MCNC: 30 MEQ/24 HR (ref 0–30)
SULFATE UR-MCNC: 12 MEQ/L
TRI-PHOS CRY STONE MICRO: 0.01 RATIO (ref 0–1)
URATE 24H UR-MCNC: 36.2 MG/DL
URATE 24H UR-MRATE: 905 MG/24 HR (ref 250–750)
URATE DIHYD CRY STONE QL IR: 2.5 RATIO (ref 0–1.2)

## 2020-07-01 ENCOUNTER — OFFICE VISIT (OUTPATIENT)
Dept: UROLOGY | Facility: MEDICAL CENTER | Age: 65
End: 2020-07-01
Payer: COMMERCIAL

## 2020-07-01 VITALS
HEIGHT: 75 IN | WEIGHT: 296 LBS | TEMPERATURE: 96.9 F | DIASTOLIC BLOOD PRESSURE: 86 MMHG | HEART RATE: 52 BPM | SYSTOLIC BLOOD PRESSURE: 120 MMHG | BODY MASS INDEX: 36.8 KG/M2

## 2020-07-01 DIAGNOSIS — N40.1 BPH WITH OBSTRUCTION/LOWER URINARY TRACT SYMPTOMS: ICD-10-CM

## 2020-07-01 DIAGNOSIS — Z12.5 PROSTATE CANCER SCREENING: ICD-10-CM

## 2020-07-01 DIAGNOSIS — N13.8 BPH WITH OBSTRUCTION/LOWER URINARY TRACT SYMPTOMS: ICD-10-CM

## 2020-07-01 DIAGNOSIS — N20.0 CALCULUS OF KIDNEY: Primary | ICD-10-CM

## 2020-07-01 DIAGNOSIS — N20.1 LEFT URETERAL STONE: ICD-10-CM

## 2020-07-01 LAB
SL AMB  POCT GLUCOSE, UA: NORMAL
SL AMB LEUKOCYTE ESTERASE,UA: NORMAL
SL AMB POCT BILIRUBIN,UA: NORMAL
SL AMB POCT BLOOD,UA: NORMAL
SL AMB POCT CLARITY,UA: CLEAR
SL AMB POCT COLOR,UA: YELLOW
SL AMB POCT KETONES,UA: NORMAL
SL AMB POCT NITRITE,UA: NORMAL
SL AMB POCT PH,UA: 5
SL AMB POCT SPECIFIC GRAVITY,UA: 1.02
SL AMB POCT URINE PROTEIN: NORMAL
SL AMB POCT UROBILINOGEN: 0.2

## 2020-07-01 PROCEDURE — 3074F SYST BP LT 130 MM HG: CPT | Performed by: UROLOGY

## 2020-07-01 PROCEDURE — 3008F BODY MASS INDEX DOCD: CPT | Performed by: UROLOGY

## 2020-07-01 PROCEDURE — 99214 OFFICE O/P EST MOD 30 MIN: CPT | Performed by: UROLOGY

## 2020-07-01 PROCEDURE — 1036F TOBACCO NON-USER: CPT | Performed by: UROLOGY

## 2020-07-01 PROCEDURE — 3079F DIAST BP 80-89 MM HG: CPT | Performed by: UROLOGY

## 2020-07-01 PROCEDURE — 81003 URINALYSIS AUTO W/O SCOPE: CPT | Performed by: UROLOGY

## 2020-07-01 RX ORDER — POTASSIUM CITRATE 10 MEQ/1
10 TABLET, EXTENDED RELEASE ORAL 2 TIMES DAILY WITH MEALS
Qty: 180 TABLET | Refills: 3 | Status: SHIPPED | OUTPATIENT
Start: 2020-07-01 | End: 2020-07-02 | Stop reason: SDUPTHER

## 2020-07-01 RX ORDER — SILODOSIN 8 MG/1
8 CAPSULE ORAL
Qty: 90 CAPSULE | Refills: 3 | Status: SHIPPED | OUTPATIENT
Start: 2020-07-01 | End: 2021-04-07 | Stop reason: HOSPADM

## 2020-07-01 RX ORDER — DUTASTERIDE 0.5 MG/1
0.5 CAPSULE, LIQUID FILLED ORAL
Qty: 90 CAPSULE | Refills: 3 | Status: SHIPPED | OUTPATIENT
Start: 2020-07-01 | End: 2021-07-29

## 2020-07-01 RX ORDER — ALLOPURINOL 300 MG/1
300 TABLET ORAL DAILY
Qty: 90 TABLET | Refills: 3 | Status: SHIPPED | OUTPATIENT
Start: 2020-07-01 | End: 2021-05-27

## 2020-07-01 NOTE — PROGRESS NOTES
Assessment/Plan:   1  Right renal calculus - approximately 4-5 mm in size nonobstructive-no intervention at this time  2  Left ureteral stone -resolved   3  BPH with lower urinary tract obstructive symptoms including nocturia -continue Rapaflo and Avodart but Plan cystoscopy uroflow PVR possible ultrasound sizing of prostate and consideration towards Uro lift or TURP  4  Prostate cancer screening -CYNTHIA and PSA prior to six-month return  Five  Hyperuricosuria -allopurinol and potassium citrate as prescribed  No problem-specific Assessment & Plan notes found for this encounter  Diagnoses and all orders for this visit:    Calculus of kidney  Comments:  right nonobstructive  Orders:  -     allopurinol (ZYLOPRIM) 300 mg tablet; Take 1 tablet (300 mg total) by mouth daily  -     potassium citrate (Urocit-K 10) 10 mEq; Take 1 tablet (10 mEq total) by mouth 2 (two) times a day with meals  -     Stone risk profile; Future  -     Uric acid; Future  -     PTH, intact; Future    Left ureteral stone  Comments:  resolved  Orders:  -     POCT urine dip auto non-scope    BPH with obstruction/lower urinary tract symptoms  Comments:   on Avodart and Rapaflo consider alternative therapy including Uro lift her TURP  Plan cystoscopy  Orders:  -     Cystoscopy; Future  -     Silodosin (Rapaflo) 8 MG CAPS; Take 1 capsule by mouth daily at bedtime  -     dutasteride (AVODART) 0 5 mg capsule; Take 1 capsule (0 5 mg total) by mouth daily at bedtime  -     Uroflow w/ post void residual; Future    Prostate cancer screening  -     PSA Total, Diagnostic; Future          Subjective:      Patient ID: Jeancarlos Toledo  is a 72 y o  male  HPI   55-year-old male who underwent ureteroscopic laser lithotripsy for a left ureteral calculus presents for follow-up    Has elevated urinary uric acid a calcium oxalate stone a residual stone on the right side nonobstructive position as well as nocturia and voiding issues with BPH treated adequately at the present time with Avodart and Rapaflo  The patient presents for urologic evaluation  The following portions of the patient's history were reviewed and updated as appropriate: allergies, current medications, past family history, past medical history, past social history, past surgical history and problem list     Review of Systems   Genitourinary:        See HPI   All other systems reviewed and are negative  Objective:      /86   Pulse (!) 52   Temp (!) 96 9 °F (36 1 °C)   Ht 6' 3" (1 905 m)   Wt 134 kg (296 lb)   BMI 37 00 kg/m²          Physical Exam   Constitutional: He is oriented to person, place, and time  He appears well-developed and well-nourished  No distress  HENT:   Head: Normocephalic and atraumatic  Eyes: EOM are normal    Neck: Normal range of motion  Pulmonary/Chest: Effort normal  No respiratory distress  Abdominal: Soft  He exhibits no distension  Neurological: He is alert and oriented to person, place, and time  Skin: Skin is dry  Psychiatric: He has a normal mood and affect  His behavior is normal  Judgment and thought content normal    Vitals reviewed

## 2020-07-02 DIAGNOSIS — N20.0 CALCULUS OF KIDNEY: ICD-10-CM

## 2020-07-02 RX ORDER — POTASSIUM CITRATE 10 MEQ/1
10 TABLET, EXTENDED RELEASE ORAL 2 TIMES DAILY WITH MEALS
Qty: 200 TABLET | Refills: 3 | Status: SHIPPED | OUTPATIENT
Start: 2020-07-02 | End: 2021-02-24

## 2020-07-02 NOTE — TELEPHONE ENCOUNTER
Received a Rx Clarification Request from [facility] mail order pharmacy  Quantity for Potassium Citrate needs to be a 100 day supply NOT a 90 day supply  The drug comes prepackaged in 100 count bottles    Corrected script was requeued and forwarded to the Advanced Practitioner covering the [facility] location for approval

## 2020-07-15 ENCOUNTER — TELEPHONE (OUTPATIENT)
Dept: UROLOGY | Facility: MEDICAL CENTER | Age: 65
End: 2020-07-15

## 2020-07-15 DIAGNOSIS — R10.9 FLANK PAIN: Primary | ICD-10-CM

## 2020-07-15 NOTE — TELEPHONE ENCOUNTER
Pt of Dr Radhika Mercer managed at the [facility] office  History of right renal calculus and left uretal stone  Last seen in the office 7/1/2020  Pt is calling into the office with c/o of blood in his urine (pink in color) and left sided flank and abdominal pain  He is not sure if the kidney stone is starting to move and he is concerned  Pt is asking if there is any testing that can be ordered to check on the stone  Reviewed ER precautions with the patient should his symptoms worsen  Pt is aware and would like a call back from office with further recommendations at this time

## 2020-07-15 NOTE — TELEPHONE ENCOUNTER
Patients wife calling again, asking for a call back to discuss      She can be reached at [phone]

## 2020-07-15 NOTE — TELEPHONE ENCOUNTER
Patient of Dr Isa Irby seen at [facility]    Experiencing gross hematuria since yesterday described as pink in color    Patients wife, Stacia Mohan,  can be reached at [phone]

## 2020-07-16 ENCOUNTER — APPOINTMENT (OUTPATIENT)
Dept: LAB | Facility: HOSPITAL | Age: 65
End: 2020-07-16
Payer: COMMERCIAL

## 2020-07-16 ENCOUNTER — HOSPITAL ENCOUNTER (OUTPATIENT)
Dept: CT IMAGING | Facility: HOSPITAL | Age: 65
Discharge: HOME | End: 2020-07-16
Payer: COMMERCIAL

## 2020-07-16 DIAGNOSIS — R10.9 FLANK PAIN: ICD-10-CM

## 2020-07-16 LAB
BACTERIA UR QL AUTO: ABNORMAL /HPF
BILIRUB UR QL STRIP: NEGATIVE
CLARITY UR: ABNORMAL
COLOR UR: YELLOW
GLUCOSE UR STRIP-MCNC: NEGATIVE MG/DL
HGB UR QL STRIP.AUTO: ABNORMAL
KETONES UR STRIP-MCNC: NEGATIVE MG/DL
LEUKOCYTE ESTERASE UR QL STRIP: NEGATIVE
NITRITE UR QL STRIP: NEGATIVE
NON-SQ EPI CELLS URNS QL MICRO: ABNORMAL /HPF
PH UR STRIP.AUTO: 5.5 [PH]
PROT UR STRIP-MCNC: NEGATIVE MG/DL
RBC #/AREA URNS AUTO: ABNORMAL /HPF
SP GR UR STRIP.AUTO: 1.01 (ref 1–1.03)
UROBILINOGEN UR QL STRIP.AUTO: 0.2 E.U./DL
WBC #/AREA URNS AUTO: ABNORMAL /HPF

## 2020-07-16 PROCEDURE — 81001 URINALYSIS AUTO W/SCOPE: CPT

## 2020-07-16 PROCEDURE — 74176 CT ABD & PELVIS W/O CONTRAST: CPT

## 2020-07-16 PROCEDURE — 87086 URINE CULTURE/COLONY COUNT: CPT

## 2020-07-16 NOTE — TELEPHONE ENCOUNTER
Patients wife calling to advise that per radiology, lab orders and CT needs to be entered as STAT  He is on his way to have imaging now      She can reached at [phone]

## 2020-07-16 NOTE — TELEPHONE ENCOUNTER
Call placed to patient and spoke with wife  Informed her of recommendations of CRNP at this time  She is aware and patient will proceed for testing as soon as possible

## 2020-07-17 ENCOUNTER — TELEPHONE (OUTPATIENT)
Dept: UROLOGY | Facility: MEDICAL CENTER | Age: 65
End: 2020-07-17

## 2020-07-17 LAB — BACTERIA UR CULT: NORMAL

## 2020-07-17 NOTE — TELEPHONE ENCOUNTER
Spoke with pt's wife, gave her CT results, suggested they call PCP if pain persists  She stated they were more concerned about the blood in the urine  I explained pt has enlarged prostate, which is very vascular and will sometimes bleed  He also still a stone in his right kidney, which may moved and scraped and caused some minor blood in the urine  She admitted that pt did 15 miles on the exercise bike the other day  This may have caused some irritation in the prostate  UC was negative  Advised to increase fluid intake and notify PCP if pain persists

## 2020-07-17 NOTE — TELEPHONE ENCOUNTER
----- Message from Faiza Lord  sent at 7/17/2020 10:01 AM EDT -----  Kayy Poe to notify patient of no stone seen on CT study that would be causing him pain and discomfort  He does have a 3 mm right lower pole her obstructing stone

## 2020-07-24 DIAGNOSIS — G57.93 NEUROPATHY INVOLVING BOTH LOWER EXTREMITIES: ICD-10-CM

## 2020-07-24 RX ORDER — OMEPRAZOLE 20 MG/1
CAPSULE, DELAYED RELEASE ORAL
Qty: 90 CAPSULE | Refills: 1 | Status: SHIPPED | OUTPATIENT
Start: 2020-07-24 | End: 2021-01-20

## 2020-08-26 ENCOUNTER — OFFICE VISIT (OUTPATIENT)
Dept: FAMILY MEDICINE CLINIC | Facility: CLINIC | Age: 65
End: 2020-08-26
Payer: COMMERCIAL

## 2020-08-26 VITALS
TEMPERATURE: 97.7 F | DIASTOLIC BLOOD PRESSURE: 78 MMHG | SYSTOLIC BLOOD PRESSURE: 138 MMHG | WEIGHT: 293 LBS | BODY MASS INDEX: 36.43 KG/M2 | HEIGHT: 75 IN

## 2020-08-26 DIAGNOSIS — L30.9 DERMATITIS: ICD-10-CM

## 2020-08-26 DIAGNOSIS — E78.5 DYSLIPIDEMIA: ICD-10-CM

## 2020-08-26 DIAGNOSIS — K21.9 GASTROESOPHAGEAL REFLUX DISEASE WITHOUT ESOPHAGITIS: Primary | ICD-10-CM

## 2020-08-26 DIAGNOSIS — I10 ESSENTIAL HYPERTENSION: ICD-10-CM

## 2020-08-26 DIAGNOSIS — E03.9 HYPOTHYROIDISM, UNSPECIFIED TYPE: ICD-10-CM

## 2020-08-26 DIAGNOSIS — H18.892 CORNEAL IRRITATION OF LEFT EYE: ICD-10-CM

## 2020-08-26 PROCEDURE — 3075F SYST BP GE 130 - 139MM HG: CPT | Performed by: FAMILY MEDICINE

## 2020-08-26 PROCEDURE — 3008F BODY MASS INDEX DOCD: CPT | Performed by: FAMILY MEDICINE

## 2020-08-26 PROCEDURE — 1036F TOBACCO NON-USER: CPT | Performed by: FAMILY MEDICINE

## 2020-08-26 PROCEDURE — 99214 OFFICE O/P EST MOD 30 MIN: CPT | Performed by: FAMILY MEDICINE

## 2020-08-26 PROCEDURE — 3078F DIAST BP <80 MM HG: CPT | Performed by: FAMILY MEDICINE

## 2020-08-26 RX ORDER — BETAMETHASONE DIPROPIONATE 0.5 MG/G
CREAM TOPICAL 2 TIMES DAILY
Qty: 30 G | Refills: 0 | Status: SHIPPED | OUTPATIENT
Start: 2020-08-26 | End: 2021-05-20 | Stop reason: SDUPTHER

## 2020-08-26 RX ORDER — OFLOXACIN 3 MG/ML
1 SOLUTION/ DROPS OPHTHALMIC 3 TIMES DAILY
Qty: 5 ML | Refills: 0 | Status: SHIPPED | OUTPATIENT
Start: 2020-08-26 | End: 2021-02-24

## 2020-08-26 RX ORDER — CLOTRIMAZOLE 1 %
CREAM (GRAM) TOPICAL 2 TIMES DAILY
Qty: 30 G | Refills: 1 | Status: SHIPPED | OUTPATIENT
Start: 2020-08-26 | End: 2021-02-24

## 2020-08-26 NOTE — PROGRESS NOTES
Assessment/Plan:  Labs reviewed  Patient continue with current regimen for dermatitis  Hypertension and hypothyroidism stable  GERD stable the hyperlipidemia stable  Patient will follow with Urology per routine  Patient will see Podiatry for hammertoe  Refills will be given when needed  Follow-up in 6 months or as needed       Diagnoses and all orders for this visit:    Gastroesophageal reflux disease without esophagitis    Dermatitis  -     clotrimazole (LOTRIMIN) 1 % cream; Apply topically 2 (two) times a day  -     betamethasone dipropionate (DIPROSONE) 0 05 % cream; Apply topically 2 (two) times a day    Hypothyroidism, unspecified type    Essential hypertension    Dyslipidemia    Corneal irritation of left eye  -     ofloxacin (OCUFLOX) 0 3 % ophthalmic solution; Administer 1 drop into the left eye 3 (three) times a day            Subjective:        Patient ID: Christianne Jackson  is a 72 y o  male  Patient follow-up on GERD, dermatitis, hypothyroidism hypertension hyperlipidemia  Patient does have hammertoe right 2nd toe with some pain intermittently  Patient also with some bird feed him in left eye today  Vision is improving  No chest pain or abdominal pain or shortness of breath  No significant change urination defecation  The following portions of the patient's history were reviewed and updated as appropriate: allergies, current medications, past family history, past medical history, past social history, past surgical history and problem list       Review of Systems   Constitutional: Negative  HENT: Negative  Eyes: Positive for pain  Respiratory: Negative  Cardiovascular: Negative  Gastrointestinal: Negative  Endocrine: Negative  Genitourinary: Negative  Musculoskeletal: Positive for arthralgias  Skin: Negative  Allergic/Immunologic: Negative  Neurological: Negative  Hematological: Negative  Psychiatric/Behavioral: Negative  Objective:               /78 (BP Location: Right arm, Patient Position: Sitting, Cuff Size: Standard)   Temp 97 7 °F (36 5 °C) (Tympanic)   Ht 6' 3" (1 905 m)   Wt 133 kg (293 lb)   BMI 36 62 kg/m²          Physical Exam  Vitals signs and nursing note reviewed  Constitutional:       General: He is not in acute distress  Appearance: Normal appearance  He is well-developed  He is not diaphoretic  HENT:      Head: Normocephalic and atraumatic  Right Ear: Tympanic membrane, ear canal and external ear normal       Left Ear: Tympanic membrane, ear canal and external ear normal       Nose: Nose normal  No congestion  Eyes:      General: No scleral icterus  Right eye: No discharge  Left eye: No discharge  Pupils: Pupils are equal, round, and reactive to light  Neck:      Musculoskeletal: Normal range of motion and neck supple  Thyroid: No thyromegaly  Cardiovascular:      Rate and Rhythm: Normal rate and regular rhythm  Heart sounds: Normal heart sounds  No murmur  No friction rub  No gallop  Pulmonary:      Effort: Pulmonary effort is normal  No respiratory distress  Breath sounds: Normal breath sounds  No wheezing or rales  Chest:      Chest wall: No tenderness  Abdominal:      General: Bowel sounds are normal  There is no distension  Palpations: Abdomen is soft  Tenderness: There is no abdominal tenderness  There is no guarding or rebound  Musculoskeletal: Normal range of motion  General: Deformity present  No tenderness  Right lower leg: No edema  Left lower leg: No edema  Comments: Right 2nd hammertoe  Lymphadenopathy:      Cervical: No cervical adenopathy  Skin:     General: Skin is warm and dry  Coloration: Skin is not pale  Findings: No erythema or rash  Neurological:      General: No focal deficit present  Mental Status: He is alert and oriented to person, place, and time  Cranial Nerves: No cranial nerve deficit  Motor: No abnormal muscle tone  Coordination: Coordination normal    Psychiatric:         Mood and Affect: Mood normal          Behavior: Behavior normal          Thought Content:  Thought content normal          Judgment: Judgment normal

## 2020-08-28 ENCOUNTER — TELEPHONE (OUTPATIENT)
Dept: FAMILY MEDICINE CLINIC | Facility: CLINIC | Age: 65
End: 2020-08-28

## 2020-10-03 DIAGNOSIS — G62.9 NEUROPATHY: ICD-10-CM

## 2020-10-04 RX ORDER — GABAPENTIN 300 MG/1
CAPSULE ORAL
Qty: 90 CAPSULE | Refills: 3 | Status: SHIPPED | OUTPATIENT
Start: 2020-10-04 | End: 2020-11-09 | Stop reason: SDUPTHER

## 2020-10-13 ENCOUNTER — TELEPHONE (OUTPATIENT)
Dept: FAMILY MEDICINE CLINIC | Facility: CLINIC | Age: 65
End: 2020-10-13

## 2020-10-20 DIAGNOSIS — I10 ESSENTIAL HYPERTENSION: ICD-10-CM

## 2020-11-09 ENCOUNTER — TELEPHONE (OUTPATIENT)
Dept: FAMILY MEDICINE CLINIC | Facility: CLINIC | Age: 65
End: 2020-11-09

## 2020-11-09 DIAGNOSIS — G62.9 NEUROPATHY: ICD-10-CM

## 2020-11-09 RX ORDER — GABAPENTIN 300 MG/1
300 CAPSULE ORAL
Qty: 90 CAPSULE | Refills: 1 | Status: SHIPPED | OUTPATIENT
Start: 2020-11-09 | End: 2020-11-09 | Stop reason: SDUPTHER

## 2020-11-09 RX ORDER — GABAPENTIN 300 MG/1
300 CAPSULE ORAL 3 TIMES DAILY
Qty: 90 CAPSULE | Refills: 0 | Status: SHIPPED | OUTPATIENT
Start: 2020-11-09 | End: 2021-02-24

## 2020-11-30 ENCOUNTER — LAB (OUTPATIENT)
Dept: LAB | Facility: HOSPITAL | Age: 65
End: 2020-11-30
Attending: UROLOGY
Payer: COMMERCIAL

## 2020-11-30 PROCEDURE — 84133 ASSAY OF URINE POTASSIUM: CPT | Performed by: UROLOGY

## 2020-11-30 PROCEDURE — 83935 ASSAY OF URINE OSMOLALITY: CPT | Performed by: UROLOGY

## 2020-11-30 PROCEDURE — 82436 ASSAY OF URINE CHLORIDE: CPT | Performed by: UROLOGY

## 2020-11-30 PROCEDURE — 82570 ASSAY OF URINE CREATININE: CPT | Performed by: UROLOGY

## 2020-11-30 PROCEDURE — 82340 ASSAY OF CALCIUM IN URINE: CPT | Performed by: UROLOGY

## 2020-11-30 PROCEDURE — 82507 ASSAY OF CITRATE: CPT | Performed by: UROLOGY

## 2020-11-30 PROCEDURE — 84392 ASSAY OF URINE SULFATE: CPT | Performed by: UROLOGY

## 2020-11-30 PROCEDURE — 84300 ASSAY OF URINE SODIUM: CPT | Performed by: UROLOGY

## 2020-11-30 PROCEDURE — 82131 AMINO ACIDS SINGLE QUANT: CPT | Performed by: UROLOGY

## 2020-11-30 PROCEDURE — 82140 ASSAY OF AMMONIA: CPT | Performed by: UROLOGY

## 2020-11-30 PROCEDURE — 81003 URINALYSIS AUTO W/O SCOPE: CPT | Performed by: UROLOGY

## 2020-11-30 PROCEDURE — 83735 ASSAY OF MAGNESIUM: CPT | Performed by: UROLOGY

## 2020-11-30 PROCEDURE — 84560 ASSAY OF URINE/URIC ACID: CPT | Performed by: UROLOGY

## 2020-11-30 PROCEDURE — 84105 ASSAY OF URINE PHOSPHORUS: CPT | Performed by: UROLOGY

## 2020-11-30 PROCEDURE — 83945 ASSAY OF OXALATE: CPT | Performed by: UROLOGY

## 2020-12-06 LAB
AMMONIA 24H UR-MRATE: 29 MEQ/24 HR
AMMONIA UR-SCNC: ABNORMAL UG/DL
CA H2 PHOS DIHYD CRY URNS QL MICRO: 0.81 RATIO (ref 0–3)
CALCIUM 24H UR-MCNC: 15.7 MG/DL
CALCIUM 24H UR-MRATE: 353.3 MG/24 HR (ref 100–300)
CHLORIDE 24H UR-SCNC: 132 MMOL/L
CHLORIDE 24H UR-SRATE: 297 MMOL/24 HR (ref 110–250)
CITRATE 24H UR-MCNC: 255 MG/L
CITRATE 24H UR-MRATE: 574 MG/24 HR (ref 320–1240)
COM CRY STONE QL IR: 7.49 RATIO (ref 0–6)
CREAT 24H UR-MCNC: 100.5 MG/DL
CREAT 24H UR-MRATE: 2261.3 MG/24 HR (ref 1000–2000)
CYSTINE 24H UR-MCNC: 13.47 MG/L
CYSTINE 24H UR-MRATE: 30.31 MG/24 HR (ref 10–100)
MAGNESIUM 24H UR-MRATE: 79 MG/24 HR (ref 12–293)
MAGNESIUM UR-MCNC: 3.5 MG/DL
NA URATE CRY STONE QL IR: 3.47 RATIO (ref 0–4)
OSMOLALITY UR: 701 MOSMOL/KG (ref 300–900)
OXALATE 24H UR-MRATE: 41 MG/24 HR (ref 7–44)
OXALATE UR-MCNC: 18 MG/L
PH 24H UR: 5.6 [PH]
PHOSPHATE 24H UR-MCNC: 46.3 MG/DL
PHOSPHATE 24H UR-MRATE: 1041.8 MG/24 HR (ref 400–1300)
PLEASE NOTE (STONE RISK): ABNORMAL
POTASSIUM 24H UR-SCNC: 139.5 MMOL/24 HR (ref 25–125)
POTASSIUM UR-SCNC: 62 MMOL/L
PRESERVED URINE: 2250 ML/24 HR (ref 800–1800)
SODIUM 24H UR-SCNC: 129 MMOL/L
SODIUM 24H UR-SRATE: 290 MMOL/24 HR (ref 58–337)
SPECIMEN VOL 24H UR: 2250 ML/24 HR (ref 800–1800)
SULFATE 24H UR-MCNC: 45 MEQ/24 HR (ref 0–30)
SULFATE UR-MCNC: 20 MEQ/L
TRI-PHOS CRY STONE MICRO: 0.01 RATIO (ref 0–1)
URATE 24H UR-MCNC: 33.1 MG/DL
URATE 24H UR-MRATE: 745 MG/24 HR (ref 250–750)
URATE DIHYD CRY STONE QL IR: 2.68 RATIO (ref 0–1.2)

## 2020-12-29 ENCOUNTER — LAB (OUTPATIENT)
Dept: LAB | Facility: MEDICAL CENTER | Age: 65
End: 2020-12-29
Attending: UROLOGY
Payer: COMMERCIAL

## 2020-12-29 DIAGNOSIS — N20.0 CALCULUS OF KIDNEY: ICD-10-CM

## 2020-12-29 DIAGNOSIS — Z12.5 PROSTATE CANCER SCREENING: ICD-10-CM

## 2020-12-29 DIAGNOSIS — R10.9 FLANK PAIN: ICD-10-CM

## 2020-12-29 LAB
ANION GAP SERPL CALCULATED.3IONS-SCNC: 4 MMOL/L (ref 4–13)
BUN SERPL-MCNC: 24 MG/DL (ref 5–25)
CALCIUM SERPL-MCNC: 9.9 MG/DL (ref 8.3–10.1)
CHLORIDE SERPL-SCNC: 110 MMOL/L (ref 100–108)
CO2 SERPL-SCNC: 29 MMOL/L (ref 21–32)
CREAT SERPL-MCNC: 1.06 MG/DL (ref 0.6–1.3)
GFR SERPL CREATININE-BSD FRML MDRD: 73 ML/MIN/1.73SQ M
GLUCOSE P FAST SERPL-MCNC: 108 MG/DL (ref 65–99)
POTASSIUM SERPL-SCNC: 4.7 MMOL/L (ref 3.5–5.3)
PSA SERPL-MCNC: 0.4 NG/ML (ref 0–4)
PTH-INTACT SERPL-MCNC: 49.3 PG/ML (ref 18.4–80.1)
SODIUM SERPL-SCNC: 143 MMOL/L (ref 136–145)
URATE SERPL-MCNC: 4.1 MG/DL (ref 4.2–8)

## 2020-12-29 PROCEDURE — 83970 ASSAY OF PARATHORMONE: CPT

## 2020-12-29 PROCEDURE — 84550 ASSAY OF BLOOD/URIC ACID: CPT

## 2020-12-29 PROCEDURE — 36415 COLL VENOUS BLD VENIPUNCTURE: CPT

## 2020-12-29 PROCEDURE — 84153 ASSAY OF PSA TOTAL: CPT

## 2020-12-29 PROCEDURE — 80048 BASIC METABOLIC PNL TOTAL CA: CPT

## 2021-01-04 DIAGNOSIS — E03.9 HYPOTHYROIDISM, UNSPECIFIED TYPE: ICD-10-CM

## 2021-01-04 NOTE — TELEPHONE ENCOUNTER
Patient's wife is calling for refill of levothyroxine  He was seen on 8/26/20  Have placed order  Thank you

## 2021-01-05 RX ORDER — LEVOTHYROXINE SODIUM 0.03 MG/1
25 TABLET ORAL DAILY
Qty: 90 TABLET | Refills: 0 | Status: SHIPPED | OUTPATIENT
Start: 2021-01-05 | End: 2021-01-12 | Stop reason: SDUPTHER

## 2021-01-07 ENCOUNTER — PROCEDURE VISIT (OUTPATIENT)
Dept: UROLOGY | Facility: MEDICAL CENTER | Age: 66
End: 2021-01-07
Payer: COMMERCIAL

## 2021-01-07 VITALS — HEIGHT: 75 IN | BODY MASS INDEX: 38.79 KG/M2 | WEIGHT: 312 LBS

## 2021-01-07 DIAGNOSIS — N13.8 BPH WITH OBSTRUCTION/LOWER URINARY TRACT SYMPTOMS: Primary | ICD-10-CM

## 2021-01-07 DIAGNOSIS — N40.1 BPH WITH OBSTRUCTION/LOWER URINARY TRACT SYMPTOMS: Primary | ICD-10-CM

## 2021-01-07 PROCEDURE — 1036F TOBACCO NON-USER: CPT | Performed by: UROLOGY

## 2021-01-07 PROCEDURE — 99214 OFFICE O/P EST MOD 30 MIN: CPT | Performed by: UROLOGY

## 2021-01-07 PROCEDURE — 76872 US TRANSRECTAL: CPT | Performed by: UROLOGY

## 2021-01-07 PROCEDURE — 52000 CYSTOURETHROSCOPY: CPT | Performed by: UROLOGY

## 2021-01-07 PROCEDURE — 1160F RVW MEDS BY RX/DR IN RCRD: CPT | Performed by: UROLOGY

## 2021-01-07 PROCEDURE — 3008F BODY MASS INDEX DOCD: CPT | Performed by: UROLOGY

## 2021-01-07 NOTE — H&P
Cystoscopy     Date/Time 1/7/2021 3:26 PM     Performed by  Ranulfo Dale MD     Authorized by Ranulfo Dale MD      Universal Protocol:  Consent: Verbal consent obtained  Written consent obtained  Risks and benefits: risks, benefits and alternatives were discussed  Consent given by: patient  Patient understanding: patient states understanding of the procedure being performed  Patient consent: the patient's understanding of the procedure matches consent given  Procedure consent: procedure consent matches procedure scheduled  Relevant documents: relevant documents present and verified  Required items: required blood products, implants, devices, and special equipment available  Patient identity confirmed: verbally with patient        Procedure Details:  Procedure type: cystoscopy    Patient tolerance: Patient tolerated the procedure well with no immediate complications    Additional Procedure Details: The patient was placed on the examining table his urethral meatus prepped with Betadine and 2% lidocaine lubricant instilled in the urethral lumen and left indwelling for 5 minutes  Flexible cystourethroscopy then took place revealing a normal anterior urethra without stricture lesion  The prostatic urethra revealed Bilobar enlargement of the lateral lobes of the prostate with visual occlusion of the bladder outlet  Approximately 6-8 uro lift implants would be appropriate for this prostate  There was no significant median lobe enlargement     The urinary bladder revealed no intrinsic lesions or evidence of extrinsic mass compression with normal ureteral orifices bilaterally with clear efflux bilaterally  Bladder was moderately trabeculated without posterior wall cellule formation  The cystoscope was retroflexed and there was no evidence of significant intravesical median lobe enlargement or any additional lesions noted without maneuver    Cystoscope was removed from the patient atraumatically with the bladder left full of irrigant  Patient was allowed to then void for uroflow and   Only a 20 ccPVR was  identified The patient was then returned to the examining room placed right side up knees to chest on the examining table and a condom covered lubricated transrectal ultrasound probe was placed at the anal verge into rectal vault and sagittal as well as longitudinal imaging of the prostate and seminal vesicles took place  Seminal vesicles appeared normal bilaterally with the prostate showing enlargement with some periurethral calcification an adenoma  No significant median lobe enlargement was identified  Transverse diameter the prostate was measured as 47 Mm and calculated volume of the prostate was calculated as 31 Cm  The probe was removed atraumatically and the patient recovered uneventfully having tolerated the procedure well  Uroflow    Date/Time: 1/7/2021 4:08 PM  Performed by: Svetlana Arevalo MD  Authorized by: Svetlana Arevalo MD   Universal Protocol:  Consent: Verbal consent obtained  Risks and benefits: risks, benefits and alternatives were discussed  Consent given by: patient  Patient understanding: patient states understanding of the procedure being performed  Patient consent: the patient's understanding of the procedure matches consent given  Procedure consent: procedure consent matches procedure scheduled  Required items: required blood products, implants, devices, and special equipment available  Patient identity confirmed: verbally with patient        Post-procedure:     Patient tolerance: Patient tolerated procedure well with no immediate complications      Comments: The patient voided 330 cc with a maximum rate of 15 9 an average rate of 9 cc/second  Flow rates are sub normal however not severely low  Clinical correlation is recommended  PVR was within normal limits a 20 cc              H&P Exam - Urology   [facility]  72 y o  male MRN: [id number]  Unit/Bed#: Encounter: [id number]    Assessment/Plan     Assessment:    BPH with lower urinary tract symptoms  Plan:   TURP    History of Present Illness   HPI:  Marlee Lundborg  is a 72 y o  male who presents with  A history of a left ureteral stone treated with ureteroscopy  Patient also has a history of an asymptomatic nonobstructive  4-5 mm calculus in the right kidney with a normal PSA in nonsuspicious CYNTHIA for prostate cancer  The patient does have BPH with lower urinary tract symptoms being presently treated with Rapaflo and Avodart however the patient still has a significantly high AUA symptom score complaining of weakened urinary stream feelings of incomplete bladder emptying and intermittency  Cystourethroscopy revealed bilobar enlargement of the lateral lobes of the prostate with visual occlusion of the bladder outlet  Urinary flow rates were mildly to moderately sub normal   PVR was adequate  I discussed treatment of bladder outlet obstruction with the patient and his wife in detail  They were made aware the patient had a significantly trabeculated bladder that was in elastic non compliant and changed to secondary most likely to bladder outlet obstruction  They understand that even with correction of the bladder outlet obstruction with either Uro lift laser TURP regular TURP or other alternative means may not correct symptoms relative to the elastic or poorly compliant bladder  After discussing both Uro lift in TURP in detail the opted to proceed with TURP understanding risks of bleeding infection contracture stricture need for for a shins perforation need for catheter  They agreed to proceed       Review of Systems   Genitourinary:          See HPI and AUA symptom score   Musculoskeletal: Positive for back pain and myalgias  Spinal curvature   All other systems reviewed and are negative        Historical Information   Past Medical History:   Diagnosis Date    Anesthesia     "cant have spinal anesthesia because of curvature of the spine and severe pain"    Anxiety     Arthritis     Athlete's foot     off and on    Benign polyp of large intestine     Cancer (HCC)     thyroid,     Chronic pain disorder     bilat knees    Colon polyp     Curvature of spine     DDD (degenerative disc disease), lumbar     Disease of thyroid gland     removed    Dyslipidemia     Enlarged prostate     GERD (gastroesophageal reflux disease)     Hiatal hernia     Hypertension     IBS (irritable bowel syndrome)     Kidney stone     still has on the left, and small one on right"    Migraines     more sinus related    Neuropathy     Numbness and tingling of both feet     Retinal hole or tear, bilateral     surgery last year laser    Seasonal allergies     Tinea pedis     Urinary frequency     Urinary urgency     Vitamin D deficiency     Wears glasses      Past Surgical History:   Procedure Laterality Date    CHOLECYSTECTOMY      COLONOSCOPY      FL RETROGRADE URETHROCYSTOGRAM  2/28/2020    KNEE ARTHROSCOPY Bilateral     knee tim    [place] COLONOSCOPY FLX DX W/COLLJ SPEC WHEN PFRMD N/A 7/25/2018    Procedure: COLONOSCOPY;  Surgeon: Patricio Barrios DO;  Location: [address] [place] [address] GI LAB; Service: General    [place] CYSTO/URETERO W/LITHOTRIPSY &INDWELL STENT INSRT Left 2/28/2020    Procedure: CYSTOSCOPY URETEROSCOP RETROGRADE PYELOGRAM AND INSERTION STENT URETERAL;  Surgeon: Payal Davies MD;  [facility];  Service: Urology    [place] CYSTO/URETERO W/LITHOTRIPSY &INDWELL STENT INSRT Left 3/25/2020    Procedure: CYSTOSCOPY URETEROSCOPIC STONE EXTRACTION, RETROGRADE PYELOGRAM AND INSERTION STENT URETERAL;  Surgeon: Payal Davies MD;  [facility];  Service: Urology    [place] FRAGMENT KIDNEY STONE/ ESWL Left 7/27/2017    Procedure: Megha Britain SHOCKWAVE (ESWL);   Surgeon: Digna Mann DO;  [facility];  Service: Urology    [place] REPAIR ING HERNIA,5+Y/O,REDUCIBL Right 7/23/2019 Procedure: REPAIR INGUINAL HERNIA  DIRECT NO MESH;  Surgeon: Tomeka Ramirez DO;  Location: [address] [place] [address] OR;  Service: General    RETINAL LASER PROCEDURE      ROTATOR CUFF REPAIR Left     THYROIDECTOMY      Subtotal and Total Thyroidectomy both mentioned in allscripts    WISDOM TOOTH EXTRACTION       Social History   Social History     Substance and Sexual Activity   Alcohol Use Yes    Alcohol/week: 2 0 standard drinks    Types: 1 Glasses of wine, 1 Cans of beer per week    Frequency: Monthly or less    Drinks per session: 1 or 2    Binge frequency: Never    Comment: occasionally     Social History     Substance and Sexual Activity   Drug Use No     Social History     Tobacco Use   Smoking Status Former Smoker    Years: 40 00    Quit date: 2014    Years since quittin 5   Smokeless Tobacco Never Used     Family History:   Family History   Problem Relation Age of Onset    Diabetes Mother     Heart disease Father     Breast cancer Sister        Meds/Allergies   all medications and allergies reviewed  No Known Allergies    Objective   Vitals: Height 6' 3" (1 905 m), weight (!) 142 kg (312 lb)  [unfilled]    Invasive Devices     Drain            Ureteral Drain/Stent Left ureter 6 Fr  288 days                Physical Exam  Constitutional:       General: He is not in acute distress  Appearance: Normal appearance  He is obese  He is not ill-appearing, toxic-appearing or diaphoretic  Neck:      Musculoskeletal: Normal range of motion and neck supple  No neck rigidity or muscular tenderness  Cardiovascular:      Rate and Rhythm: Normal rate and regular rhythm  Pulses: Normal pulses  Heart sounds: Normal heart sounds  Pulmonary:      Effort: Pulmonary effort is normal  No respiratory distress  Breath sounds: Normal breath sounds  No wheezing, rhonchi or rales  Abdominal:      General: There is no distension  Palpations: Abdomen is soft  Tenderness:  There is no abdominal tenderness  There is no guarding or rebound  Genitourinary:     Penis: Normal     Skin:     General: Skin is dry  Neurological:      General: No focal deficit present  Mental Status: He is alert and oriented to person, place, and time  Mental status is at baseline  Psychiatric:         Mood and Affect: Mood normal          Thought Content: Thought content normal          Judgment: Judgment normal          Lab Results: I have personally reviewed pertinent reports  Imaging: I have personally reviewed pertinent reports  EKG, Pathology, and Other Studies: I have personally reviewed pertinent reports  VTE Prophylaxis: Sequential compression device Ghislaine Dura)     Code Status: [unfilled]  Advance Directive and Living Will:      Power of :    POLST:      Counseling / Coordination of Care  Total floor / unit time spent today 35   minutes  Greater than 50% of total time was spent with the patient and / or family counseling and / or coordination of care  A description of the counseling / coordination of care: Mariel Rosado

## 2021-01-07 NOTE — PROGRESS NOTES
Cystoscopy     Date/Time 1/7/2021 3:26 PM     Performed by  Betzy Armas MD     Authorized by Betzy Armas MD      Universal Protocol:  Consent: Verbal consent obtained  Written consent obtained  Risks and benefits: risks, benefits and alternatives were discussed  Consent given by: patient  Patient understanding: patient states understanding of the procedure being performed  Patient consent: the patient's understanding of the procedure matches consent given  Procedure consent: procedure consent matches procedure scheduled  Relevant documents: relevant documents present and verified  Required items: required blood products, implants, devices, and special equipment available  Patient identity confirmed: verbally with patient        Procedure Details:  Procedure type: cystoscopy    Patient tolerance: Patient tolerated the procedure well with no immediate complications    Additional Procedure Details: The patient was placed on the examining table his urethral meatus prepped with Betadine and 2% lidocaine lubricant instilled in the urethral lumen and left indwelling for 5 minutes  Flexible cystourethroscopy then took place revealing a normal anterior urethra without stricture lesion  The prostatic urethra revealed Bilobar enlargement of the lateral lobes of the prostate with visual occlusion of the bladder outlet  Approximately 6-8 uro lift implants would be appropriate for this prostate  There was no significant median lobe enlargement     The urinary bladder revealed no intrinsic lesions or evidence of extrinsic mass compression with normal ureteral orifices bilaterally with clear efflux bilaterally  Bladder was moderately trabeculated without posterior wall cellule formation  The cystoscope was retroflexed and there was no evidence of significant intravesical median lobe enlargement or any additional lesions noted without maneuver    Cystoscope was removed from the patient atraumatically with the bladder left full of irrigant  Patient was allowed to then void for uroflow and   Only a 20 ccPVR was  identified The patient was then returned to the examining room placed right side up knees to chest on the examining table and a condom covered lubricated transrectal ultrasound probe was placed at the anal verge into rectal vault and sagittal as well as longitudinal imaging of the prostate and seminal vesicles took place  Seminal vesicles appeared normal bilaterally with the prostate showing enlargement with some periurethral calcification an adenoma  No significant median lobe enlargement was identified  Transverse diameter the prostate was measured as 47 Mm and calculated volume of the prostate was calculated as 31 Cm  The probe was removed atraumatically and the patient recovered uneventfully having tolerated the procedure well

## 2021-01-07 NOTE — PROGRESS NOTES
[facility]roflow    Date/Time: 1/7/2021 4:08 PM  Performed by: Alex Kaur MD  Authorized by: Alex Kaur MD   Universal Protocol:  Consent: Verbal consent obtained  Risks and benefits: risks, benefits and alternatives were discussed  Consent given by: patient  Patient understanding: patient states understanding of the procedure being performed  Patient consent: the patient's understanding of the procedure matches consent given  Procedure consent: procedure consent matches procedure scheduled  Required items: required blood products, implants, devices, and special equipment available  Patient identity confirmed: verbally with patient        Post-procedure:     Patient tolerance: Patient tolerated procedure well with no immediate complications      Comments: The patient voided 330 cc with a maximum rate of 15 9 an average rate of 9 cc/second  Flow rates are sub normal however not severely low  Clinical correlation is recommended  PVR was within normal limits a 20 cc

## 2021-01-11 ENCOUNTER — TELEPHONE (OUTPATIENT)
Dept: UROLOGY | Facility: MEDICAL CENTER | Age: 66
End: 2021-01-11

## 2021-01-11 NOTE — TELEPHONE ENCOUNTER
I spoke to the patients wife, scheduled 4/6/2021 at [facility] with Dr Jonathan Veloz

## 2021-01-12 DIAGNOSIS — E03.9 HYPOTHYROIDISM, UNSPECIFIED TYPE: ICD-10-CM

## 2021-01-12 RX ORDER — LEVOTHYROXINE SODIUM 0.03 MG/1
25 TABLET ORAL DAILY
Qty: 90 TABLET | Refills: 0 | Status: SHIPPED | OUTPATIENT
Start: 2021-01-12 | End: 2021-02-24 | Stop reason: SDUPTHER

## 2021-01-13 ENCOUNTER — TELEPHONE (OUTPATIENT)
Dept: FAMILY MEDICINE CLINIC | Facility: CLINIC | Age: 66
End: 2021-01-13

## 2021-01-14 ENCOUNTER — TELEPHONE (OUTPATIENT)
Dept: FAMILY MEDICINE CLINIC | Facility: CLINIC | Age: 66
End: 2021-01-14

## 2021-01-14 NOTE — TELEPHONE ENCOUNTER
Pharmacy is calling stating to call back in regards to Synthroid 200 mcg  They would like to know if they can dispense the med for a cheaper price without the MANDY  Please advise       [facility]  [phone]      Ref #-[id number]

## 2021-01-18 ENCOUNTER — TELEPHONE (OUTPATIENT)
Dept: UROLOGY | Facility: MEDICAL CENTER | Age: 66
End: 2021-01-18

## 2021-01-18 NOTE — TELEPHONE ENCOUNTER
Wife wanted to know if patient received his Covid 23 Vacine would that interfere with his upcoming surgery  No date as this time for vaccine  Please advise

## 2021-01-18 NOTE — TELEPHONE ENCOUNTER
Called patient and spoke with wife  She is told that the vaccine should not interfere with him having surgery

## 2021-01-20 DIAGNOSIS — G57.93 NEUROPATHY INVOLVING BOTH LOWER EXTREMITIES: ICD-10-CM

## 2021-01-20 RX ORDER — OMEPRAZOLE 20 MG/1
CAPSULE, DELAYED RELEASE ORAL
Qty: 90 CAPSULE | Refills: 1 | Status: SHIPPED | OUTPATIENT
Start: 2021-01-20 | End: 2021-07-19

## 2021-02-19 DIAGNOSIS — Z23 ENCOUNTER FOR IMMUNIZATION: ICD-10-CM

## 2021-02-22 PROBLEM — E66.01 SEVERE OBESITY (BMI 35.0-39.9) WITH COMORBIDITY (HCC): Status: ACTIVE | Noted: 2021-02-22

## 2021-02-24 ENCOUNTER — OFFICE VISIT (OUTPATIENT)
Dept: FAMILY MEDICINE CLINIC | Facility: CLINIC | Age: 66
End: 2021-02-24
Payer: COMMERCIAL

## 2021-02-24 VITALS
WEIGHT: 309 LBS | BODY MASS INDEX: 38.42 KG/M2 | DIASTOLIC BLOOD PRESSURE: 98 MMHG | HEIGHT: 75 IN | SYSTOLIC BLOOD PRESSURE: 152 MMHG

## 2021-02-24 DIAGNOSIS — G62.9 NEUROPATHY: ICD-10-CM

## 2021-02-24 DIAGNOSIS — I10 ESSENTIAL HYPERTENSION: Primary | ICD-10-CM

## 2021-02-24 DIAGNOSIS — E66.01 SEVERE OBESITY (BMI 35.0-39.9) WITH COMORBIDITY (HCC): ICD-10-CM

## 2021-02-24 DIAGNOSIS — E03.9 HYPOTHYROIDISM, UNSPECIFIED TYPE: ICD-10-CM

## 2021-02-24 PROCEDURE — 1036F TOBACCO NON-USER: CPT | Performed by: FAMILY MEDICINE

## 2021-02-24 PROCEDURE — 3080F DIAST BP >= 90 MM HG: CPT | Performed by: FAMILY MEDICINE

## 2021-02-24 PROCEDURE — 1160F RVW MEDS BY RX/DR IN RCRD: CPT | Performed by: FAMILY MEDICINE

## 2021-02-24 PROCEDURE — 1101F PT FALLS ASSESS-DOCD LE1/YR: CPT | Performed by: FAMILY MEDICINE

## 2021-02-24 PROCEDURE — 99214 OFFICE O/P EST MOD 30 MIN: CPT | Performed by: FAMILY MEDICINE

## 2021-02-24 PROCEDURE — 3725F SCREEN DEPRESSION PERFORMED: CPT | Performed by: FAMILY MEDICINE

## 2021-02-24 PROCEDURE — 3008F BODY MASS INDEX DOCD: CPT | Performed by: FAMILY MEDICINE

## 2021-02-24 PROCEDURE — 3288F FALL RISK ASSESSMENT DOCD: CPT | Performed by: FAMILY MEDICINE

## 2021-02-24 RX ORDER — LEVOTHYROXINE SODIUM 0.03 MG/1
25 TABLET ORAL DAILY
Qty: 90 TABLET | Refills: 1 | Status: SHIPPED | OUTPATIENT
Start: 2021-02-24 | End: 2021-08-18 | Stop reason: SDUPTHER

## 2021-02-24 RX ORDER — CELECOXIB 200 MG/1
200 CAPSULE ORAL 2 TIMES DAILY
Qty: 30 CAPSULE | Refills: 3 | Status: SHIPPED | OUTPATIENT
Start: 2021-02-24 | End: 2021-07-02 | Stop reason: SDUPTHER

## 2021-02-24 NOTE — PROGRESS NOTES
Assessment/Plan:    The labs reviewed  Patient will go for labs prior to next visit  Cerumen impaction removed with irrigation and cerumen loop  Refills given at this time  Patient have labs prior to next visit in 6 months  The     Diagnoses and all orders for this visit:    Essential hypertension  -     celecoxib (CeleBREX) 200 mg capsule; Take 1 capsule (200 mg total) by mouth 2 (two) times a day  -     CBC and differential; Future  -     Comprehensive metabolic panel; Future  -     Lipid panel; Future  -     TSH, 3rd generation with Free T4 reflex; Future    Hypothyroidism, unspecified type  -     levothyroxine 25 mcg tablet; Take 1 tablet (25 mcg total) by mouth daily  -     celecoxib (CeleBREX) 200 mg capsule; Take 1 capsule (200 mg total) by mouth 2 (two) times a day  -     CBC and differential; Future  -     Comprehensive metabolic panel; Future  -     Lipid panel; Future  -     TSH, 3rd generation with Free T4 reflex; Future    Severe obesity (BMI 35 0-39  9) with comorbidity (HCC)    Neuropathy  -     celecoxib (CeleBREX) 200 mg capsule; Take 1 capsule (200 mg total) by mouth 2 (two) times a day  -     CBC and differential; Future  -     Comprehensive metabolic panel; Future  -     Lipid panel; Future  -     TSH, 3rd generation with Free T4 reflex; Future            Subjective:        Patient ID: David Parrish  is a 72 y o  male  Patient here to follow-up on hypertension hypothyroidism neuropathy  Patient using bio soothe for neuropathy which is helping  Patient given  Patient with right ear pain  Patient using Debrox  No fevers or chills  No headaches  Patient having surgery April the  Patient having prostatic tissue removed with green laser  This is being done by Dr Manny Ashley  The no chest pain shortness of breath          The following portions of the patient's history were reviewed and updated as appropriate: allergies, current medications, past family history, past medical history, past social history, past surgical history and problem list       Review of Systems   Constitutional: Negative  HENT: Positive for ear pain  Eyes: Negative  Respiratory: Negative  Cardiovascular: Negative  Gastrointestinal: Negative  Endocrine: Negative  Genitourinary: Positive for difficulty urinating  Musculoskeletal: Negative  Skin: Negative  Allergic/Immunologic: Negative  Neurological: Positive for numbness  Hematological: Negative  Psychiatric/Behavioral: Negative  Objective:      BMI Counseling: Body mass index is 38 62 kg/m²  The BMI is above normal  Nutrition recommendations include decreasing portion sizes  Exercise recommendations include moderate physical activity 150 minutes/week  /98 (BP Location: Right arm, Patient Position: Sitting, Cuff Size: Standard)   Ht 6' 3" (1 905 m)   Wt (!) 140 kg (309 lb)   BMI 38 62 kg/m²          Physical Exam  Vitals signs and nursing note reviewed  Constitutional:       General: He is not in acute distress  Appearance: Normal appearance  He is not ill-appearing, toxic-appearing or diaphoretic  HENT:      Head: Normocephalic and atraumatic  Right Ear: Ear canal and external ear normal  There is impacted cerumen  Left Ear: Tympanic membrane, ear canal and external ear normal  There is no impacted cerumen  Nose: Nose normal  No congestion or rhinorrhea  Mouth/Throat:      Mouth: Mucous membranes are moist       Pharynx: No oropharyngeal exudate or posterior oropharyngeal erythema  Eyes:      General: No scleral icterus  Right eye: No discharge  Left eye: No discharge  Extraocular Movements: Extraocular movements intact  Conjunctiva/sclera: Conjunctivae normal       Pupils: Pupils are equal, round, and reactive to light  Neck:      Musculoskeletal: Normal range of motion and neck supple  No neck rigidity or muscular tenderness  Vascular: No carotid bruit  Cardiovascular:      Rate and Rhythm: Normal rate and regular rhythm  Pulses: Normal pulses  Heart sounds: Normal heart sounds  No murmur  No friction rub  No gallop  Pulmonary:      Effort: Pulmonary effort is normal  No respiratory distress  Breath sounds: Normal breath sounds  No stridor  No wheezing, rhonchi or rales  Chest:      Chest wall: No tenderness  Abdominal:      General: Abdomen is flat  Bowel sounds are normal  There is no distension  Palpations: Abdomen is soft  Tenderness: There is no abdominal tenderness  There is no guarding or rebound  Musculoskeletal: Normal range of motion  General: No swelling, tenderness, deformity or signs of injury  Right lower leg: No edema  Left lower leg: No edema  Lymphadenopathy:      Cervical: No cervical adenopathy  Skin:     General: Skin is warm and dry  Capillary Refill: Capillary refill takes less than 2 seconds  Coloration: Skin is not jaundiced  Findings: No bruising, erythema, lesion or rash  Neurological:      General: No focal deficit present  Mental Status: He is alert and oriented to person, place, and time  Cranial Nerves: No cranial nerve deficit  Sensory: No sensory deficit  Motor: No weakness  Coordination: Coordination normal       Gait: Gait normal    Psychiatric:         Mood and Affect: Mood normal          Behavior: Behavior normal          Thought Content:  Thought content normal          Judgment: Judgment normal

## 2021-03-13 ENCOUNTER — CLINICAL SUPPORT (OUTPATIENT)
Dept: URGENT CARE | Facility: MEDICAL CENTER | Age: 66
End: 2021-03-13
Attending: UROLOGY
Payer: COMMERCIAL

## 2021-03-13 ENCOUNTER — APPOINTMENT (OUTPATIENT)
Dept: LAB | Facility: MEDICAL CENTER | Age: 66
End: 2021-03-13
Attending: UROLOGY
Payer: COMMERCIAL

## 2021-03-13 DIAGNOSIS — I10 ESSENTIAL HYPERTENSION: ICD-10-CM

## 2021-03-13 DIAGNOSIS — G62.9 NEUROPATHY: ICD-10-CM

## 2021-03-13 DIAGNOSIS — N13.8 BPH WITH OBSTRUCTION/LOWER URINARY TRACT SYMPTOMS: ICD-10-CM

## 2021-03-13 DIAGNOSIS — N40.1 BPH WITH OBSTRUCTION/LOWER URINARY TRACT SYMPTOMS: ICD-10-CM

## 2021-03-13 DIAGNOSIS — E03.9 HYPOTHYROIDISM, UNSPECIFIED TYPE: ICD-10-CM

## 2021-03-13 LAB
ALBUMIN SERPL BCP-MCNC: 3.9 G/DL (ref 3.5–5)
ALP SERPL-CCNC: 70 U/L (ref 46–116)
ALT SERPL W P-5'-P-CCNC: 50 U/L (ref 12–78)
ANION GAP SERPL CALCULATED.3IONS-SCNC: 7 MMOL/L (ref 4–13)
AST SERPL W P-5'-P-CCNC: 27 U/L (ref 5–45)
ATRIAL RATE: 55 BPM
BASOPHILS # BLD AUTO: 0.06 THOUSANDS/ΜL (ref 0–0.1)
BASOPHILS NFR BLD AUTO: 1 % (ref 0–1)
BILIRUB SERPL-MCNC: 0.58 MG/DL (ref 0.2–1)
BUN SERPL-MCNC: 17 MG/DL (ref 5–25)
CALCIUM SERPL-MCNC: 8.9 MG/DL (ref 8.3–10.1)
CHLORIDE SERPL-SCNC: 107 MMOL/L (ref 100–108)
CHOLEST SERPL-MCNC: 196 MG/DL (ref 50–200)
CO2 SERPL-SCNC: 25 MMOL/L (ref 21–32)
CREAT SERPL-MCNC: 1.08 MG/DL (ref 0.6–1.3)
EOSINOPHIL # BLD AUTO: 0.1 THOUSAND/ΜL (ref 0–0.61)
EOSINOPHIL NFR BLD AUTO: 2 % (ref 0–6)
ERYTHROCYTE [DISTWIDTH] IN BLOOD BY AUTOMATED COUNT: 12.9 % (ref 11.6–15.1)
GFR SERPL CREATININE-BSD FRML MDRD: 72 ML/MIN/1.73SQ M
GLUCOSE P FAST SERPL-MCNC: 103 MG/DL (ref 65–99)
HCT VFR BLD AUTO: 49.3 % (ref 36.5–49.3)
HDLC SERPL-MCNC: 39 MG/DL
HGB BLD-MCNC: 16.2 G/DL (ref 12–17)
IMM GRANULOCYTES # BLD AUTO: 0.03 THOUSAND/UL (ref 0–0.2)
IMM GRANULOCYTES NFR BLD AUTO: 1 % (ref 0–2)
LDLC SERPL CALC-MCNC: 126 MG/DL (ref 0–100)
LYMPHOCYTES # BLD AUTO: 1.97 THOUSANDS/ΜL (ref 0.6–4.47)
LYMPHOCYTES NFR BLD AUTO: 33 % (ref 14–44)
MCH RBC QN AUTO: 29.8 PG (ref 26.8–34.3)
MCHC RBC AUTO-ENTMCNC: 32.9 G/DL (ref 31.4–37.4)
MCV RBC AUTO: 91 FL (ref 82–98)
MONOCYTES # BLD AUTO: 0.52 THOUSAND/ΜL (ref 0.17–1.22)
MONOCYTES NFR BLD AUTO: 9 % (ref 4–12)
NEUTROPHILS # BLD AUTO: 3.34 THOUSANDS/ΜL (ref 1.85–7.62)
NEUTS SEG NFR BLD AUTO: 54 % (ref 43–75)
NONHDLC SERPL-MCNC: 157 MG/DL
NRBC BLD AUTO-RTO: 0 /100 WBCS
P AXIS: 18 DEGREES
PLATELET # BLD AUTO: 175 THOUSANDS/UL (ref 149–390)
PMV BLD AUTO: 8.7 FL (ref 8.9–12.7)
POTASSIUM SERPL-SCNC: 4.4 MMOL/L (ref 3.5–5.3)
PR INTERVAL: 196 MS
PROT SERPL-MCNC: 7.2 G/DL (ref 6.4–8.2)
QRS AXIS: -5 DEGREES
QRSD INTERVAL: 84 MS
QT INTERVAL: 410 MS
QTC INTERVAL: 392 MS
RBC # BLD AUTO: 5.44 MILLION/UL (ref 3.88–5.62)
SODIUM SERPL-SCNC: 139 MMOL/L (ref 136–145)
T WAVE AXIS: 48 DEGREES
TRIGL SERPL-MCNC: 153 MG/DL
TSH SERPL DL<=0.05 MIU/L-ACNC: 3.03 UIU/ML (ref 0.36–3.74)
VENTRICULAR RATE: 55 BPM
WBC # BLD AUTO: 6.02 THOUSAND/UL (ref 4.31–10.16)

## 2021-03-13 PROCEDURE — 84443 ASSAY THYROID STIM HORMONE: CPT

## 2021-03-13 PROCEDURE — 80053 COMPREHEN METABOLIC PANEL: CPT

## 2021-03-13 PROCEDURE — 87086 URINE CULTURE/COLONY COUNT: CPT

## 2021-03-13 PROCEDURE — 93005 ELECTROCARDIOGRAM TRACING: CPT

## 2021-03-13 PROCEDURE — 36415 COLL VENOUS BLD VENIPUNCTURE: CPT

## 2021-03-13 PROCEDURE — 86850 RBC ANTIBODY SCREEN: CPT

## 2021-03-13 PROCEDURE — 86900 BLOOD TYPING SEROLOGIC ABO: CPT

## 2021-03-13 PROCEDURE — 93010 ELECTROCARDIOGRAM REPORT: CPT | Performed by: INTERNAL MEDICINE

## 2021-03-13 PROCEDURE — 85025 COMPLETE CBC W/AUTO DIFF WBC: CPT

## 2021-03-13 PROCEDURE — 80061 LIPID PANEL: CPT

## 2021-03-13 PROCEDURE — 86901 BLOOD TYPING SEROLOGIC RH(D): CPT

## 2021-03-14 LAB
ABO GROUP BLD: NORMAL
BACTERIA UR CULT: NORMAL
BLD GP AB SCN SERPL QL: NEGATIVE
RH BLD: POSITIVE
SPECIMEN EXPIRATION DATE: NORMAL

## 2021-03-18 DIAGNOSIS — E03.9 HYPOTHYROIDISM, UNSPECIFIED TYPE: ICD-10-CM

## 2021-03-18 RX ORDER — LEVOTHYROXINE SODIUM 0.2 MG/1
TABLET ORAL
Qty: 90 TABLET | Refills: 3 | Status: SHIPPED | OUTPATIENT
Start: 2021-03-18 | End: 2021-08-18 | Stop reason: SDUPTHER

## 2021-03-25 NOTE — PRE-PROCEDURE INSTRUCTIONS
Pre-Surgery Instructions:   Medication Instructions    acetaminophen (TYLENOL) 325 mg tablet Instructed patient per Anesthesia Guidelines   allopurinol (ZYLOPRIM) 300 mg tablet Instructed patient per Anesthesia Guidelines   betamethasone dipropionate (DIPROSONE) 0 05 % cream Instructed patient per Anesthesia Guidelines   celecoxib (CeleBREX) 200 mg capsule Instructed patient per Anesthesia Guidelines   chlordiazepoxide-clidinium (LIBRAX) 5-2 5 mg per capsule Instructed patient per Anesthesia Guidelines   COVID-19 mRNA Virus Vaccine (MODERNA COVID-19 VACCINE IM) Instructed patient per Anesthesia Guidelines   dutasteride (AVODART) 0 5 mg capsule Instructed patient per Anesthesia Guidelines   levothyroxine 200 mcg tablet Instructed patient per Anesthesia Guidelines   levothyroxine 25 mcg tablet Instructed patient per Anesthesia Guidelines   MAGNESIUM PO Instructed patient per Anesthesia Guidelines   Multiple Vitamin (MULTIVITAMIN) tablet Instructed patient per Anesthesia Guidelines   omeprazole (PriLOSEC) 20 mg delayed release capsule Instructed patient per Anesthesia Guidelines   Silodosin (Rapaflo) 8 MG CAPS Instructed patient per Anesthesia Guidelines   verapamil (CALAN-SR) 180 mg CR tablet Instructed patient per Anesthesia Guidelines  takes at night    vitamin E, tocopherol, 400 units capsule Instructed patient per Anesthesia Guidelines  Patient instructed *to take*levothyroxine *with a sip of water the morning of surgery  If needed omeprazole and tylenol  Patient given/ instructed on use of chlorhexidine soap per hospital protocol    Patient instructed to stop all ASA, NSAIDS, vitamins and herbal supplements one week prior to surgery or per Dr Aniyah aMldonado

## 2021-03-26 ENCOUNTER — TELEPHONE (OUTPATIENT)
Dept: UROLOGY | Facility: MEDICAL CENTER | Age: 66
End: 2021-03-26

## 2021-03-26 NOTE — TELEPHONE ENCOUNTER
Patient has surgery on 04/06/21 with Dr Nick Sanon  Wife calling to see if patient should be on Avodart and Rapaflo or should he stop

## 2021-03-28 NOTE — H&P
History and physical examination    Assessment/Plan      Assessment:    BPH with lower urinary tract symptoms  Plan:   TURP     History of Present Illness         HPI:  Aaliyah Boy  is a 72 y o  male who presents with  A history of a left ureteral stone treated with ureteroscopy  Patient also has a history of an asymptomatic nonobstructive  4-5 mm calculus in the right kidney with a normal PSA in nonsuspicious CYNTHIA for prostate cancer  The patient does have BPH with lower urinary tract symptoms being presently treated with Rapaflo and Avodart however the patient still has a significantly high AUA symptom score complaining of weakened urinary stream feelings of incomplete bladder emptying and intermittency  Cystourethroscopy revealed bilobar enlargement of the lateral lobes of the prostate with visual occlusion of the bladder outlet  Urinary flow rates were mildly to moderately sub normal   PVR was adequate  I discussed treatment of bladder outlet obstruction with the patient and his wife in detail  They were made aware the patient had a significantly trabeculated bladder that was in elastic non compliant and changed to secondary most likely to bladder outlet obstruction  They understand that even with correction of the bladder outlet obstruction with either Uro lift laser TURP regular TURP or other alternative means may not correct symptoms relative to the elastic or poorly compliant bladder  After discussing both Uro lift in TURP in detail the opted to proceed with TURP understanding risks of bleeding infection contracture stricture need for for a shins perforation need for catheter  They agreed to proceed        Review of Systems   Genitourinary:          See HPI and AUA symptom score   Musculoskeletal: Positive for back pain and myalgias           Spinal curvature   All other systems reviewed and are negative         Historical Information         Medical History        Past Medical History: Diagnosis Date    Anesthesia       "cant have spinal anesthesia because of curvature of the spine and severe pain"    Anxiety      Arthritis      Athlete's foot       off and on    Benign polyp of large intestine      Cancer (HCC)       thyroid,     Chronic pain disorder       bilat knees    Colon polyp      Curvature of spine      DDD (degenerative disc disease), lumbar      Disease of thyroid gland       removed    Dyslipidemia      Enlarged prostate      GERD (gastroesophageal reflux disease)      Hiatal hernia      Hypertension      IBS (irritable bowel syndrome)      Kidney stone       still has on the left, and small one on right"    Migraines       more sinus related    Neuropathy      Numbness and tingling of both feet      Retinal hole or tear, bilateral       surgery last year laser    Seasonal allergies      Tinea pedis      Urinary frequency      Urinary urgency      Vitamin D deficiency      Wears glasses          Surgical History         Past Surgical History:   Procedure Laterality Date    CHOLECYSTECTOMY        COLONOSCOPY        FL RETROGRADE URETHROCYSTOGRAM   2/28/2020    KNEE ARTHROSCOPY Bilateral       knee tim    ND COLONOSCOPY FLX DX W/COLLJ SPEC WHEN PFRMD N/A 7/25/2018     Procedure: COLONOSCOPY;  Surgeon: Huseyin Jack DO;  Location: [address] [place] [address] GI LAB;   Service: General    ND CYSTO/URETERO W/LITHOTRIPSY &INDWELL STENT INSRT Left 2/28/2020     Procedure: CYSTOSCOPY URETEROSCOP RETROGRADE PYELOGRAM AND INSERTION STENT URETERAL;  Surgeon: Blayne Otero MD;  [facility];  Service: Urology    ND CYSTO/URETERO W/LITHOTRIPSY &INDWELL STENT INSRT Left 3/25/2020     Procedure: CYSTOSCOPY URETEROSCOPIC STONE EXTRACTION, RETROGRADE PYELOGRAM AND INSERTION STENT URETERAL;  Surgeon: Blayne Otero MD;  [facility];  Service: Urology    ND FRAGMENT KIDNEY STONE/ ESWL Left 7/27/2017     Procedure: Bell Olson SHOCKWAVE (ESWL); Surgeon: Casey Hanna DO;  [facility];  Service: Urology    [place] REPAIR ING HERNIA,5+Y/O,REDUCIBL Right 2019     Procedure: REPAIR INGUINAL HERNIA  DIRECT NO MESH;  Surgeon: Willi Ribeiro DO;  [facility];  Service: General    RETINAL LASER PROCEDURE        ROTATOR CUFF REPAIR Left      THYROIDECTOMY         Subtotal and Total Thyroidectomy both mentioned in allscripts    WISDOM TOOTH EXTRACTION            Social History         Social History           Substance and Sexual Activity   Alcohol Use Yes    Alcohol/week: 2 0 standard drinks    Types: 1 Glasses of wine, 1 Cans of beer per week    Frequency: Monthly or less    Drinks per session: 1 or 2    Binge frequency: Never     Comment: occasionally      Social History          Substance and Sexual Activity   Drug Use No      Social History           Tobacco Use   Smoking Status Former Smoker    Years: 40 00    Quit date: 2014    Years since quittin 5   Smokeless Tobacco Never Used      Family History:         Family History   Problem Relation Age of Onset    Diabetes Mother      Heart disease Father      Breast cancer Sister           Meds/Allergies   all medications and allergies reviewed  No Known Allergies     Objective   Vitals: Height 6' 3" (1 905 m), weight (!) 142 kg (312 lb)      [unfilled]         Invasive Devices      Drain                     Ureteral Drain/Stent Left ureter 6 Fr  288 days                     Physical Exam  Constitutional:       General: He is not in acute distress  Appearance: Normal appearance  He is obese  He is not ill-appearing, toxic-appearing or diaphoretic  Neck:      Musculoskeletal: Normal range of motion and neck supple  No neck rigidity or muscular tenderness  Cardiovascular:      Rate and Rhythm: Normal rate and regular rhythm  Pulses: Normal pulses  Heart sounds: Normal heart sounds     Pulmonary:      Effort: Pulmonary effort is normal  No respiratory distress  Breath sounds: Normal breath sounds  No wheezing, rhonchi or rales  Abdominal:      General: There is no distension  Palpations: Abdomen is soft  Tenderness: There is no abdominal tenderness  There is no guarding or rebound  Genitourinary:     Penis: Normal     Skin:     General: Skin is dry  Neurological:      General: No focal deficit present  Mental Status: He is alert and oriented to person, place, and time  Mental status is at baseline  Psychiatric:         Mood and Affect: Mood normal          Thought Content: Thought content normal          Judgment: Judgment normal             Lab Results: I have personally reviewed pertinent reports  Imaging: I have personally reviewed pertinent reports  EKG, Pathology, and Other Studies: I have personally reviewed pertinent reports      VTE Prophylaxis: Sequential compression device Stuart Godfrey)

## 2021-03-28 NOTE — DISCHARGE INSTRUCTIONS
Vigorous oral fluid intake   limited activity for 1 week with gradual return to full activity in 2-3 weeks except for exertional activity and heavy lifting and which case wait a full 6 weeks    Resendez Catheter Placement and Care   WHAT YOU NEED TO KNOW:   A Resendez catheter is a sterile tube that is inserted into your bladder to drain urine  It is also called an indwelling urinary catheter  DISCHARGE INSTRUCTIONS:   Seek care immediately if:   · Your catheter comes out  · You suddenly have material that looks like sand in the tubing or drainage bag  · No urine is draining into the bag and you have checked the system  · You have pain in your hip, back, pelvis, or lower abdomen  · You are confused or cannot think clearly  Call your doctor or urologist if:   · You have a fever  · You have bladder spasms for more than 1 day after the catheter is placed  · You see blood in the tubing or drainage bag  · You have a rash or itching where the catheter tube is secured to your skin  · Urine leaks from or around the catheter, tubing, or drainage bag  · The closed drainage system has accidently come open or apart  · You see a layer of crystals inside the tubing  · You have questions or concerns about your condition or care  Care for your catheter and drainage bag: You can reduce your risk for infection and injury by caring for your catheter and drainage bag properly  · Wash your hands often  Wash before and after you touch your catheter, tubing, or drainage bag  Use soap and water  Wear clean disposable gloves when you care for your catheter or disconnect the drainage bag  Wash your hands before you prepare or eat food  · Clean your genital area 2 times every day  Clean your catheter area and anal opening after every bowel movement  ? For men:  Use a soapy cloth to clean the tip of your penis  Start where the catheter enters   Wipe backward making sure to pull back the foreskin  Then use a cloth with clear water in the same direction to clean away the soap  ? For women:  Use a soapy cloth to clean the area that the catheter enters your body  Make sure to separate your labia and wipe toward the anus  Then use a cloth with clear water and wipe in the same direction  · Secure the catheter tube  so you do not pull or move the catheter  This helps prevent pain and bladder spasms  Healthcare providers will show you how to use medical tape or a strap to secure the catheter tube to your body  · Keep a closed drainage system  Your catheter should always be attached to the drainage bag to form a closed system  Do not disconnect any part of the closed system unless you need to change the bag  · Keep the drainage bag below the level of your waist   This helps stop urine from moving back up the tubing and into your bladder  Do not loop or kink the tubing  This can cause urine to back up and collect in your bladder  Do not let the drainage bag touch or lie on the floor  · Empty the drainage bag when needed  The weight of a full drainage bag can be painful  Empty the drainage bag every 3 to 6 hours or when it is ? full  · Clean and change the drainage bag as directed  Ask your healthcare provider how often you should change the drainage bag and what cleaning solution to use  Wear disposable gloves when you change the bag  Do not allow the end of the catheter or tubing to touch anything  Clean the ends with an alcohol pad before you reconnect them  What to do if problems develop:   · No urine is draining into the bag:      ? Check for kinks in the tubing and straighten them out  ? Check the tape or strap used to secure the catheter tube to your skin  Make sure it is not blocking the tube  ? Make sure you are not sitting or lying on the tubing      ? Make sure the urine bag is hanging below the level of your waist     · Urine leaks from or around the catheter, tubing, or drainage bag:  Check if the closed drainage system has accidently come open or apart  Clean the catheter and tubing ends with a new alcohol pad and reconnect them  Follow up with your doctor or urologist as directed:  Write down your questions so you remember to ask them during your visits  © Copyright [address] Information is for End User's use only and may not be sold, redistributed or otherwise used for commercial purposes  All illustrations and images included in CareNotes® are the copyrighted property of [employer]  or [address] [place], [place] [address]   The above information is an  only  It is not intended as medical advice for individual conditions or treatments  Talk to your doctor, nurse or pharmacist before following any medical regimen to see if it is safe and effective for you

## 2021-03-29 NOTE — TELEPHONE ENCOUNTER
Continue the medications until after the surgery and then the surgeon will discuss which medications he should continue after ports

## 2021-03-29 NOTE — TELEPHONE ENCOUNTER
Call placed to patient and spoke with his wife  Informed her of the recommendations of KARINP  Wife understands and is aware

## 2021-03-29 NOTE — TELEPHONE ENCOUNTER
Call placed to patient and spoke with his wife  Informed her of the recommendations of the CRNP  Wife understands but is confused  She is asking if patient should stop taking these medications before his surgery next week or should he take this medication up until his surgery  Wife would like clarification  Routing to provider to review,

## 2021-03-31 ENCOUNTER — TELEPHONE (OUTPATIENT)
Dept: UROLOGY | Facility: CLINIC | Age: 66
End: 2021-03-31

## 2021-03-31 NOTE — TELEPHONE ENCOUNTER
I left a voice mail message advising that a letter will be at the  of [address] stating date of surgery and estimate 4 week in home recovery period  I advised that the return to work release note will need to be obtained at his 4/30/21 follow up appoint since we do not know prior to surgery what date he will be released

## 2021-03-31 NOTE — TELEPHONE ENCOUNTER
Spoke to pt's wife, and she stated that patient needs two notes for work  Pt is having surgery with Dr rSidevi Shahid on 4/6/21, and pt would like a general note stating he is having surgery(but he doesn't want it to state what type of surgery)  Pt also needs a  Return to work note for after the surgery  Pt wants to know if they can  the letters today? Thank you

## 2021-04-04 ENCOUNTER — ANESTHESIA EVENT (OUTPATIENT)
Dept: PERIOP | Facility: HOSPITAL | Age: 66
End: 2021-04-04
Payer: COMMERCIAL

## 2021-04-05 NOTE — TELEPHONE ENCOUNTER
Received [facility] FMLA paper work by [employer]  Paper work completed and will be faxed from the Office Wednesday 4/7/21

## 2021-04-06 ENCOUNTER — ANESTHESIA (OUTPATIENT)
Dept: PERIOP | Facility: HOSPITAL | Age: 66
End: 2021-04-06
Payer: COMMERCIAL

## 2021-04-06 ENCOUNTER — HOSPITAL ENCOUNTER (OUTPATIENT)
Facility: HOSPITAL | Age: 66
Setting detail: OUTPATIENT SURGERY
Discharge: HOME | End: 2021-04-07
Attending: UROLOGY | Admitting: UROLOGY
Payer: COMMERCIAL

## 2021-04-06 DIAGNOSIS — N40.1 BPH WITH OBSTRUCTION/LOWER URINARY TRACT SYMPTOMS: ICD-10-CM

## 2021-04-06 DIAGNOSIS — N13.8 BPH WITH OBSTRUCTION/LOWER URINARY TRACT SYMPTOMS: ICD-10-CM

## 2021-04-06 LAB
ABO GROUP BLD: NORMAL
RH BLD: POSITIVE

## 2021-04-06 PROCEDURE — 52601 PROSTATECTOMY (TURP): CPT | Performed by: UROLOGY

## 2021-04-06 PROCEDURE — NC001 PR NO CHARGE: Performed by: UROLOGY

## 2021-04-06 PROCEDURE — 88305 TISSUE EXAM BY PATHOLOGIST: CPT | Performed by: PATHOLOGY

## 2021-04-06 RX ORDER — PROPOFOL 10 MG/ML
INJECTION, EMULSION INTRAVENOUS AS NEEDED
Status: DISCONTINUED | OUTPATIENT
Start: 2021-04-06 | End: 2021-04-06

## 2021-04-06 RX ORDER — OXYBUTYNIN CHLORIDE 10 MG/1
10 TABLET, EXTENDED RELEASE ORAL DAILY
Status: DISCONTINUED | OUTPATIENT
Start: 2021-04-06 | End: 2021-04-07 | Stop reason: HOSPADM

## 2021-04-06 RX ORDER — DEXTROSE, SODIUM CHLORIDE, AND POTASSIUM CHLORIDE 5; .45; .15 G/100ML; G/100ML; G/100ML
75 INJECTION INTRAVENOUS CONTINUOUS
Status: DISCONTINUED | OUTPATIENT
Start: 2021-04-06 | End: 2021-04-07 | Stop reason: HOSPADM

## 2021-04-06 RX ORDER — LIDOCAINE HYDROCHLORIDE 20 MG/ML
INJECTION, SOLUTION EPIDURAL; INFILTRATION; INTRACAUDAL; PERINEURAL AS NEEDED
Status: DISCONTINUED | OUTPATIENT
Start: 2021-04-06 | End: 2021-04-06

## 2021-04-06 RX ORDER — MAGNESIUM HYDROXIDE 1200 MG/15ML
LIQUID ORAL AS NEEDED
Status: DISCONTINUED | OUTPATIENT
Start: 2021-04-06 | End: 2021-04-06 | Stop reason: HOSPADM

## 2021-04-06 RX ORDER — HYDROCODONE BITARTRATE AND ACETAMINOPHEN 5; 325 MG/1; MG/1
1 TABLET ORAL EVERY 6 HOURS PRN
Status: DISCONTINUED | OUTPATIENT
Start: 2021-04-06 | End: 2021-04-07 | Stop reason: HOSPADM

## 2021-04-06 RX ORDER — ONDANSETRON 2 MG/ML
4 INJECTION INTRAMUSCULAR; INTRAVENOUS EVERY 6 HOURS PRN
Status: DISCONTINUED | OUTPATIENT
Start: 2021-04-06 | End: 2021-04-07 | Stop reason: HOSPADM

## 2021-04-06 RX ORDER — ACETAMINOPHEN 325 MG/1
650 TABLET ORAL EVERY 6 HOURS PRN
Status: DISCONTINUED | OUTPATIENT
Start: 2021-04-06 | End: 2021-04-07 | Stop reason: HOSPADM

## 2021-04-06 RX ORDER — SODIUM CHLORIDE 9 MG/ML
125 INJECTION, SOLUTION INTRAVENOUS CONTINUOUS
Status: DISCONTINUED | OUTPATIENT
Start: 2021-04-06 | End: 2021-04-06

## 2021-04-06 RX ORDER — DOCUSATE SODIUM 100 MG/1
100 CAPSULE, LIQUID FILLED ORAL 2 TIMES DAILY
Status: DISCONTINUED | OUTPATIENT
Start: 2021-04-06 | End: 2021-04-07 | Stop reason: HOSPADM

## 2021-04-06 RX ORDER — ONDANSETRON 2 MG/ML
4 INJECTION INTRAMUSCULAR; INTRAVENOUS ONCE AS NEEDED
Status: DISCONTINUED | OUTPATIENT
Start: 2021-04-06 | End: 2021-04-06 | Stop reason: HOSPADM

## 2021-04-06 RX ORDER — SORBITOL 30 G/1000ML
IRRIGANT IRRIGATION AS NEEDED
Status: DISCONTINUED | OUTPATIENT
Start: 2021-04-06 | End: 2021-04-06 | Stop reason: HOSPADM

## 2021-04-06 RX ORDER — ONDANSETRON 2 MG/ML
INJECTION INTRAMUSCULAR; INTRAVENOUS AS NEEDED
Status: DISCONTINUED | OUTPATIENT
Start: 2021-04-06 | End: 2021-04-06

## 2021-04-06 RX ORDER — FENTANYL CITRATE 50 UG/ML
INJECTION, SOLUTION INTRAMUSCULAR; INTRAVENOUS AS NEEDED
Status: DISCONTINUED | OUTPATIENT
Start: 2021-04-06 | End: 2021-04-06

## 2021-04-06 RX ORDER — OXYBUTYNIN CHLORIDE 10 MG/1
10 TABLET, EXTENDED RELEASE ORAL
Qty: 4 TABLET | Refills: 0 | Status: SHIPPED | OUTPATIENT
Start: 2021-04-08 | End: 2021-09-22 | Stop reason: ALTCHOICE

## 2021-04-06 RX ORDER — FENTANYL CITRATE/PF 50 MCG/ML
25 SYRINGE (ML) INJECTION
Status: DISCONTINUED | OUTPATIENT
Start: 2021-04-06 | End: 2021-04-06 | Stop reason: HOSPADM

## 2021-04-06 RX ORDER — LEVOTHYROXINE SODIUM 0.03 MG/1
25 TABLET ORAL DAILY
Status: DISCONTINUED | OUTPATIENT
Start: 2021-04-07 | End: 2021-04-07 | Stop reason: HOSPADM

## 2021-04-06 RX ORDER — ALLOPURINOL 100 MG/1
300 TABLET ORAL DAILY
Status: DISCONTINUED | OUTPATIENT
Start: 2021-04-06 | End: 2021-04-07 | Stop reason: HOSPADM

## 2021-04-06 RX ORDER — PANTOPRAZOLE SODIUM 40 MG/1
40 TABLET, DELAYED RELEASE ORAL
Status: DISCONTINUED | OUTPATIENT
Start: 2021-04-07 | End: 2021-04-07 | Stop reason: HOSPADM

## 2021-04-06 RX ORDER — CEFAZOLIN SODIUM 2 G/50ML
2000 SOLUTION INTRAVENOUS ONCE
Status: DISCONTINUED | OUTPATIENT
Start: 2021-04-06 | End: 2021-04-06 | Stop reason: HOSPADM

## 2021-04-06 RX ORDER — HYDROCODONE BITARTRATE AND ACETAMINOPHEN 5; 325 MG/1; MG/1
1 TABLET ORAL EVERY 6 HOURS PRN
Qty: 10 TABLET | Refills: 0 | Status: SHIPPED | OUTPATIENT
Start: 2021-04-06 | End: 2021-04-16

## 2021-04-06 RX ORDER — SULFAMETHOXAZOLE AND TRIMETHOPRIM 800; 160 MG/1; MG/1
1 TABLET ORAL DAILY
Qty: 7 TABLET | Refills: 0 | Status: SHIPPED | OUTPATIENT
Start: 2021-04-06 | End: 2021-04-13

## 2021-04-06 RX ORDER — DEXAMETHASONE SODIUM PHOSPHATE 4 MG/ML
INJECTION, SOLUTION INTRA-ARTICULAR; INTRALESIONAL; INTRAMUSCULAR; INTRAVENOUS; SOFT TISSUE AS NEEDED
Status: DISCONTINUED | OUTPATIENT
Start: 2021-04-06 | End: 2021-04-06

## 2021-04-06 RX ORDER — DOCUSATE SODIUM 100 MG/1
100 CAPSULE, LIQUID FILLED ORAL 2 TIMES DAILY
Status: DISCONTINUED | OUTPATIENT
Start: 2021-04-06 | End: 2021-04-06 | Stop reason: SDUPTHER

## 2021-04-06 RX ADMIN — SODIUM CHLORIDE: 0.9 INJECTION, SOLUTION INTRAVENOUS at 13:48

## 2021-04-06 RX ADMIN — ONDANSETRON 4 MG: 2 INJECTION INTRAMUSCULAR; INTRAVENOUS at 14:06

## 2021-04-06 RX ADMIN — FENTANYL CITRATE 25 MCG: 50 INJECTION INTRAMUSCULAR; INTRAVENOUS at 13:24

## 2021-04-06 RX ADMIN — FENTANYL CITRATE 50 MCG: 50 INJECTION INTRAMUSCULAR; INTRAVENOUS at 13:37

## 2021-04-06 RX ADMIN — VERAPAMIL HYDROCHLORIDE 120 MG: 120 TABLET, FILM COATED, EXTENDED RELEASE ORAL at 17:04

## 2021-04-06 RX ADMIN — LIDOCAINE HYDROCHLORIDE 60 MG: 20 INJECTION, SOLUTION EPIDURAL; INFILTRATION; INTRACAUDAL; PERINEURAL at 13:18

## 2021-04-06 RX ADMIN — FENTANYL CITRATE 50 MCG: 50 INJECTION INTRAMUSCULAR; INTRAVENOUS at 13:49

## 2021-04-06 RX ADMIN — PROPOFOL 260 MG: 10 INJECTION, EMULSION INTRAVENOUS at 13:18

## 2021-04-06 RX ADMIN — DEXAMETHASONE SODIUM PHOSPHATE 4 MG: 4 INJECTION INTRA-ARTICULAR; INTRALESIONAL; INTRAMUSCULAR; INTRAVENOUS; SOFT TISSUE at 13:24

## 2021-04-06 RX ADMIN — DOCUSATE SODIUM 100 MG: 100 CAPSULE, LIQUID FILLED ORAL at 17:03

## 2021-04-06 RX ADMIN — OXYBUTYNIN 10 MG: 10 TABLET, FILM COATED, EXTENDED RELEASE ORAL at 15:55

## 2021-04-06 RX ADMIN — DEXTROSE, SODIUM CHLORIDE, AND POTASSIUM CHLORIDE 75 ML/HR: 5; .45; .15 INJECTION INTRAVENOUS at 17:04

## 2021-04-06 RX ADMIN — SODIUM CHLORIDE 125 ML/HR: 0.9 INJECTION, SOLUTION INTRAVENOUS at 10:50

## 2021-04-06 RX ADMIN — FENTANYL CITRATE 50 MCG: 50 INJECTION INTRAMUSCULAR; INTRAVENOUS at 13:30

## 2021-04-06 RX ADMIN — ALLOPURINOL 300 MG: 100 TABLET ORAL at 15:55

## 2021-04-06 RX ADMIN — FENTANYL CITRATE 25 MCG: 50 INJECTION INTRAMUSCULAR; INTRAVENOUS at 13:13

## 2021-04-06 NOTE — OP NOTE
OPERATIVE REPORT  PATIENT NAME: Stephen Abad  :  1955  MRN: [id number]  Pt Location: [facility] OR ROOM 07    SURGERY DATE: 2021    Surgeon(s) and Role:     * Rajiv Lim MD - Primary    Preop Diagnosis:  BPH with obstruction/lower urinary tract symptoms [N40 1, N13 8]    Post-Op Diagnosis Codes:     * BPH with obstruction/lower urinary tract symptoms [N40 1, N13 8]    Procedure(s) (LRB):  TRANSURETHRAL RESECTION OF PROSTATE (TURP) (N/A)    Specimen(s):  ID Type Source Tests Collected by Time Destination   1 : prostate chips Tissue Prostate TISSUE EXAM Rajiv iLm MD 2021 [id number]        Estimated Blood Loss:   Minimal    Drains:  Ureteral Drain/Stent Left ureter 6 Fr  (Active)   Number of days: 377       Continuous Bladder Irrigation (Active)   Number of days: 0       Anesthesia Type:   General    Operative Indications:  BPH with obstruction/lower urinary tract symptoms [N40 1, N13 8]       Operative Findings:  Normal anterior urethra without stricture lesion  Bilobar enlargement of the lateral lobes of the prostate with visual occlusion of the bladder outlet  At the end of TURP the prostatic fossa was well resected with no evidence of perforation of the prostatic capsule no resection distal to the verumontanum and no retained chips and no bleeding  Efflux from the bladder and prostate at the end of the operation was clear  Urinary bladder was within normal limits with mild to moderate trabeculation no evidence of intrinsic lesion and no evidence of extrinsic mass compression  Ureteral orifices were normal position and configuration bilaterally with clear efflux bilaterally  No bladder injury bladder perforation or injury to the ureteral orifices took place throughout the procedure  Complications:   None    Procedure and Technique:  The patient was seen in the holding area and I reviewed TURP as well as potential risks and complications  The patient agreed to the procedure  Patient was taken to the operating room placed on the operating table and then in dorsal lithotomy position after which general LMA anesthesia was induced  After appropriate time-out a 32 [place] resectoscopic obturator and sheath was placed per urethra into the urinary bladder and a 30 degree telescope was used to evaluate the bladder and urethra  The resectoscope 1 type 1 cutting at 100 with coagulation at 79 was used to resect the prostate from the bladder neck proximally to the verumontanum distally depth wise to the surgical capsular fibers of the prostate  After completion of the resection the [place] evacuator was used to evacuate all prostatic specimen and the coagulation current used to coagulate any visualized bleeders  At the end of the procedure no resection took place distal to the verumontanum and there were no retained chips  The cystoscope/resectoscope was removed under direct vision and a 3 way 24 [place] Carmella Resendez catheter was then placed per urethra into the urinary bladder with 50 cc of saline placed in the balloon which was held on traction for 2 minutes and then released  Efflux was totally clear without gross hematuria  The patient tolerated the procedure well without complication  The patient will be admitted to the hospital for an overnight stay for Resendez catheter management and will be discharged tomorrow with the Resendez catheter indwelling    The patient will return to the office on 04/11/2021 for a voiding trial Resendez catheter removal        I was present for the entire procedure and I was present for all critical portions of the procedure    Patient Disposition:  PACU     SIGNATURE: Ruth Blas MD  DATE: April 6, 2021  TIME: 2:11 PM

## 2021-04-06 NOTE — ANESTHESIA PREPROCEDURE EVALUATION
Procedure:  TRANSURETHRAL RESECTION OF PROSTATE (TURP) (N/A Urethra)    Relevant Problems   CARDIO   (+) Essential hypertension      ENDO   (+) Hypothyroidism      GI/HEPATIC   (+) Gastroesophageal reflux disease without esophagitis   (+) Hepatic steatosis      /RENAL   (+) Acute kidney injury ([address] )   (+) Hydronephrosis with urinary obstruction due to renal calculus   (+) Kidney stone on left side      MUSCULOSKELETAL   (+) Lumbar pain   (+) Primary osteoarthritis of right knee      Other   (+) Severe obesity (BMI 35 0-39  9) with comorbidity ([address] )        Physical Exam    Airway    Mallampati score: III  TM Distance: >3 FB  Neck ROM: full     Dental   No notable dental hx     Cardiovascular  Rhythm: regular, Rate: normal, Cardiovascular exam normal    Pulmonary  Pulmonary exam normal Breath sounds clear to auscultation,     Other Findings        Anesthesia Plan  ASA Score- 3     Anesthesia Type- general with ASA Monitors  Additional Monitors:   Airway Plan:           Plan Factors-    Chart reviewed  Patient summary reviewed  Patient is not a current smoker  Patient instructed to abstain from smoking on day of procedure  Patient did not smoke on day of surgery  Induction- intravenous  Postoperative Plan-     Informed Consent- Anesthetic plan and risks discussed with patient

## 2021-04-06 NOTE — PLAN OF CARE
Problem: PAIN - ADULT  Goal: Verbalizes/displays adequate comfort level or baseline comfort level  Description: Interventions:  - Encourage patient to monitor pain and request assistance  - Assess pain using appropriate pain scale  - Administer analgesics based on type and severity of pain and evaluate response  - Implement non-pharmacological measures as appropriate and evaluate response  - Consider cultural and social influences on pain and pain management  - Notify physician/advanced practitioner if interventions unsuccessful or patient reports new pain  Outcome: Progressing     Problem: INFECTION - ADULT  Goal: Absence or prevention of progression during hospitalization  Description: INTERVENTIONS:  - Assess and monitor for signs and symptoms of infection  - Monitor lab/diagnostic results  - Monitor all insertion sites, i e  indwelling lines, tubes, and drains  - Monitor endotracheal if appropriate and nasal secretions for changes in amount and color  - [place] appropriate cooling/warming therapies per order  - Administer medications as ordered  - Instruct and encourage patient and family to use good hand hygiene technique  - Identify and instruct in appropriate isolation precautions for identified infection/condition  Outcome: Progressing  Goal: Absence of fever/infection during neutropenic period  Description: INTERVENTIONS:  - Monitor WBC    Outcome: Progressing     Problem: SAFETY ADULT  Goal: Patient will remain free of falls  Description: INTERVENTIONS:  - Assess patient frequently for physical needs  -  Identify cognitive and physical deficits and behaviors that affect risk of falls    -  [place] fall precautions as indicated by assessment   - Educate patient/family on patient safety including physical limitations  - Instruct patient to call for assistance with activity based on assessment  - Modify environment to reduce risk of injury  - Consider OT/PT consult to assist with strengthening/mobility  Outcome: Progressing  Goal: Maintain or return to baseline ADL function  Description: INTERVENTIONS:  -  Assess patient's ability to carry out ADLs; assess patient's baseline for ADL function and identify physical deficits which impact ability to perform ADLs (bathing, care of mouth/teeth, toileting, grooming, dressing, etc )  - Assess/evaluate cause of self-care deficits   - Assess range of motion  - Assess patient's mobility; develop plan if impaired  - Assess patient's need for assistive devices and provide as appropriate  - Encourage maximum independence but intervene and supervise when necessary  - Involve family in performance of ADLs  - Assess for home care needs following discharge   - Consider OT consult to assist with ADL evaluation and planning for discharge  - Provide patient education as appropriate  Outcome: Progressing  Goal: Maintain or return mobility status to optimal level  Description: INTERVENTIONS:  - Assess patient's baseline mobility status (ambulation, transfers, stairs, etc )    - Identify cognitive and physical deficits and behaviors that affect mobility  - Identify mobility aids required to assist with transfers and/or ambulation (gait belt, sit-to-stand, lift, walker, cane, etc )  - [place] fall precautions as indicated by assessment  - Record patient progress and toleration of activity level on Mobility SBAR; progress patient to next Phase/Stage  - Instruct patient to call for assistance with activity based on assessment  - Consider rehabilitation consult to assist with strengthening/weightbearing, etc   Outcome: Progressing     Problem: DISCHARGE PLANNING  Goal: Discharge to home or other facility with appropriate resources  Description: INTERVENTIONS:  - Identify barriers to discharge w/patient and caregiver  - Arrange for needed discharge resources and transportation as appropriate  - Identify discharge learning needs (meds, wound care, etc )  - Arrange for interpretive services to assist at discharge as needed  - Refer to Case Management Department for coordinating discharge planning if the patient needs post-hospital services based on physician/advanced practitioner order or complex needs related to functional status, cognitive ability, or social support system  Outcome: Progressing     Problem: Knowledge Deficit  Goal: Patient/family/caregiver demonstrates understanding of disease process, treatment plan, medications, and discharge instructions  Description: Complete learning assessment and assess knowledge base    Interventions:  - Provide teaching at level of understanding  - Provide teaching via preferred learning methods  Outcome: Progressing     Problem: GENITOURINARY - ADULT  Goal: Maintains or returns to baseline urinary function  Description: INTERVENTIONS:  - Assess urinary function  - Encourage oral fluids to ensure adequate hydration if ordered  - Administer IV fluids as ordered to ensure adequate hydration  - Administer ordered medications as needed  - Offer frequent toileting  - Follow urinary retention protocol if ordered  Outcome: Progressing  Goal: Absence of urinary retention  Description: INTERVENTIONS:  - Assess patients ability to void and empty bladder  - Monitor I/O  - Bladder scan as needed  - Discuss with physician/AP medications to alleviate retention as needed  - Discuss catheterization for long term situations as appropriate  Outcome: Progressing  Goal: Urinary catheter remains patent  Description: INTERVENTIONS:  - Assess patency of urinary catheter  - If patient has a chronic ma, consider changing catheter if non-functioning  - Follow guidelines for intermittent irrigation of non-functioning urinary catheter  Outcome: Progressing

## 2021-04-06 NOTE — INTERVAL H&P NOTE
H&P reviewed  After examining the patient I find no changes in the patients condition since the H&P had been written  /94   Pulse 63   Temp 98 6 °F (37 °C) (Temporal)   Ht 6' 2" (1 88 m)   Wt (!) 140 kg (309 lb)   SpO2 96%   BMI 39 67 kg/m²       Vitals:    04/06/21 1028   BP: 166/94   Pulse: 63   Temp: 98 6 °F (37 °C)   SpO2: 96%

## 2021-04-07 ENCOUNTER — TELEPHONE (OUTPATIENT)
Dept: UROLOGY | Facility: MEDICAL CENTER | Age: 66
End: 2021-04-07

## 2021-04-07 VITALS
RESPIRATION RATE: 18 BRPM | HEIGHT: 74 IN | HEART RATE: 57 BPM | TEMPERATURE: 97.5 F | SYSTOLIC BLOOD PRESSURE: 143 MMHG | WEIGHT: 315 LBS | DIASTOLIC BLOOD PRESSURE: 82 MMHG | BODY MASS INDEX: 40.43 KG/M2 | OXYGEN SATURATION: 95 %

## 2021-04-07 PROBLEM — R39.14 BENIGN PROSTATIC HYPERPLASIA WITH INCOMPLETE BLADDER EMPTYING: Chronic | Status: ACTIVE | Noted: 2021-04-07

## 2021-04-07 PROBLEM — N40.1 BENIGN PROSTATIC HYPERPLASIA WITH INCOMPLETE BLADDER EMPTYING: Chronic | Status: ACTIVE | Noted: 2021-04-07

## 2021-04-07 PROCEDURE — NC001 PR NO CHARGE: Performed by: PHYSICIAN ASSISTANT

## 2021-04-07 PROCEDURE — 99024 POSTOP FOLLOW-UP VISIT: CPT | Performed by: PHYSICIAN ASSISTANT

## 2021-04-07 RX ADMIN — ACETAMINOPHEN 650 MG: 325 TABLET, FILM COATED ORAL at 03:14

## 2021-04-07 RX ADMIN — OXYBUTYNIN 10 MG: 10 TABLET, FILM COATED, EXTENDED RELEASE ORAL at 08:42

## 2021-04-07 RX ADMIN — HYDROCODONE BITARTRATE AND ACETAMINOPHEN 1 TABLET: 5; 325 TABLET ORAL at 08:43

## 2021-04-07 RX ADMIN — LEVOTHYROXINE SODIUM 25 MCG: 25 TABLET ORAL at 06:25

## 2021-04-07 RX ADMIN — DOCUSATE SODIUM 100 MG: 100 CAPSULE, LIQUID FILLED ORAL at 11:20

## 2021-04-07 RX ADMIN — DEXTROSE, SODIUM CHLORIDE, AND POTASSIUM CHLORIDE 75 ML/HR: 5; .45; .15 INJECTION INTRAVENOUS at 03:03

## 2021-04-07 RX ADMIN — PANTOPRAZOLE SODIUM 40 MG: 40 TABLET, DELAYED RELEASE ORAL at 06:25

## 2021-04-07 RX ADMIN — VERAPAMIL HYDROCHLORIDE 120 MG: 120 TABLET, FILM COATED, EXTENDED RELEASE ORAL at 08:42

## 2021-04-07 RX ADMIN — ALLOPURINOL 300 MG: 100 TABLET ORAL at 08:42

## 2021-04-07 NOTE — TELEPHONE ENCOUNTER
Paper work signed and scanned into chart  Copy mailed to patients home address and originals mailed to [facility] at address requested on paper work

## 2021-04-07 NOTE — DISCHARGE SUMMARY
Discharge Summary - Hernando Adames  77 y o  male MRN: [id number]    Unit/Bed#: E5 -01 Encounter: [id number]    Admission Date: 4/6/2021     Discharge Date: 04/07/21    HPI: Hernando Adames  is a 77 y o  male who presented for TURP by Dr Frederik Krabbe  Procedure(s):  TRANSURETHRAL RESECTION OF PROSTATE (TURP)  Surgeon(s):  Irwin Castro MD  4/6/2021    Hospital Course: Patient was taken to the operating room for TURP for treatment of BPH with bladder outlet obstruction  There were no intraoperative complications  Following surgery, patient was recovered in the PACU and then transferred to the [facility] floor for overnight observation  He has had excellent pain control, appropriate urinary output, with clear urine on CBI overnight  He is tolerating a full diet, and passing gas  He is ambulatory and comfortable  His Resendez catheter will be transferred to a leg bag  He will present to the office Friday 4/9/21 for Resendez catheter removal  He is stable for discharge to home to continue his recovery there  Verbal and written discharge instructions were reviewed with patient and his wife at the bedside including prescriptions  Discharge Diagnosis: Benign prostatic hyperplasia with incomplete bladder emptying    Condition at Discharge: good     Discharge Medications:  See after visit summary for reconciled discharge medications provided to patient and family  Patient was discharged home on home medications with the addition of norco, bactrim, ditropan  Discharge instructions/Information to patient and family:   See after visit summary for information provided to patient and family  Provisions for Follow-Up Care:  See after visit summary for information related to follow-up care and any pertinent home health orders  Disposition: Home    Planned Readmission: No    Discharge Statement   I spent 30 minutes discharging the patient  This time was spent on the day of discharge   I had direct contact with the patient on the day of discharge  Additional documentation is required if more than 30 minutes were spent on discharge  Signature:    Betty Mackey PA-C  Date: 4/7/2021 Time: 10:37 AM

## 2021-04-07 NOTE — TELEPHONE ENCOUNTER
Call placed to patient and spoke with him  He informed me that he has been taking colace and his stools are "softening up" but he still feels constipated  Pt is asking if he can try OTC Miralax to see if that helps /     Recommended that patient try OTC Miralax and to take it as directed to see if that helps relieve his constipation   Pt stated that he is scheduled for a follow up in the office on Friday and will address his symptoms then if he does not have any success with the laxative,

## 2021-04-07 NOTE — PLAN OF CARE
Problem: PAIN - ADULT  Goal: Verbalizes/displays adequate comfort level or baseline comfort level  Description: Interventions:  - Encourage patient to monitor pain and request assistance  - Assess pain using appropriate pain scale  - Administer analgesics based on type and severity of pain and evaluate response  - Implement non-pharmacological measures as appropriate and evaluate response  - Consider cultural and social influences on pain and pain management  - Notify physician/advanced practitioner if interventions unsuccessful or patient reports new pain  Outcome: Progressing     Problem: INFECTION - ADULT  Goal: Absence or prevention of progression during hospitalization  Description: INTERVENTIONS:  - Assess and monitor for signs and symptoms of infection  - Monitor lab/diagnostic results  - Monitor all insertion sites, i e  indwelling lines, tubes, and drains  - Monitor endotracheal if appropriate and nasal secretions for changes in amount and color  - [place] appropriate cooling/warming therapies per order  - Administer medications as ordered  - Instruct and encourage patient and family to use good hand hygiene technique  - Identify and instruct in appropriate isolation precautions for identified infection/condition  Outcome: Progressing  Goal: Absence of fever/infection during neutropenic period  Description: INTERVENTIONS:  - Monitor WBC    Outcome: Progressing     Problem: SAFETY ADULT  Goal: Patient will remain free of falls  Description: INTERVENTIONS:  - Assess patient frequently for physical needs  -  Identify cognitive and physical deficits and behaviors that affect risk of falls    -  [place] fall precautions as indicated by assessment   - Educate patient/family on patient safety including physical limitations  - Instruct patient to call for assistance with activity based on assessment  - Modify environment to reduce risk of injury  - Consider OT/PT consult to assist with strengthening/mobility  Outcome: Progressing  Goal: Maintain or return to baseline ADL function  Description: INTERVENTIONS:  -  Assess patient's ability to carry out ADLs; assess patient's baseline for ADL function and identify physical deficits which impact ability to perform ADLs (bathing, care of mouth/teeth, toileting, grooming, dressing, etc )  - Assess/evaluate cause of self-care deficits   - Assess range of motion  - Assess patient's mobility; develop plan if impaired  - Assess patient's need for assistive devices and provide as appropriate  - Encourage maximum independence but intervene and supervise when necessary  - Involve family in performance of ADLs  - Assess for home care needs following discharge   - Consider OT consult to assist with ADL evaluation and planning for discharge  - Provide patient education as appropriate  Outcome: Progressing  Goal: Maintain or return mobility status to optimal level  Description: INTERVENTIONS:  - Assess patient's baseline mobility status (ambulation, transfers, stairs, etc )    - Identify cognitive and physical deficits and behaviors that affect mobility  - Identify mobility aids required to assist with transfers and/or ambulation (gait belt, sit-to-stand, lift, walker, cane, etc )  - [place] fall precautions as indicated by assessment  - Record patient progress and toleration of activity level on Mobility SBAR; progress patient to next Phase/Stage  - Instruct patient to call for assistance with activity based on assessment  - Consider rehabilitation consult to assist with strengthening/weightbearing, etc   Outcome: Progressing     Problem: DISCHARGE PLANNING  Goal: Discharge to home or other facility with appropriate resources  Description: INTERVENTIONS:  - Identify barriers to discharge w/patient and caregiver  - Arrange for needed discharge resources and transportation as appropriate  - Identify discharge learning needs (meds, wound care, etc )  - Arrange for interpretive services to assist at discharge as needed  - Refer to Case Management Department for coordinating discharge planning if the patient needs post-hospital services based on physician/advanced practitioner order or complex needs related to functional status, cognitive ability, or social support system  Outcome: Progressing     Problem: Knowledge Deficit  Goal: Patient/family/caregiver demonstrates understanding of disease process, treatment plan, medications, and discharge instructions  Description: Complete learning assessment and assess knowledge base    Interventions:  - Provide teaching at level of understanding  - Provide teaching via preferred learning methods  Outcome: Progressing     Problem: GENITOURINARY - ADULT  Goal: Maintains or returns to baseline urinary function  Description: INTERVENTIONS:  - Assess urinary function  - Encourage oral fluids to ensure adequate hydration if ordered  - Administer IV fluids as ordered to ensure adequate hydration  - Administer ordered medications as needed  - Offer frequent toileting  - Follow urinary retention protocol if ordered  Outcome: Progressing  Goal: Absence of urinary retention  Description: INTERVENTIONS:  - Assess patients ability to void and empty bladder  - Monitor I/O  - Bladder scan as needed  - Discuss with physician/AP medications to alleviate retention as needed  - Discuss catheterization for long term situations as appropriate  Outcome: Progressing  Goal: Urinary catheter remains patent  Description: INTERVENTIONS:  - Assess patency of urinary catheter  - If patient has a chronic ma, consider changing catheter if non-functioning  - Follow guidelines for intermittent irrigation of non-functioning urinary catheter  Outcome: Progressing     Problem: Potential for Falls  Goal: Patient will remain free of falls  Description: INTERVENTIONS:  - Assess patient frequently for physical needs  -  Identify cognitive and physical deficits and behaviors that affect risk of falls    -  [place] fall precautions as indicated by assessment   - Educate patient/family on patient safety including physical limitations  - Instruct patient to call for assistance with activity based on assessment  - Modify environment to reduce risk of injury  - Consider OT/PT consult to assist with strengthening/mobility  Outcome: Progressing

## 2021-04-07 NOTE — TELEPHONE ENCOUNTER
Pt managed by Isaiah Mathew, pt's calling in regards to medication has not had  bowel movement,he has colace can he also take otc miralax?

## 2021-04-07 NOTE — ASSESSMENT & PLAN NOTE
Postop day one TURP  Urine is clear  CBI clamped  Ambulating  Pain controlled  Full diet, may shower    Discharge home today after Resendez care teaching  Void trial Friday 04/09/2021 in office

## 2021-04-07 NOTE — PROGRESS NOTES
Progress Note - Urology  Christianne Jackson  1955, 77 y o  male MRN: [id number]    Unit/Bed#: E5 -01 Encounter: [id number]    * Benign prostatic hyperplasia with incomplete bladder emptying  Assessment & Plan  Postop day one TURP  Urine is clear  CBI clamped  Ambulating  Pain controlled  Full diet, may shower    Discharge home today after Resendez care teaching  Void trial Friday 04/09/2021 in office      Subjective:  Feels well  No pain  Urine is light peach, clear  Eating and ambulating  Passing gas no BM, did have some increased hematuria after sitting to try to have BM  This is his first ever catheter, will have formal teaching with nurses prior to discharge  Review of Systems   Constitutional: Negative for activity change, appetite change, chills, fever and unexpected weight change  HENT: Negative  Respiratory: Negative  Negative for shortness of breath  Cardiovascular: Negative  Negative for chest pain  Gastrointestinal: Negative for abdominal pain, diarrhea, nausea and vomiting  Endocrine: Negative  Genitourinary: Positive for difficulty urinating, frequency and hematuria  Negative for decreased urine volume, dysuria, flank pain and urgency  Musculoskeletal: Negative for back pain and gait problem  Skin: Negative  Allergic/Immunologic: Negative  Neurological: Negative  Hematological: Negative for adenopathy  Does not bruise/bleed easily  Objective:  Vitals: Blood pressure 143/82, pulse 57, temperature 97 5 °F (36 4 °C), temperature source Temporal, resp  rate 18, height 6' 2" (1 88 m), weight (!) 144 kg (317 lb 7 4 oz), SpO2 95 %  ,Body mass index is 40 76 kg/m²      Intake/Output Summary (Last 24 hours) at 4/7/2021 1035  Last data filed at 4/7/2021 0601  Gross per 24 hour   Intake 2486 25 ml   Output 3000 ml   Net -513 75 ml     Invasive Devices     Peripheral Intravenous Line            Peripheral IV 04/06/21 Right Hand less than 1 day          Drain Ureteral Drain/Stent Left ureter 6 Fr  378 days    Continuous Bladder Irrigation Three-way less than 1 day                Physical Exam  Vitals signs and nursing note reviewed  Constitutional:       Appearance: He is well-developed  Comments: Obese white male seated in bedside chair   HENT:      Head: Normocephalic and atraumatic  Cardiovascular:      Rate and Rhythm: Normal rate and regular rhythm  Heart sounds: Normal heart sounds  No murmur  Pulmonary:      Effort: Pulmonary effort is normal       Breath sounds: Normal breath sounds  Abdominal:      General: Bowel sounds are normal       Palpations: Abdomen is soft  Genitourinary:     Comments: Resendez catheter per urethra draining clear light peach/radha urine on slow CBI  Musculoskeletal: Normal range of motion  Skin:     General: Skin is warm and dry  Capillary Refill: Capillary refill takes less than 2 seconds  Coloration: Skin is not pale  Neurological:      Mental Status: He is alert and oriented to person, place, and time  Gait: Gait normal    Psychiatric:         Mood and Affect: Mood normal          Labs:  No results for input(s): WBC in the last 72 hours  No results for input(s): HGB in the last 72 hours  No results for input(s): CREATININE in the last 72 hours      History:    Past Medical History:   Diagnosis Date    Anesthesia     "cant have spinal anesthesia because of curvature of the spine and severe pain"    Anxiety     Arthritis     Athlete's foot     off and on    Benign polyp of large intestine     Cancer (HCC)     thyroid,     Chronic pain disorder     bilat knees    Colon polyp     Curvature of spine     DDD (degenerative disc disease), lumbar     Dental crowns present     Disease of thyroid gland     removed    Dyslipidemia     Enlarged prostate     Exercises daily     "walking/biking/lifts weights"    GERD (gastroesophageal reflux disease)     Hiatal hernia     History of radiation therapy     "iodine pill for thyroid cancer"    Hypertension     IBS (irritable bowel syndrome)     Kidney stone     still has on the left, and small one on right"    Neuropathy     Numbness and tingling of both feet     Retinal hole or tear, bilateral     had laser surgery Dr Karlos Kimball 3-4 yrs ago    Seasonal allergies     Teeth missing     Tension headache     occas    Tinea pedis     Urinary frequency     Urinary urgency     Wears glasses      Past Surgical History:   Procedure Laterality Date    CHOLECYSTECTOMY      COLONOSCOPY      FL RETROGRADE URETHROCYSTOGRAM  2/28/2020    KNEE ARTHROSCOPY Bilateral     knee tim    ND COLONOSCOPY FLX DX W/COLLJ SPEC WHEN PFRMD N/A 7/25/2018    Procedure: COLONOSCOPY;  Surgeon: Caty Barron DO;  Location: [address] [place] [address] GI LAB; Service: General    ND CYSTO/URETERO W/LITHOTRIPSY &INDWELL STENT INSRT Left 2/28/2020    Procedure: CYSTOSCOPY URETEROSCOP RETROGRADE PYELOGRAM AND INSERTION STENT URETERAL;  Surgeon: Rajiv Lim MD;  [facility];  Service: Urology    ND CYSTO/URETERO W/LITHOTRIPSY &INDWELL STENT INSRT Left 3/25/2020    Procedure: CYSTOSCOPY URETEROSCOPIC STONE EXTRACTION, RETROGRADE PYELOGRAM AND INSERTION STENT URETERAL;  Surgeon: Rajiv Lim MD;  [facility];  Service: Urology    ND FRAGMENT KIDNEY STONE/ ESWL Left 7/27/2017    Procedure: Rambo Henning SHOCKWAVE (ESWL); Surgeon: Radha Cuenca DO;  [facility];  Service: Urology    ND REPAIR [address] HERNIA,5+Y/O,REDUCIBL Right 7/23/2019    Procedure: REPAIR INGUINAL HERNIA  DIRECT NO MESH;  Surgeon: Caty Barron DO;  Location: [address] [place] [address] MAIN OR;  Service: General    ND TRANSURETHRAL ELEC-SURG PROSTATECTOM N/A 4/6/2021    Procedure: TRANSURETHRAL RESECTION OF PROSTATE (TURP);   Surgeon: Rajiv Lim MD;  [facility];  Service: Urology    RETINAL LASER PROCEDURE      ROTATOR CUFF REPAIR Left     THYROIDECTOMY      Subtotal and Total Thyroidectomy both mentioned in allscripts    WISDOM TOOTH EXTRACTION       Family History   Problem Relation Age of Onset    Diabetes Mother     Heart disease Father     Breast cancer Sister      Social History     Socioeconomic History    Marital status: /Civil Union     Spouse name: None    Number of children: None    Years of education: None    Highest education level: None   Occupational History    None   Social Needs    Financial resource strain: Not hard at all   Alyssa-Payal insecurity     Worry: Never true     Inability: Never true    Transportation needs     Medical: No     Non-medical: No   Tobacco Use    Smoking status: Former Smoker     Years: 40 00     Quit date: 2014     Years since quittin 8    Smokeless tobacco: Former User   Substance and Sexual Activity    Alcohol use: Yes     Alcohol/week: 2 0 standard drinks     Types: 1 Glasses of wine, 1 Cans of beer per week     Frequency: Monthly or less     Drinks per session: 1 or 2     Binge frequency: Never     Comment: very rarely    Drug use: No    Sexual activity: None   Lifestyle    Physical activity     Days per week: 3 days     Minutes per session: None    Stress:  Only a little   Relationships    Social connections     Talks on phone: Never     Gets together: Twice a week     Attends Buddhism service: More than 4 times per year     Active member of club or organization: Yes     Attends meetings of clubs or organizations: More than 4 times per year     Relationship status:     Intimate partner violence     Fear of current or ex partner: No     Emotionally abused: No     Physically abused: No     Forced sexual activity: No   Other Topics Concern    None   Social History Narrative    None         Euna Angelucci, [place]  Date: 2021 Time: 10:35 AM

## 2021-04-08 ENCOUNTER — TELEPHONE (OUTPATIENT)
Dept: UROLOGY | Facility: MEDICAL CENTER | Age: 66
End: 2021-04-08

## 2021-04-08 NOTE — TELEPHONE ENCOUNTER
Spoke to pt's wife (pt sleeping) to assess how he is doing s/p TURP sx on 4/6 with Dr Mauri Hinds  Pt was discharged yesterday  Pt is doing well with no issues or concerns at this time  Confirmed appt for ma removal/void trial tomorrow and FU to TURP w/pathology with Terrie Gallego on 4/30

## 2021-04-09 ENCOUNTER — PROCEDURE VISIT (OUTPATIENT)
Dept: UROLOGY | Facility: MEDICAL CENTER | Age: 66
End: 2021-04-09
Payer: COMMERCIAL

## 2021-04-09 VITALS
WEIGHT: 300 LBS | DIASTOLIC BLOOD PRESSURE: 88 MMHG | BODY MASS INDEX: 38.5 KG/M2 | HEIGHT: 74 IN | SYSTOLIC BLOOD PRESSURE: 152 MMHG

## 2021-04-09 DIAGNOSIS — N13.8 BPH WITH OBSTRUCTION/LOWER URINARY TRACT SYMPTOMS: Primary | ICD-10-CM

## 2021-04-09 DIAGNOSIS — N40.1 BPH WITH OBSTRUCTION/LOWER URINARY TRACT SYMPTOMS: Primary | ICD-10-CM

## 2021-04-09 LAB — POST-VOID RESIDUAL VOLUME, ML POC: 37 ML

## 2021-04-09 PROCEDURE — 1160F RVW MEDS BY RX/DR IN RCRD: CPT

## 2021-04-09 PROCEDURE — NC001 PR NO CHARGE

## 2021-04-09 PROCEDURE — 51798 US URINE CAPACITY MEASURE: CPT

## 2021-04-09 PROCEDURE — 1036F TOBACCO NON-USER: CPT

## 2021-04-09 PROCEDURE — 3077F SYST BP >= 140 MM HG: CPT

## 2021-04-09 PROCEDURE — 3079F DIAST BP 80-89 MM HG: CPT

## 2021-04-09 NOTE — PROGRESS NOTES
4/9/2021    Liseth Lane Jr   1955  [id number]    Diagnosis  Chief Complaint     Benign Prostatic Hypertrophy          Patient presents for ma catheter removal s/p TURP  managed by Dr Nelly Lamb  Return to the office this afternoon for PVR     Procedure Ma removal/voiding trial    Ma catheter removed after deflation of an intact balloon  Patient tolerated well  Encouraged patient to hydrate well and return this afternoon for post void residual   he knows he may return early if uncomfortable and unable to urinate  Patient agrees to this plan  Patient returned this afternoon  Patient states able to void  Patient voided while in office  Bladder ultrasound performed and PVR measured 37ml            Vitals:    04/09/21 0846   BP: 152/88   BP Location: Left arm   Patient Position: Sitting   Cuff Size: Adult   Weight: 136 kg (300 lb)   Height: 6' 2" (1 88 m)           Ta Elizondo RN

## 2021-04-18 DIAGNOSIS — I10 ESSENTIAL HYPERTENSION: ICD-10-CM

## 2021-04-30 ENCOUNTER — OFFICE VISIT (OUTPATIENT)
Dept: UROLOGY | Facility: MEDICAL CENTER | Age: 66
End: 2021-04-30
Payer: COMMERCIAL

## 2021-04-30 VITALS
SYSTOLIC BLOOD PRESSURE: 120 MMHG | WEIGHT: 306 LBS | HEIGHT: 74 IN | BODY MASS INDEX: 39.27 KG/M2 | HEART RATE: 64 BPM | DIASTOLIC BLOOD PRESSURE: 80 MMHG

## 2021-04-30 DIAGNOSIS — N40.1 BPH WITH OBSTRUCTION/LOWER URINARY TRACT SYMPTOMS: Primary | ICD-10-CM

## 2021-04-30 DIAGNOSIS — N13.8 BPH WITH OBSTRUCTION/LOWER URINARY TRACT SYMPTOMS: Primary | ICD-10-CM

## 2021-04-30 LAB
POST-VOID RESIDUAL VOLUME, ML POC: 53 ML
SL AMB  POCT GLUCOSE, UA: ABNORMAL
SL AMB LEUKOCYTE ESTERASE,UA: ABNORMAL
SL AMB POCT BILIRUBIN,UA: ABNORMAL
SL AMB POCT BLOOD,UA: ABNORMAL
SL AMB POCT CLARITY,UA: CLEAR
SL AMB POCT COLOR,UA: YELLOW
SL AMB POCT KETONES,UA: ABNORMAL
SL AMB POCT NITRITE,UA: ABNORMAL
SL AMB POCT PH,UA: 5
SL AMB POCT SPECIFIC GRAVITY,UA: 1.02
SL AMB POCT URINE PROTEIN: ABNORMAL
SL AMB POCT UROBILINOGEN: 0.2

## 2021-04-30 PROCEDURE — 81003 URINALYSIS AUTO W/O SCOPE: CPT | Performed by: NURSE PRACTITIONER

## 2021-04-30 PROCEDURE — 3008F BODY MASS INDEX DOCD: CPT

## 2021-04-30 PROCEDURE — 51798 US URINE CAPACITY MEASURE: CPT | Performed by: NURSE PRACTITIONER

## 2021-04-30 PROCEDURE — 99024 POSTOP FOLLOW-UP VISIT: CPT | Performed by: NURSE PRACTITIONER

## 2021-04-30 NOTE — LETTER
April 30, 2021     Patient: Zakiya Escamilla  YOB: 1955   Date of Visit: 4/30/2021       To Whom it May Concern:    Lucina Meigs is under my professional care  He was seen in my office on 4/30/2021  He may return to work on 5-3-2021  If you have any questions or concerns, please don't hesitate to call           Sincerely,          JONNA Coronado        CC: No Recipients

## 2021-05-20 DIAGNOSIS — L30.9 DERMATITIS: ICD-10-CM

## 2021-05-20 RX ORDER — BETAMETHASONE DIPROPIONATE 0.5 MG/G
CREAM TOPICAL 2 TIMES DAILY
Qty: 30 G | Refills: 0 | Status: SHIPPED | OUTPATIENT
Start: 2021-05-20 | End: 2021-11-23 | Stop reason: SDUPTHER

## 2021-05-26 ENCOUNTER — OFFICE VISIT (OUTPATIENT)
Dept: FAMILY MEDICINE CLINIC | Facility: CLINIC | Age: 66
End: 2021-05-26
Payer: COMMERCIAL

## 2021-05-26 VITALS
TEMPERATURE: 96.6 F | WEIGHT: 310 LBS | SYSTOLIC BLOOD PRESSURE: 130 MMHG | DIASTOLIC BLOOD PRESSURE: 80 MMHG | BODY MASS INDEX: 39.78 KG/M2 | HEIGHT: 74 IN

## 2021-05-26 DIAGNOSIS — M79.671 PAIN IN BOTH FEET: Primary | ICD-10-CM

## 2021-05-26 DIAGNOSIS — M79.672 PAIN IN BOTH FEET: Primary | ICD-10-CM

## 2021-05-26 PROCEDURE — 1036F TOBACCO NON-USER: CPT | Performed by: FAMILY MEDICINE

## 2021-05-26 PROCEDURE — 3008F BODY MASS INDEX DOCD: CPT | Performed by: FAMILY MEDICINE

## 2021-05-26 PROCEDURE — 3075F SYST BP GE 130 - 139MM HG: CPT | Performed by: FAMILY MEDICINE

## 2021-05-26 PROCEDURE — 99213 OFFICE O/P EST LOW 20 MIN: CPT | Performed by: FAMILY MEDICINE

## 2021-05-26 PROCEDURE — 1160F RVW MEDS BY RX/DR IN RCRD: CPT | Performed by: FAMILY MEDICINE

## 2021-05-26 PROCEDURE — 3079F DIAST BP 80-89 MM HG: CPT | Performed by: FAMILY MEDICINE

## 2021-05-26 RX ORDER — PREDNISONE 10 MG/1
TABLET ORAL
Qty: 63 TABLET | Refills: 0 | Status: SHIPPED | OUTPATIENT
Start: 2021-05-26 | End: 2021-07-02

## 2021-05-26 RX ORDER — CLOTRIMAZOLE AND BETAMETHASONE DIPROPIONATE 10; .64 MG/G; MG/G
CREAM TOPICAL 2 TIMES DAILY
Qty: 30 G | Refills: 0 | Status: SHIPPED | OUTPATIENT
Start: 2021-05-26 | End: 2021-11-23 | Stop reason: SDUPTHER

## 2021-05-26 NOTE — PROGRESS NOTES
Assessment/Plan:       Diagnoses and all orders for this visit:    Pain in both feet  -     CBC and differential; Future  -     Comprehensive metabolic panel; Future  -     Lipid panel; Future  -     TSH, 3rd generation with Free T4 reflex; Future  -     Uric acid; Future  -     clotrimazole-betamethasone (LOTRISONE) 1-0 05 % cream; Apply topically 2 (two) times a day  -     predniSONE 10 mg tablet; 6 pills daily for 3 days, 5 for 3 days, 4 for 3 days, 3 for 3 days, 2 for 2 days, 1 for 3 days  Subjective:        Patient ID: Angela Murphy  is a 77 y o  male  Patient is here with swelling in bilateral feet right foot near Achilles tendon and also patient having pain in ball of foot your distal metatarsals  Patient also having pain in left foot and swelling  The patient is using Celebrex  The patient use Eucerin and dorsal        The following portions of the patient's history were reviewed and updated as appropriate: allergies, current medications, past family history, past medical history, past social history, past surgical history and problem list       Review of Systems   Constitutional: Negative  HENT: Negative  Eyes: Negative  Respiratory: Negative  Cardiovascular: Positive for leg swelling  Gastrointestinal: Negative  Endocrine: Negative  Genitourinary: Negative  Musculoskeletal: Positive for arthralgias  Skin: Negative  Allergic/Immunologic: Negative  Neurological: Positive for numbness  Hematological: Negative  Psychiatric/Behavioral: Negative  Objective:      BMI Counseling: Body mass index is 39 8 kg/m²  The BMI is above normal  Nutrition recommendations include decreasing portion sizes  Exercise recommendations include moderate physical activity 150 minutes/week  Depression Screening and Follow-up Plan: Clincally patient does not have depression  No treatment is required               /80 (BP Location: Left arm, Patient Position: Sitting, Cuff Size: Large)   Temp (!) 96 6 °F (35 9 °C) (Tympanic)   Ht 6' 2" (1 88 m)   Wt (!) 141 kg (310 lb)   BMI 39 80 kg/m²          Physical Exam  Constitutional:       Appearance: Normal appearance  Musculoskeletal:         General: Swelling and tenderness present  Comments: Swelling medial aspect of Achilles tendon at insertion  Patient with some pain over the distal metatarsals on the right foot  Patient with swelling lateral aspect of the left foot   Skin:     Capillary Refill: Capillary refill takes less than 2 seconds  Neurological:      Mental Status: He is alert and oriented to person, place, and time  Mental status is at baseline  Psychiatric:         Mood and Affect: Mood normal          Behavior: Behavior normal          Thought Content:  Thought content normal

## 2021-05-27 DIAGNOSIS — N20.0 CALCULUS OF KIDNEY: ICD-10-CM

## 2021-05-27 RX ORDER — ALLOPURINOL 300 MG/1
TABLET ORAL
Qty: 90 TABLET | Refills: 3 | Status: SHIPPED | OUTPATIENT
Start: 2021-05-27 | End: 2021-11-24 | Stop reason: SDUPTHER

## 2021-06-05 ENCOUNTER — APPOINTMENT (OUTPATIENT)
Dept: LAB | Facility: MEDICAL CENTER | Age: 66
End: 2021-06-05
Payer: COMMERCIAL

## 2021-06-05 DIAGNOSIS — M79.672 PAIN IN BOTH FEET: ICD-10-CM

## 2021-06-05 DIAGNOSIS — M79.671 PAIN IN BOTH FEET: ICD-10-CM

## 2021-06-05 LAB
ALBUMIN SERPL BCP-MCNC: 3.7 G/DL (ref 3.5–5)
ALP SERPL-CCNC: 70 U/L (ref 46–116)
ALT SERPL W P-5'-P-CCNC: 46 U/L (ref 12–78)
ANION GAP SERPL CALCULATED.3IONS-SCNC: 4 MMOL/L (ref 4–13)
AST SERPL W P-5'-P-CCNC: 11 U/L (ref 5–45)
BASOPHILS # BLD AUTO: 0.05 THOUSANDS/ΜL (ref 0–0.1)
BASOPHILS NFR BLD AUTO: 0 % (ref 0–1)
BILIRUB SERPL-MCNC: 0.33 MG/DL (ref 0.2–1)
BUN SERPL-MCNC: 24 MG/DL (ref 5–25)
CALCIUM SERPL-MCNC: 9.1 MG/DL (ref 8.3–10.1)
CHLORIDE SERPL-SCNC: 110 MMOL/L (ref 100–108)
CHOLEST SERPL-MCNC: 202 MG/DL (ref 50–200)
CO2 SERPL-SCNC: 27 MMOL/L (ref 21–32)
CREAT SERPL-MCNC: 0.94 MG/DL (ref 0.6–1.3)
EOSINOPHIL # BLD AUTO: 0.04 THOUSAND/ΜL (ref 0–0.61)
EOSINOPHIL NFR BLD AUTO: 0 % (ref 0–6)
ERYTHROCYTE [DISTWIDTH] IN BLOOD BY AUTOMATED COUNT: 12.9 % (ref 11.6–15.1)
GFR SERPL CREATININE-BSD FRML MDRD: 84 ML/MIN/1.73SQ M
GLUCOSE P FAST SERPL-MCNC: 100 MG/DL (ref 65–99)
HCT VFR BLD AUTO: 48.8 % (ref 36.5–49.3)
HDLC SERPL-MCNC: 50 MG/DL
HGB BLD-MCNC: 16.2 G/DL (ref 12–17)
IMM GRANULOCYTES # BLD AUTO: 0.22 THOUSAND/UL (ref 0–0.2)
IMM GRANULOCYTES NFR BLD AUTO: 2 % (ref 0–2)
LDLC SERPL CALC-MCNC: 125 MG/DL (ref 0–100)
LYMPHOCYTES # BLD AUTO: 2.93 THOUSANDS/ΜL (ref 0.6–4.47)
LYMPHOCYTES NFR BLD AUTO: 20 % (ref 14–44)
MCH RBC QN AUTO: 30.1 PG (ref 26.8–34.3)
MCHC RBC AUTO-ENTMCNC: 33.2 G/DL (ref 31.4–37.4)
MCV RBC AUTO: 91 FL (ref 82–98)
MONOCYTES # BLD AUTO: 0.86 THOUSAND/ΜL (ref 0.17–1.22)
MONOCYTES NFR BLD AUTO: 6 % (ref 4–12)
NEUTROPHILS # BLD AUTO: 10.59 THOUSANDS/ΜL (ref 1.85–7.62)
NEUTS SEG NFR BLD AUTO: 72 % (ref 43–75)
NONHDLC SERPL-MCNC: 152 MG/DL
NRBC BLD AUTO-RTO: 0 /100 WBCS
PLATELET # BLD AUTO: 206 THOUSANDS/UL (ref 149–390)
PMV BLD AUTO: 8.7 FL (ref 8.9–12.7)
POTASSIUM SERPL-SCNC: 4.7 MMOL/L (ref 3.5–5.3)
PROT SERPL-MCNC: 6.7 G/DL (ref 6.4–8.2)
RBC # BLD AUTO: 5.38 MILLION/UL (ref 3.88–5.62)
SODIUM SERPL-SCNC: 141 MMOL/L (ref 136–145)
TRIGL SERPL-MCNC: 134 MG/DL
TSH SERPL DL<=0.05 MIU/L-ACNC: 1.61 UIU/ML (ref 0.36–3.74)
URATE SERPL-MCNC: 2.6 MG/DL (ref 4.2–8)
WBC # BLD AUTO: 14.69 THOUSAND/UL (ref 4.31–10.16)

## 2021-06-05 PROCEDURE — 84443 ASSAY THYROID STIM HORMONE: CPT

## 2021-06-05 PROCEDURE — 84550 ASSAY OF BLOOD/URIC ACID: CPT

## 2021-06-05 PROCEDURE — 36415 COLL VENOUS BLD VENIPUNCTURE: CPT

## 2021-06-05 PROCEDURE — 85025 COMPLETE CBC W/AUTO DIFF WBC: CPT

## 2021-06-05 PROCEDURE — 80061 LIPID PANEL: CPT

## 2021-06-05 PROCEDURE — 80053 COMPREHEN METABOLIC PANEL: CPT

## 2021-06-07 DIAGNOSIS — L03.119 CELLULITIS OF LOWER EXTREMITY, UNSPECIFIED LATERALITY: Primary | ICD-10-CM

## 2021-06-07 RX ORDER — CEPHALEXIN 500 MG/1
500 CAPSULE ORAL EVERY 6 HOURS SCHEDULED
COMMUNITY
End: 2021-06-07 | Stop reason: SDUPTHER

## 2021-06-07 RX ORDER — CEPHALEXIN 500 MG/1
CAPSULE ORAL
Qty: 30 CAPSULE | Refills: 0 | Status: SHIPPED | OUTPATIENT
Start: 2021-06-07 | End: 2021-06-17

## 2021-07-02 ENCOUNTER — OFFICE VISIT (OUTPATIENT)
Dept: FAMILY MEDICINE CLINIC | Facility: CLINIC | Age: 66
End: 2021-07-02
Payer: COMMERCIAL

## 2021-07-02 VITALS
HEIGHT: 74 IN | BODY MASS INDEX: 39.32 KG/M2 | TEMPERATURE: 96 F | WEIGHT: 306.4 LBS | SYSTOLIC BLOOD PRESSURE: 138 MMHG | DIASTOLIC BLOOD PRESSURE: 80 MMHG

## 2021-07-02 DIAGNOSIS — I10 ESSENTIAL HYPERTENSION: ICD-10-CM

## 2021-07-02 DIAGNOSIS — E03.9 HYPOTHYROIDISM, UNSPECIFIED TYPE: ICD-10-CM

## 2021-07-02 DIAGNOSIS — G62.9 NEUROPATHY: ICD-10-CM

## 2021-07-02 PROCEDURE — 1124F ACP DISCUSS-NO DSCNMKR DOCD: CPT | Performed by: FAMILY MEDICINE

## 2021-07-02 PROCEDURE — 3008F BODY MASS INDEX DOCD: CPT | Performed by: FAMILY MEDICINE

## 2021-07-02 PROCEDURE — 99213 OFFICE O/P EST LOW 20 MIN: CPT | Performed by: FAMILY MEDICINE

## 2021-07-02 RX ORDER — CELECOXIB 200 MG/1
200 CAPSULE ORAL DAILY
Qty: 90 CAPSULE | Refills: 1 | Status: SHIPPED | OUTPATIENT
Start: 2021-07-02

## 2021-07-02 NOTE — PROGRESS NOTES
Assessment/Plan:  Patient use the ice, Celebrex as directed  Patient may need to see Podiatry  Patient does not wish steroids at the present time  Diagnoses and all orders for this visit:    Hypothyroidism, unspecified type  -     celecoxib (CeleBREX) 200 mg capsule; Take 1 capsule (200 mg total) by mouth daily    Essential hypertension  -     celecoxib (CeleBREX) 200 mg capsule; Take 1 capsule (200 mg total) by mouth daily    Neuropathy  -     celecoxib (CeleBREX) 200 mg capsule; Take 1 capsule (200 mg total) by mouth daily            Subjective:        Patient ID: Genna Mora  is a 77 y o  male  Patient is here due to swelling of his right Achilles region and left lateral foot  Patient using ice with some improvement in pain and swelling  Patient has seen Podiatry in the past   No new trauma  No treatment other than ice  The following portions of the patient's history were reviewed and updated as appropriate: allergies, current medications, past family history, past medical history, past social history, past surgical history and problem list       Review of Systems   Constitutional: Negative  HENT: Negative  Eyes: Negative  Respiratory: Negative  Cardiovascular: Negative  Gastrointestinal: Negative  Endocrine: Negative  Genitourinary: Negative  Musculoskeletal: Positive for arthralgias  Skin: Negative  Allergic/Immunologic: Negative  Neurological: Negative  Hematological: Negative  Psychiatric/Behavioral: Negative  Objective:        Depression Screening and Follow-up Plan: Clincally patient does not have depression  No treatment is required  /80 (BP Location: Right arm, Patient Position: Sitting, Cuff Size: Standard)   Temp (!) 96 °F (35 6 °C) (Tympanic)   Ht 6' 2" (1 88 m)   Wt (!) 139 kg (306 lb 6 4 oz)   BMI 39 34 kg/m²          Physical Exam  Constitutional:       General: He is not in acute distress  Appearance: Normal appearance  He is not ill-appearing, toxic-appearing or diaphoretic  HENT:      Head: Normocephalic and atraumatic  Musculoskeletal:         General: Tenderness present  Comments: Swelling over Achilles insertion on the right medially  Some pain with palpation  Patient also with some swelling and pain lateral left foot  Skin:     Capillary Refill: Capillary refill takes less than 2 seconds  Findings: No erythema or rash  Neurological:      Mental Status: He is alert  Psychiatric:         Mood and Affect: Mood normal          Behavior: Behavior normal          Thought Content:  Thought content normal

## 2021-07-07 ENCOUNTER — TELEPHONE (OUTPATIENT)
Dept: UROLOGY | Facility: MEDICAL CENTER | Age: 66
End: 2021-07-07

## 2021-07-07 NOTE — TELEPHONE ENCOUNTER
Patient had surgery, and wants to know when he can go back to exercising? Can he go bike riding again? If so how much can he do? Patient can be reached at [phone]

## 2021-07-07 NOTE — TELEPHONE ENCOUNTER
Spoke with pts with regarding pts question since he was unavailable  Dr Cristina Goins confirmed that pt can return to riding his bike and lifting wts since his surgery was 4/6/21  He is now 3 months po turp  Pt should start off slow since it's been 3 month out and not over do it

## 2021-07-29 DIAGNOSIS — N13.8 BPH WITH OBSTRUCTION/LOWER URINARY TRACT SYMPTOMS: ICD-10-CM

## 2021-07-29 DIAGNOSIS — N40.1 BPH WITH OBSTRUCTION/LOWER URINARY TRACT SYMPTOMS: ICD-10-CM

## 2021-07-29 RX ORDER — DUTASTERIDE 0.5 MG/1
CAPSULE, LIQUID FILLED ORAL
Qty: 90 CAPSULE | Refills: 3 | Status: SHIPPED | OUTPATIENT
Start: 2021-07-29 | End: 2021-08-05

## 2021-08-05 ENCOUNTER — OFFICE VISIT (OUTPATIENT)
Dept: UROLOGY | Facility: MEDICAL CENTER | Age: 66
End: 2021-08-05
Payer: COMMERCIAL

## 2021-08-05 VITALS
WEIGHT: 306 LBS | BODY MASS INDEX: 39.27 KG/M2 | HEIGHT: 74 IN | SYSTOLIC BLOOD PRESSURE: 120 MMHG | HEART RATE: 61 BPM | DIASTOLIC BLOOD PRESSURE: 80 MMHG

## 2021-08-05 DIAGNOSIS — Z12.5 PROSTATE CANCER SCREENING: ICD-10-CM

## 2021-08-05 DIAGNOSIS — N40.1 BPH WITH OBSTRUCTION/LOWER URINARY TRACT SYMPTOMS: Primary | ICD-10-CM

## 2021-08-05 DIAGNOSIS — N13.8 BPH WITH OBSTRUCTION/LOWER URINARY TRACT SYMPTOMS: Primary | ICD-10-CM

## 2021-08-05 LAB — POST-VOID RESIDUAL VOLUME, ML POC: 29 ML

## 2021-08-05 PROCEDURE — 1160F RVW MEDS BY RX/DR IN RCRD: CPT | Performed by: NURSE PRACTITIONER

## 2021-08-05 PROCEDURE — 1036F TOBACCO NON-USER: CPT | Performed by: NURSE PRACTITIONER

## 2021-08-05 PROCEDURE — 51798 US URINE CAPACITY MEASURE: CPT | Performed by: NURSE PRACTITIONER

## 2021-08-05 PROCEDURE — 3079F DIAST BP 80-89 MM HG: CPT | Performed by: NURSE PRACTITIONER

## 2021-08-05 PROCEDURE — 3074F SYST BP LT 130 MM HG: CPT | Performed by: NURSE PRACTITIONER

## 2021-08-05 PROCEDURE — 3008F BODY MASS INDEX DOCD: CPT | Performed by: NURSE PRACTITIONER

## 2021-08-05 PROCEDURE — 99213 OFFICE O/P EST LOW 20 MIN: CPT | Performed by: NURSE PRACTITIONER

## 2021-08-05 NOTE — PROGRESS NOTES
8/5/2021    Assessment and Plan    77 y o  male managed by Dr George Smith  1  Benign prostatic hyperplasia with lower urinary tract symptoms  · Status post TURP 04/06/2021; approximately 8 g of tissue resected-negative for malignancy on pathology  · Bladder scan PVR- 29 ml  · Discussed need to maintain adequate hydration upwards to 40 oz of water intake per day while avoiding bladder irritating foods and beverages  · Continue with pelvic floor exercises  · Follow up in the office in 6 months with bladder scan PVR    2  Prostate Cancer Screening  · PSA and CYNTHIA in 6 months       History of Present Illness  Allie Shultz  is a 77 y o  male here for follow up evaluation of urinary symptoms secondary to benign prostatic hyperplasia  Patient is status post TURP 04/06/2021  He is doing well postoperatively with reports of a stronger urinary stream   He reports adequate sensation of bladder emptying after urinating  He denies dysuria and hematuria  He continues to have mild urethral irritation after urinating but is not bothered by the symptoms at this time  He reports getting up less at night to urinate  He is very happy with his current urinary status he reports less urinary hesitancy and less postvoid dribbling after urinating  He reports a strong urinary stream   He is very happy with his urinary symptoms at this time          Review of Systems   Constitutional: Negative for chills and fever  Respiratory: Negative for cough and shortness of breath  Cardiovascular: Negative for chest pain  Gastrointestinal: Negative for abdominal distention, abdominal pain, blood in stool, nausea and vomiting  Genitourinary: Negative for difficulty urinating, dysuria, enuresis, flank pain, frequency, hematuria and urgency  Skin: Negative for rash       Past Medical History  Past Medical History:   Diagnosis Date    Anesthesia     "cant have spinal anesthesia because of curvature of the spine and severe pain"    Anxiety     Arthritis     Athlete's foot     off and on    Benign polyp of large intestine     Cancer (HCC)     thyroid,     Chronic pain disorder     bilat knees    Colon polyp     Curvature of spine     DDD (degenerative disc disease), lumbar     Dental crowns present     Disease of thyroid gland     removed    Dyslipidemia     Enlarged prostate     Exercises daily     "walking/biking/lifts weights"    GERD (gastroesophageal reflux disease)     Hiatal hernia     History of radiation therapy     "iodine pill for thyroid cancer"    Hypertension     IBS (irritable bowel syndrome)     Kidney stone     still has on the left, and small one on right"    Neuropathy     Numbness and tingling of both feet     Retinal hole or tear, bilateral     had laser surgery Dr Brennan Stone 3-4 yrs ago    Seasonal allergies     Teeth missing     Tension headache     occas    Tinea pedis     Urinary frequency     Urinary urgency     Wears glasses        Past Social History  Past Surgical History:   Procedure Laterality Date    CHOLECYSTECTOMY      COLONOSCOPY      FL RETROGRADE URETHROCYSTOGRAM  2/28/2020    KNEE ARTHROSCOPY Bilateral     knee tim    AR COLONOSCOPY FLX DX W/COLLJ SPEC WHEN PFRMD N/A 7/25/2018    Procedure: COLONOSCOPY;  Surgeon: Robert Meyer DO;  Location: [address] [place] [address] GI LAB; Service: General    AR CYSTO/URETERO W/LITHOTRIPSY &INDWELL STENT INSRT Left 2/28/2020    Procedure: CYSTOSCOPY URETEROSCOP RETROGRADE PYELOGRAM AND INSERTION STENT URETERAL;  Surgeon: Emily Coffey MD;  [facility];  Service: Urology    AR CYSTO/URETERO W/LITHOTRIPSY &INDWELL STENT INSRT Left 3/25/2020    Procedure: CYSTOSCOPY URETEROSCOPIC STONE EXTRACTION, RETROGRADE PYELOGRAM AND INSERTION STENT URETERAL;  Surgeon: Emily Coffey MD;  [facility];  Service: Urology    AR FRAGMENT KIDNEY STONE/ ESWL Left 7/27/2017    Procedure: Kathia Murphy SHOCKWAVE (ESWL);   Surgeon: Pretty Bruce Manny Cho DO;  [facility];  Service: Urology    AZ REPAIR [address] HERNIA,5+Y/O,REDUCIBL Right 2019    Procedure: REPAIR INGUINAL HERNIA  DIRECT NO MESH;  Surgeon: Robert Meyer DO;  Location: [address] [place] [address] MAIN OR;  Service: General    AZ TRANSURETHRAL ELEC-SURG PROSTATECTOM N/A 2021    Procedure: TRANSURETHRAL RESECTION OF PROSTATE (TURP); Surgeon: Emily Coffey MD;  [facility];  Service: Urology    RETINAL LASER PROCEDURE      ROTATOR CUFF REPAIR Left     THYROIDECTOMY      Subtotal and Total Thyroidectomy both mentioned in allscripts    WISDOM TOOTH EXTRACTION       Social History     Tobacco Use   Smoking Status Former Smoker    Years: 40     Quit date: 2014    Years since quittin 1   Smokeless Tobacco Former User       Past Family History  Family History   Problem Relation Age of Onset    Diabetes Mother     Heart disease Father     Breast cancer Sister        Past Social history  Social History     Socioeconomic History    Marital status: /Civil Union     Spouse name: Not on file    Number of children: Not on file    Years of education: Not on file    Highest education level: Not on file   Occupational History    Not on file   Tobacco Use    Smoking status: Former Smoker     Years: 40      Quit date: 2014     Years since quittin 1    Smokeless tobacco: Former User   Vaping Use    Vaping Use: Never used   Substance and Sexual Activity    Alcohol use:  Yes     Alcohol/week: 2 0 standard drinks     Types: 1 Glasses of wine, 1 Cans of beer per week     Comment: very rarely    Drug use: No    Sexual activity: Not on file   Other Topics Concern    Not on file   Social History Narrative    Not on file     Social Determinants of Health     Financial Resource Strain: Low Risk     Difficulty of Paying Living Expenses: Not hard at all   Food Insecurity: No Food Insecurity    Worried About Running Out of Food in the Last Year: Never true   [employer] of Food in the Last Year: Never true   Transportation Needs: No Transportation Needs    Lack of Transportation (Medical): No    Lack of Transportation (Non-Medical): No   Physical Activity: Unknown    Days of Exercise per Week: 3 days    Minutes of Exercise per Session: Not on file   Stress: No Stress Concern Present    Feeling of Stress : Only a little   Social Connections: Moderately Integrated    Frequency of Communication with Friends and Family: Never    Frequency of Social Gatherings with Friends and Family: Twice a week    Attends Anabaptist Services: More than 4 times per year    Active Member of [employer] or Organizations:  Yes    Attends Club or Organization Meetings: More than 4 times per year    Marital Status:    Intimate Partner Violence: Not At Risk    Fear of Current or Ex-Partner: No    Emotionally Abused: No    Physically Abused: No    Sexually Abused: No       Current Medications  Current Outpatient Medications   Medication Sig Dispense Refill    acetaminophen (TYLENOL) 325 mg tablet Take 650 mg by mouth every 6 (six) hours as needed for mild pain      allopurinol (ZYLOPRIM) 300 mg tablet TAKE 1 TABLET DAILY 90 tablet 3    betamethasone dipropionate (DIPROSONE) 0 05 % cream Apply topically 2 (two) times a day 30 g 0    celecoxib (CeleBREX) 200 mg capsule Take 1 capsule (200 mg total) by mouth daily 90 capsule 1    chlordiazepoxide-clidinium (LIBRAX) 5-2 5 mg per capsule Take 1 capsule by mouth as needed for indigestion      clotrimazole-betamethasone (LOTRISONE) 1-0 05 % cream Apply topically 2 (two) times a day 30 g 0    levothyroxine 200 mcg tablet TAKE 1 TABLET DAILY 90 tablet 3    levothyroxine 25 mcg tablet Take 1 tablet (25 mcg total) by mouth daily 90 tablet 1    MAGNESIUM PO Take by mouth as needed       Multiple Vitamin (MULTIVITAMIN) tablet Take 1 tablet by mouth daily      omeprazole (PriLOSEC) 20 mg delayed release capsule TAKE 1 CAPSULE DAILY 90 capsule 3    verapamil (CALAN-SR) 180 mg CR tablet TAKE 1 TABLET DAILY AS DIRECTED 90 tablet 1    vitamin E, tocopherol, 400 units capsule Take 400 Units by mouth daily      COVID-19 mRNA Virus Vaccine (MODERNA COVID-19 VACCINE IM) Inject into a muscle 2nd dose received on 3/9/2021 (Patient not taking: Reported on 8/5/2021)      docusate sodium (COLACE) 100 mg capsule Take 1 capsule (100 mg total) by mouth 2 (two) times a day for 15 days (Patient not taking: Reported on 1/7/2021) 30 capsule 0    naproxen (NAPROSYN) 500 mg tablet Take 1 tablet (500 mg total) by mouth 2 (two) times a day with meals for 5 days (Patient not taking: Reported on 1/7/2021) 10 tablet 0    oxybutynin (DITROPAN-XL) 10 MG 24 hr tablet Take 1 tablet (10 mg total) by mouth daily at bedtime for 4 days 4 tablet 0     No current facility-administered medications for this visit  Allergies  No Known Allergies      The following portions of the patient's history were reviewed and updated as appropriate: allergies, current medications, past medical history, past social history, past surgical history and problem list       Vitals  Vitals:    08/05/21 1134   BP: 120/80   Pulse: 61   Weight: (!) 139 kg (306 lb)   Height: 6' 2" (1 88 m)     Physical Exam  Physical Exam  Vitals reviewed  Constitutional:       General: He is not in acute distress  Appearance: Normal appearance  He is normal weight  HENT:      Head: Normocephalic  Pulmonary:      Effort: No respiratory distress  Breath sounds: Normal breath sounds  Skin:     General: Skin is warm and dry  Neurological:      General: No focal deficit present  Mental Status: He is alert and oriented to person, place, and time     Psychiatric:         Mood and Affect: Mood normal          Behavior: Behavior normal        Results  Recent Results (from the past 1 hour(s))   POCT Measure PVR    Collection Time: 08/05/21 11:46 AM   Result Value Ref Range    POST-VOID RESIDUAL VOLUME, ML POC 29 mL   ]  Lab Results   Component Value Date    PSA 0 4 12/29/2020    PSA 0 2 02/10/2018    PSA 0 3 05/07/2016     Lab Results   Component Value Date    GLUCOSE 91 06/27/2015    CALCIUM 9 1 06/05/2021     06/27/2015    K 4 7 06/05/2021    CO2 27 06/05/2021     (H) 06/05/2021    BUN 24 06/05/2021    CREATININE 0 94 06/05/2021     Lab Results   Component Value Date    WBC 14 69 (H) 06/05/2021    HGB 16 2 06/05/2021    HCT 48 8 06/05/2021    MCV 91 06/05/2021     06/05/2021     Orders  Orders Placed This Encounter   Procedures    PSA, Total Screen     This is a patient instruction: This test is non-fasting  Please drink two glasses of water morning of bloodwork          Standing Status:   Future     Standing Expiration Date:   2/5/2023   Katherin Hunt POCT Measure PVR       JONNA Donald

## 2021-08-11 ENCOUNTER — RA CDI HCC (OUTPATIENT)
Dept: OTHER | Facility: HOSPITAL | Age: 66
End: 2021-08-11

## 2021-08-11 NOTE — PROGRESS NOTES
[facility]  coding opportunities       Chart reviewed, no opportunity found: CHART REVIEWED, NO OPPORTUNITY FOUND                        Patients insurance company:  [employer] [facility] (Medicare Advantage and Commercial)

## 2021-08-18 ENCOUNTER — OFFICE VISIT (OUTPATIENT)
Dept: FAMILY MEDICINE CLINIC | Facility: CLINIC | Age: 66
End: 2021-08-18
Payer: COMMERCIAL

## 2021-08-18 VITALS
TEMPERATURE: 96.8 F | BODY MASS INDEX: 39.4 KG/M2 | WEIGHT: 307 LBS | HEIGHT: 74 IN | SYSTOLIC BLOOD PRESSURE: 122 MMHG | DIASTOLIC BLOOD PRESSURE: 80 MMHG

## 2021-08-18 DIAGNOSIS — Z00.00 MEDICARE ANNUAL WELLNESS VISIT, SUBSEQUENT: ICD-10-CM

## 2021-08-18 DIAGNOSIS — I10 ESSENTIAL HYPERTENSION: ICD-10-CM

## 2021-08-18 DIAGNOSIS — E03.9 HYPOTHYROIDISM, UNSPECIFIED TYPE: Primary | ICD-10-CM

## 2021-08-18 DIAGNOSIS — R22.41 LOCALIZED SWELLING OF RIGHT FOOT: ICD-10-CM

## 2021-08-18 DIAGNOSIS — G62.9 NEUROPATHY: ICD-10-CM

## 2021-08-18 PROCEDURE — 3079F DIAST BP 80-89 MM HG: CPT | Performed by: FAMILY MEDICINE

## 2021-08-18 PROCEDURE — 3008F BODY MASS INDEX DOCD: CPT | Performed by: FAMILY MEDICINE

## 2021-08-18 PROCEDURE — 1160F RVW MEDS BY RX/DR IN RCRD: CPT | Performed by: FAMILY MEDICINE

## 2021-08-18 PROCEDURE — 1036F TOBACCO NON-USER: CPT | Performed by: FAMILY MEDICINE

## 2021-08-18 PROCEDURE — 3725F SCREEN DEPRESSION PERFORMED: CPT | Performed by: FAMILY MEDICINE

## 2021-08-18 PROCEDURE — 3288F FALL RISK ASSESSMENT DOCD: CPT | Performed by: FAMILY MEDICINE

## 2021-08-18 PROCEDURE — 99214 OFFICE O/P EST MOD 30 MIN: CPT | Performed by: FAMILY MEDICINE

## 2021-08-18 PROCEDURE — 1170F FXNL STATUS ASSESSED: CPT | Performed by: FAMILY MEDICINE

## 2021-08-18 PROCEDURE — 3074F SYST BP LT 130 MM HG: CPT | Performed by: FAMILY MEDICINE

## 2021-08-18 PROCEDURE — G0439 PPPS, SUBSEQ VISIT: HCPCS | Performed by: FAMILY MEDICINE

## 2021-08-18 PROCEDURE — 1125F AMNT PAIN NOTED PAIN PRSNT: CPT | Performed by: FAMILY MEDICINE

## 2021-08-18 RX ORDER — LEVOTHYROXINE SODIUM 0.2 MG/1
200 TABLET ORAL DAILY
Qty: 90 TABLET | Refills: 1 | Status: SHIPPED | OUTPATIENT
Start: 2021-08-18 | End: 2022-02-24 | Stop reason: SDUPTHER

## 2021-08-18 RX ORDER — LEVOTHYROXINE SODIUM 0.03 MG/1
25 TABLET ORAL DAILY
Qty: 90 TABLET | Refills: 1 | Status: SHIPPED | OUTPATIENT
Start: 2021-08-18 | End: 2021-11-23 | Stop reason: SDUPTHER

## 2021-08-18 NOTE — PATIENT INSTRUCTIONS

## 2021-08-18 NOTE — PROGRESS NOTES
Assessment/Plan:  Patient follow-up Dr Esdras Richter for bilateral foot pain  Refills given for hypothyroidism  Patient have laboratory studies prior to next visit  Records reviewed  Follow-up in 6 months       Diagnoses and all orders for this visit:    Medicare annual wellness visit, subsequent    Hypothyroidism, unspecified type  -     levothyroxine 25 mcg tablet; Take 1 tablet (25 mcg total) by mouth daily  -     levothyroxine 200 mcg tablet; Take 1 tablet (200 mcg total) by mouth daily    Essential hypertension    Neuropathy    Localized swelling of right foot            Subjective:        Patient ID: Shani Lennon  is a 77 y o  male  Patient here to follow-up on hypothyroidism hyperlipidemia and neuropathy as well as localized swelling over the right Achilles distally as well as the lateral left foot  This is causing patient pain  Patient also with neuropathy  The following portions of the patient's history were reviewed and updated as appropriate: allergies, current medications, past family history, past medical history, past social history, past surgical history and problem list       Review of Systems   Constitutional: Negative  HENT: Negative  Eyes: Negative  Respiratory: Negative  Cardiovascular: Negative  Gastrointestinal: Negative  Endocrine: Negative  Genitourinary: Negative  Musculoskeletal: Positive for arthralgias and gait problem  Skin:        Callus right foot   Allergic/Immunologic: Negative  Hematological: Negative  Psychiatric/Behavioral: Negative  Objective:               /80 (BP Location: Left arm, Patient Position: Sitting, Cuff Size: Large)   Temp (!) 96 8 °F (36 °C) (Tympanic)   Ht 6' 2" (1 88 m)   Wt (!) 139 kg (307 lb)   BMI 39 42 kg/m²          Physical Exam  Vitals and nursing note reviewed  Constitutional:       General: He is not in acute distress  Appearance: Normal appearance   He is not ill-appearing, toxic-appearing or diaphoretic  HENT:      Head: Normocephalic and atraumatic  Right Ear: Tympanic membrane, ear canal and external ear normal  There is no impacted cerumen  Left Ear: Tympanic membrane, ear canal and external ear normal  There is no impacted cerumen  Nose: Nose normal  No congestion or rhinorrhea  Mouth/Throat:      Mouth: Mucous membranes are moist       Pharynx: No oropharyngeal exudate or posterior oropharyngeal erythema  Eyes:      General: No scleral icterus  Right eye: No discharge  Left eye: No discharge  Extraocular Movements: Extraocular movements intact  Conjunctiva/sclera: Conjunctivae normal       Pupils: Pupils are equal, round, and reactive to light  Neck:      Vascular: No carotid bruit  Cardiovascular:      Rate and Rhythm: Normal rate and regular rhythm  Pulses: Normal pulses  Heart sounds: Normal heart sounds  No murmur heard  No friction rub  No gallop  Pulmonary:      Effort: Pulmonary effort is normal  No respiratory distress  Breath sounds: Normal breath sounds  No stridor  No wheezing, rhonchi or rales  Chest:      Chest wall: No tenderness  Abdominal:      General: Abdomen is flat  Bowel sounds are normal  There is no distension  Palpations: Abdomen is soft  Tenderness: There is no abdominal tenderness  There is no guarding or rebound  Musculoskeletal:         General: Tenderness present  No swelling, deformity or signs of injury  Cervical back: Normal range of motion and neck supple  No rigidity  No muscular tenderness  Right lower leg: No edema  Left lower leg: No edema  Comments: The swelling over left distal Achilles and left foot laterally  Lymphadenopathy:      Cervical: No cervical adenopathy  Skin:     General: Skin is warm and dry  Capillary Refill: Capillary refill takes less than 2 seconds  Coloration: Skin is not jaundiced        Findings: No bruising, erythema, lesion or rash  Neurological:      Mental Status: He is alert and oriented to person, place, and time  Mental status is at baseline  Cranial Nerves: No cranial nerve deficit  Sensory: No sensory deficit  Motor: No weakness  Coordination: Coordination normal       Gait: Gait normal    Psychiatric:         Mood and Affect: Mood normal          Behavior: Behavior normal          Thought Content:  Thought content normal          Judgment: Judgment normal

## 2021-08-18 NOTE — PROGRESS NOTES
Assessment and Plan:     Problem List Items Addressed This Visit     None           Preventive health issues were discussed with patient, and age appropriate screening tests were ordered as noted in patient's After Visit Summary  Personalized health advice and appropriate referrals for health education or preventive services given if needed, as noted in patient's After Visit Summary  History of Present Illness:     Patient presents for Welcome to Medicare visit  Patient Care Team:  Bridgette Dodson DO as PCP - General  MD Robert Parmar MD Burnell Beat, DO as Endoscopist     Review of Systems:     Review of Systems   Problem List:     Patient Active Problem List   Diagnosis    Dyslipidemia    Essential hypertension    Hypothyroidism    Low vitamin D level    Lumbar pain    Neuropathy involving both lower extremities    Tarsal tunnel syndrome of left side    Tarsal tunnel syndrome of right side    Chronic pain of right knee    Rupture of anterior cruciate ligament of right knee    Primary osteoarthritis of right knee    Skin neoplasm    Seborrheic keratosis    Onychomycosis    Acute non-recurrent maxillary sinusitis    Right lower quadrant pain    Direct inguinal hernia    Cellulitis of right lower extremity    Lower extremity edema    Localized swelling of right foot    Venous stasis    Allergic reaction    Gastroesophageal reflux disease without esophagitis    Periumbilical pain    Hepatic steatosis    Kidney stone on left side    Hydronephrosis with urinary obstruction due to renal calculus    Acute kidney injury ([address] )    Corneal irritation of left eye    Severe obesity (BMI 35 0-39  9) with comorbidity ([address] )    Neuropathy    Benign prostatic hyperplasia with incomplete bladder emptying    Pain in both feet      Past Medical and Surgical History:     Past Medical History:   Diagnosis Date    Anesthesia     "cant have spinal anesthesia because of curvature of the spine and severe pain"    Anxiety     Arthritis     Athlete's foot     off and on    Benign polyp of large intestine     Cancer (HCC)     thyroid,     Chronic pain disorder     bilat knees    Colon polyp     Curvature of spine     DDD (degenerative disc disease), lumbar     Dental crowns present     Disease of thyroid gland     removed    Dyslipidemia     Enlarged prostate     Exercises daily     "walking/biking/lifts weights"    GERD (gastroesophageal reflux disease)     Hiatal hernia     History of radiation therapy     "iodine pill for thyroid cancer"    Hypertension     IBS (irritable bowel syndrome)     Kidney stone     still has on the left, and small one on right"    Neuropathy     Numbness and tingling of both feet     Retinal hole or tear, bilateral     had laser surgery Dr Wes Mendoza 3-4 yrs ago    S/P TURP (status post transurethral resection of prostate)     Seasonal allergies     Teeth missing     Tension headache     occas    Tinea pedis     Urinary frequency     Urinary urgency     Wears glasses      Past Surgical History:   Procedure Laterality Date    CHOLECYSTECTOMY      COLONOSCOPY      FL RETROGRADE URETHROCYSTOGRAM  2/28/2020    KNEE ARTHROSCOPY Bilateral     knee tim    IL COLONOSCOPY FLX DX W/COLLJ SPEC WHEN PFRMD N/A 7/25/2018    Procedure: COLONOSCOPY;  Surgeon: Adán Arroyo DO;  Location: [facility];   Service: General    IL CYSTO/URETERO W/LITHOTRIPSY &INDWELL STENT INSRT Left 2/28/2020    Procedure: CYSTOSCOPY URETEROSCOP RETROGRADE PYELOGRAM AND INSERTION STENT URETERAL;  Surgeon: Karyna Gan MD;  [facility];  Service: Urology    IL CYSTO/URETERO W/LITHOTRIPSY &INDWELL STENT INSRT Left 3/25/2020    Procedure: CYSTOSCOPY URETEROSCOPIC STONE EXTRACTION, RETROGRADE PYELOGRAM AND INSERTION STENT URETERAL;  Surgeon: Karyna Gan MD;  [facility];  Service: Urology    IL FRAGMENT KIDNEY STONE/ ESWL Left 2017    Procedure: [place] SHOCKWAVE (ESWL); Surgeon: Kennedi uMrphy DO;  [facility];  Service: Urology    ID REPAIR [address] HERNIA,5+Y/O,REDUCIBL Right 2019    Procedure: REPAIR INGUINAL HERNIA  DIRECT NO MESH;  Surgeon: Bryan Lawson DO;  Location: [address] [place] [address] MAIN OR;  Service: General    ID TRANSURETHRAL ELEC-SURG PROSTATECTOM N/A 2021    Procedure: TRANSURETHRAL RESECTION OF PROSTATE (TURP); Surgeon: Gerson Salinas MD;  [facility];  Service: Urology    RETINAL LASER PROCEDURE      ROTATOR CUFF REPAIR Left     THYROIDECTOMY      Subtotal and Total Thyroidectomy both mentioned in allscripts    WISDOM TOOTH EXTRACTION        Family History:     Family History   Problem Relation Age of Onset    Diabetes Mother     Heart disease Father     Breast cancer Sister       Social History:     Social History     Socioeconomic History    Marital status: /Civil Union     Spouse name: None    Number of children: None    Years of education: None    Highest education level: None   Occupational History    None   Tobacco Use    Smoking status: Former Smoker     Years: 40 00     Quit date: 2014     Years since quittin 1    Smokeless tobacco: Former User   Vaping Use    Vaping Use: Never used   Substance and Sexual Activity    Alcohol use: Yes     Alcohol/week: 2 0 standard drinks     Types: 1 Glasses of wine, 1 Cans of beer per week     Comment: very rarely    Drug use: No    Sexual activity: None   Other Topics Concern    None   Social History Narrative    None     Social Determinants of Health     Financial Resource Strain: Low Risk     Difficulty of Paying Living Expenses: Not hard at all   Food Insecurity: No Food Insecurity    Worried About Running Out of Food in the Last Year: Never true    Liset of Food in the Last Year: Never true   Transportation Needs: No Transportation Needs    Lack of Transportation (Medical):  No    Lack of Transportation (Non-Medical): No   Physical Activity: Unknown    Days of Exercise per Week: 3 days    Minutes of Exercise per Session: Not on file   Stress: No Stress Concern Present    Feeling of Stress : Only a little   Social Connections: Moderately Integrated    Frequency of Communication with Friends and Family: Never    Frequency of Social Gatherings with Friends and Family: Twice a week    Attends Sikhism Services: More than 4 times per year    Active Member of [employer] or Organizations:  Yes    Attends Club or Organization Meetings: More than 4 times per year    Marital Status:    Intimate Partner Violence: Not At Risk    Fear of Current or Ex-Partner: No    Emotionally Abused: No    Physically Abused: No    Sexually Abused: No      Medications and Allergies:     Current Outpatient Medications   Medication Sig Dispense Refill    acetaminophen (TYLENOL) 325 mg tablet Take 650 mg by mouth every 6 (six) hours as needed for mild pain      allopurinol (ZYLOPRIM) 300 mg tablet TAKE 1 TABLET DAILY 90 tablet 3    betamethasone dipropionate (DIPROSONE) 0 05 % cream Apply topically 2 (two) times a day 30 g 0    celecoxib (CeleBREX) 200 mg capsule Take 1 capsule (200 mg total) by mouth daily 90 capsule 1    chlordiazepoxide-clidinium (LIBRAX) 5-2 5 mg per capsule Take 1 capsule by mouth as needed for indigestion      clotrimazole-betamethasone (LOTRISONE) 1-0 05 % cream Apply topically 2 (two) times a day 30 g 0    levothyroxine 200 mcg tablet TAKE 1 TABLET DAILY 90 tablet 3    levothyroxine 25 mcg tablet Take 1 tablet (25 mcg total) by mouth daily 90 tablet 1    MAGNESIUM PO Take by mouth as needed       Multiple Vitamin (MULTIVITAMIN) tablet Take 1 tablet by mouth daily      omeprazole (PriLOSEC) 20 mg delayed release capsule TAKE 1 CAPSULE DAILY 90 capsule 3    verapamil (CALAN-SR) 180 mg CR tablet TAKE 1 TABLET DAILY AS DIRECTED 90 tablet 1    vitamin E, tocopherol, 400 units capsule Take 400 Units by mouth daily      COVID-19 mRNA Virus Vaccine (MODERNA COVID-19 VACCINE IM) Inject into a muscle 2nd dose received on 3/9/2021 (Patient not taking: Reported on 8/5/2021)      docusate sodium (COLACE) 100 mg capsule Take 1 capsule (100 mg total) by mouth 2 (two) times a day for 15 days (Patient not taking: Reported on 1/7/2021) 30 capsule 0    naproxen (NAPROSYN) 500 mg tablet Take 1 tablet (500 mg total) by mouth 2 (two) times a day with meals for 5 days (Patient not taking: Reported on 1/7/2021) 10 tablet 0    oxybutynin (DITROPAN-XL) 10 MG 24 hr tablet Take 1 tablet (10 mg total) by mouth daily at bedtime for 4 days 4 tablet 0     No current facility-administered medications for this visit  No Known Allergies   Immunizations:     Immunization History   Administered Date(s) Administered    Hep A, adult 05/17/2006, 03/21/2007    SARS-CoV-2 / COVID-19 mRNA IM (Gosia Garcia) 02/08/2021      Health Maintenance:         Topic Date Due    Colorectal Cancer Screening  07/25/2028    Hepatitis C Screening  Discontinued         Topic Date Due    Pneumococcal Vaccine: 65+ Years (1 of 1 - PPSV23) Never done    COVID-19 Vaccine (2 - Moderna 2-dose series) 03/08/2021    Influenza Vaccine (1) 09/01/2021      Medicare Screening Tests and Risk Assessments:     Army Galvez is here for his Subsequent Wellness visit  Health Risk Assessment:   Patient rates overall health as good  Patient feels that their physical health rating is same  Patient is satisfied with their life  Eyesight was rated as same  Hearing was rated as same  Patient feels that their emotional and mental health rating is same  Patients states they are sometimes angry  Patient states they are sometimes unusually tired/fatigued  Pain experienced in the last 7 days has been some  Patient's pain rating has been 4/10  Patient states that he has experienced no weight loss or gain in last 6 months       Depression Screening:   PHQ-2 Score: 0      Fall Risk Screening: In the past year, patient has experienced: no history of falling in past year      Home Safety:  Patient does not have trouble with stairs inside or outside of their home  Patient has working smoke alarms and has working carbon monoxide detector  Home safety hazards include: none  Nutrition:   Current diet is Regular  Medications:   Patient is currently taking over-the-counter supplements  OTC medications include: see medication list  Patient is able to manage medications  Activities of Daily Living (ADLs)/Instrumental Activities of Daily Living (IADLs):   Walk and transfer into and out of bed and chair?: Yes  Dress and groom yourself?: Yes    Bathe or shower yourself?: Yes    Feed yourself?  Yes  Do your laundry/housekeeping?: Yes  Manage your money, pay your bills and track your expenses?: Yes  Make your own meals?: Yes    Do your own shopping?: Yes    Previous Hospitalizations:   Any hospitalizations or ED visits within the last 12 months?: Yes    How many hospitalizations have you had in the last year?: 1-2    Advance Care Planning:   Living will: No    Durable POA for healthcare: No    Advanced directive: No    Five wishes given: Yes      Cognitive Screening:   Provider or family/friend/caregiver concerned regarding cognition?: No    PREVENTIVE SCREENINGS      Cardiovascular Screening:    General: Screening Current and Risks and Benefits Discussed      Diabetes Screening:     General: Screening Current and Risks and Benefits Discussed      Colorectal Cancer Screening:     General: Screening Current and Risks and Benefits Discussed      Prostate Cancer Screening:    General: Screening Current and Risks and Benefits Discussed      Osteoporosis Screening:    General: Risks and Benefits Discussed and Screening Not Indicated      Abdominal Aortic Aneurysm (AAA) Screening:    Risk factors include: age between 73-67 yo and tobacco use        General: Risks and Benefits Discussed      Lung Cancer Screening:     General: Screening Not Indicated and Risks and Benefits Discussed      Hepatitis C Screening:    General: Risks and Benefits Discussed and Screening Not Indicated    Screening, Brief Intervention, and Referral to Treatment (SBIRT)    Screening    Typical number of drinks in a week: 0    Single Item Drug Screening:  How often have you used an illegal drug (including marijuana) or a prescription medication for non-medical reasons in the past year? never    Single Item Drug Screen Score: 0  Interpretation: Negative screen for possible drug use disorder    Other Counseling Topics:   Regular weightbearing exercise       No exam data present     Physical Exam:     /80 (BP Location: Left arm, Patient Position: Sitting, Cuff Size: Large)   Temp (!) 96 8 °F (36 °C) (Tympanic)   Ht 6' 2" (1 88 m)   Wt (!) 139 kg (307 lb)   BMI 39 42 kg/m²     Physical Exam     [place], DO

## 2021-09-17 ENCOUNTER — OFFICE VISIT (OUTPATIENT)
Dept: FAMILY MEDICINE CLINIC | Facility: CLINIC | Age: 66
End: 2021-09-17
Payer: COMMERCIAL

## 2021-09-17 VITALS
HEART RATE: 60 BPM | HEIGHT: 74 IN | TEMPERATURE: 96.5 F | BODY MASS INDEX: 40.04 KG/M2 | SYSTOLIC BLOOD PRESSURE: 130 MMHG | OXYGEN SATURATION: 98 % | DIASTOLIC BLOOD PRESSURE: 80 MMHG | WEIGHT: 312 LBS

## 2021-09-17 DIAGNOSIS — R10.33 PERIUMBILICAL PAIN: ICD-10-CM

## 2021-09-17 DIAGNOSIS — K62.5 RECTAL BLEEDING: Primary | ICD-10-CM

## 2021-09-17 PROCEDURE — 3008F BODY MASS INDEX DOCD: CPT | Performed by: FAMILY MEDICINE

## 2021-09-17 PROCEDURE — 1160F RVW MEDS BY RX/DR IN RCRD: CPT | Performed by: FAMILY MEDICINE

## 2021-09-17 PROCEDURE — 99213 OFFICE O/P EST LOW 20 MIN: CPT | Performed by: FAMILY MEDICINE

## 2021-09-17 NOTE — PROGRESS NOTES
Assessment/Plan:  Records reviewed  Patient have colonoscopy 2016  The patient see Dr Bianka De Leon for rectal bleeding and periumbilical pain/hernia  Diagnoses and all orders for this visit:    Rectal bleeding    Periumbilical pain            Subjective:        Patient ID: Sarina Madrigal  is a 77 y o  male  Patient is here with rectal bleeding over the past week  Patient does notice rectal bleeding and bright red blood per rectum with wiping as well as interval   Patient did have some constipation which is improved with laxatives x2  No fever or vomiting noted  Patient does have some abdominal pain midline  Patient pain worse when doing pushups  No hernia noted patient  No melena  No problems urinating  The following portions of the patient's history were reviewed and updated as appropriate: allergies, current medications, past family history, past medical history, past social history, past surgical history and problem list       Review of Systems   Constitutional: Negative  HENT: Negative  Eyes: Negative  Respiratory: Negative  Cardiovascular: Negative  Gastrointestinal: Positive for abdominal pain and blood in stool  Endocrine: Negative  Genitourinary: Negative  Musculoskeletal: Negative  Skin: Negative  Allergic/Immunologic: Negative  Neurological: Negative  Hematological: Negative  Psychiatric/Behavioral: Negative  Objective:               /80 (BP Location: Right arm, Patient Position: Sitting, Cuff Size: Adult)   Pulse 60   Temp (!) 96 5 °F (35 8 °C) (Tympanic)   Ht 6' 2" (1 88 m)   Wt (!) 142 kg (312 lb)   SpO2 98%   BMI 40 06 kg/m²          Physical Exam  Constitutional:       Appearance: Normal appearance  HENT:      Head: Normocephalic and atraumatic  Cardiovascular:      Rate and Rhythm: Normal rate and regular rhythm  Pulses: Normal pulses  Heart sounds: Normal heart sounds     Pulmonary:      Effort: Pulmonary effort is normal       Breath sounds: Normal breath sounds  Abdominal:      General: Abdomen is flat  Bowel sounds are normal  There is no distension  Palpations: Abdomen is soft  Tenderness: There is abdominal tenderness  Hernia: A hernia is present  Comments: Periumbilical hernia   Neurological:      Mental Status: He is alert

## 2021-09-21 ENCOUNTER — ANESTHESIA EVENT (OUTPATIENT)
Dept: GASTROENTEROLOGY | Facility: HOSPITAL | Age: 66
End: 2021-09-21

## 2021-09-21 ENCOUNTER — TELEPHONE (OUTPATIENT)
Dept: GASTROENTEROLOGY | Facility: HOSPITAL | Age: 66
End: 2021-09-21

## 2021-09-21 ENCOUNTER — TELEPHONE (OUTPATIENT)
Dept: PREADMISSION TESTING | Facility: HOSPITAL | Age: 66
End: 2021-09-21

## 2021-09-22 ENCOUNTER — ANESTHESIA (OUTPATIENT)
Dept: GASTROENTEROLOGY | Facility: HOSPITAL | Age: 66
End: 2021-09-22

## 2021-09-22 ENCOUNTER — HOSPITAL ENCOUNTER (OUTPATIENT)
Dept: GASTROENTEROLOGY | Facility: HOSPITAL | Age: 66
Setting detail: OUTPATIENT SURGERY
Discharge: HOME | End: 2021-09-22
Attending: SURGERY
Payer: COMMERCIAL

## 2021-09-22 VITALS
TEMPERATURE: 98.1 F | DIASTOLIC BLOOD PRESSURE: 89 MMHG | RESPIRATION RATE: 20 BRPM | HEART RATE: 58 BPM | SYSTOLIC BLOOD PRESSURE: 143 MMHG | OXYGEN SATURATION: 96 %

## 2021-09-22 DIAGNOSIS — K62.5 HEMORRHAGE OF ANUS AND RECTUM: ICD-10-CM

## 2021-09-22 PROCEDURE — 88305 TISSUE EXAM BY PATHOLOGIST: CPT | Performed by: PATHOLOGY

## 2021-09-22 RX ORDER — LIDOCAINE HYDROCHLORIDE 10 MG/ML
INJECTION, SOLUTION EPIDURAL; INFILTRATION; INTRACAUDAL; PERINEURAL AS NEEDED
Status: DISCONTINUED | OUTPATIENT
Start: 2021-09-22 | End: 2021-09-22

## 2021-09-22 RX ORDER — SODIUM CHLORIDE 9 MG/ML
100 INJECTION, SOLUTION INTRAVENOUS CONTINUOUS
Status: DISCONTINUED | OUTPATIENT
Start: 2021-09-22 | End: 2021-09-26 | Stop reason: HOSPADM

## 2021-09-22 RX ORDER — PROPOFOL 10 MG/ML
INJECTION, EMULSION INTRAVENOUS AS NEEDED
Status: DISCONTINUED | OUTPATIENT
Start: 2021-09-22 | End: 2021-09-22

## 2021-09-22 RX ADMIN — PROPOFOL 120 MG: 10 INJECTION, EMULSION INTRAVENOUS at 08:44

## 2021-09-22 RX ADMIN — PROPOFOL 40 MG: 10 INJECTION, EMULSION INTRAVENOUS at 08:55

## 2021-09-22 RX ADMIN — SODIUM CHLORIDE: 0.9 INJECTION, SOLUTION INTRAVENOUS at 08:30

## 2021-09-22 RX ADMIN — PROPOFOL 40 MG: 10 INJECTION, EMULSION INTRAVENOUS at 08:48

## 2021-09-22 RX ADMIN — LIDOCAINE HYDROCHLORIDE 50 MG: 10 INJECTION, SOLUTION EPIDURAL; INFILTRATION; INTRACAUDAL; PERINEURAL at 08:44

## 2021-09-22 RX ADMIN — PROPOFOL 50 MG: 10 INJECTION, EMULSION INTRAVENOUS at 08:59

## 2021-09-22 RX ADMIN — PROPOFOL 40 MG: 10 INJECTION, EMULSION INTRAVENOUS at 08:51

## 2021-09-22 RX ADMIN — SODIUM CHLORIDE 100 ML/HR: 0.9 INJECTION, SOLUTION INTRAVENOUS at 07:25

## 2021-09-22 NOTE — ANESTHESIA PREPROCEDURE EVALUATION
Procedure:  COLONOSCOPY    Relevant Problems   ANESTHESIA (within normal limits)  unable to get spinal      CARDIO   (+) Essential hypertension      ENDO   (+) Hypothyroidism      GI/HEPATIC   (+) Gastroesophageal reflux disease without esophagitis   (+) Hepatic steatosis   (+) Rectal bleeding      /RENAL   (+) Acute kidney injury ([address] )   (+) Benign prostatic hyperplasia with incomplete bladder emptying   (+) Hydronephrosis with urinary obstruction due to renal calculus   (+) Kidney stone on left side      MUSCULOSKELETAL   (+) Lumbar pain   (+) Primary osteoarthritis of right knee        Physical Exam    Airway    Mallampati score: III  TM Distance: >3 FB  Neck ROM: full     Dental   No notable dental hx     Cardiovascular  Rate: normal,     Pulmonary  Pulmonary exam normal     Other Findings        Anesthesia Plan  ASA Score- 3     Anesthesia Type- IV sedation with anesthesia with ASA Monitors  Additional Monitors:   Airway Plan:     Comment: Per patient, appropriately NPO, denies active CP/SOB/wheezing/symptoms related to heartburn/nausea/vomiting  Plan Factors-Exercise tolerance (METS): >4 METS  Chart reviewed  Patient summary reviewed  Patient is not a current smoker  Induction- intravenous  Postoperative Plan-     Informed Consent- Anesthetic plan and risks discussed with patient  I personally reviewed this patient with the CRNA  Discussed and agreed on the Anesthesia Plan with the CRNA  Christina Vann

## 2021-09-22 NOTE — INTERVAL H&P NOTE
H&P reviewed  After examining the patient I find no changes in the patients condition since the H&P had been written      Vitals:    09/22/21 0659   BP: 144/82   Pulse: 60   Resp: 20   Temp: (!) 97 2 °F (36 2 °C)   SpO2: 96%

## 2021-09-29 ENCOUNTER — EVALUATION (OUTPATIENT)
Dept: PHYSICAL THERAPY | Facility: CLINIC | Age: 66
End: 2021-09-29
Payer: COMMERCIAL

## 2021-09-29 DIAGNOSIS — M76.61 ACHILLES TENDINITIS OF RIGHT LOWER EXTREMITY: Primary | ICD-10-CM

## 2021-09-29 PROCEDURE — 97161 PT EVAL LOW COMPLEX 20 MIN: CPT | Performed by: PHYSICAL THERAPIST

## 2021-09-29 PROCEDURE — 97140 MANUAL THERAPY 1/> REGIONS: CPT | Performed by: PHYSICAL THERAPIST

## 2021-09-29 NOTE — LETTER
2021    [facility]    Patient: Dot Westbrook  YOB: 1955   Date of Visit: 2021     Encounter Diagnosis     ICD-10-CM    1  Achilles tendinitis of right lower extremity  M76 61        Dear Dr Sarah Ellis: Thank you for your recent referral of Dot Westbrook    Please review the attached evaluation summary from Fuad's recent visit  Please verify that you agree with the plan of care by signing the attached order  If you have any questions or concerns, please do not hesitate to call  I sincerely appreciate the opportunity to share in the care of one of your patients and hope to have another opportunity to work with you in the near future  Sincerely,    Alejandrina Lyons, PT      Referring Provider:      I certify that I have read the below Plan of Care and certify the need for these services furnished under this plan of treatment while under my care  Marcia Shira [facility]  Via Fax: [phone]          PT Evaluation     Today's date: 2021  Patient name: Dot Westbrook  : 1955  MRN: [id number]  Referring provider: Aliyah Valentine DPM  Dx:   Encounter Diagnosis     ICD-10-CM    1  Achilles tendinitis of right lower extremity  M76 61                   Assessment  Assessment details: Dot Westbrook  is a 77 y o  male referred to outpatient physical therapy for R achilles tendinitis  Impairments include pain, decreased R ankle strength, decreased R ankle ROM, antalgic gait and R ankle swelling  These impairments are limiting patients functional ability to perform prolonged ambulation  Pt is restricted in participating in ADLs  Pt would benefit from skilled physical therapy to address the limitations above to allow return to prior level of function   At this point in time, no further referral necessary based upon examination results  Impairments: abnormal coordination, abnormal gait, abnormal or restricted ROM, activity intolerance, impaired balance, impaired physical strength, lacks appropriate home exercise program, pain with function and weight-bearing intolerance    Symptom irritability: lowUnderstanding of Dx/Px/POC: good  Goals  Short term goals 2-3 weeks    1) Patient will demonstrate ability to perform HEP  2) Patient will improve ankle DF AROM >5*  3) Patient will decrease pain at worst to 5/10 on NPRS  Long term goals 4-8 weeks    1) Patient will demonstrate independence with comprehensive HEP  2) Patient improve FOTO score to equal or greater than expected values  3) Patient will demonstrate ability to perform yard work without increased symptoms  4) Patient will demonstrate ability to walk >1 hour without increase in symptoms  Plan  Plan details: Plan of care was discussed with patient at time of evaluation  Pt will be seen 2x a week for 8 weeks  Patient would benefit from: skilled physical therapy  Planned therapy interventions: balance, balance/weight bearing training, flexibility, functional ROM exercises, gait training, home exercise program, therapeutic exercise, therapeutic activities, stretching, strengthening, patient education, neuromuscular re-education, manual therapy and joint mobilization  Frequency: 2x week  Duration in weeks: 8  Treatment plan discussed with: patient        Subjective Evaluation    History of Present Illness  Mechanism of injury: Patient presents to outpatient physical therapy with chief complaint of R achilles pain  Patient reports insidious onset that began 2 years prior  Pt reports he also has neuropathy and he can not always feel the pain  Patient Reports episodes of numbness or tingling in R ankle however he has neuropathy  Pain described as throbbing in R achilles  Pain rating currently 1/10, at best 1/10 and at worst 9/10       Patient states limitations with prolonged ambulation  Pt is able to ride bike without increase symptoms  Pt is currently retired  Aggravating factors include yard work, ambulation and general activity  Patient states use of bio-soothe and tylenol for relief of symptoms  Pt goals for therapy include getting rid of the problem to avoid surgery  Not a recurrent problem   Quality of life: good    Pain  Current pain ratin  At best pain ratin  At worst pain ratin  Quality: throbbing  Aggravating factors: stair climbing and walking  Progression: no change    Social Support  Stairs in house: yes     Employment status: not working  Treatments  No previous or current treatments  Patient Goals  Patient goals for therapy: decreased edema, decreased pain, improved balance, increased motion, increased strength and independence with ADLs/IADLs          Objective     Palpation     Right   Hypertonic in the lateral gastrocnemius and medial gastrocnemius  Tenderness of the lateral gastrocnemius and medial gastrocnemius  Trigger point to lateral gastrocnemius and medial gastrocnemius  Tenderness     Right Ankle/Foot   Tenderness in the Achilles insertion and proximal Achilles  Neurological Testing     Sensation     Ankle/Foot   Left Ankle/Foot   Diminished: light touch  Paresthesia: light touch    Right Ankle/Foot   Diminished: light touch  Paresthesia: light touch    Active Range of Motion     Right Ankle/Foot   Dorsiflexion (ke): 0 degrees   Plantar flexion: 45 degrees     Strength/Myotome Testing     Right Ankle/Foot   Dorsiflexion: 4+  Plantar flexion: 3+  Inversion: 3+  Eversion: 3+    Tests     Right Ankle/Foot   Negative for Tao                Precautions: HTN, GERD, neuropathy, arthritis, lumbar DDD, hx CA      Manuals             IASTM R achilles 8'            R gastroc STM 4'                                      Neuro Re-Ed             NBOS EC             Tandem R foot back Ther Ex             Bike             Standing R gastroc stretch             Standing R soleus stretch             Long sit R gastroc stretch 3x30" HEP            Long sit soleus stretch             Ankle DF/PF TB PTB  x10 ea    HEP            Self STM w/ lax ball              Eccentric heel raises                          Ther Activity                                       Gait Training                                       Modalities

## 2021-10-05 ENCOUNTER — OFFICE VISIT (OUTPATIENT)
Dept: PHYSICAL THERAPY | Facility: CLINIC | Age: 66
End: 2021-10-05
Payer: MEDICARE

## 2021-10-05 DIAGNOSIS — M76.61 ACHILLES TENDINITIS OF RIGHT LOWER EXTREMITY: Primary | ICD-10-CM

## 2021-10-05 PROCEDURE — 97140 MANUAL THERAPY 1/> REGIONS: CPT | Performed by: PHYSICAL THERAPIST

## 2021-10-05 PROCEDURE — 97110 THERAPEUTIC EXERCISES: CPT | Performed by: PHYSICAL THERAPIST

## 2021-10-08 ENCOUNTER — APPOINTMENT (OUTPATIENT)
Dept: PHYSICAL THERAPY | Facility: CLINIC | Age: 66
End: 2021-10-08
Payer: MEDICARE

## 2021-10-12 ENCOUNTER — OFFICE VISIT (OUTPATIENT)
Dept: PHYSICAL THERAPY | Facility: CLINIC | Age: 66
End: 2021-10-12
Payer: MEDICARE

## 2021-10-12 DIAGNOSIS — M76.61 ACHILLES TENDINITIS OF RIGHT LOWER EXTREMITY: Primary | ICD-10-CM

## 2021-10-12 PROCEDURE — 97110 THERAPEUTIC EXERCISES: CPT | Performed by: PHYSICAL THERAPIST

## 2021-10-12 PROCEDURE — 97140 MANUAL THERAPY 1/> REGIONS: CPT | Performed by: PHYSICAL THERAPIST

## 2021-10-18 ENCOUNTER — OFFICE VISIT (OUTPATIENT)
Dept: PHYSICAL THERAPY | Facility: CLINIC | Age: 66
End: 2021-10-18
Payer: MEDICARE

## 2021-10-18 DIAGNOSIS — M76.61 ACHILLES TENDINITIS OF RIGHT LOWER EXTREMITY: Primary | ICD-10-CM

## 2021-10-18 PROCEDURE — 97110 THERAPEUTIC EXERCISES: CPT | Performed by: PHYSICAL THERAPIST

## 2021-10-18 PROCEDURE — 97140 MANUAL THERAPY 1/> REGIONS: CPT | Performed by: PHYSICAL THERAPIST

## 2021-10-21 ENCOUNTER — OFFICE VISIT (OUTPATIENT)
Dept: PHYSICAL THERAPY | Facility: CLINIC | Age: 66
End: 2021-10-21
Payer: MEDICARE

## 2021-10-21 DIAGNOSIS — M76.61 ACHILLES TENDINITIS OF RIGHT LOWER EXTREMITY: Primary | ICD-10-CM

## 2021-10-21 PROCEDURE — 97110 THERAPEUTIC EXERCISES: CPT | Performed by: PHYSICAL THERAPIST

## 2021-10-21 PROCEDURE — 97140 MANUAL THERAPY 1/> REGIONS: CPT | Performed by: PHYSICAL THERAPIST

## 2021-10-24 NOTE — PROGRESS NOTES
H&P Exam - Urology   Sarah Godinez  72 y o  male MRN: [id number]  Unit/Bed#:  Encounter: [id number]    Assessment/Plan     Assessment:    BPH with lower urinary tract symptoms  Plan:   TURP    History of Present Illness   HPI:  Sarah Godinez  is a 72 y o  male who presents with  A history of a left ureteral stone treated with ureteroscopy  Patient also has a history of an asymptomatic nonobstructive  4-5 mm calculus in the right kidney with a normal PSA in nonsuspicious CYNTHIA for prostate cancer  The patient does have BPH with lower urinary tract symptoms being presently treated with Rapaflo and Avodart however the patient still has a significantly high AUA symptom score complaining of weakened urinary stream feelings of incomplete bladder emptying and intermittency  Cystourethroscopy revealed bilobar enlargement of the lateral lobes of the prostate with visual occlusion of the bladder outlet  Urinary flow rates were mildly to moderately sub normal   PVR was adequate  I discussed treatment of bladder outlet obstruction with the patient and his wife in detail  They were made aware the patient had a significantly trabeculated bladder that was in elastic non compliant and changed to secondary most likely to bladder outlet obstruction  They understand that even with correction of the bladder outlet obstruction with either Uro lift laser TURP regular TURP or other alternative means may not correct symptoms relative to the elastic or poorly compliant bladder  After discussing both Uro lift in TURP in detail the opted to proceed with TURP understanding risks of bleeding infection contracture stricture need for for a shins perforation need for catheter  They agreed to proceed       Review of Systems   Genitourinary:          See HPI and AUA symptom score   Musculoskeletal: Positive for back pain and myalgias  Spinal curvature   All other systems reviewed and are negative        Historical Information Past Medical History:   Diagnosis Date    Anesthesia     "cant have spinal anesthesia because of curvature of the spine and severe pain"    Anxiety     Arthritis     Athlete's foot     off and on    Benign polyp of large intestine     Cancer (HCC)     thyroid,     Chronic pain disorder     bilat knees    Colon polyp     Curvature of spine     DDD (degenerative disc disease), lumbar     Disease of thyroid gland     removed    Dyslipidemia     Enlarged prostate     GERD (gastroesophageal reflux disease)     Hiatal hernia     Hypertension     IBS (irritable bowel syndrome)     Kidney stone     still has on the left, and small one on right"    Migraines     more sinus related    Neuropathy     Numbness and tingling of both feet     Retinal hole or tear, bilateral     surgery last year laser    Seasonal allergies     Tinea pedis     Urinary frequency     Urinary urgency     Vitamin D deficiency     Wears glasses      Past Surgical History:   Procedure Laterality Date    CHOLECYSTECTOMY      COLONOSCOPY      FL RETROGRADE URETHROCYSTOGRAM  2/28/2020    KNEE ARTHROSCOPY Bilateral     knee tim    W[place] COLONOSCOPY FLX DX W/COLLJ SPEC WHEN PFRMD N/A 7/25/2018    Procedure: COLONOSCOPY;  Surgeon: Hayden Flynn DO;  Location: [address] [place] [address] GI LAB; Service: General    WY CYSTO/URETERO W/LITHOTRIPSY &INDWELL STENT INSRT Left 2/28/2020    Procedure: CYSTOSCOPY URETEROSCOP RETROGRADE PYELOGRAM AND INSERTION STENT URETERAL;  Surgeon: Katarzyna Beltran MD;  [facility];  Service: Urology    WY CYSTO/URETERO W/LITHOTRIPSY &INDWELL STENT INSRT Left 3/25/2020    Procedure: CYSTOSCOPY URETEROSCOPIC STONE EXTRACTION, RETROGRADE PYELOGRAM AND INSERTION STENT URETERAL;  Surgeon: Katarzyna Beltran MD;  [facility];  Service: Urology    WY FRAGMENT KIDNEY STONE/ ESWL Left 7/27/2017    Procedure: Jane Martin SHOCKWAVE (ESWL);   Surgeon: Unique Burgess DO;  [facility]; Service: Urology    AL REPAIR [address] 58 HERNIA,5+Y/O,REDUCIBL Right 2019    Procedure: REPAIR INGUINAL HERNIA  DIRECT NO MESH;  Surgeon: Lord Richa DO;  [facility];  Service: General    RETINAL LASER PROCEDURE      ROTATOR CUFF REPAIR Left     THYROIDECTOMY      Subtotal and Total Thyroidectomy both mentioned in allscripts    WISDOM TOOTH EXTRACTION       Social History   Social History     Substance and Sexual Activity   Alcohol Use Yes    Alcohol/week: 2 0 standard drinks    Types: 1 Glasses of wine, 1 Cans of beer per week    Frequency: Monthly or less    Drinks per session: 1 or 2    Binge frequency: Never    Comment: occasionally     Social History     Substance and Sexual Activity   Drug Use No     Social History     Tobacco Use   Smoking Status Former Smoker    Years: 40 00    Quit date: 2014    Years since quittin 5   Smokeless Tobacco Never Used     Family History:   Family History   Problem Relation Age of Onset    Diabetes Mother     Heart disease Father     Breast cancer Sister        Meds/Allergies   all medications and allergies reviewed  No Known Allergies    Objective   Vitals: Height 6' 3" (1 905 m), weight (!) 142 kg (312 lb)  [unfilled]    Invasive Devices     Drain            Ureteral Drain/Stent Left ureter 6 Fr  288 days                Physical Exam  Constitutional:       General: He is not in acute distress  Appearance: Normal appearance  He is obese  He is not ill-appearing, toxic-appearing or diaphoretic  Neck:      Musculoskeletal: Normal range of motion and neck supple  No neck rigidity or muscular tenderness  Cardiovascular:      Rate and Rhythm: Normal rate and regular rhythm  Pulses: Normal pulses  Heart sounds: Normal heart sounds  Pulmonary:      Effort: Pulmonary effort is normal  No respiratory distress  Breath sounds: Normal breath sounds  No wheezing, rhonchi or rales     Abdominal:      General: There is no distension  Palpations: Abdomen is soft  Tenderness: There is no abdominal tenderness  There is no guarding or rebound  Genitourinary:     Penis: Normal     Skin:     General: Skin is dry  Neurological:      General: No focal deficit present  Mental Status: He is alert and oriented to person, place, and time  Mental status is at baseline  Psychiatric:         Mood and Affect: Mood normal          Thought Content: Thought content normal          Judgment: Judgment normal          Lab Results: I have personally reviewed pertinent reports  Imaging: I have personally reviewed pertinent reports  EKG, Pathology, and Other Studies: I have personally reviewed pertinent reports  VTE Prophylaxis: Sequential compression device Ely Filler)     Code Status: [unfilled]  Advance Directive and Living Will:      Power of :    POLST:      Counseling / Coordination of Care  Total floor / unit time spent today 35   minutes  Greater than 50% of total time was spent with the patient and / or family counseling and / or coordination of care  A description of the counseling / coordination of care: Jessica Frost Card[facility]

## 2021-10-25 ENCOUNTER — OFFICE VISIT (OUTPATIENT)
Dept: URGENT CARE | Facility: MEDICAL CENTER | Age: 66
End: 2021-10-25
Payer: MEDICARE

## 2021-10-25 ENCOUNTER — APPOINTMENT (OUTPATIENT)
Dept: RADIOLOGY | Facility: MEDICAL CENTER | Age: 66
End: 2021-10-25
Payer: MEDICARE

## 2021-10-25 VITALS
OXYGEN SATURATION: 98 % | RESPIRATION RATE: 16 BRPM | HEIGHT: 74 IN | SYSTOLIC BLOOD PRESSURE: 160 MMHG | WEIGHT: 312 LBS | HEART RATE: 72 BPM | BODY MASS INDEX: 40.04 KG/M2 | TEMPERATURE: 98.4 F | DIASTOLIC BLOOD PRESSURE: 100 MMHG

## 2021-10-25 DIAGNOSIS — M25.562 ACUTE PAIN OF LEFT KNEE: Primary | ICD-10-CM

## 2021-10-25 DIAGNOSIS — M25.562 ACUTE PAIN OF LEFT KNEE: ICD-10-CM

## 2021-10-25 PROCEDURE — G0463 HOSPITAL OUTPT CLINIC VISIT: HCPCS | Performed by: PHYSICIAN ASSISTANT

## 2021-10-25 PROCEDURE — 73564 X-RAY EXAM KNEE 4 OR MORE: CPT

## 2021-10-25 PROCEDURE — 99213 OFFICE O/P EST LOW 20 MIN: CPT | Performed by: PHYSICIAN ASSISTANT

## 2021-10-25 RX ORDER — IBUPROFEN 600 MG/1
600 TABLET ORAL EVERY 6 HOURS PRN
Qty: 30 TABLET | Refills: 0 | Status: SHIPPED | OUTPATIENT
Start: 2021-10-25

## 2021-10-26 ENCOUNTER — EVALUATION (OUTPATIENT)
Dept: PHYSICAL THERAPY | Facility: CLINIC | Age: 66
End: 2021-10-26
Payer: MEDICARE

## 2021-10-26 DIAGNOSIS — M25.562 ACUTE PAIN OF LEFT KNEE: ICD-10-CM

## 2021-10-26 DIAGNOSIS — M76.61 ACHILLES TENDINITIS OF RIGHT LOWER EXTREMITY: Primary | ICD-10-CM

## 2021-10-26 PROCEDURE — 97110 THERAPEUTIC EXERCISES: CPT

## 2021-10-26 PROCEDURE — 97164 PT RE-EVAL EST PLAN CARE: CPT

## 2021-10-28 ENCOUNTER — OFFICE VISIT (OUTPATIENT)
Dept: PHYSICAL THERAPY | Facility: CLINIC | Age: 66
End: 2021-10-28
Payer: MEDICARE

## 2021-10-28 DIAGNOSIS — M25.562 ACUTE PAIN OF LEFT KNEE: ICD-10-CM

## 2021-10-28 DIAGNOSIS — M76.61 ACHILLES TENDINITIS OF RIGHT LOWER EXTREMITY: Primary | ICD-10-CM

## 2021-10-28 PROCEDURE — 97140 MANUAL THERAPY 1/> REGIONS: CPT | Performed by: PHYSICAL THERAPIST

## 2021-10-28 PROCEDURE — 97110 THERAPEUTIC EXERCISES: CPT | Performed by: PHYSICAL THERAPIST

## 2021-11-01 ENCOUNTER — OFFICE VISIT (OUTPATIENT)
Dept: PHYSICAL THERAPY | Facility: CLINIC | Age: 66
End: 2021-11-01
Payer: MEDICARE

## 2021-11-01 DIAGNOSIS — M76.61 ACHILLES TENDINITIS OF RIGHT LOWER EXTREMITY: Primary | ICD-10-CM

## 2021-11-01 DIAGNOSIS — M25.562 ACUTE PAIN OF LEFT KNEE: ICD-10-CM

## 2021-11-01 PROCEDURE — 97140 MANUAL THERAPY 1/> REGIONS: CPT | Performed by: PHYSICAL THERAPIST

## 2021-11-01 PROCEDURE — 97110 THERAPEUTIC EXERCISES: CPT | Performed by: PHYSICAL THERAPIST

## 2021-11-04 ENCOUNTER — OFFICE VISIT (OUTPATIENT)
Dept: PHYSICAL THERAPY | Facility: CLINIC | Age: 66
End: 2021-11-04
Payer: MEDICARE

## 2021-11-04 DIAGNOSIS — M25.562 ACUTE PAIN OF LEFT KNEE: ICD-10-CM

## 2021-11-04 DIAGNOSIS — M76.61 ACHILLES TENDINITIS OF RIGHT LOWER EXTREMITY: Primary | ICD-10-CM

## 2021-11-04 PROCEDURE — 97110 THERAPEUTIC EXERCISES: CPT | Performed by: PHYSICAL THERAPIST

## 2021-11-04 PROCEDURE — 97140 MANUAL THERAPY 1/> REGIONS: CPT | Performed by: PHYSICAL THERAPIST

## 2021-11-09 ENCOUNTER — OFFICE VISIT (OUTPATIENT)
Dept: PHYSICAL THERAPY | Facility: CLINIC | Age: 66
End: 2021-11-09
Payer: MEDICARE

## 2021-11-09 DIAGNOSIS — M25.562 ACUTE PAIN OF LEFT KNEE: ICD-10-CM

## 2021-11-09 DIAGNOSIS — M76.61 ACHILLES TENDINITIS OF RIGHT LOWER EXTREMITY: Primary | ICD-10-CM

## 2021-11-09 PROCEDURE — 97140 MANUAL THERAPY 1/> REGIONS: CPT | Performed by: PHYSICAL THERAPIST

## 2021-11-09 PROCEDURE — 97110 THERAPEUTIC EXERCISES: CPT | Performed by: PHYSICAL THERAPIST

## 2021-11-12 ENCOUNTER — APPOINTMENT (OUTPATIENT)
Dept: LAB | Facility: CLINIC | Age: 66
End: 2021-11-12
Payer: MEDICARE

## 2021-11-12 ENCOUNTER — OFFICE VISIT (OUTPATIENT)
Dept: PHYSICAL THERAPY | Facility: CLINIC | Age: 66
End: 2021-11-12
Payer: MEDICARE

## 2021-11-12 DIAGNOSIS — M76.61 ACHILLES TENDINITIS OF RIGHT LOWER EXTREMITY: Primary | ICD-10-CM

## 2021-11-12 DIAGNOSIS — E03.9 HYPOTHYROIDISM, UNSPECIFIED TYPE: ICD-10-CM

## 2021-11-12 DIAGNOSIS — G62.9 NEUROPATHY: ICD-10-CM

## 2021-11-12 DIAGNOSIS — M25.562 ACUTE PAIN OF LEFT KNEE: ICD-10-CM

## 2021-11-12 DIAGNOSIS — I10 ESSENTIAL HYPERTENSION: ICD-10-CM

## 2021-11-12 LAB
ALBUMIN SERPL BCP-MCNC: 3.9 G/DL (ref 3.5–5)
ALP SERPL-CCNC: 67 U/L (ref 46–116)
ALT SERPL W P-5'-P-CCNC: 53 U/L (ref 12–78)
ANION GAP SERPL CALCULATED.3IONS-SCNC: 7 MMOL/L (ref 4–13)
AST SERPL W P-5'-P-CCNC: 32 U/L (ref 5–45)
BASOPHILS # BLD AUTO: 0.06 THOUSANDS/ΜL (ref 0–0.1)
BASOPHILS NFR BLD AUTO: 1 % (ref 0–1)
BILIRUB SERPL-MCNC: 0.68 MG/DL (ref 0.2–1)
BUN SERPL-MCNC: 20 MG/DL (ref 5–25)
CALCIUM SERPL-MCNC: 9.2 MG/DL (ref 8.3–10.1)
CHLORIDE SERPL-SCNC: 106 MMOL/L (ref 100–108)
CHOLEST SERPL-MCNC: 193 MG/DL (ref 50–200)
CO2 SERPL-SCNC: 27 MMOL/L (ref 21–32)
CREAT SERPL-MCNC: 1.04 MG/DL (ref 0.6–1.3)
EOSINOPHIL # BLD AUTO: 0.06 THOUSAND/ΜL (ref 0–0.61)
EOSINOPHIL NFR BLD AUTO: 1 % (ref 0–6)
ERYTHROCYTE [DISTWIDTH] IN BLOOD BY AUTOMATED COUNT: 12.7 % (ref 11.6–15.1)
GFR SERPL CREATININE-BSD FRML MDRD: 74 ML/MIN/1.73SQ M
GLUCOSE P FAST SERPL-MCNC: 101 MG/DL (ref 65–99)
HCT VFR BLD AUTO: 47 % (ref 36.5–49.3)
HDLC SERPL-MCNC: 40 MG/DL
HGB BLD-MCNC: 15.8 G/DL (ref 12–17)
IMM GRANULOCYTES # BLD AUTO: 0.02 THOUSAND/UL (ref 0–0.2)
IMM GRANULOCYTES NFR BLD AUTO: 0 % (ref 0–2)
LDLC SERPL CALC-MCNC: 121 MG/DL (ref 0–100)
LYMPHOCYTES # BLD AUTO: 2.47 THOUSANDS/ΜL (ref 0.6–4.47)
LYMPHOCYTES NFR BLD AUTO: 37 % (ref 14–44)
MCH RBC QN AUTO: 30.6 PG (ref 26.8–34.3)
MCHC RBC AUTO-ENTMCNC: 33.6 G/DL (ref 31.4–37.4)
MCV RBC AUTO: 91 FL (ref 82–98)
MONOCYTES # BLD AUTO: 0.54 THOUSAND/ΜL (ref 0.17–1.22)
MONOCYTES NFR BLD AUTO: 8 % (ref 4–12)
NEUTROPHILS # BLD AUTO: 3.45 THOUSANDS/ΜL (ref 1.85–7.62)
NEUTS SEG NFR BLD AUTO: 53 % (ref 43–75)
NONHDLC SERPL-MCNC: 153 MG/DL
NRBC BLD AUTO-RTO: 0 /100 WBCS
PLATELET # BLD AUTO: 164 THOUSANDS/UL (ref 149–390)
PMV BLD AUTO: 8.6 FL (ref 8.9–12.7)
POTASSIUM SERPL-SCNC: 4.3 MMOL/L (ref 3.5–5.3)
PROT SERPL-MCNC: 7.2 G/DL (ref 6.4–8.2)
RBC # BLD AUTO: 5.17 MILLION/UL (ref 3.88–5.62)
SODIUM SERPL-SCNC: 140 MMOL/L (ref 136–145)
TRIGL SERPL-MCNC: 162 MG/DL
TSH SERPL DL<=0.05 MIU/L-ACNC: 3.28 UIU/ML (ref 0.36–3.74)
URATE SERPL-MCNC: 4.6 MG/DL (ref 4.2–8)
WBC # BLD AUTO: 6.6 THOUSAND/UL (ref 4.31–10.16)

## 2021-11-12 PROCEDURE — 84550 ASSAY OF BLOOD/URIC ACID: CPT

## 2021-11-12 PROCEDURE — 85025 COMPLETE CBC W/AUTO DIFF WBC: CPT

## 2021-11-12 PROCEDURE — 97110 THERAPEUTIC EXERCISES: CPT | Performed by: PHYSICAL THERAPIST

## 2021-11-12 PROCEDURE — 80061 LIPID PANEL: CPT

## 2021-11-12 PROCEDURE — 80053 COMPREHEN METABOLIC PANEL: CPT

## 2021-11-12 PROCEDURE — 97140 MANUAL THERAPY 1/> REGIONS: CPT | Performed by: PHYSICAL THERAPIST

## 2021-11-12 PROCEDURE — 36415 COLL VENOUS BLD VENIPUNCTURE: CPT

## 2021-11-12 PROCEDURE — 84443 ASSAY THYROID STIM HORMONE: CPT

## 2021-11-16 ENCOUNTER — OFFICE VISIT (OUTPATIENT)
Dept: PHYSICAL THERAPY | Facility: CLINIC | Age: 66
End: 2021-11-16
Payer: MEDICARE

## 2021-11-16 DIAGNOSIS — M76.61 ACHILLES TENDINITIS OF RIGHT LOWER EXTREMITY: Primary | ICD-10-CM

## 2021-11-16 DIAGNOSIS — M25.562 ACUTE PAIN OF LEFT KNEE: ICD-10-CM

## 2021-11-16 PROCEDURE — 97110 THERAPEUTIC EXERCISES: CPT | Performed by: PHYSICAL THERAPIST

## 2021-11-16 PROCEDURE — 97140 MANUAL THERAPY 1/> REGIONS: CPT | Performed by: PHYSICAL THERAPIST

## 2021-11-18 ENCOUNTER — EVALUATION (OUTPATIENT)
Dept: PHYSICAL THERAPY | Facility: CLINIC | Age: 66
End: 2021-11-18
Payer: MEDICARE

## 2021-11-18 DIAGNOSIS — M76.61 ACHILLES TENDINITIS OF RIGHT LOWER EXTREMITY: Primary | ICD-10-CM

## 2021-11-18 DIAGNOSIS — M25.562 ACUTE PAIN OF LEFT KNEE: ICD-10-CM

## 2021-11-18 PROCEDURE — 97140 MANUAL THERAPY 1/> REGIONS: CPT | Performed by: PHYSICAL THERAPIST

## 2021-11-18 PROCEDURE — 97110 THERAPEUTIC EXERCISES: CPT | Performed by: PHYSICAL THERAPIST

## 2021-11-23 ENCOUNTER — OFFICE VISIT (OUTPATIENT)
Dept: FAMILY MEDICINE CLINIC | Facility: CLINIC | Age: 66
End: 2021-11-23
Payer: MEDICARE

## 2021-11-23 ENCOUNTER — APPOINTMENT (OUTPATIENT)
Dept: PHYSICAL THERAPY | Facility: CLINIC | Age: 66
End: 2021-11-23
Payer: MEDICARE

## 2021-11-23 VITALS
OXYGEN SATURATION: 98 % | TEMPERATURE: 96.2 F | DIASTOLIC BLOOD PRESSURE: 86 MMHG | HEART RATE: 68 BPM | SYSTOLIC BLOOD PRESSURE: 148 MMHG | BODY MASS INDEX: 40.17 KG/M2 | HEIGHT: 74 IN | WEIGHT: 313 LBS

## 2021-11-23 DIAGNOSIS — L30.9 DERMATITIS: ICD-10-CM

## 2021-11-23 DIAGNOSIS — Z23 NEED FOR IMMUNIZATION AGAINST INFLUENZA: Primary | ICD-10-CM

## 2021-11-23 DIAGNOSIS — K21.9 GASTROESOPHAGEAL REFLUX DISEASE WITHOUT ESOPHAGITIS: ICD-10-CM

## 2021-11-23 DIAGNOSIS — G89.29 CHRONIC KNEE PAIN AFTER TOTAL REPLACEMENT OF LEFT KNEE JOINT: ICD-10-CM

## 2021-11-23 DIAGNOSIS — I10 ESSENTIAL HYPERTENSION: ICD-10-CM

## 2021-11-23 DIAGNOSIS — M79.672 PAIN IN BOTH FEET: ICD-10-CM

## 2021-11-23 DIAGNOSIS — R39.14 BENIGN PROSTATIC HYPERPLASIA WITH INCOMPLETE BLADDER EMPTYING: Chronic | ICD-10-CM

## 2021-11-23 DIAGNOSIS — N40.1 BENIGN PROSTATIC HYPERPLASIA WITH INCOMPLETE BLADDER EMPTYING: Chronic | ICD-10-CM

## 2021-11-23 DIAGNOSIS — M79.671 PAIN IN BOTH FEET: ICD-10-CM

## 2021-11-23 DIAGNOSIS — M25.562 CHRONIC KNEE PAIN AFTER TOTAL REPLACEMENT OF LEFT KNEE JOINT: ICD-10-CM

## 2021-11-23 DIAGNOSIS — E03.9 HYPOTHYROIDISM, UNSPECIFIED TYPE: ICD-10-CM

## 2021-11-23 DIAGNOSIS — Z96.652 CHRONIC KNEE PAIN AFTER TOTAL REPLACEMENT OF LEFT KNEE JOINT: ICD-10-CM

## 2021-11-23 PROCEDURE — G0008 ADMIN INFLUENZA VIRUS VAC: HCPCS

## 2021-11-23 PROCEDURE — 90662 IIV NO PRSV INCREASED AG IM: CPT

## 2021-11-23 PROCEDURE — 99214 OFFICE O/P EST MOD 30 MIN: CPT | Performed by: FAMILY MEDICINE

## 2021-11-23 RX ORDER — BETAMETHASONE DIPROPIONATE 0.5 MG/G
CREAM TOPICAL 2 TIMES DAILY
Qty: 30 G | Refills: 2 | Status: SHIPPED | OUTPATIENT
Start: 2021-11-23 | End: 2021-11-26 | Stop reason: SDUPTHER

## 2021-11-23 RX ORDER — CLOTRIMAZOLE AND BETAMETHASONE DIPROPIONATE 10; .64 MG/G; MG/G
CREAM TOPICAL 2 TIMES DAILY
Qty: 30 G | Refills: 2 | Status: SHIPPED | OUTPATIENT
Start: 2021-11-23 | End: 2021-11-26 | Stop reason: SDUPTHER

## 2021-11-23 RX ORDER — LEVOTHYROXINE SODIUM 0.03 MG/1
25 TABLET ORAL DAILY
Qty: 90 TABLET | Refills: 1 | Status: SHIPPED | OUTPATIENT
Start: 2021-11-23 | End: 2021-11-26 | Stop reason: SDUPTHER

## 2021-11-24 DIAGNOSIS — N20.0 CALCULUS OF KIDNEY: ICD-10-CM

## 2021-11-24 DIAGNOSIS — I10 ESSENTIAL HYPERTENSION: ICD-10-CM

## 2021-11-24 RX ORDER — ALLOPURINOL 300 MG/1
300 TABLET ORAL DAILY
Qty: 90 TABLET | Refills: 1 | Status: SHIPPED | OUTPATIENT
Start: 2021-11-24 | End: 2021-11-26 | Stop reason: SDUPTHER

## 2021-11-26 DIAGNOSIS — M79.671 PAIN IN BOTH FEET: ICD-10-CM

## 2021-11-26 DIAGNOSIS — E03.9 HYPOTHYROIDISM, UNSPECIFIED TYPE: ICD-10-CM

## 2021-11-26 DIAGNOSIS — I10 ESSENTIAL HYPERTENSION: ICD-10-CM

## 2021-11-26 DIAGNOSIS — M79.672 PAIN IN BOTH FEET: ICD-10-CM

## 2021-11-26 DIAGNOSIS — L30.9 DERMATITIS: ICD-10-CM

## 2021-11-26 DIAGNOSIS — N20.0 CALCULUS OF KIDNEY: ICD-10-CM

## 2021-11-26 RX ORDER — LEVOTHYROXINE SODIUM 0.03 MG/1
25 TABLET ORAL DAILY
Qty: 90 TABLET | Refills: 1 | Status: SHIPPED | OUTPATIENT
Start: 2021-11-26 | End: 2022-02-24 | Stop reason: SDUPTHER

## 2021-11-26 RX ORDER — BETAMETHASONE DIPROPIONATE 0.5 MG/G
CREAM TOPICAL 2 TIMES DAILY
Qty: 30 G | Refills: 2 | Status: SHIPPED | OUTPATIENT
Start: 2021-11-26 | End: 2022-02-24

## 2021-11-26 RX ORDER — CLOTRIMAZOLE AND BETAMETHASONE DIPROPIONATE 10; .64 MG/G; MG/G
CREAM TOPICAL 2 TIMES DAILY
Qty: 30 G | Refills: 2 | Status: SHIPPED | OUTPATIENT
Start: 2021-11-26 | End: 2022-02-24 | Stop reason: SDUPTHER

## 2021-11-26 RX ORDER — ALLOPURINOL 300 MG/1
300 TABLET ORAL DAILY
Qty: 90 TABLET | Refills: 1 | Status: SHIPPED | OUTPATIENT
Start: 2021-11-26 | End: 2022-02-24 | Stop reason: SDUPTHER

## 2021-11-30 ENCOUNTER — APPOINTMENT (OUTPATIENT)
Dept: PHYSICAL THERAPY | Facility: CLINIC | Age: 66
End: 2021-11-30
Payer: MEDICARE

## 2022-01-13 DIAGNOSIS — I10 ESSENTIAL HYPERTENSION: ICD-10-CM

## 2022-01-24 ENCOUNTER — APPOINTMENT (OUTPATIENT)
Dept: LAB | Facility: MEDICAL CENTER | Age: 67
End: 2022-01-24
Payer: MEDICARE

## 2022-01-24 DIAGNOSIS — Z12.5 PROSTATE CANCER SCREENING: ICD-10-CM

## 2022-01-24 LAB — PSA SERPL-MCNC: 0.5 NG/ML (ref 0–4)

## 2022-01-24 PROCEDURE — G0103 PSA SCREENING: HCPCS

## 2022-02-17 ENCOUNTER — OFFICE VISIT (OUTPATIENT)
Dept: UROLOGY | Facility: MEDICAL CENTER | Age: 67
End: 2022-02-17
Payer: MEDICARE

## 2022-02-17 VITALS
SYSTOLIC BLOOD PRESSURE: 140 MMHG | HEART RATE: 60 BPM | BODY MASS INDEX: 39.78 KG/M2 | HEIGHT: 74 IN | DIASTOLIC BLOOD PRESSURE: 90 MMHG | WEIGHT: 310 LBS

## 2022-02-17 DIAGNOSIS — N13.8 BPH WITH OBSTRUCTION/LOWER URINARY TRACT SYMPTOMS: Primary | ICD-10-CM

## 2022-02-17 DIAGNOSIS — N40.1 BPH WITH OBSTRUCTION/LOWER URINARY TRACT SYMPTOMS: Primary | ICD-10-CM

## 2022-02-17 DIAGNOSIS — Z12.5 PROSTATE CANCER SCREENING: ICD-10-CM

## 2022-02-17 LAB — POST-VOID RESIDUAL VOLUME, ML POC: 37 ML

## 2022-02-17 PROCEDURE — 99213 OFFICE O/P EST LOW 20 MIN: CPT | Performed by: NURSE PRACTITIONER

## 2022-02-17 PROCEDURE — 51798 US URINE CAPACITY MEASURE: CPT | Performed by: NURSE PRACTITIONER

## 2022-02-17 NOTE — PROGRESS NOTES
2/17/2022      No chief complaint on file  Assessment and Plan    77 y o  male managed by Dr Jessica Guerrero    1  Benign prostatic hyperplasia with lower urinary tract symptoms  · Status post TURP 04/06/2021 with approximately 8 grams of tissue resected  Negative for malignancy on pathology noted  · Bladder scan PVR 37 mL  · Will continue to monitor for worsening/progression of urinary symptoms  · Maintain adequate hydration upwards to 40 ounces of water intake per day while avoiding bladder irritating foods beverages  · Ensure complete bladder emptying with urination  · Timed voiding  · Continue with pelvic floor/Kegel exercises  · Follow up in the office in 1 year      2  Prostate cancer screening  · PSA performed 01/24/2022 resulted 0 5  · CYNTHIA-prostate approximate 40-45 g with no nodules  · Repeat PSA and CYNTHIA in 1 year  · Follow up in the office in 1 year    History of Present Illness  Ede Sneed  is a 77 y o  male here for follow up evaluation of  urinary symptoms secondary to benign prostatic hyperplasia   Patient is status post TURP 04/06/2021  Ebenezer Ingram is doing well postoperatively with reports of a stronger urinary stream   He reports adequate sensation of bladder emptying after urinating   He denies dysuria and hematuria  Ebenezer Ingram continues to have mild urethral irritation after urinating but is not bothered by the symptoms at this time  Ebenezer Ingram reports getting up less at night to urinate  Ebenezer Ingram is very happy with his current urinary status he reports less urinary hesitancy and less postvoid dribbling after urinating  He reports a strong urinary stream   He is very happy with his urinary symptoms at this time        Component PSA, Total   Latest Ref Rng & Units 0 0 - 4 0 ng/mL   2/10/2018 0 2   12/29/2020 0 4   1/24/2022 0 5       Review of Systems   Constitutional: Negative for chills and fever  Respiratory: Negative for cough and shortness of breath  Cardiovascular: Negative for chest pain  Gastrointestinal: Negative for abdominal distention, abdominal pain, blood in stool, nausea and vomiting  Genitourinary: Negative for difficulty urinating, dysuria, enuresis, flank pain, frequency, hematuria and urgency  Skin: Negative for rash  AUA SYMPTOM SCORE      Most Recent Value   AUA SYMPTOM SCORE    How often have you had a sensation of not emptying your bladder completely after you finished urinating? 5   How often have you had to urinate again less than two hours after you finished urinating? 5   How often have you found you stopped and started again several times when you urinate? 3   How often have you found it difficult to postpone urination? 2   How often have you had a weak urinary stream? 3   How often have you had to push or strain to begin urination? 2   How many times did you most typically get up to urinate from the time you went to bed at night until the time you got up in the morning?  5   Quality of Life: If you were to spend the rest of your life with your urinary condition just the way it is now, how would you feel about that? 3   AUA SYMPTOM SCORE 25         Past Medical History  Past Medical History:   Diagnosis Date    Anesthesia     "cant have spinal anesthesia because of curvature of the spine and severe pain"    Anxiety     Arthritis     Athlete's foot     off and on    Benign polyp of large intestine     Cancer (HCC)     thyroid,     Chronic pain disorder     bilat knees    Colon polyp     Curvature of spine     DDD (degenerative disc disease), lumbar     Dental crowns present     Disease of thyroid gland     removed    Dizziness     Dyslipidemia     Enlarged prostate     Exercises daily     "walking/biking/lifts weights"    GERD (gastroesophageal reflux disease)     Hiatal hernia     History of radiation therapy     "iodine pill for thyroid cancer"    Hypertension     IBS (irritable bowel syndrome)     Kidney stone     still has on the left, and small one on right"    Neuropathy     Numbness and tingling of both feet     Retinal hole or tear, bilateral     had laser surgery Dr Keaton Rm 3-4 yrs ago    S/P TURP (status post transurethral resection of prostate)     Seasonal allergies     Teeth missing     Tension headache     occas    Tinea pedis     Urinary frequency     Urinary urgency     Wears glasses        Past Social History  Past Surgical History:   Procedure Laterality Date    CHOLECYSTECTOMY      COLONOSCOPY      FL RETROGRADE URETHROCYSTOGRAM  2/28/2020    KNEE ARTHROSCOPY Bilateral     knee tim    [place] COLONOSCOPY FLX DX W/COLLJ SPEC WHEN PFRMD N/A 7/25/2018    Procedure: COLONOSCOPY;  Surgeon: Daniela Rodriguez DO;  Location: [address] [place] [address] GI LAB; Service: General    PA CYSTO/URETERO W/LITHOTRIPSY &INDWELL STENT INSRT Left 2/28/2020    Procedure: CYSTOSCOPY URETEROSCOP RETROGRADE PYELOGRAM AND INSERTION STENT URETERAL;  Surgeon: Summer Quiros MD;  [facility];  Service: Urology    PA CYSTO/URETERO W/LITHOTRIPSY &INDWELL STENT INSRT Left 3/25/2020    Procedure: CYSTOSCOPY URETEROSCOPIC STONE EXTRACTION, RETROGRADE PYELOGRAM AND INSERTION STENT URETERAL;  Surgeon: Summer Quiros MD;  [facility];  Service: Urology    PA FRAGMENT KIDNEY STONE/ ESWL Left 7/27/2017    Procedure: Rexene Bar SHOCKWAVE (ESWL); Surgeon: Sue Fisher DO;  [facility];  Service: Urology    PA REPAIR [address] HERNIA,5+Y/O,REDUCIBL Right 7/23/2019    Procedure: REPAIR INGUINAL HERNIA  DIRECT NO MESH;  Surgeon: Daniela Rodriguez DO;  Location: [address] [place] [address] MAIN OR;  Service: General    PA TRANSURETHRAL ELEC-SURG PROSTATECTOM N/A 4/6/2021    Procedure: TRANSURETHRAL RESECTION OF PROSTATE (TURP);   Surgeon: Summer Quiros MD;  [facility];  Service: Urology    RETINAL LASER PROCEDURE      ROTATOR CUFF REPAIR Left     THYROIDECTOMY      Subtotal and Total Thyroidectomy both mentioned in allscripts    WISDOM TOOTH EXTRACTION Social History     Tobacco Use   Smoking Status Former Smoker    Years: 40 00    Quit date: 2014    Years since quittin 6   Smokeless Tobacco Former User       Past Family History  Family History   Problem Relation Age of Onset    Diabetes Mother     Heart disease Father     Breast cancer Sister        Past Social history  Social History     Socioeconomic History    Marital status: /Civil Union     Spouse name: Not on file    Number of children: Not on file    Years of education: Not on file    Highest education level: Not on file   Occupational History    Occupation: retired security   Tobacco Use    Smoking status: Former Smoker     Years: 40 00     Quit date: 2014     Years since quittin 6    Smokeless tobacco: Former User   Vaping Use    Vaping Use: Never used   Substance and Sexual Activity    Alcohol use: Yes     Alcohol/week: 2 0 standard drinks     Types: 1 Glasses of wine, 1 Cans of beer per week     Comment: very rarely    Drug use: No    Sexual activity: Not on file   Other Topics Concern    Not on file   Social History Narrative    Not on file     Social Determinants of Health     Financial Resource Strain: Low Risk     Difficulty of Paying Living Expenses: Not hard at all   Food Insecurity: No Food Insecurity    Worried About Running Out of Food in the Last Year: Never true    Liset of Food in the Last Year: Never true   Transportation Needs: No Transportation Needs    Lack of Transportation (Medical): No    Lack of Transportation (Non-Medical): No   Physical Activity: Unknown    Days of Exercise per Week: 3 days    Minutes of Exercise per Session: Not on file   Stress: No Stress Concern Present    Feeling of Stress : Only a little   Social Connections:  Moderately Integrated    Frequency of Communication with Friends and Family: Never    Frequency of Social Gatherings with Friends and Family: Twice a week    Attends Latter day Services: More than 4 times per year   [employer] of Clubs or Organizations:  Yes    Attends Club or Organization Meetings: More than 4 times per year    Marital Status:    Intimate Partner Violence: Not At Risk    Fear of Current or Ex-Partner: No    Emotionally Abused: No    Physically Abused: No    Sexually Abused: No   Housing Stability: Not on file       Current Medications  Current Outpatient Medications   Medication Sig Dispense Refill    acetaminophen (TYLENOL) 325 mg tablet Take 650 mg by mouth every 6 (six) hours as needed for mild pain      chlordiazepoxide-clidinium (LIBRAX) 5-2 5 mg per capsule Take 1 capsule by mouth as needed for indigestion      Multiple Vitamin (MULTIVITAMIN) tablet Take 1 tablet by mouth daily      Omega-3 Fatty Acids (OMEGA-3 2100 PO) Take by mouth      omeprazole (PriLOSEC) 20 mg delayed release capsule TAKE 1 CAPSULE DAILY 90 capsule 3    vitamin E, tocopherol, 400 units capsule Take 400 Units by mouth daily      allopurinol (ZYLOPRIM) 300 mg tablet Take 1 tablet (300 mg total) by mouth daily 90 tablet 1    celecoxib (CeleBREX) 200 mg capsule Take 1 capsule (200 mg total) by mouth daily 90 capsule 1    clotrimazole-betamethasone (LOTRISONE) 1-0 05 % cream Apply topically 2 (two) times a day 30 g 2    COVID-19 mRNA Virus Vaccine (MODERNA COVID-19 VACCINE IM) Inject into a muscle 2nd dose received on 3/9/2021        furosemide (LASIX) 20 mg tablet Take 1 tablet (20 mg total) by mouth daily 30 tablet 5    ibuprofen (MOTRIN) 600 mg tablet Take 1 tablet (600 mg total) by mouth every 6 (six) hours as needed for mild pain or moderate pain 30 tablet 0    levothyroxine 200 mcg tablet Take 1 tablet (200 mcg total) by mouth daily 90 tablet 1    levothyroxine 25 mcg tablet Take 1 tablet (25 mcg total) by mouth daily 90 tablet 1    MAGNESIUM PO Take by mouth as needed  (Patient not taking: Reported on 11/23/2021 )      verapamil (CALAN-SR) 180 mg CR tablet Take 1 tablet (180 mg total) by mouth daily 90 tablet 1     No current facility-administered medications for this visit  Allergies  No Known Allergies      The following portions of the patient's history were reviewed and updated as appropriate: allergies, current medications, past medical history, past social history, past surgical history and problem list       Vitals  Vitals:    02/17/22 1102   BP: 140/90   Pulse: 60   Weight: (!) 141 kg (310 lb)   Height: 6' 2" (1 88 m)           Physical Exam  Physical Exam  Vitals reviewed  Constitutional:       General: He is not in acute distress  Appearance: Normal appearance  He is normal weight  HENT:      Head: Normocephalic  Pulmonary:      Effort: No respiratory distress  Breath sounds: Normal breath sounds  Genitourinary:     Comments: CYNTHIA-prostate 40-45 g with no nodules  Skin:     General: Skin is warm and dry  Neurological:      General: No focal deficit present  Mental Status: He is alert and oriented to person, place, and time  Psychiatric:         Mood and Affect: Mood normal          Behavior: Behavior normal            Results  No results found for this or any previous visit (from the past 1 hour(s)) ]  Lab Results   Component Value Date    PSA 0 5 01/24/2022    PSA 0 4 12/29/2020    PSA 0 2 02/10/2018     Lab Results   Component Value Date    GLUCOSE 91 06/27/2015    CALCIUM 9 2 11/12/2021     06/27/2015    K 4 3 11/12/2021    CO2 27 11/12/2021     11/12/2021    BUN 20 11/12/2021    CREATININE 1 04 11/12/2021     Lab Results   Component Value Date    WBC 6 60 11/12/2021    HGB 15 8 11/12/2021    HCT 47 0 11/12/2021    MCV 91 11/12/2021     11/12/2021           Orders  Orders Placed This Encounter   Procedures    PSA, Total Screen     This is a patient instruction: This test is non-fasting  Please drink two glasses of water morning of bloodwork          Standing Status:   Future     Standing Expiration Date:   8/17/2023   [facility] POCT Measure PVR       Balinda Holstein, CRNP

## 2022-02-23 ENCOUNTER — RA CDI HCC (OUTPATIENT)
Dept: OTHER | Facility: HOSPITAL | Age: 67
End: 2022-02-23

## 2022-02-23 NOTE — PROGRESS NOTES
[facility]  coding opportunities       Chart reviewed, no opportunity found: CHART REVIEWED, NO OPPORTUNITY FOUND                        Patients insurance company:  [employer] [facility] (Medicare Advantage and Commercial)

## 2022-02-24 ENCOUNTER — OFFICE VISIT (OUTPATIENT)
Dept: FAMILY MEDICINE CLINIC | Facility: CLINIC | Age: 67
End: 2022-02-24
Payer: MEDICARE

## 2022-02-24 VITALS
DIASTOLIC BLOOD PRESSURE: 90 MMHG | HEART RATE: 60 BPM | TEMPERATURE: 96.6 F | HEIGHT: 74 IN | SYSTOLIC BLOOD PRESSURE: 138 MMHG | WEIGHT: 315 LBS | RESPIRATION RATE: 16 BRPM | OXYGEN SATURATION: 94 % | BODY MASS INDEX: 40.43 KG/M2

## 2022-02-24 DIAGNOSIS — M79.671 PAIN IN BOTH FEET: ICD-10-CM

## 2022-02-24 DIAGNOSIS — I10 ESSENTIAL HYPERTENSION: ICD-10-CM

## 2022-02-24 DIAGNOSIS — M79.672 PAIN IN BOTH FEET: ICD-10-CM

## 2022-02-24 DIAGNOSIS — N20.0 CALCULUS OF KIDNEY: ICD-10-CM

## 2022-02-24 DIAGNOSIS — G57.93 NEUROPATHY INVOLVING BOTH LOWER EXTREMITIES: ICD-10-CM

## 2022-02-24 DIAGNOSIS — K21.9 GASTROESOPHAGEAL REFLUX DISEASE WITHOUT ESOPHAGITIS: Primary | ICD-10-CM

## 2022-02-24 DIAGNOSIS — E03.9 HYPOTHYROIDISM, UNSPECIFIED TYPE: ICD-10-CM

## 2022-02-24 DIAGNOSIS — R60.0 LOWER EXTREMITY EDEMA: ICD-10-CM

## 2022-02-24 PROCEDURE — 99214 OFFICE O/P EST MOD 30 MIN: CPT | Performed by: FAMILY MEDICINE

## 2022-02-24 RX ORDER — LEVOTHYROXINE SODIUM 0.2 MG/1
200 TABLET ORAL DAILY
Qty: 90 TABLET | Refills: 1 | Status: SHIPPED | OUTPATIENT
Start: 2022-02-24

## 2022-02-24 RX ORDER — FUROSEMIDE 20 MG/1
20 TABLET ORAL DAILY
Qty: 30 TABLET | Refills: 5 | Status: SHIPPED | OUTPATIENT
Start: 2022-02-24 | End: 2022-02-24 | Stop reason: SDUPTHER

## 2022-02-24 RX ORDER — ALLOPURINOL 300 MG/1
300 TABLET ORAL DAILY
Qty: 90 TABLET | Refills: 1 | Status: SHIPPED | OUTPATIENT
Start: 2022-02-24

## 2022-02-24 RX ORDER — LEVOTHYROXINE SODIUM 0.03 MG/1
25 TABLET ORAL DAILY
Qty: 90 TABLET | Refills: 1 | Status: SHIPPED | OUTPATIENT
Start: 2022-02-24

## 2022-02-24 RX ORDER — CLOTRIMAZOLE AND BETAMETHASONE DIPROPIONATE 10; .64 MG/G; MG/G
CREAM TOPICAL 2 TIMES DAILY
Qty: 30 G | Refills: 2 | Status: SHIPPED | OUTPATIENT
Start: 2022-02-24

## 2022-02-24 RX ORDER — FUROSEMIDE 20 MG/1
20 TABLET ORAL DAILY
Qty: 30 TABLET | Refills: 5 | Status: SHIPPED | OUTPATIENT
Start: 2022-02-24

## 2022-02-28 ENCOUNTER — TELEPHONE (OUTPATIENT)
Dept: UROLOGY | Facility: MEDICAL CENTER | Age: 67
End: 2022-02-28

## 2022-02-28 NOTE — TELEPHONE ENCOUNTER
Patient is wondering if he should be taking Avadart 0 5 mgs  He would rather not take this medication unless it is absolutely necessary  Please call patient at [phone]

## 2022-03-01 NOTE — TELEPHONE ENCOUNTER
Called pt to assess any symptoms he is presently having to advise provider  Pt asking if he can discontinue Avodart medication

## 2022-03-04 NOTE — TELEPHONE ENCOUNTER
Call placed to patient  He did not answer  Left detailed message for patient on answering machine with the recommendations of the AP at this time  Office number was provided to the patient to call back with any questions or concerns

## 2022-03-04 NOTE — TELEPHONE ENCOUNTER
Call placed to patient and spoke with his wife  Pt was in the office a few weeks ago and forgot to ask CRNP if he should continue taking Rapaflo  He had TURP in April 2021  He has been doing well and urinating without any issues or concerns  Pt would like clarification if this medication can be discontinued  Will forward to provider for review

## 2022-03-04 NOTE — TELEPHONE ENCOUNTER
Pt left another voice mail today stating he never received a call back in regards to his medication question

## 2022-04-25 ENCOUNTER — OFFICE VISIT (OUTPATIENT)
Dept: FAMILY MEDICINE CLINIC | Facility: CLINIC | Age: 67
End: 2022-04-25
Payer: MEDICARE

## 2022-04-25 DIAGNOSIS — J01.00 ACUTE NON-RECURRENT MAXILLARY SINUSITIS: Primary | ICD-10-CM

## 2022-04-25 PROCEDURE — 99213 OFFICE O/P EST LOW 20 MIN: CPT | Performed by: FAMILY MEDICINE

## 2022-04-25 RX ORDER — AMOXICILLIN 500 MG/1
1000 CAPSULE ORAL EVERY 12 HOURS SCHEDULED
Qty: 40 CAPSULE | Refills: 0 | Status: SHIPPED | OUTPATIENT
Start: 2022-04-25 | End: 2022-05-05

## 2022-04-25 NOTE — PROGRESS NOTES
Assessment/Plan:       Diagnoses and all orders for this visit:    Acute non-recurrent maxillary sinusitis  -     amoxicillin (AMOXIL) 500 mg capsule; Take 2 capsules (1,000 mg total) by mouth every 12 (twelve) hours for 10 days            Subjective:        Patient ID: Ema Alaniz  is a 79 y o  male  Patient is here with sinus issues including congestion rhinorrhea postnasal drip hoarseness of the throat over the past 2 weeks  Patient is using TheraFlu severe cold and sinus medication  Patient using daytime and nighttime medication  No fever  Patient with some looser stools  The following portions of the patient's history were reviewed and updated as appropriate: allergies, current medications, past family history, past medical history, past social history, past surgical history and problem list       Review of Systems   Constitutional: Negative  Negative for fever  HENT: Positive for congestion, postnasal drip, rhinorrhea, sinus pressure and sinus pain  Eyes: Negative  Respiratory: Positive for cough  Cardiovascular: Negative  Gastrointestinal: Negative  Endocrine: Negative  Genitourinary: Negative  Musculoskeletal: Negative  Skin: Negative  Allergic/Immunologic: Negative  Neurological: Negative  Hematological: Negative  Psychiatric/Behavioral: Negative  Objective:      BMI Counseling: There is no height or weight on file to calculate BMI  The BMI is above normal  Nutrition recommendations include decreasing portion sizes  Exercise recommendations include moderate physical activity 150 minutes/week  Rationale for BMI follow-up plan is due to patient being overweight or obese  Depression Screening and Follow-up Plan: Clincally patient does not have depression  No treatment is required  There were no vitals taken for this visit  Physical Exam  Vitals and nursing note reviewed     Constitutional:       General: He is not in acute distress  Appearance: Normal appearance  He is not ill-appearing, toxic-appearing or diaphoretic  HENT:      Head: Normocephalic and atraumatic  Right Ear: Tympanic membrane, ear canal and external ear normal  There is no impacted cerumen  Left Ear: Tympanic membrane, ear canal and external ear normal  There is no impacted cerumen  Nose: Congestion and rhinorrhea present  Mouth/Throat:      Mouth: Mucous membranes are moist       Pharynx: Oropharyngeal exudate present  No posterior oropharyngeal erythema  Eyes:      General: No scleral icterus  Right eye: No discharge  Left eye: No discharge  Extraocular Movements: Extraocular movements intact  Conjunctiva/sclera: Conjunctivae normal       Pupils: Pupils are equal, round, and reactive to light  Neck:      Vascular: No carotid bruit  Cardiovascular:      Rate and Rhythm: Normal rate and regular rhythm  Pulses: Normal pulses  Heart sounds: Normal heart sounds  No murmur heard  No friction rub  No gallop  Pulmonary:      Effort: Pulmonary effort is normal  No respiratory distress  Breath sounds: Normal breath sounds  No stridor  No wheezing, rhonchi or rales  Chest:      Chest wall: No tenderness  Musculoskeletal:         General: No swelling, tenderness, deformity or signs of injury  Normal range of motion  Cervical back: Normal range of motion and neck supple  No rigidity  No muscular tenderness  Right lower leg: No edema  Left lower leg: No edema  Lymphadenopathy:      Cervical: No cervical adenopathy  Skin:     General: Skin is warm and dry  Capillary Refill: Capillary refill takes less than 2 seconds  Coloration: Skin is not jaundiced  Findings: No bruising, erythema, lesion or rash  Neurological:      Mental Status: He is alert and oriented to person, place, and time  Cranial Nerves: No cranial nerve deficit        Sensory: No sensory deficit  Motor: No weakness  Coordination: Coordination normal       Gait: Gait normal    Psychiatric:         Mood and Affect: Mood normal          Behavior: Behavior normal          Thought Content:  Thought content normal          Judgment: Judgment normal

## 2022-08-18 ENCOUNTER — RA CDI HCC (OUTPATIENT)
Dept: OTHER | Facility: HOSPITAL | Age: 67
End: 2022-08-18

## 2022-08-18 NOTE — PROGRESS NOTES
[facility]  coding opportunities          Chart Reviewed number of suggestions sent to Provider: 1     Patients Insurance     Medicare Insurance: Medicare        [id number]

## 2022-08-25 ENCOUNTER — OFFICE VISIT (OUTPATIENT)
Dept: FAMILY MEDICINE CLINIC | Facility: CLINIC | Age: 67
End: 2022-08-25
Payer: MEDICARE

## 2022-08-25 VITALS
OXYGEN SATURATION: 98 % | DIASTOLIC BLOOD PRESSURE: 98 MMHG | TEMPERATURE: 98.1 F | SYSTOLIC BLOOD PRESSURE: 148 MMHG | WEIGHT: 315 LBS | HEIGHT: 74 IN | HEART RATE: 64 BPM | BODY MASS INDEX: 40.43 KG/M2

## 2022-08-25 DIAGNOSIS — I10 ESSENTIAL HYPERTENSION: ICD-10-CM

## 2022-08-25 DIAGNOSIS — E03.9 HYPOTHYROIDISM, UNSPECIFIED TYPE: Primary | ICD-10-CM

## 2022-08-25 PROCEDURE — G0438 PPPS, INITIAL VISIT: HCPCS | Performed by: FAMILY MEDICINE

## 2022-08-25 NOTE — PROGRESS NOTES
Assessment and Plan:     Problem List Items Addressed This Visit        Endocrine    Hypothyroidism - Primary    Relevant Orders    CBC and differential    Comprehensive metabolic panel    Lipid panel    TSH, 3rd generation with Free T4 reflex       Cardiovascular and Mediastinum    Essential hypertension    Relevant Orders    CBC and differential    Comprehensive metabolic panel    Lipid panel    TSH, 3rd generation with Free T4 reflex           Preventive health issues were discussed with patient, and age appropriate screening tests were ordered as noted in patient's After Visit Summary  Personalized health advice and appropriate referrals for health education or preventive services given if needed, as noted in patient's After Visit Summary       History of Present Illness:     Patient presents for a Medicare Wellness Visit    HPI   Patient Care Team:  Benjamin Car DO as PCP - General  MD Efrem Rader MD Norma Ley, DO as Endoscopist     Review of Systems:     Review of Systems     Problem List:     Patient Active Problem List   Diagnosis    Dyslipidemia    Essential hypertension    Hypothyroidism    Low vitamin D level    Lumbar pain    Neuropathy involving both lower extremities    Tarsal tunnel syndrome of left side    Tarsal tunnel syndrome of right side    Chronic knee pain 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    Rupture of anterior cruciate ligament of right knee    Primary osteoarthritis of right knee    Skin neoplasm    Seborrheic keratosis    Onychomycosis    Acute non-recurrent maxillary sinusitis    Right lower quadrant pain    Direct inguinal hernia    Cellulitis of right lower extremity    Lower extremity edema    Localized swelling of right foot    Venous stasis    Allergic reaction    Gastroesophageal reflux disease without esophagitis    Periumbilical pain    Hepatic steatosis    Kidney stone on left side    Hydronephrosis with urinary obstruction due to renal calculus    [facility] kidney injury ([facility] )    Corneal irritation of left eye    Severe obesity (BMI 35 0-39  9) with comorbidity ([facility] )    Neuropathy    Benign prostatic hyperplasia with incomplete bladder emptying    Pain in both feet    Rectal bleeding      Past Medical and Surgical History:     Past Medical History:   Diagnosis Date    Anesthesia     "cant have spinal anesthesia because of curvature of the spine and severe pain"    Anxiety     Arthritis     Athlete's foot     off and on    Benign polyp of large intestine     Cancer (HCC)     thyroid,     Chronic pain disorder     bilat knees    Colon polyp     Curvature of spine     DDD (degenerative disc disease), lumbar     Dental crowns present     Disease of thyroid gland     removed    Dizziness     Dyslipidemia     Enlarged prostate     Exercises daily     "walking/biking/lifts weights"    GERD (gastroesophageal reflux disease)     Hiatal hernia     History of radiation therapy     "iodine pill for thyroid cancer"    Hypertension     IBS (irritable bowel syndrome)     Kidney stone     still has on the left, and small one on right"    Neuropathy     Numbness and tingling of both feet     Retinal hole or tear, bilateral     had laser surgery Dr Giacomo Blevins 3-4 yrs ago    S/P TURP (status post transurethral resection of prostate)     Seasonal allergies     Teeth missing     Tension headache     occas    Tinea pedis     Urinary frequency     Urinary urgency     Wears glasses      Past Surgical History:   Procedure Laterality Date    CHOLECYSTECTOMY      COLONOSCOPY      FL RETROGRADE URETHROCYSTOGRAM  2/28/2020    KNEE ARTHROSCOPY Bilateral     knee tim    NE COLONOSCOPY FLX DX W/COLLJ SPEC WHEN PFRMD N/A 7/25/2018    Procedure: COLONOSCOPY;  Surgeon: Avel Randall DO;  Location: [address] [place] [address] GI LAB;   Service: General    NE CYSTO/URETERO W/LITHOTRIPSY &INDWELL STENT INSRT Left 2/28/2020    Procedure: Ernesto Smith URETEROSCOP RETROGRADE PYELOGRAM AND INSERTION STENT URETERAL;  Surgeon: Husam Chau MD;  [facility];  Service: Urology    DC CYSTO/URETERO W/LITHOTRIPSY &INDWELL STENT INSRT Left 3/25/2020    Procedure: CYSTOSCOPY URETEROSCOPIC STONE EXTRACTION, RETROGRADE PYELOGRAM AND INSERTION STENT URETERAL;  Surgeon: Husam Chau MD;  [facility];  Service: Urology    DC FRAGMENT KIDNEY STONE/ ESWL Left 2017    Procedure: Saritha England SHOCKWAVE (ESWL); Surgeon: Onofre Saeed DO;  [facility];  Service: Urology    DC REPAIR [address] HERNIA,5+Y/O,REDUCIBL Right 2019    Procedure: REPAIR INGUINAL HERNIA  DIRECT NO MESH;  Surgeon: Lenore Aburto DO;  [facility] MA[facility] OR;  Service: General    DC TRANSURETHRAL ELEC-SURG PROSTATECTOM N/A 2021    Procedure: TRANSURETHRAL RESECTION OF PROSTATE (TURP); Surgeon: Husam Chau MD;  [facility];  Service: Urology    RETINAL LASER PROCEDURE      ROTATOR CUFF REPAIR Left     THYROIDECTOMY      Subtotal and Total Thyroidectomy both mentioned in allscripts    WISDOM TOOTH EXTRACTION        Family History:     Family History   Problem Relation Age of Onset    Diabetes Mother     Heart disease Father     Breast cancer Sister       Social History:     Social History     Socioeconomic History    Marital status: /Civil Union     Spouse name: None    Number of children: None    Years of education: None    Highest education level: None   Occupational History    Occupation: retired security   Tobacco Use    Smoking status: Former Smoker     Years: 40 00     Quit date: 2014     Years since quittin 1    Smokeless tobacco: Former User   Vaping Use    Vaping Use: Never used   Substance and Sexual Activity    Alcohol use:  Yes     Alcohol/week: 2 0 standard drinks     Types: 1 Glasses of wine, 1 Cans of beer per week     Comment: very rarely    Drug use: No    Sexual activity: None   Other Topics Concern    None   Social History Narrative    None     Social Determinants of Health     Financial Resource Strain: Medium Risk    Difficulty of Paying Living Expenses: Somewhat hard   Food Insecurity: Not on file   Transportation Needs: No Transportation Needs    Lack of Transportation (Medical): No    Lack of Transportation (Non-Medical):  No   Physical Activity: Not on file   Stress: Not on file   Social Connections: Not on file   Intimate Partner Violence: Not on file   Housing Stability: Not on file      Medications and Allergies:     Current Outpatient Medications   Medication Sig Dispense Refill    acetaminophen (TYLENOL) 325 mg tablet Take 650 mg by mouth every 6 (six) hours as needed for mild pain      allopurinol (ZYLOPRIM) 300 mg tablet Take 1 tablet (300 mg total) by mouth daily 90 tablet 1    chlordiazepoxide-clidinium (LIBRAX) 5-2 5 mg per capsule Take 1 capsule by mouth as needed for indigestion      clotrimazole-betamethasone (LOTRISONE) 1-0 05 % cream Apply topically 2 (two) times a day 30 g 2    furosemide (LASIX) 20 mg tablet Take 1 tablet (20 mg total) by mouth daily 30 tablet 5    ibuprofen (MOTRIN) 600 mg tablet Take 1 tablet (600 mg total) by mouth every 6 (six) hours as needed for mild pain or moderate pain 30 tablet 0    levothyroxine 200 mcg tablet Take 1 tablet (200 mcg total) by mouth daily 90 tablet 1    levothyroxine 25 mcg tablet Take 1 tablet (25 mcg total) by mouth daily 90 tablet 1    MAGNESIUM PO Take by mouth as needed        Multiple Vitamin (MULTIVITAMIN) tablet Take 1 tablet by mouth daily      Omega-3 Fatty Acids (OMEGA-3 2100 PO) Take by mouth      omeprazole (PriLOSEC) 20 mg delayed release capsule TAKE 1 CAPSULE DAILY 90 capsule 3    verapamil (CALAN-SR) 180 mg CR tablet Take 1 tablet (180 mg total) by mouth daily 90 tablet 1    vitamin E, tocopherol, 400 units capsule Take 400 Units by mouth daily      celecoxib (CeleBREX) 200 mg capsule Take 1 capsule (200 mg total) by mouth daily (Patient not taking: No sig reported) 90 capsule 1    COVID-19 mRNA Virus Vaccine (MODERNA COVID-19 VACCINE IM) Inject into a muscle 2nd dose received on 3/9/2021   (Patient not taking: No sig reported)       No current facility-administered medications for this visit  No Known Allergies   Immunizations:     Immunization History   Administered Date(s) Administered    COVID-19 MODERNA VACC 0 5 ML IM 02/08/2021, 03/09/2021    Hep A, adult 05/17/2006, 03/21/2007    Influenza, high dose seasonal 0 7 mL 11/23/2021      Health Maintenance:         Topic Date Due    Colorectal Cancer Screening  09/22/2031    Hepatitis C Screening  Discontinued         Topic Date Due    Pneumococcal Vaccine: 65+ Years (1 - PCV) Never done    COVID-19 Vaccine (3 - Booster for Moderna series) 08/09/2021    Influenza Vaccine (1) 09/01/2022      Medicare Screening Tests and Risk Assessments:     Kranthi Galaviz is here for his Subsequent Wellness visit  Health Risk Assessment:   Patient rates overall health as good  Patient feels that their physical health rating is same  Patient is very satisfied with their life  Eyesight was rated as slightly worse  Hearing was rated as same  Patient feels that their emotional and mental health rating is same  Patients states they are never, rarely angry  Patient states they are sometimes unusually tired/fatigued  Pain experienced in the last 7 days has been some  Patient states that he has experienced no weight loss or gain in last 6 months  Depression Screening:   PHQ-2 Score: 0      Fall Risk Screening: In the past year, patient has experienced: no history of falling in past year      Home Safety:  Patient does not have trouble with stairs inside or outside of their home  Patient has working smoke alarms and has working carbon monoxide detector  Home safety hazards include: none  Nutrition:   Current diet is Regular       Medications:   Patient is currently taking over-the-counter supplements  OTC medications include: see medication list  Patient is able to manage medications  Activities of Daily Living (ADLs)/Instrumental Activities of Daily Living (IADLs):   Walk and transfer into and out of bed and chair?: Yes  Dress and groom yourself?: Yes    Bathe or shower yourself?: Yes    Feed yourself? Yes  Do your laundry/housekeeping?: Yes  Manage your money, pay your bills and track your expenses?: Yes  Make your own meals?: Yes    Do your own shopping?: Yes    Previous Hospitalizations:   Any hospitalizations or ED visits within the last 12 months?: No      Advance Care Planning:   Living will: No    Durable POA for healthcare: No    Advanced directive: No      Cognitive Screening:   Provider or family/friend/caregiver concerned regarding cognition?: No    PREVENTIVE SCREENINGS      Cardiovascular Screening:    General: Screening Current and Risks and Benefits Discussed      Diabetes Screening:     General: Screening Current and Risks and Benefits Discussed      Colorectal Cancer Screening:     General: Screening Current      Prostate Cancer Screening:    General: Screening Current and Risks and Benefits Discussed      Osteoporosis Screening:    General: Risks and Benefits Discussed and Screening Not Indicated      Abdominal Aortic Aneurysm (AAA) Screening:    Risk factors include: age between 73-67 yo and tobacco use        General: Risks and Benefits Discussed and Screening Not Indicated      Lung Cancer Screening:     General: Screening Not Indicated and Risks and Benefits Discussed      Hepatitis C Screening:    General: Risks and Benefits Discussed    Other Counseling Topics:   Regular weightbearing exercise       No exam data present     Physical Exam:     /98 (BP Location: Right arm, Patient Position: Sitting, Cuff Size: Standard)   Pulse 64   Temp 98 1 °F (36 7 °C) (Temporal)   Ht 6' 2" (1 88 m)   Wt (!) 145 kg (319 lb)   SpO2 98%   BMI 40 96 kg/m²     Physical Exam     Haze Dessert, DO

## 2022-09-13 ENCOUNTER — PROCEDURE VISIT (OUTPATIENT)
Dept: FAMILY MEDICINE CLINIC | Facility: CLINIC | Age: 67
End: 2022-09-13
Payer: MEDICARE

## 2022-09-13 VITALS
HEART RATE: 66 BPM | DIASTOLIC BLOOD PRESSURE: 86 MMHG | OXYGEN SATURATION: 96 % | WEIGHT: 313 LBS | RESPIRATION RATE: 18 BRPM | BODY MASS INDEX: 40.17 KG/M2 | SYSTOLIC BLOOD PRESSURE: 132 MMHG | HEIGHT: 74 IN | TEMPERATURE: 97.5 F

## 2022-09-13 DIAGNOSIS — D49.2 SKIN NEOPLASM: Primary | ICD-10-CM

## 2022-09-13 PROCEDURE — 11302 SHAVE SKIN LESION 1.1-2.0 CM: CPT | Performed by: FAMILY MEDICINE

## 2022-09-13 PROCEDURE — 88305 TISSUE EXAM BY PATHOLOGIST: CPT | Performed by: PATHOLOGY

## 2022-09-13 NOTE — PROGRESS NOTES
Assessment/Plan:  See procedure note  Patient will keep area clean and dry use Neosporin and dry sterile dressing daily  Patient wishes to follow-up as needed       Diagnoses and all orders for this visit:    Skin neoplasm  -     Tissue Exam; Future  -     Tissue Exam  -     Tissue Exam; Future  -     Tissue Exam    Other orders  -     Shave lesion            Subjective:        Patient ID: Cole Fischer  is a 79 y o  male  Patient is here to remove lesions on back bilaterally patient has notice worsening size and color change        The following portions of the patient's history were reviewed and updated as appropriate: allergies, current medications, past family history, past medical history, past social history, past surgical history and problem list       Review of Systems   Constitutional: Negative  HENT: Negative  Eyes: Negative  Respiratory: Negative  Cardiovascular: Negative  Gastrointestinal: Negative  Endocrine: Negative  Genitourinary: Negative  Musculoskeletal: Negative  Skin: Positive for color change  Allergic/Immunologic: Negative  Neurological: Negative  Hematological: Negative  Psychiatric/Behavioral: Negative  Objective:               /86 (BP Location: Right arm, Patient Position: Sitting, Cuff Size: Adult)   Pulse 66   Temp 97 5 °F (36 4 °C) (Tympanic)   Resp 18   Ht 6' 2" (1 88 m)   Wt (!) 142 kg (313 lb)   SpO2 96%   BMI 40 19 kg/m²          Physical Exam  Vitals and nursing note reviewed  Constitutional:       Appearance: Normal appearance  Skin:     Comments: Irregular raised the 11 mm lesion right lumbar region  Irregular raised 14 mm lesion from left lumbar region   Neurological:      Mental Status: He is alert  Shave lesion    Date/Time: 9/13/2022 1:56 PM  Performed by: Pili Escamilla DO  Authorized by: Pili Escamilla DO   Universal Protocol:  Consent: Verbal consent obtained   Written consent obtained  Risks and benefits: risks, benefits and alternatives were discussed  Consent given by: patient  Time out: Immediately prior to procedure a "time out" was called to verify the correct patient, procedure, equipment, support staff and site/side marked as required  Patient understanding: patient states understanding of the procedure being performed  Patient consent: the patient's understanding of the procedure matches consent given  Procedure consent: procedure consent matches procedure scheduled  Relevant documents: relevant documents present and verified  Patient identity confirmed: verbally with patient      Number of Lesions: 2  Lesion 1:     Body area: trunk    Trunk location: back (Left back)    Initial size (mm): 11    Final defect size (mm): 11    Malignancy: malignancy unknown      Destruction method: shave removal    Lesion 2:     Body area: trunk    Trunk location: back (Right back lumbar region)    Initial size (mm): 14    Final defect size (mm): 14    Malignancy: malignancy unknown      Destruction method: shave removal      Comments:  Informed consent obtained  Betadine and alcohol use  1 cc lidocaine with epinephrine used each lesion  Shave excision done the each lesion  Electrodesiccation to each lesion  Curettage each lesion  Specimen sent to pathology  Bacitracin and dry sterile dressing applied  Patient tolerated procedure well

## 2022-10-17 ENCOUNTER — TELEPHONE (OUTPATIENT)
Dept: FAMILY MEDICINE CLINIC | Facility: CLINIC | Age: 67
End: 2022-10-17

## 2022-10-17 DIAGNOSIS — E03.9 HYPOTHYROIDISM, UNSPECIFIED TYPE: ICD-10-CM

## 2022-10-17 DIAGNOSIS — I10 ESSENTIAL HYPERTENSION: ICD-10-CM

## 2022-10-17 DIAGNOSIS — N20.0 CALCULUS OF KIDNEY: ICD-10-CM

## 2022-10-17 RX ORDER — ALLOPURINOL 300 MG/1
300 TABLET ORAL DAILY
Qty: 90 TABLET | Refills: 1 | Status: SHIPPED | OUTPATIENT
Start: 2022-10-17

## 2022-10-17 RX ORDER — LEVOTHYROXINE SODIUM 0.03 MG/1
25 TABLET ORAL DAILY
Qty: 90 TABLET | Refills: 1 | Status: SHIPPED | OUTPATIENT
Start: 2022-10-17

## 2022-10-25 ENCOUNTER — HOSPITAL ENCOUNTER (OUTPATIENT)
Dept: CT IMAGING | Facility: HOSPITAL | Age: 67
Discharge: HOME | End: 2022-10-25
Attending: SURGERY
Payer: MEDICARE

## 2022-10-25 ENCOUNTER — APPOINTMENT (OUTPATIENT)
Dept: LAB | Facility: MEDICAL CENTER | Age: 67
End: 2022-10-25
Payer: MEDICARE

## 2022-10-25 DIAGNOSIS — R10.9 ABDOMINAL PAIN, UNSPECIFIED ABDOMINAL LOCATION: ICD-10-CM

## 2022-10-25 DIAGNOSIS — K57.92 DIVERTICULITIS: ICD-10-CM

## 2022-10-25 DIAGNOSIS — I10 ESSENTIAL HYPERTENSION: ICD-10-CM

## 2022-10-25 DIAGNOSIS — E03.9 HYPOTHYROIDISM, UNSPECIFIED TYPE: ICD-10-CM

## 2022-10-25 LAB
ALBUMIN SERPL BCP-MCNC: 3.7 G/DL (ref 3.5–5)
ALP SERPL-CCNC: 73 U/L (ref 46–116)
ALT SERPL W P-5'-P-CCNC: 49 U/L (ref 12–78)
ANION GAP SERPL CALCULATED.3IONS-SCNC: 7 MMOL/L (ref 4–13)
AST SERPL W P-5'-P-CCNC: 23 U/L (ref 5–45)
BASOPHILS # BLD AUTO: 0.08 THOUSANDS/ÂΜL (ref 0–0.1)
BASOPHILS NFR BLD AUTO: 1 % (ref 0–1)
BILIRUB SERPL-MCNC: 0.43 MG/DL (ref 0.2–1)
BUN SERPL-MCNC: 19 MG/DL (ref 5–25)
CALCIUM SERPL-MCNC: 9.1 MG/DL (ref 8.3–10.1)
CHLORIDE SERPL-SCNC: 110 MMOL/L (ref 96–108)
CHOLEST SERPL-MCNC: 196 MG/DL
CO2 SERPL-SCNC: 23 MMOL/L (ref 21–32)
CREAT SERPL-MCNC: 1.05 MG/DL (ref 0.6–1.3)
EOSINOPHIL # BLD AUTO: 0.05 THOUSAND/ÂΜL (ref 0–0.61)
EOSINOPHIL NFR BLD AUTO: 1 % (ref 0–6)
ERYTHROCYTE [DISTWIDTH] IN BLOOD BY AUTOMATED COUNT: 13.2 % (ref 11.6–15.1)
GFR SERPL CREATININE-BSD FRML MDRD: 73 ML/MIN/1.73SQ M
GLUCOSE P FAST SERPL-MCNC: 117 MG/DL (ref 65–99)
HCT VFR BLD AUTO: 51.3 % (ref 36.5–49.3)
HDLC SERPL-MCNC: 42 MG/DL
HGB BLD-MCNC: 16.6 G/DL (ref 12–17)
IMM GRANULOCYTES # BLD AUTO: 0.04 THOUSAND/UL (ref 0–0.2)
IMM GRANULOCYTES NFR BLD AUTO: 1 % (ref 0–2)
LDLC SERPL CALC-MCNC: 118 MG/DL (ref 0–100)
LYMPHOCYTES # BLD AUTO: 3.19 THOUSANDS/ÂΜL (ref 0.6–4.47)
LYMPHOCYTES NFR BLD AUTO: 37 % (ref 14–44)
MCH RBC QN AUTO: 30 PG (ref 26.8–34.3)
MCHC RBC AUTO-ENTMCNC: 32.4 G/DL (ref 31.4–37.4)
MCV RBC AUTO: 93 FL (ref 82–98)
MONOCYTES # BLD AUTO: 0.66 THOUSAND/ÂΜL (ref 0.17–1.22)
MONOCYTES NFR BLD AUTO: 8 % (ref 4–12)
NEUTROPHILS # BLD AUTO: 4.66 THOUSANDS/ÂΜL (ref 1.85–7.62)
NEUTS SEG NFR BLD AUTO: 52 % (ref 43–75)
NONHDLC SERPL-MCNC: 154 MG/DL
NRBC BLD AUTO-RTO: 0 /100 WBCS
PLATELET # BLD AUTO: 194 THOUSANDS/UL (ref 149–390)
PMV BLD AUTO: 9.2 FL (ref 8.9–12.7)
POTASSIUM SERPL-SCNC: 4.5 MMOL/L (ref 3.5–5.3)
PROT SERPL-MCNC: 7.1 G/DL (ref 6.4–8.4)
RBC # BLD AUTO: 5.53 MILLION/UL (ref 3.88–5.62)
SODIUM SERPL-SCNC: 140 MMOL/L (ref 135–147)
T4 FREE SERPL-MCNC: 0.95 NG/DL (ref 0.76–1.46)
TRIGL SERPL-MCNC: 178 MG/DL
TSH SERPL DL<=0.05 MIU/L-ACNC: 7.79 UIU/ML (ref 0.45–4.5)
WBC # BLD AUTO: 8.68 THOUSAND/UL (ref 4.31–10.16)

## 2022-10-25 PROCEDURE — 84439 ASSAY OF FREE THYROXINE: CPT

## 2022-10-25 PROCEDURE — 84443 ASSAY THYROID STIM HORMONE: CPT

## 2022-10-25 PROCEDURE — G1004 CDSM NDSC: HCPCS

## 2022-10-25 PROCEDURE — 85025 COMPLETE CBC W/AUTO DIFF WBC: CPT

## 2022-10-25 PROCEDURE — 80061 LIPID PANEL: CPT

## 2022-10-25 PROCEDURE — 80053 COMPREHEN METABOLIC PANEL: CPT

## 2022-10-25 PROCEDURE — 36415 COLL VENOUS BLD VENIPUNCTURE: CPT

## 2022-10-25 PROCEDURE — 74177 CT ABD & PELVIS W/CONTRAST: CPT

## 2022-10-25 RX ADMIN — IOHEXOL 100 ML: 350 INJECTION, SOLUTION INTRAVENOUS at 14:39

## 2022-10-26 ENCOUNTER — ANESTHESIA EVENT (OUTPATIENT)
Dept: GASTROENTEROLOGY | Facility: HOSPITAL | Age: 67
End: 2022-10-26

## 2022-10-26 ENCOUNTER — HOSPITAL ENCOUNTER (OUTPATIENT)
Dept: GASTROENTEROLOGY | Facility: HOSPITAL | Age: 67
Setting detail: OUTPATIENT SURGERY
Discharge: HOME | End: 2022-10-26
Attending: SURGERY

## 2022-10-26 ENCOUNTER — ANESTHESIA (OUTPATIENT)
Dept: GASTROENTEROLOGY | Facility: HOSPITAL | Age: 67
End: 2022-10-26

## 2022-10-26 VITALS
HEART RATE: 56 BPM | WEIGHT: 313 LBS | BODY MASS INDEX: 40.17 KG/M2 | SYSTOLIC BLOOD PRESSURE: 138 MMHG | RESPIRATION RATE: 18 BRPM | HEIGHT: 74 IN | TEMPERATURE: 97.4 F | OXYGEN SATURATION: 93 % | DIASTOLIC BLOOD PRESSURE: 94 MMHG

## 2022-10-26 DIAGNOSIS — K92.1 MELENA: ICD-10-CM

## 2022-10-26 DIAGNOSIS — R10.13 EPIGASTRIC PAIN: ICD-10-CM

## 2022-10-26 RX ORDER — PROPOFOL 10 MG/ML
INJECTION, EMULSION INTRAVENOUS AS NEEDED
Status: DISCONTINUED | OUTPATIENT
Start: 2022-10-26 | End: 2022-10-26

## 2022-10-26 RX ORDER — SODIUM CHLORIDE 9 MG/ML
100 INJECTION, SOLUTION INTRAVENOUS CONTINUOUS
Status: DISCONTINUED | OUTPATIENT
Start: 2022-10-26 | End: 2022-10-30 | Stop reason: HOSPADM

## 2022-10-26 RX ORDER — METRONIDAZOLE 500 MG/1
TABLET ORAL
COMMUNITY
Start: 2022-10-24

## 2022-10-26 RX ORDER — SODIUM CHLORIDE 9 MG/ML
100 INJECTION, SOLUTION INTRAVENOUS CONTINUOUS
Status: CANCELLED | OUTPATIENT
Start: 2022-10-26

## 2022-10-26 RX ORDER — LIDOCAINE HYDROCHLORIDE 10 MG/ML
INJECTION, SOLUTION EPIDURAL; INFILTRATION; INTRACAUDAL; PERINEURAL AS NEEDED
Status: DISCONTINUED | OUTPATIENT
Start: 2022-10-26 | End: 2022-10-26

## 2022-10-26 RX ADMIN — PROPOFOL 30 MG: 10 INJECTION, EMULSION INTRAVENOUS at 08:06

## 2022-10-26 RX ADMIN — PROPOFOL 30 MG: 10 INJECTION, EMULSION INTRAVENOUS at 08:08

## 2022-10-26 RX ADMIN — SODIUM CHLORIDE 100 ML/HR: 0.9 INJECTION, SOLUTION INTRAVENOUS at 06:38

## 2022-10-26 RX ADMIN — LIDOCAINE HYDROCHLORIDE 50 MG: 10 INJECTION, SOLUTION EPIDURAL; INFILTRATION; INTRACAUDAL; PERINEURAL at 08:04

## 2022-10-26 RX ADMIN — PROPOFOL 140 MG: 10 INJECTION, EMULSION INTRAVENOUS at 08:04

## 2022-10-26 NOTE — ANESTHESIA PREPROCEDURE EVALUATION
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        Physical Exam    Airway    Mallampati score: III  TM Distance: >3 FB  Neck ROM: full     Dental       Cardiovascular  Rate: normal,     Pulmonary  Pulmonary exam normal     Other Findings  Per pt denies anything remaining that is loose or removeable      Anesthesia Plan  ASA Score- 3     Anesthesia Type- IV sedation with anesthesia with ASA Monitors  Additional Monitors:   Airway Plan:     Comment: Per patient, appropriately NPO, denies active CP/SOB/wheezing/symptoms related to heartburn/nausea/vomiting  Plan Factors-Exercise tolerance (METS): >4 METS  Chart reviewed  Patient summary reviewed  Patient is not a current smoker  Induction- intravenous  Postoperative Plan-     Informed Consent- Anesthetic plan and risks discussed with patient  I personally reviewed this patient with the CRNA  Discussed and agreed on the Anesthesia Plan with the CRNA  Nataliia Thorne

## 2022-10-26 NOTE — INTERVAL H&P NOTE
H&P reviewed  After examining the patient I find no changes in the patients condition since the H&P had been written      Vitals:    10/26/22 0620   BP: 153/98   Pulse: 60   Resp: 18   Temp: (!) 97 2 °F (36 2 °C)   SpO2: 97%

## 2022-10-26 NOTE — ANESTHESIA POSTPROCEDURE EVALUATION
Post-Op Assessment Note    CV Status:  Stable  Pain Score: 0    Pain management: adequate     Mental Status:  Alert and awake   Hydration Status:  Euvolemic   PONV Controlled:  Controlled   Airway Patency:  Patent      Post Op Vitals Reviewed: Yes      Staff: CRNA         No complications documented      BP   119/76   Temp     Pulse 53   Resp 16   SpO2 95

## 2022-10-26 NOTE — H&P
H&P Exam - General Surgery   Patrick Tracy  79 y o  male MRN: [id number]  Unit/Bed#:  Encounter: [id number]    Assessment/Plan     Assessment:  Epigastric pain  Black colored stool  Plan:  EGD    History of Present Illness   History, ROS and PFSH unobtainable from any source due to none  HPI:  Patrick Tracy  is a 79 y o  male who presents with epigastric pain and black colored stools for approximately 2-3 weeks  The 1st episode of this type  Patient has no urinary complaints     Patient also with dysphagia from time to time    Review of Systems   All other systems reviewed and are negative        Historical Information   Past Medical History:   Diagnosis Date   • Anesthesia     "cant have spinal anesthesia because of curvature of the spine and severe pain"   • Anxiety    • Arthritis    • Athlete's foot     off and on   • Benign polyp of large intestine    • Cancer (HCC)     thyroid,    • Chronic pain disorder     bilat knees   • Colon polyp    • Curvature of spine    • DDD (degenerative disc disease), lumbar    • Dental crowns present    • Disease of thyroid gland     removed   • Dizziness    • Dyslipidemia    • Enlarged prostate    • Exercises daily     "walking/biking/lifts weights"   • GERD (gastroesophageal reflux disease)    • Hiatal hernia    • History of radiation therapy     "iodine pill for thyroid cancer"   • Hypertension    • IBS (irritable bowel syndrome)    • Kidney stone     still has on the left, and small one on right"   • Neuropathy    • Numbness and tingling of both feet    • Retinal hole or tear, bilateral     had laser surgery Dr Park Johnson 3-4 yrs ago   • S/P TURP (status post transurethral resection of prostate)    • Seasonal allergies    • Teeth missing    • Tension headache     occas   • Tinea pedis    • Urinary frequency    • Urinary urgency    • Wears glasses      Past Surgical History:   Procedure Laterality Date   • CHOLECYSTECTOMY     • COLONOSCOPY     • FL RETROGRADE URETHROCYSTOGRAM  2020   • KNEE ARTHROSCOPY Bilateral     knee tim   • NV COLONOSCOPY FLX DX W/COLLJ SPEC WHEN PFRMD N/A 2018    Procedure: COLONOSCOPY;  Surgeon: Florencio Morales DO;  Location: [address] [place] [address] GI LAB; Service: General   • NV CYSTO/URETERO W/LITHOTRIPSY &INDWELL STENT INSRT Left 2020    Procedure: CYSTOSCOPY URETEROSCOP RETROGRADE PYELOGRAM AND INSERTION STENT URETERAL;  Surgeon: Ej Pettit MD;  [facility];  Service: Urology   • NV CYSTO/URETERO W/LITHOTRIPSY &INDWELL STENT INSRT Left 3/25/2020    Procedure: CYSTOSCOPY URETEROSCOPIC STONE EXTRACTION, RETROGRADE PYELOGRAM AND INSERTION STENT URETERAL;  Surgeon: Ej Pettit MD;  Location: [facility];  Service: Urology   • NV FRAGMENT KIDNEY STONE/ ESWL Left 2017    Procedure: Vani Mon SHOCKWAVE (ESWL); Surgeon: Abdirahman Davis DO;  Location: [facility];  Service: Urology   • NV REPAIR [address] HERNIA,5+Y/O,REDUCIBL Right 2019    Procedure: REPAIR INGUINAL HERNIA  DIRECT NO MESH;  Surgeon: Florencio Morales DO;  Location: [address] [place] [address] MAIN OR;  Service: General   • NV TRANSURETHRAL ELEC-SURG PROSTATECTOM N/A 2021    Procedure: TRANSURETHRAL RESECTION OF PROSTATE (TURP);   Surgeon: Ej Pettit MD;  Location[facility];  Service: Urology   • RETINAL LASER PROCEDURE     • ROTATOR CUFF REPAIR Left    • THYROIDECTOMY      Subtotal and Total Thyroidectomy both mentioned in allscripts   • WISDOM TOOTH EXTRACTION       Social History   Social History     Substance and Sexual Activity   Alcohol Use Yes   • Alcohol/week: 2 0 standard drinks   • Types: 1 Glasses of wine, 1 Cans of beer per week    Comment: very rarely     Social History     Substance and Sexual Activity   Drug Use No     Social History     Tobacco Use   Smoking Status Former Smoker   • Years: 40 00   • Quit date: 2014   • Years since quittin 3   Smokeless Tobacco Former User     E-Cigarette/Vaping   • E-Cigarette Use Never User E-Cigarette/Vaping Substances   • Nicotine No    • THC No    • CBD No    • Flavoring No    • Other No    • Unknown No      Family History: non-contributory    Meds/Allergies   all medications and allergies reviewed  No Known Allergies    Objective   First Vitals:   Blood Pressure: 153/98 (10/26/22 0620)  Pulse: 60 (10/26/22 0620)  Temperature: (!) 97 2 °F (36 2 °C) (10/26/22 0620)  Temp Source: Temporal (10/26/22 0620)  Respirations: 18 (10/26/22 0620)  Height: 6' 2" (188 cm) (10/26/22 6889)  Weight - Scale: (!) 142 kg (313 lb) (10/26/22 0633)  SpO2: 97 % (10/26/22 0620)    Current Vitals:   Blood Pressure: 153/98 (10/26/22 0620)  Pulse: 60 (10/26/22 0620)  Temperature: (!) 97 2 °F (36 2 °C) (10/26/22 0620)  Temp Source: Temporal (10/26/22 0620)  Respirations: 18 (10/26/22 0620)  Height: 6' 2" (188 cm) (10/26/22 6930)  Weight - Scale: (!) 142 kg (313 lb) (10/26/22 0633)  SpO2: 97 % (10/26/22 0620)    No intake or output data in the 24 hours ending 10/26/22 0728    Invasive Devices  Report    Peripheral Intravenous Line  Duration           Peripheral IV 10/26/22 Right;Dorsal (posterior) Hand <1 day          Drain  Duration           Ureteral Drain/Stent Left ureter 6 Fr  944 days                Physical Exam  Vitals reviewed  Constitutional:       Appearance: Normal appearance  Cardiovascular:      Heart sounds: Normal heart sounds  Pulmonary:      Breath sounds: Normal breath sounds  Abdominal:      Palpations: Abdomen is soft  Tenderness: There is no abdominal tenderness  Musculoskeletal:         General: Normal range of motion  Cervical back: Normal range of motion  Skin:     General: Skin is warm and dry  Neurological:      General: No focal deficit present  Mental Status: He is alert and oriented to person, place, and time  Psychiatric:         Mood and Affect: Mood normal          Behavior: Behavior normal          Thought Content:  Thought content normal          Judgment: Judgment normal          Lab Results: I have personally reviewed pertinent lab results  Imaging: I have personally reviewed pertinent reports  EKG, Pathology, and Other Studies: I have personally reviewed pertinent reports  Code Status: Prior  Advance Directive and Living Will:      Power of :    POLST:      Counseling / Coordination of Care  Total floor / unit time spent today 30 minutes  Greater than 50% of total time was spent with the patient and / or family counseling and / or coordination of care  A description of the counseling / coordination of care:  EGD

## 2022-11-07 ENCOUNTER — OFFICE VISIT (OUTPATIENT)
Dept: FAMILY MEDICINE CLINIC | Facility: CLINIC | Age: 67
End: 2022-11-07

## 2022-11-07 VITALS
WEIGHT: 307 LBS | DIASTOLIC BLOOD PRESSURE: 86 MMHG | SYSTOLIC BLOOD PRESSURE: 136 MMHG | HEIGHT: 74 IN | BODY MASS INDEX: 39.4 KG/M2 | TEMPERATURE: 97.4 F | OXYGEN SATURATION: 96 % | HEART RATE: 71 BPM

## 2022-11-07 DIAGNOSIS — Z23 NEED FOR IMMUNIZATION AGAINST INFLUENZA: ICD-10-CM

## 2022-11-07 DIAGNOSIS — H61.23 BILATERAL IMPACTED CERUMEN: Primary | ICD-10-CM

## 2022-11-07 NOTE — PROGRESS NOTES
Assessment/Plan:  Cerumen impaction removed the bilaterally with irrigation and cerumen loop  Diagnoses and all orders for this visit:    Bilateral impacted cerumen    Need for immunization against influenza  -     FLUZONE HIGH-DOSE: influenza vaccine, high-dose, preservative-free 0 7 mL            Subjective:        Patient ID: Shelia Roth  is a 79 y o  male  Patient is here with decreased hearing and possible cerumen impaction bilaterally  The following portions of the patient's history were reviewed and updated as appropriate: allergies, current medications, past family history, past medical history, past social history, past surgical history and problem list       Review of Systems   Constitutional: Negative  HENT: Positive for hearing loss  Eyes: Negative  Respiratory: Negative  Cardiovascular: Negative  Gastrointestinal: Negative  Endocrine: Negative  Genitourinary: Negative  Musculoskeletal: Negative  Skin: Negative  Allergic/Immunologic: Negative  Neurological: Negative  Hematological: Negative  Psychiatric/Behavioral: Negative  Objective:               /86 (BP Location: Right arm, Patient Position: Sitting, Cuff Size: Large)   Pulse 71   Temp (!) 97 4 °F (36 3 °C)   Ht 6' 2" (1 88 m)   Wt (!) 139 kg (307 lb)   SpO2 96%   BMI 39 42 kg/m²          Physical Exam  Constitutional:       Appearance: Normal appearance  HENT:      Head: Normocephalic and atraumatic  Right Ear: There is impacted cerumen  Left Ear: There is impacted cerumen  Neurological:      Mental Status: He is alert     Psychiatric:         Mood and Affect: Mood normal

## 2022-11-12 ENCOUNTER — HOSPITAL ENCOUNTER (EMERGENCY)
Facility: HOSPITAL | Age: 67
Discharge: HOME | End: 2022-11-12
Attending: EMERGENCY MEDICINE

## 2022-11-12 ENCOUNTER — APPOINTMENT (EMERGENCY)
Dept: RADIOLOGY | Facility: HOSPITAL | Age: 67
End: 2022-11-12

## 2022-11-12 VITALS
WEIGHT: 313.71 LBS | HEART RATE: 70 BPM | RESPIRATION RATE: 18 BRPM | TEMPERATURE: 98.1 F | DIASTOLIC BLOOD PRESSURE: 71 MMHG | BODY MASS INDEX: 40.28 KG/M2 | SYSTOLIC BLOOD PRESSURE: 133 MMHG | OXYGEN SATURATION: 97 %

## 2022-11-12 DIAGNOSIS — T78.40XA ALLERGIC REACTION, INITIAL ENCOUNTER: ICD-10-CM

## 2022-11-12 DIAGNOSIS — R07.89 FEELING OF CHEST TIGHTNESS: ICD-10-CM

## 2022-11-12 DIAGNOSIS — T78.3XXA ANGIOEDEMA, INITIAL ENCOUNTER: Primary | ICD-10-CM

## 2022-11-12 DIAGNOSIS — L50.9 URTICARIA: ICD-10-CM

## 2022-11-12 LAB
2HR DELTA HS TROPONIN: -2 NG/L
ABO GROUP BLD: NORMAL
ALBUMIN SERPL BCP-MCNC: 3.6 G/DL (ref 3.5–5)
ALP SERPL-CCNC: 77 U/L (ref 46–116)
ALT SERPL W P-5'-P-CCNC: 49 U/L (ref 12–78)
ANION GAP SERPL CALCULATED.3IONS-SCNC: 7 MMOL/L (ref 4–13)
ATRIAL RATE: 63 BPM
ATRIAL RATE: 64 BPM
BASOPHILS # BLD AUTO: 0.02 THOUSANDS/ÂΜL (ref 0–0.1)
BASOPHILS NFR BLD AUTO: 0 % (ref 0–1)
BILIRUB SERPL-MCNC: 0.5 MG/DL (ref 0.2–1)
BLD GP AB SCN SERPL QL: NEGATIVE
BUN SERPL-MCNC: 25 MG/DL (ref 5–25)
CALCIUM SERPL-MCNC: 8.7 MG/DL (ref 8.3–10.1)
CARDIAC TROPONIN I PNL SERPL HS: 6 NG/L
CARDIAC TROPONIN I PNL SERPL HS: 8 NG/L
CHLORIDE SERPL-SCNC: 105 MMOL/L (ref 96–108)
CO2 SERPL-SCNC: 27 MMOL/L (ref 21–32)
CREAT SERPL-MCNC: 1.08 MG/DL (ref 0.6–1.3)
EOSINOPHIL # BLD AUTO: 0.06 THOUSAND/ÂΜL (ref 0–0.61)
EOSINOPHIL NFR BLD AUTO: 1 % (ref 0–6)
ERYTHROCYTE [DISTWIDTH] IN BLOOD BY AUTOMATED COUNT: 12.9 % (ref 11.6–15.1)
GFR SERPL CREATININE-BSD FRML MDRD: 70 ML/MIN/1.73SQ M
GLUCOSE SERPL-MCNC: 118 MG/DL (ref 65–140)
HCT VFR BLD AUTO: 46.9 % (ref 36.5–49.3)
HGB BLD-MCNC: 15.9 G/DL (ref 12–17)
IMM GRANULOCYTES # BLD AUTO: 0.04 THOUSAND/UL (ref 0–0.2)
IMM GRANULOCYTES NFR BLD AUTO: 0 % (ref 0–2)
LYMPHOCYTES # BLD AUTO: 2.28 THOUSANDS/ÂΜL (ref 0.6–4.47)
LYMPHOCYTES NFR BLD AUTO: 25 % (ref 14–44)
MCH RBC QN AUTO: 30.6 PG (ref 26.8–34.3)
MCHC RBC AUTO-ENTMCNC: 33.9 G/DL (ref 31.4–37.4)
MCV RBC AUTO: 90 FL (ref 82–98)
MONOCYTES # BLD AUTO: 0.66 THOUSAND/ÂΜL (ref 0.17–1.22)
MONOCYTES NFR BLD AUTO: 7 % (ref 4–12)
NEUTROPHILS # BLD AUTO: 6.13 THOUSANDS/ÂΜL (ref 1.85–7.62)
NEUTS SEG NFR BLD AUTO: 67 % (ref 43–75)
NRBC BLD AUTO-RTO: 0 /100 WBCS
P AXIS: 47 DEGREES
P AXIS: 89 DEGREES
PLATELET # BLD AUTO: 159 THOUSANDS/UL (ref 149–390)
PMV BLD AUTO: 8.5 FL (ref 8.9–12.7)
POTASSIUM SERPL-SCNC: 4.2 MMOL/L (ref 3.5–5.3)
PR INTERVAL: 164 MS
PR INTERVAL: 168 MS
PROT SERPL-MCNC: 7 G/DL (ref 6.4–8.4)
QRS AXIS: -3 DEGREES
QRS AXIS: -4 DEGREES
QRSD INTERVAL: 80 MS
QRSD INTERVAL: 82 MS
QT INTERVAL: 396 MS
QT INTERVAL: 398 MS
QTC INTERVAL: 407 MS
QTC INTERVAL: 408 MS
RBC # BLD AUTO: 5.19 MILLION/UL (ref 3.88–5.62)
RH BLD: POSITIVE
SODIUM SERPL-SCNC: 139 MMOL/L (ref 135–147)
SPECIMEN EXPIRATION DATE: NORMAL
T WAVE AXIS: 31 DEGREES
T WAVE AXIS: 31 DEGREES
VENTRICULAR RATE: 63 BPM
VENTRICULAR RATE: 64 BPM
WBC # BLD AUTO: 9.19 THOUSAND/UL (ref 4.31–10.16)

## 2022-11-12 RX ORDER — FAMOTIDINE 10 MG/ML
20 INJECTION, SOLUTION INTRAVENOUS ONCE
Status: COMPLETED | OUTPATIENT
Start: 2022-11-12 | End: 2022-11-12

## 2022-11-12 RX ORDER — PREDNISONE 20 MG/1
TABLET ORAL
Qty: 12 TABLET | Refills: 0 | Status: SHIPPED | OUTPATIENT
Start: 2022-11-12 | End: 2022-11-12 | Stop reason: SDUPTHER

## 2022-11-12 RX ORDER — HYDROXYZINE HYDROCHLORIDE 25 MG/1
25 TABLET, FILM COATED ORAL ONCE
Status: COMPLETED | OUTPATIENT
Start: 2022-11-12 | End: 2022-11-12

## 2022-11-12 RX ORDER — DIPHENHYDRAMINE HYDROCHLORIDE 50 MG/ML
25 INJECTION INTRAMUSCULAR; INTRAVENOUS ONCE
Status: COMPLETED | OUTPATIENT
Start: 2022-11-12 | End: 2022-11-12

## 2022-11-12 RX ORDER — EPINEPHRINE 0.3 MG/.3ML
0.3 INJECTION SUBCUTANEOUS ONCE AS NEEDED
Qty: 0.6 ML | Refills: 0 | Status: SHIPPED | OUTPATIENT
Start: 2022-11-12

## 2022-11-12 RX ORDER — ALBUTEROL SULFATE 2.5 MG/3ML
2.5 SOLUTION RESPIRATORY (INHALATION) ONCE
Status: COMPLETED | OUTPATIENT
Start: 2022-11-12 | End: 2022-11-12

## 2022-11-12 RX ORDER — DIPHENHYDRAMINE HCL 25 MG
25 TABLET ORAL EVERY 6 HOURS PRN
Qty: 20 TABLET | Refills: 0 | Status: SHIPPED | OUTPATIENT
Start: 2022-11-12 | End: 2022-11-12 | Stop reason: SDUPTHER

## 2022-11-12 RX ORDER — METHYLPREDNISOLONE SODIUM SUCCINATE 125 MG/2ML
125 INJECTION, POWDER, LYOPHILIZED, FOR SOLUTION INTRAMUSCULAR; INTRAVENOUS ONCE
Status: COMPLETED | OUTPATIENT
Start: 2022-11-12 | End: 2022-11-12

## 2022-11-12 RX ORDER — PREDNISONE 20 MG/1
TABLET ORAL
Qty: 12 TABLET | Refills: 0 | Status: SHIPPED | OUTPATIENT
Start: 2022-11-12 | End: 2022-11-18

## 2022-11-12 RX ORDER — DIPHENHYDRAMINE HCL 25 MG
25 TABLET ORAL EVERY 6 HOURS PRN
Qty: 20 TABLET | Refills: 0 | Status: SHIPPED | OUTPATIENT
Start: 2022-11-12

## 2022-11-12 RX ORDER — EPINEPHRINE 0.3 MG/.3ML
0.3 INJECTION SUBCUTANEOUS ONCE AS NEEDED
Qty: 0.6 ML | Refills: 0 | Status: SHIPPED | OUTPATIENT
Start: 2022-11-12 | End: 2022-11-12 | Stop reason: SDUPTHER

## 2022-11-12 RX ADMIN — METHYLPREDNISOLONE SODIUM SUCCINATE 125 MG: 125 INJECTION, POWDER, FOR SOLUTION INTRAMUSCULAR; INTRAVENOUS at 04:55

## 2022-11-12 RX ADMIN — HYDROXYZINE HYDROCHLORIDE 25 MG: 25 TABLET ORAL at 06:44

## 2022-11-12 RX ADMIN — TRANEXAMIC ACID 1000 MG: 1 INJECTION, SOLUTION INTRAVENOUS at 05:37

## 2022-11-12 RX ADMIN — FAMOTIDINE 20 MG: 10 INJECTION, SOLUTION INTRAVENOUS at 04:56

## 2022-11-12 RX ADMIN — ALBUTEROL SULFATE 2.5 MG: 2.5 SOLUTION RESPIRATORY (INHALATION) at 04:56

## 2022-11-12 RX ADMIN — DIPHENHYDRAMINE HYDROCHLORIDE 25 MG: 50 INJECTION, SOLUTION INTRAMUSCULAR; INTRAVENOUS at 04:56

## 2022-11-12 NOTE — DISCHARGE INSTRUCTIONS
Please refer to the attached information for strict return instructions  If symptoms worsen or new symptoms develop please return to the ER  Please follow up with your primary care physician for re-evaluation of symptoms  Use prescribed prednisone for full course  Use epipen injector only if your symptoms of severe allergic reaction (anaphylaxis) as instructed

## 2022-11-12 NOTE — ED PROVIDER NOTES
History  Chief Complaint   Patient presents with   • Allergic Reaction     Pt reports shortness of breath, chest tightness, throat tightening and generalized rash     Jagdish Francis  is a 79 y o   male with PMH of Hypertension, hyperlipidemia, hypothyroidism status post thyroidectomy for thyroid cancer who presents to the emergency department with lip swelling, chest tightness/shortness of breath as well as throat discomfort and rash  The patient states that his symptoms started on Tuesday  He states that he noticed a rash to his body  He does not know where the rash started just started to notice multiple pruritic/red lesions on his torso arms and legs  He states he had been taking over-the-counter allergy creams with relief of his symptoms  The patient states a 10:00 p m  this evening started have some chest tightness as well as lip swelling  He states that he did not think much of it and went to bed  He states he woke up just about an hour prior to arrival and lip swelling had worsened as well as chest tightness and he was now having some throat discomfort  He states he came into the emergency department for evaluation at that time  On arrival patient is maintaining his airway, has angioedema of the upper lip not extending into the palate  He is tolerating secretions, saturating at %, is not tachypneic or tachycardic  He is not in respiratory distress  He does have an urticarial type rash to the upper and lower extremities as well as intermittently on his chest     Patient denies taking an ACE inhibitor or Arb  He denies any new medications, detergents, soaps, colognes, sense, , clothing things or food  Patient does take verapamil as well as Allopurinol daily              History provided by:  Patient and spouse   used: No    Medical Problem  Location:  Lip swelling, chest tightness, shortness of breath,  Severity:  Moderate  Onset quality:  Gradual  Duration: 6 hours  Timing:  Constant  Progression:  Unchanged  Relieved by:  Nothing  Worsened by:  Nothing  Ineffective treatments:  None tried  Associated symptoms: rash and shortness of breath    Associated symptoms: no abdominal pain, no chest pain, no congestion, no cough, no diarrhea, no ear pain, no fatigue, no fever, no headaches, no loss of consciousness, no myalgias, no nausea, no rhinorrhea, no sore throat, no vomiting and no wheezing    Rash:     Location:  Full body    Quality: itchiness and redness      Severity:  Mild    Onset quality:  Gradual    Duration:  5 days    Timing:  Constant    Progression:  Unchanged  Shortness of breath:     Severity:  Mild    Onset quality:  Gradual    Duration:  6 hours    Timing:  Constant    Progression:  Unchanged      Prior to Admission Medications   Prescriptions Last Dose Informant Patient Reported? Taking?    COVID-19 mRNA Virus Vaccine (MODERNA COVID-19 VACCINE IM)   Yes No   Sig: Inject into a muscle 2nd dose received on 3/9/2021     Patient not taking: No sig reported   Multiple Vitamin (MULTIVITAMIN) tablet  Self Yes No   Sig: Take 1 tablet by mouth daily   Omega-3 Fatty Acids (OMEGA-3 2100 PO)  Self Yes No   Sig: Take by mouth   acetaminophen (TYLENOL) 325 mg tablet  Self Yes No   Sig: Take 650 mg by mouth every 6 (six) hours as needed for mild pain   allopurinol (ZYLOPRIM) 300 mg tablet   No No   Sig: Take 1 tablet (300 mg total) by mouth daily   chlordiazepoxide-clidinium (LIBRAX) 5-2 5 mg per capsule  Self Yes No   Sig: Take 1 capsule by mouth as needed for indigestion   clotrimazole-betamethasone (LOTRISONE) 1-0 05 % cream   No No   Sig: Apply topically 2 (two) times a day   ibuprofen (MOTRIN) 600 mg tablet  Self No No   Sig: Take 1 tablet (600 mg total) by mouth every 6 (six) hours as needed for mild pain or moderate pain   levothyroxine 200 mcg tablet   No No   Sig: Take 1 tablet (200 mcg total) by mouth daily   levothyroxine 25 mcg tablet   No No   Sig: Take 1 tablet (25 mcg total) by mouth daily   metroNIDAZOLE (FLAGYL) 500 mg tablet   Yes No   omeprazole (PriLOSEC) 20 mg delayed release capsule  Self No No   Sig: TAKE 1 CAPSULE DAILY   verapamil (CALAN-SR) 180 mg CR tablet   No No   Sig: Take 1 tablet (180 mg total) by mouth daily   vitamin E, tocopherol, 400 units capsule  Self Yes No   Sig: Take 400 Units by mouth daily      Facility-Administered Medications: None       Past Medical History:   Diagnosis Date   • Anesthesia     "cant have spinal anesthesia because of curvature of the spine and severe pain"   • Anxiety    • Arthritis    • Athlete's foot     off and on   • Benign polyp of large intestine    • Cancer (HCC)     thyroid,    • Chronic pain disorder     bilat knees   • Colon polyp    • Curvature of spine    • DDD (degenerative disc disease), lumbar    • Dental crowns present    • Disease of thyroid gland     removed   • Dizziness    • Dyslipidemia    • Enlarged prostate    • Exercises daily     "walking/biking/lifts weights"   • GERD (gastroesophageal reflux disease)    • Hiatal hernia    • History of radiation therapy     "iodine pill for thyroid cancer"   • Hypertension    • IBS (irritable bowel syndrome)    • Kidney stone     still has on the left, and small one on right"   • Neuropathy    • Numbness and tingling of both feet    • Retinal hole or tear, bilateral     had laser surgery Dr Dee Oconnell 3-4 yrs ago   • S/P TURP (status post transurethral resection of prostate)    • Seasonal allergies    • Teeth missing    • Tension headache     occas   • Tinea pedis    • Urinary frequency    • Urinary urgency    • Wears glasses        Past Surgical History:   Procedure Laterality Date   • CHOLECYSTECTOMY     • COLONOSCOPY     • FL RETROGRADE URETHROCYSTOGRAM  2/28/2020   • KNEE ARTHROSCOPY Bilateral     knee tim   • NH COLONOSCOPY FLX DX W/COLLJ SPEC WHEN PFRMD N/A 7/25/2018    Procedure: COLONOSCOPY;  Surgeon: Willi Ribeiro DO;  Location: [address] [place] [address] GI LAB;   Service: General   • SC CYSTO/URETERO W/LITHOTRIPSY &INDWELL STENT INSRT Left 2020    Procedure: CYSTOSCOPY URETEROSCOP RETROGRADE PYELOGRAM AND INSERTION STENT URETERAL;  Surgeon: Colten Das MD;  [facility];  Service: Urology   • SC CYSTO/URETERO W/LITHOTRIPSY &INDWELL STENT INSRT Left 3/25/2020    Procedure: CYSTOSCOPY URETEROSCOPIC STONE EXTRACTION, RETROGRADE PYELOGRAM AND INSERTION STENT URETERAL;  Surgeon: Colten Das MD;  [facility];  Service: Urology   • SC FRAGMENT KIDNEY STONE/ ESWL Left 2017    Procedure: Carlos Roth SHOCKWAVE (ESWL); Surgeon: Marcia Rahman DO;  [facility];  Service: Urology   • SC REPAIR [address] HERNIA,5+Y/O,REDUCIBL Right 2019    Procedure: REPAIR INGUINAL HERNIA  DIRECT NO MESH;  Surgeon: Shalonda Agee DO;  Location: [address] [place] [address] MAIN OR;  Service: General   • SC TRANSURETHRAL ELEC-SURG PROSTATECTOM N/A 2021    Procedure: TRANSURETHRAL RESECTION OF PROSTATE (TURP); Surgeon: Colten Das MD;  [facility];  Service: Urology   • RETINAL LASER PROCEDURE     • ROTATOR CUFF REPAIR Left    • THYROIDECTOMY      Subtotal and Total Thyroidectomy both mentioned in allscripts   • WISDOM TOOTH EXTRACTION         Family History   Problem Relation Age of Onset   • Diabetes Mother    • Heart disease Father    • Breast cancer Sister      I have reviewed and agree with the history as documented  E-Cigarette/Vaping   • E-Cigarette Use Never User      E-Cigarette/Vaping Substances   • Nicotine No    • THC No    • CBD No    • Flavoring No    • Other No    • Unknown No      Social History     Tobacco Use   • Smoking status: Former Smoker     Years: 40 00     Quit date: 2014     Years since quittin 4   • Smokeless tobacco: Former User   Vaping Use   • Vaping Use: Never used   Substance Use Topics   • Alcohol use:  Yes     Alcohol/week: 2 0 standard drinks     Types: 1 Glasses of wine, 1 Cans of beer per week     Comment: very rarely   • Drug use: No       Review of Systems   Constitutional: Negative for activity change, appetite change, chills, fatigue, fever and unexpected weight change  HENT: Positive for facial swelling  Negative for congestion, ear discharge, ear pain, postnasal drip, rhinorrhea, sinus pressure, sinus pain and sore throat  Respiratory: Positive for chest tightness and shortness of breath  Negative for apnea, cough, choking, wheezing and stridor  Cardiovascular: Negative for chest pain, palpitations and leg swelling  Gastrointestinal: Negative for abdominal pain, blood in stool, constipation, diarrhea, nausea and vomiting  Genitourinary: Negative for dysuria, flank pain, frequency and urgency  Musculoskeletal: Negative for arthralgias, myalgias and neck stiffness  Skin: Positive for rash  Negative for color change, pallor and wound  Neurological: Negative for dizziness, tremors, seizures, loss of consciousness, syncope, light-headedness, numbness and headaches  All other systems reviewed and are negative  Physical Exam  Physical Exam  Vitals and nursing note reviewed  Constitutional:       General: He is not in acute distress  Appearance: Normal appearance  He is normal weight  He is not ill-appearing or toxic-appearing  HENT:      Head: Normocephalic and atraumatic  Right Ear: Tympanic membrane, ear canal and external ear normal       Left Ear: Tympanic membrane, ear canal and external ear normal       Nose: Nose normal  No congestion or rhinorrhea  Mouth/Throat:      Mouth: Mucous membranes are moist       Pharynx: Oropharynx is clear  Comments: Angioedema of the upper lip  No tongue swelling  No swelling into the palate  Tolerating secretions  Uvula is midline with no pharyngeal erythema or swelling  Eyes:      General:         Right eye: No discharge  Left eye: No discharge  Extraocular Movements: Extraocular movements intact  Conjunctiva/sclera: Conjunctivae normal       Pupils: Pupils are equal, round, and reactive to light  Comments: No periorbital angioedema   Cardiovascular:      Rate and Rhythm: Normal rate and regular rhythm  Pulses: Normal pulses  Heart sounds: Normal heart sounds  No murmur heard  No friction rub  No gallop  Pulmonary:      Effort: Pulmonary effort is normal  No respiratory distress  Breath sounds: Normal breath sounds  No stridor  No wheezing, rhonchi or rales  Chest:      Chest wall: No tenderness  Abdominal:      General: Abdomen is flat  There is no distension  Palpations: Abdomen is soft  There is no mass  Tenderness: There is no abdominal tenderness  Hernia: No hernia is present  Musculoskeletal:         General: Normal range of motion  Cervical back: Normal range of motion and neck supple  No tenderness  Skin:     General: Skin is warm and dry  Capillary Refill: Capillary refill takes less than 2 seconds  Comments: There is an intermittent urticarial rash on the upper and lower extremities  Urticarial rash does extends the torso  Negative this Nikolsky sign  Blanching rash  Neurological:      General: No focal deficit present  Mental Status: He is alert and oriented to person, place, and time  Mental status is at baseline  Cranial Nerves: No cranial nerve deficit  Sensory: No sensory deficit           Vital Signs  ED Triage Vitals   Temperature Pulse Respirations Blood Pressure SpO2   11/12/22 0450 11/12/22 0428 11/12/22 0428 11/12/22 0428 11/12/22 0428   98 1 °F (36 7 °C) 72 19 144/97 97 %      Temp Source Heart Rate Source Patient Position - Orthostatic VS BP Location FiO2 (%)   11/12/22 0426 11/12/22 0426 11/12/22 0428 11/12/22 0428 --   Oral Monitor Sitting Right arm       Pain Score       --                  Vitals:    11/12/22 0800 11/12/22 0830 11/12/22 0915 11/12/22 0931   BP: 120/58 123/66 131/67 133/71   Pulse: 68 64 72 70   Patient Position - Orthostatic VS: Sitting Sitting Sitting Lying         Visual Acuity      ED Medications  Medications   diphenhydrAMINE (BENADRYL) injection 25 mg (25 mg Intravenous Given 11/12/22 0456)   Famotidine (PF) (PEPCID) injection 20 mg (20 mg Intravenous Given 11/12/22 0456)   methylPREDNISolone sodium succinate (Solu-MEDROL) injection 125 mg (125 mg Intravenous Given 11/12/22 0455)   albuterol inhalation solution 2 5 mg (2 5 mg Nebulization Given 11/12/22 0456)   tranexamic Acid 1,000 mg in sodium chloride 0 9 % 100 mL IVPB (0 mg Intravenous Stopped 11/12/22 0557)   hydrOXYzine HCL (ATARAX) tablet 25 mg (25 mg Oral Given 11/12/22 0644)       Diagnostic Studies  Results Reviewed     Procedure Component Value Units Date/Time    HS Troponin I 2hr [[id number]]  (Normal) Collected: 11/12/22 0711    Lab Status: Final result Specimen: Blood from Arm, Left Updated: 11/12/22 0741     hs TnI 2hr 6 ng/L      Delta 2hr hsTnI -2 ng/L     Comprehensive metabolic panel [[id number]] Collected: 11/12/22 0509    Lab Status: Final result Specimen: Blood from Arm, Left Updated: 11/12/22 0548     Sodium 139 mmol/L      Potassium 4 2 mmol/L      Chloride 105 mmol/L      CO2 27 mmol/L      ANION GAP 7 mmol/L      BUN 25 mg/dL      Creatinine 1 08 mg/dL      Glucose 118 mg/dL      Calcium 8 7 mg/dL      AST --     ALT 49 U/L      Alkaline Phosphatase 77 U/L      Total Protein 7 0 g/dL      Albumin 3 6 g/dL      Total Bilirubin 0 50 mg/dL      eGFR 70 ml/min/1 73sq m     Narrative:      NO AST RESULTS IF NEEDED IT MUST BE ORDERED SEPARATELY  National Kidney Disease Foundation guidelines for Chronic Kidney Disease (CKD):   •  Stage 1 with normal or high GFR (GFR > 90 mL/min/1 73 square meters)  •  Stage 2 Mild CKD (GFR = 60-89 mL/min/1 73 square meters)  •  Stage 3A Moderate CKD (GFR = 45-59 mL/min/1 73 square meters)  •  Stage 3B Moderate CKD (GFR = 30-44 mL/min/1 73 square meters)  •  Stage 4 Severe CKD (GFR = 15-29 mL/min/1 73 square meters)  •  Stage 5 End Stage CKD (GFR <15 mL/min/1 73 square meters)  Note: GFR calculation is accurate only with a steady state creatinine    HS Troponin 0hr (reflex protocol) [[id number]]  (Normal) Collected: 11/12/22 0509    Lab Status: Final result Specimen: Blood from Arm, Left Updated: 11/12/22 0541     hs TnI 0hr 8 ng/L     CBC and differential [[id number]]  (Abnormal) Collected: 11/12/22 0509    Lab Status: Final result Specimen: Blood from Arm, Left Updated: 11/12/22 0518     WBC 9 19 Thousand/uL      RBC 5 19 Million/uL      Hemoglobin 15 9 g/dL      Hematocrit 46 9 %      MCV 90 fL      MCH 30 6 pg      MCHC 33 9 g/dL      RDW 12 9 %      MPV 8 5 fL      Platelets 073 Thousands/uL      nRBC 0 /100 WBCs      Neutrophils Relative 67 %      Immat GRANS % 0 %      Lymphocytes Relative 25 %      Monocytes Relative 7 %      Eosinophils Relative 1 %      Basophils Relative 0 %      Neutrophils Absolute 6 13 Thousands/µL      Immature Grans Absolute 0 04 Thousand/uL      Lymphocytes Absolute 2 28 Thousands/µL      Monocytes Absolute 0 66 Thousand/µL      Eosinophils Absolute 0 06 Thousand/µL      Basophils Absolute 0 02 Thousands/µL                  XR chest 1 view portable   ED Interpretation by Luzma Sandoval PA-C (11/12 0530)   No infiltrate, cardiac silhouette normal, no pleural effusions or pulmonary edema  Final Result by Gerhardt Brier, MD (11/12 0932)      No acute cardiopulmonary disease        Findings are stable            Workstation performed: [id number]                    Procedures  ECG 12 Lead Documentation Only    Date/Time: 11/12/2022 5:19 AM  Performed by: Luzma Sandoval PA-C  Authorized by: Luzma Sandoval PA-C     Indications / Diagnosis:  Chest Tightness  ECG reviewed by me, the ED Provider: yes    Patient location:  ED  Previous ECG:     Previous ECG:  Compared to current    Similarity:  No change    Comparison to cardiac monitor: No    Interpretation: Interpretation: normal    Rate:     ECG rate:  64    ECG rate assessment: normal    Rhythm:     Rhythm: sinus rhythm    Ectopy:     Ectopy: none    QRS:     QRS axis:  Normal    QRS intervals:  Normal  Conduction:     Conduction: normal    ST segments:     ST segments:  Normal  T waves:     T waves: normal               ED Course                               SBIRT 20yo+    Flowsheet Row Most Recent Value   SBIRT (25 yo +)    In order to provide better care to our patients, we are screening all of our patients for alcohol and drug use  Would it be okay to ask you these screening questions? No Filed at: 11/12/2022 0518                    MDM  Number of Diagnoses or Management Options  Allergic reaction, initial encounter: new and requires workup  Angioedema, initial encounter: new and requires workup  Feeling of chest tightness: new and requires workup  Urticaria: new and requires workup  Diagnosis management comments: Patient was seen and examined  in the emergency department for chief complaint of angioedema lip as well as pruritic urticarial type rash  The patient presented symptoms originally started on Tuesday  Pruritic urticarial rash diffusely  Negative Nikolsky  No fevers or chills  No new chemicals, meds, foods, exposures  States that the symptoms wax and wane improved with oral Benadryl as well as topical Benadryl  This evening approximately 10:00 p m  Noted lip swelling as well as some chest tightness and throat tightness  Went to bed and woke up in symptoms were persistent so came in for evaluation  On exam here patient is maintaining his airway  Has angioedema of the upper lip no swelling into the soft palate  He does have an urticarial rash that is mild  Lungs are clear  Abdomen soft nontender nondistended  Neuro exam is nonfocal    DDx including but not limited to: Allergic reaction, urticaria, angioedema, cellulitis, anaphylaxis  Workup: Will check labs    At this point does not appear to be an allergic-type reaction but will treat as such as well as treat for angioedema possibly related to his calcium channel blocker or possible hereditary source  TXA, FFP, Benadryl, Pepcid, steroids  Will give albuterol in the setting of chest tightness  Reassessed shortly after treatment patient symptomatically feeling much better  With swelling slowly started to degree acute decreased while in the emergency department  At this point symptoms appear to be idiopathic versus calcium channel blocker mediated versus allergic  I did recommend admission however patient declining stating is feeling much better  I discussed that this can rebound and come back worse  Will place on steroid burst/taper  Will recommend very close follow-up with PCP and discontinuation of lisinopril  Case will be signed out to oncoming provider pending 5 hour observation     Again recommended that patient stay in the hospital for admission  He again declined  Compromise was observation  Disposition:  General impression 71-year-old male with angioedema of the lip as well as chest tightness and urticarial rash  Possibly adverse effect of Eloy channel blocker versus idiopathic angioedema versus allergic  Could not identify allergic source  Follow-up PCP  Strict return precautions  The patient was discharged in stable condition  Patient ambulated off the department  Extensive return to emergency department precautions were discussed  Follow up with appropriate providers including primary care physician was discussed  Patient and/or their  primary decision maker expressed understanding  Patient remained stable during entire emergency department stay           Amount and/or Complexity of Data Reviewed  Clinical lab tests: ordered and reviewed  Tests in the radiology section of CPT®: ordered and reviewed  Tests in the medicine section of CPT®: ordered and reviewed  Review and summarize past medical records: yes  Independent visualization of images, tracings, or specimens: yes    Risk of Complications, Morbidity, and/or Mortality  Presenting problems: moderate  Diagnostic procedures: moderate  Management options: moderate    Patient Progress  Patient progress: stable      Disposition  Final diagnoses: Allergic reaction, initial encounter   Angioedema, initial encounter   Feeling of chest tightness   Urticaria     Time reflects when diagnosis was documented in both MDM as applicable and the Disposition within this note     Time User Action Codes Description Comment    11/12/2022  9:11 AM Samaria Barnard Add [T78 40XA] Allergic reaction, initial encounter     11/12/2022  9:17 AM Anahi Villagran  3XXA] Angioedema, initial encounter     11/12/2022  9:17 AM Samaria Barnard Modify [T78 40XA] Allergic reaction, initial encounter     11/12/2022  9:17 AM Samaria Barnard Modify [T78  3XXA] Angioedema, initial encounter     11/12/2022  7:23 PM Nova Beans Add [R07 89] Feeling of chest tightness     11/12/2022  7:23 PM Nova Beans Add [L50 9] Urticaria       ED Disposition     ED Disposition   Discharge    Condition   Stable    Date/Time   Sat Nov 12, 2022  9:11 AM    Comment   Magno Sarkar  discharge to home/self care                 Follow-up Information     Follow up With Specialties Details Why Contact Info Additional Information    Neha Wheeler,  Family Medicine Schedule an appointment as soon as possible for a visit   [address] [place] [address] [facility] Emergency Department Emergency Medicine  If symptoms worsen [facility] [id number]  [address] [facility] Emergency Department, [address] [place] [address] [address] [place], [place], [id number]          Discharge Medication List as of 11/12/2022  9:18 AM      START taking these medications    Details   diphenhydrAMINE (BENADRYL) 25 mg tablet Take 1 tablet (25 mg total) by mouth every 6 (six) hours as needed for itching or allergies, Starting Sat 11/12/2022, Normal      EPINEPHrine (EPIPEN) 0 3 mg/0 3 mL SOAJ Inject 0 3 mL (0 3 mg total) into a muscle once as needed for anaphylaxis for up to 1 dose, Starting Sat 11/12/2022, Normal      predniSONE 20 mg tablet Multiple Dosages:Starting Sat 11/12/2022, Until Sun 11/13/2022 at 2359, THEN Starting Mon 11/14/2022, Until Tue 11/15/2022 at 2359, THEN Starting Wed 11/16/2022, Until Thu 11/17/2022 at 2359Take 3 tablets (60 mg total) by mouth daily for 2 days, THEN  2 tablets (40 mg total) daily for 2 days, THEN 1 tablet (20 mg total) daily for 2 days  , Normal         CONTINUE these medications which have NOT CHANGED    Details   acetaminophen (TYLENOL) 325 mg tablet Take 650 mg by mouth every 6 (six) hours as needed for mild pain, Historical Med      allopurinol (ZYLOPRIM) 300 mg tablet Take 1 tablet (300 mg total) by mouth daily, Starting Mon 10/17/2022, Normal      chlordiazepoxide-clidinium (LIBRAX) 5-2 5 mg per capsule Take 1 capsule by mouth as needed for indigestion, Historical Med      clotrimazole-betamethasone (LOTRISONE) 1-0 05 % cream Apply topically 2 (two) times a day, Starting Thu 2/24/2022, Normal      COVID-19 mRNA Virus Vaccine (MODERNA COVID-19 VACCINE IM) Inject into a muscle 2nd dose received on 3/9/2021  , Historical Med      ibuprofen (MOTRIN) 600 mg tablet Take 1 tablet (600 mg total) by mouth every 6 (six) hours as needed for mild pain or moderate pain, Starting Mon 10/25/2021, Normal      !! levothyroxine 200 mcg tablet Take 1 tablet (200 mcg total) by mouth daily, Starting u 2/24/2022, Normal      !! levothyroxine 25 mcg tablet Take 1 tablet (25 mcg total) by mouth daily, Starting Mon 10/17/2022, Normal      metroNIDAZOLE (FLAGYL) 500 mg tablet Starting Mon 10/24/2022, Historical Med      Multiple Vitamin (MULTIVITAMIN) tablet Take 1 tablet by mouth daily, Historical Med      Omega-3 Fatty Acids (OMEGA-3 2100 PO) Take by mouth, Historical Med      omeprazole (PriLOSEC) 20 mg delayed release capsule TAKE 1 CAPSULE DAILY, Normal      verapamil (CALAN-SR) 180 mg CR tablet Take 1 tablet (180 mg total) by mouth daily, Starting Mon 10/17/2022, Normal      vitamin E, tocopherol, 400 units capsule Take 400 Units by mouth daily, Historical Med       !! - Potential duplicate medications found  Please discuss with provider  No discharge procedures on file      PDMP Review       Value Time User    PDMP Reviewed  Yes 10/25/2021 11:12 AM Sourav Mckeon PA-C          ED Provider  Electronically Signed by           Cristina Cortes PA-C  11/12/22 1923

## 2022-11-12 NOTE — ED PROVIDER NOTES
Emergency Department Sign Out Note        Sign out and transfer of care from [facility]  See Separate Emergency Department note  The patient, Keena Treadwell , was evaluated by the previous provider for lip swelling, dyspnea, urticaria  Workup Completed:  Labs Reviewed   CBC AND DIFFERENTIAL - Abnormal       Result Value Ref Range Status    WBC 9 19  4 31 - 10 16 Thousand/uL Final    RBC 5 19  3 88 - 5 62 Million/uL Final    Hemoglobin 15 9  12 0 - 17 0 g/dL Final    Hematocrit 46 9  36 5 - 49 3 % Final    MCV 90  82 - 98 fL Final    MCH 30 6  26 8 - 34 3 pg Final    MCHC 33 9  31 4 - 37 4 g/dL Final    RDW 12 9  11 6 - 15 1 % Final    MPV 8 5 (*) 8 9 - 12 7 fL Final    Platelets 871  849 - 390 Thousands/uL Final    nRBC 0  /100 WBCs Final    Neutrophils Relative 67  43 - 75 % Final    Immat GRANS % 0  0 - 2 % Final    Lymphocytes Relative 25  14 - 44 % Final    Monocytes Relative 7  4 - 12 % Final    Eosinophils Relative 1  0 - 6 % Final    Basophils Relative 0  0 - 1 % Final    Neutrophils Absolute 6 13  1 85 - 7 62 Thousands/µL Final    Immature Grans Absolute 0 04  0 00 - 0 20 Thousand/uL Final    Lymphocytes Absolute 2 28  0 60 - 4 47 Thousands/µL Final    Monocytes Absolute 0 66  0 17 - 1 22 Thousand/µL Final    Eosinophils Absolute 0 06  0 00 - 0 61 Thousand/µL Final    Basophils Absolute 0 02  0 00 - 0 10 Thousands/µL Final   HS TROPONIN I 0HR - Normal    hs TnI 0hr 8  "Refer to ACS Flowchart"- see link ng/L Final    Comment:                                              Initial (time 0) result  If >=50 ng/L, Myocardial injury suggested ;  Type of myocardial injury and treatment strategy  to be determined  If 5-49 ng/L, a delta result at 2 hours and or 4 hours will be needed to further evaluate  If <4 ng/L, and chest pain has been >3 hours since onset, patient may qualify for discharge based on the HEART score in the ED    If <5 ng/L and <3hours since onset of chest pain, a delta result at 2 hours will be needed to further evaluate  HS Troponin 99th Percentile URL of a Health Population=12 ng/L with a 95% Confidence Interval of 8-18 ng/L  Second Troponin (time 2 hours)  If calculated delta >= 20 ng/L,  Myocardial injury suggested ; Type of myocardial injury and treatment strategy to be determined  If 5-49 ng/L and the calculated delta is 5-19 ng/L, consult medical service for evaluation  Continue evaluation for ischemia on ecg and other possible etiology and repeat hs troponin at 4 hours  If delta is <5 ng/L at 2 hours, consider discharge based on risk stratification via the HEART score (if in ED), or AXEL risk score in IP/Observation  HS Troponin 99th Percentile URL of a Health Population=12 ng/L with a 95% Confidence Interval of 8-18 ng/L    HS TROPONIN I 2HR - Normal    hs TnI 2hr 6  "Refer to ACS Flowchart"- see link ng/L Final    Comment:                                              Initial (time 0) result  If >=50 ng/L, Myocardial injury suggested ;  Type of myocardial injury and treatment strategy  to be determined  If 5-49 ng/L, a delta result at 2 hours and or 4 hours will be needed to further evaluate  If <4 ng/L, and chest pain has been >3 hours since onset, patient may qualify for discharge based on the HEART score in the ED  If <5 ng/L and <3hours since onset of chest pain, a delta result at 2 hours will be needed to further evaluate  HS Troponin 99th Percentile URL of a Health Population=12 ng/L with a 95% Confidence Interval of 8-18 ng/L  Second Troponin (time 2 hours)  If calculated delta >= 20 ng/L,  Myocardial injury suggested ; Type of myocardial injury and treatment strategy to be determined  If 5-49 ng/L and the calculated delta is 5-19 ng/L, consult medical service for evaluation  Continue evaluation for ischemia on ecg and other possible etiology and repeat hs troponin at 4 hours    If delta is <5 ng/L at 2 hours, consider discharge based on risk stratification via the HEART score (if in ED), or AXEL risk score in IP/Observation  HS Troponin 99th Percentile URL of a Health Population=12 ng/L with a 95% Confidence Interval of 8-18 ng/L  Delta 2hr hsTnI -2  <20 ng/L Final   COMPREHENSIVE METABOLIC PANEL    Sodium 827  135 - 147 mmol/L Final    Potassium 4 2  3 5 - 5 3 mmol/L Final    Chloride 105  96 - 108 mmol/L Final    CO2 27  21 - 32 mmol/L Final    ANION GAP 7  4 - 13 mmol/L Final    BUN 25  5 - 25 mg/dL Final    Creatinine 1 08  0 60 - 1 30 mg/dL Final    Comment: Standardized to IDMS reference method    Glucose 118  65 - 140 mg/dL Final    Comment: If the patient is fasting, the ADA then defines impaired fasting glucose as > 100 mg/dL and diabetes as > or equal to 123 mg/dL  Specimen collection should occur prior to Sulfasalazine administration due to the potential for falsely depressed results  Specimen collection should occur prior to Sulfapyridine administration due to the potential for falsely elevated results  Calcium 8 7  8 3 - 10 1 mg/dL Final    AST     Final    Comment: Specimen collection should occur prior to Sulfasalazine administration due to the potential for falsely depressed results  ALT 49  12 - 78 U/L Final    Comment: Specimen collection should occur prior to Sulfasalazine administration due to the potential for falsely depressed results  Alkaline Phosphatase 77  46 - 116 U/L Final    Total Protein 7 0  6 4 - 8 4 g/dL Final    Albumin 3 6  3 5 - 5 0 g/dL Final    Total Bilirubin 0 50  0 20 - 1 00 mg/dL Final    Comment: Use of this assay is not recommended for patients undergoing treatment with eltrombopag due to the potential for falsely elevated results      eGFR 70  ml/min/1 73sq m Final    Narrative:     NO AST RESULTS IF NEEDED IT MUST BE ORDERED SEPARATELY  National Kidney Disease Foundation guidelines for Chronic Kidney Disease (CKD):   •  Stage 1 with normal or high GFR (GFR > 90 mL/min/1 73 square meters)  • Stage 2 Mild CKD (GFR = 60-89 mL/min/1 73 square meters)  •  Stage 3A Moderate CKD (GFR = 45-59 mL/min/1 73 square meters)  •  Stage 3B Moderate CKD (GFR = 30-44 mL/min/1 73 square meters)  •  Stage 4 Severe CKD (GFR = 15-29 mL/min/1 73 square meters)  •  Stage 5 End Stage CKD (GFR <15 mL/min/1 73 square meters)  Note: GFR calculation is accurate only with a steady state creatinine   TYPE AND SCREEN    ABO Grouping A   Final    Rh Factor Positive   Final    Antibody Screen Negative   Final    Specimen Expiration Date [id number]   Final   PREPARE PLASMA ADULT    Unit Product Code G9882F38       Unit Number [id number]       Unit ABO AB       Unit [facility] POS       Unit Dispense Status Issued       Unit Product Volume 229  mL     Unit Product Code B2944Z63       Unit Number [id number]       Unit ABO AB       Unit [facility] POS       Unit Dispense Status Issued       Unit Product Volume 202  mL      XR chest 1 view portable   ED Interpretation   No infiltrate, cardiac silhouette normal, no pleural effusions or pulmonary edema  Final Result      No acute cardiopulmonary disease        Findings are stable            Workstation performed: [id number]               ED Course / Workup Pending (followup):  -Significantly improved at time of my sign on - little to no residual angioedema to lip, complete improvement overall in symptoms of chest/throat tightness  -Significant improvement in urticaria - some scattered wheals to torso still noted  -Admission had been recommended by previous provider and myself to patient however continues to decline - had been agreeable to observation for short time in ED to ensure continued improvement  -Will discharge patient per request - plan on oral steroid taper, benadryl PRN, epipen PRN anaphylaxis  -Etiology no certain - while possibly allergic, medication induced suspected by previous provider with recommendation to hold verapamil until next PCP appt - pt verbalized understanding of this  -Strict f/u and return to ED indications reviewed                                     Procedures  MDM        Disposition  Final diagnoses: Allergic reaction, initial encounter   Angioedema, initial encounter     Time reflects when diagnosis was documented in both MDM as applicable and the Disposition within this note     Time User Action Codes Description Comment    11/12/2022  9:11 AM Samaria Barnard Add [T78 40XA] Allergic reaction, initial encounter     11/12/2022  9:17 AM Anahi Villagran  3XXA] Angioedema, initial encounter     11/12/2022  9:17 AM Samaria Barnard Modify [T78 40XA] Allergic reaction, initial encounter     11/12/2022  9:17 AM Samaria Barnard Modify [T78  3XXA] Angioedema, initial encounter       ED Disposition     ED Disposition   Discharge    Condition   Stable    Date/Time   Sat Nov 12, 2022  9:11 AM    Comment   Magno Sarkar  discharge to home/self care                 Follow-up Information     Follow up With Specialties Details Why Contact Info Additional Information    Neha Wheeler DO Family Medicine Schedule an appointment as soon as possible for a visit   Fabrizio Aguilar [address] [facility] Emergency Department Emergency Medicine  If symptoms worsen [facility] [id number]  [address] [facility] Emergency Department, [address] , [address] [facility], [place], [address]        Discharge Medication List as of 11/12/2022  9:18 AM      START taking these medications    Details   diphenhydrAMINE (BENADRYL) 25 mg tablet Take 1 tablet (25 mg total) by mouth every 6 (six) hours as needed for itching or allergies, Starting Sat 11/12/2022, Normal      EPINEPHrine (EPIPEN) 0 3 mg/0 3 mL SOAJ Inject 0 3 mL (0 3 mg total) into a muscle once as needed for anaphylaxis for up to 1 dose, Starting Sat 11/12/2022, Normal      predniSONE 20 mg tablet Multiple Dosages:Starting Sat 11/12/2022, Until Sun 11/13/2022 at 2359, THEN Starting Mon 11/14/2022, Until Tue 11/15/2022 at 2359, THEN Starting Wed 11/16/2022, Until Thu 11/17/2022 at 2359Take 3 tablets (60 mg total) by mouth daily for 2 days, THEN  2 tablets (40 mg total) daily for 2 days, THEN 1 tablet (20 mg total) daily for 2 days  , Normal         CONTINUE these medications which have NOT CHANGED    Details   acetaminophen (TYLENOL) 325 mg tablet Take 650 mg by mouth every 6 (six) hours as needed for mild pain, Historical Med      allopurinol (ZYLOPRIM) 300 mg tablet Take 1 tablet (300 mg total) by mouth daily, Starting Mon 10/17/2022, Normal      chlordiazepoxide-clidinium (LIBRAX) 5-2 5 mg per capsule Take 1 capsule by mouth as needed for indigestion, Historical Med      clotrimazole-betamethasone (LOTRISONE) 1-0 05 % cream Apply topically 2 (two) times a day, Starting Thu 2/24/2022, Normal      COVID-19 mRNA Virus Vaccine (MODERNA COVID-19 VACCINE IM) Inject into a muscle 2nd dose received on 3/9/2021  , Historical Med      ibuprofen (MOTRIN) 600 mg tablet Take 1 tablet (600 mg total) by mouth every 6 (six) hours as needed for mild pain or moderate pain, Starting Mon 10/25/2021, Normal      !! levothyroxine 200 mcg tablet Take 1 tablet (200 mcg total) by mouth daily, Starting Thu 2/24/2022, Normal      !! levothyroxine 25 mcg tablet Take 1 tablet (25 mcg total) by mouth daily, Starting Mon 10/17/2022, Normal      metroNIDAZOLE (FLAGYL) 500 mg tablet Starting Mon 10/24/2022, Historical Med      Multiple Vitamin (MULTIVITAMIN) tablet Take 1 tablet by mouth daily, Historical Med      Omega-3 Fatty Acids (OMEGA-3 2100 PO) Take by mouth, Historical Med      omeprazole (PriLOSEC) 20 mg delayed release capsule TAKE 1 CAPSULE DAILY, Normal      verapamil (CALAN-SR) 180 mg CR tablet Take 1 tablet (180 mg total) by mouth daily, Starting Mon 10/17/2022, Normal      vitamin E, tocopherol, 400 units capsule Take 400 Units by mouth daily, Historical Med       !! - Potential duplicate medications found  Please discuss with provider  No discharge procedures on file         ED Provider  Electronically Signed by     Ira Merrill PA-C  11/12/22 [id number]

## 2022-11-13 LAB
ABO GROUP BLD BPU: NORMAL
ABO GROUP BLD BPU: NORMAL
BPU ID: NORMAL
BPU ID: NORMAL
UNIT DISPENSE STATUS: NORMAL
UNIT DISPENSE STATUS: NORMAL
UNIT PRODUCT CODE: NORMAL
UNIT PRODUCT CODE: NORMAL
UNIT PRODUCT VOLUME: 202 ML
UNIT PRODUCT VOLUME: 229 ML
UNIT RH: NORMAL
UNIT RH: NORMAL

## 2022-11-16 ENCOUNTER — OFFICE VISIT (OUTPATIENT)
Dept: FAMILY MEDICINE CLINIC | Facility: CLINIC | Age: 67
End: 2022-11-16

## 2022-11-16 VITALS
HEIGHT: 74 IN | BODY MASS INDEX: 39.78 KG/M2 | TEMPERATURE: 97.6 F | WEIGHT: 310 LBS | SYSTOLIC BLOOD PRESSURE: 120 MMHG | DIASTOLIC BLOOD PRESSURE: 90 MMHG | HEART RATE: 60 BPM | OXYGEN SATURATION: 97 %

## 2022-11-16 DIAGNOSIS — T78.3XXD ANGIOEDEMA, SUBSEQUENT ENCOUNTER: Primary | ICD-10-CM

## 2022-11-16 DIAGNOSIS — I10 ESSENTIAL HYPERTENSION: ICD-10-CM

## 2022-11-16 PROBLEM — T78.3XXA ANGIOEDEMA: Status: ACTIVE | Noted: 2022-11-16

## 2022-11-16 NOTE — PROGRESS NOTES
Assessment/Plan:  ER records and laboratory studies as well as chest x-ray reviewed present time  Patient will remain off verapamil for hypertension  Patient will complete prednisone and use Benadryl for angioedema  EpiPen as needed  Patient see allergist        Diagnoses and all orders for this visit:    Angioedema, subsequent encounter  -     Ambulatory Referral to Allergy; Future    Essential hypertension            Subjective:        Patient ID: Indra Song  is a 79 y o  male  Patient is here status post ER visit on the weekend for angioedema  Patient is having chest tightness and throat tightness as well as hives diffusely throughout his body as low well as lip swelling  Patient was given steroids the, Benadryl, epinephrine  Patient is seeing improvement with prednisone taper which he is presently on  The residual hives noted but improved significantly  No chest tightness or throat tightness or difficulty swallowing or breathing  No fevers noted  No change in topical agents or foods  The following portions of the patient's history were reviewed and updated as appropriate: allergies, current medications, past family history, past medical history, past social history, past surgical history and problem list       Review of Systems   Constitutional: Negative  HENT: Negative  Eyes: Negative  Respiratory: Negative  Cardiovascular: Negative  Gastrointestinal: Negative  Endocrine: Negative  Genitourinary: Negative  Musculoskeletal: Negative  Skin: Negative  Allergic/Immunologic: Negative  Neurological: Negative  Hematological: Negative  Psychiatric/Behavioral: Negative  Objective:               /90   Pulse 60   Temp 97 6 °F (36 4 °C)   Ht 6' 2" (1 88 m)   Wt (!) 141 kg (310 lb)   SpO2 97%   BMI 39 80 kg/m²          Physical Exam  Vitals and nursing note reviewed  Constitutional:       General: He is not in acute distress  Appearance: Normal appearance  He is not ill-appearing, toxic-appearing or diaphoretic  HENT:      Head: Normocephalic and atraumatic  Right Ear: Tympanic membrane, ear canal and external ear normal  There is no impacted cerumen  Left Ear: Tympanic membrane, ear canal and external ear normal  There is no impacted cerumen  Nose: Nose normal  No congestion or rhinorrhea  Mouth/Throat:      Mouth: Mucous membranes are moist       Pharynx: No oropharyngeal exudate or posterior oropharyngeal erythema  Eyes:      General: No scleral icterus  Right eye: No discharge  Left eye: No discharge  Extraocular Movements: Extraocular movements intact  Conjunctiva/sclera: Conjunctivae normal       Pupils: Pupils are equal, round, and reactive to light  Neck:      Vascular: No carotid bruit  Cardiovascular:      Rate and Rhythm: Normal rate and regular rhythm  Pulses: Normal pulses  Heart sounds: Normal heart sounds  No murmur heard  No friction rub  No gallop  Pulmonary:      Effort: Pulmonary effort is normal  No respiratory distress  Breath sounds: Normal breath sounds  No stridor  No wheezing, rhonchi or rales  Chest:      Chest wall: No tenderness  Musculoskeletal:         General: No swelling, tenderness, deformity or signs of injury  Normal range of motion  Cervical back: Normal range of motion and neck supple  No rigidity  No muscular tenderness  Right lower leg: No edema  Left lower leg: No edema  Lymphadenopathy:      Cervical: No cervical adenopathy  Skin:     General: Skin is warm and dry  Capillary Refill: Capillary refill takes less than 2 seconds  Coloration: Skin is not jaundiced  Findings: No bruising, erythema, lesion or rash  Neurological:      Mental Status: He is alert and oriented to person, place, and time  Mental status is at baseline  Cranial Nerves: No cranial nerve deficit        Sensory: No sensory deficit  Motor: No weakness  Coordination: Coordination normal       Gait: Gait normal    Psychiatric:         Mood and Affect: Mood normal          Behavior: Behavior normal          Thought Content:  Thought content normal          Judgment: Judgment normal

## 2022-11-28 DIAGNOSIS — E03.9 HYPOTHYROIDISM, UNSPECIFIED TYPE: ICD-10-CM

## 2022-11-28 RX ORDER — LEVOTHYROXINE SODIUM 0.05 MG/1
50 TABLET ORAL DAILY
COMMUNITY
End: 2022-11-28 | Stop reason: SDUPTHER

## 2022-11-28 RX ORDER — LEVOTHYROXINE SODIUM 0.05 MG/1
50 TABLET ORAL DAILY
Qty: 90 TABLET | Refills: 1 | Status: SHIPPED | OUTPATIENT
Start: 2022-11-28

## 2022-12-07 ENCOUNTER — OFFICE VISIT (OUTPATIENT)
Dept: FAMILY MEDICINE CLINIC | Facility: CLINIC | Age: 67
End: 2022-12-07

## 2022-12-07 VITALS
DIASTOLIC BLOOD PRESSURE: 82 MMHG | HEART RATE: 64 BPM | OXYGEN SATURATION: 96 % | TEMPERATURE: 97.7 F | SYSTOLIC BLOOD PRESSURE: 134 MMHG | WEIGHT: 310 LBS | HEIGHT: 74 IN | BODY MASS INDEX: 39.78 KG/M2

## 2022-12-07 DIAGNOSIS — R10.30 LOWER ABDOMINAL PAIN: Primary | ICD-10-CM

## 2022-12-07 LAB
BILIRUB UR QL STRIP: NEGATIVE
CLARITY UR: CLEAR
COLOR UR: YELLOW
GLUCOSE UR STRIP-MCNC: NEGATIVE MG/DL
HGB UR QL STRIP.AUTO: NEGATIVE
KETONES UR STRIP-MCNC: NEGATIVE MG/DL
LEUKOCYTE ESTERASE UR QL STRIP: NEGATIVE
NITRITE UR QL STRIP: NEGATIVE
PH UR STRIP.AUTO: 5.5 [PH]
PROT UR STRIP-MCNC: NEGATIVE MG/DL
SP GR UR STRIP.AUTO: 1.02 (ref 1–1.03)
UROBILINOGEN UR STRIP-ACNC: <2 MG/DL

## 2022-12-07 RX ORDER — CIPROFLOXACIN 500 MG/1
500 TABLET, FILM COATED ORAL EVERY 12 HOURS SCHEDULED
Qty: 14 TABLET | Refills: 0 | Status: SHIPPED | OUTPATIENT
Start: 2022-12-07 | End: 2022-12-14

## 2022-12-07 RX ORDER — BENZONATATE 200 MG/1
200 CAPSULE ORAL 3 TIMES DAILY PRN
Qty: 20 CAPSULE | Refills: 0 | Status: SHIPPED | OUTPATIENT
Start: 2022-12-07

## 2022-12-07 NOTE — PROGRESS NOTES
Assessment/Plan: Urinalysis and urine culture done at this time  Patient use Cipro as directed  Patient use Motrin as needed  Diagnoses and all orders for this visit:    Lower abdominal pain  -     ciprofloxacin (CIPRO) 500 mg tablet; Take 1 tablet (500 mg total) by mouth every 12 (twelve) hours for 7 days  -     benzonatate (TESSALON) 200 MG capsule; Take 1 capsule (200 mg total) by mouth 3 (three) times a day as needed for cough            Subjective:        Patient ID: Iline Collet  is a 79 y o  male  Patient is here with bilateral lower quadrant abdominal pain since Saturday  No significant change urination or defecation  No nausea vomiting or fevers associated with this  No significant back pain associated with this  Patient has used Tylenol and Aleve as well as Motrin  The following portions of the patient's history were reviewed and updated as appropriate: allergies, current medications, past family history, past medical history, past social history, past surgical history and problem list       Review of Systems   Constitutional: Negative  HENT: Negative  Eyes: Negative  Respiratory: Negative  Cardiovascular: Negative  Gastrointestinal: Positive for abdominal pain  Endocrine: Negative  Genitourinary: Negative  Musculoskeletal: Negative  Skin: Negative  Allergic/Immunologic: Negative  Neurological: Negative  Hematological: Negative  Psychiatric/Behavioral: Negative  Objective:               /82 (BP Location: Right arm, Patient Position: Sitting, Cuff Size: Adult)   Pulse 64   Temp 97 7 °F (36 5 °C) (Tympanic)   Ht 6' 2" (1 88 m)   Wt (!) 141 kg (310 lb)   SpO2 96%   BMI 39 80 kg/m²          Physical Exam  Vitals and nursing note reviewed  Constitutional:       General: He is not in acute distress  Appearance: Normal appearance  He is not ill-appearing, toxic-appearing or diaphoretic     HENT:      Head: Normocephalic and atraumatic  Eyes:      General: No scleral icterus  Right eye: No discharge  Left eye: No discharge  Extraocular Movements: Extraocular movements intact  Conjunctiva/sclera: Conjunctivae normal       Pupils: Pupils are equal, round, and reactive to light  Neck:      Vascular: No carotid bruit  Cardiovascular:      Rate and Rhythm: Normal rate and regular rhythm  Pulses: Normal pulses  Heart sounds: Normal heart sounds  No murmur heard  No friction rub  No gallop  Pulmonary:      Effort: Pulmonary effort is normal  No respiratory distress  Breath sounds: Normal breath sounds  No stridor  No wheezing, rhonchi or rales  Chest:      Chest wall: No tenderness  Abdominal:      General: Abdomen is flat  Bowel sounds are normal  There is no distension  Palpations: Abdomen is soft  Tenderness: There is abdominal tenderness  There is no guarding or rebound  Comments: Pain with palpation lower abdomen bilaterally   Musculoskeletal:         General: No swelling, tenderness, deformity or signs of injury  Normal range of motion  Cervical back: Normal range of motion and neck supple  No rigidity  No muscular tenderness  Right lower leg: No edema  Left lower leg: No edema  Lymphadenopathy:      Cervical: No cervical adenopathy  Skin:     General: Skin is warm and dry  Capillary Refill: Capillary refill takes less than 2 seconds  Coloration: Skin is not jaundiced  Findings: No bruising, erythema, lesion or rash  Neurological:      Mental Status: He is alert and oriented to person, place, and time  Mental status is at baseline  Cranial Nerves: No cranial nerve deficit  Sensory: No sensory deficit  Motor: No weakness  Coordination: Coordination normal       Gait: Gait normal    Psychiatric:         Mood and Affect: Mood normal          Behavior: Behavior normal          Thought Content:  Thought content normal          Judgment: Judgment normal

## 2022-12-08 LAB — BACTERIA UR CULT: NORMAL

## 2023-01-06 PROBLEM — H61.23 BILATERAL IMPACTED CERUMEN: Status: RESOLVED | Noted: 2022-11-07 | Resolved: 2023-01-06

## 2023-01-20 DIAGNOSIS — N20.0 CALCULUS OF KIDNEY: ICD-10-CM

## 2023-01-20 DIAGNOSIS — E03.9 HYPOTHYROIDISM, UNSPECIFIED TYPE: ICD-10-CM

## 2023-01-20 RX ORDER — LEVOTHYROXINE SODIUM 0.05 MG/1
50 TABLET ORAL DAILY
Qty: 90 TABLET | Refills: 1 | Status: SHIPPED | OUTPATIENT
Start: 2023-01-20

## 2023-01-20 RX ORDER — ALLOPURINOL 300 MG/1
300 TABLET ORAL DAILY
Qty: 90 TABLET | Refills: 1 | Status: SHIPPED | OUTPATIENT
Start: 2023-01-20

## 2023-01-20 RX ORDER — LEVOTHYROXINE SODIUM 0.2 MG/1
200 TABLET ORAL DAILY
Qty: 90 TABLET | Refills: 1 | Status: SHIPPED | OUTPATIENT
Start: 2023-01-20

## 2023-02-17 ENCOUNTER — RA CDI HCC (OUTPATIENT)
Dept: OTHER | Facility: HOSPITAL | Age: 68
End: 2023-02-17

## 2023-02-17 NOTE — PROGRESS NOTES
[facility] 75  coding opportunities          Chart Reviewed number of suggestions sent to Provider: 1     Patients Insurance     Medicare Insurance: Medicare        [id number]

## 2023-02-23 ENCOUNTER — APPOINTMENT (OUTPATIENT)
Dept: LAB | Facility: MEDICAL CENTER | Age: 68
End: 2023-02-23

## 2023-02-23 ENCOUNTER — TELEPHONE (OUTPATIENT)
Dept: OTHER | Facility: OTHER | Age: 68
End: 2023-02-23

## 2023-02-23 DIAGNOSIS — M17.11 PRIMARY OSTEOARTHRITIS OF RIGHT KNEE: ICD-10-CM

## 2023-02-23 DIAGNOSIS — E78.5 DYSLIPIDEMIA: ICD-10-CM

## 2023-02-23 DIAGNOSIS — I10 ESSENTIAL HYPERTENSION: ICD-10-CM

## 2023-02-23 DIAGNOSIS — Z12.5 PROSTATE CANCER SCREENING: ICD-10-CM

## 2023-02-23 DIAGNOSIS — E03.9 HYPOTHYROIDISM, UNSPECIFIED TYPE: Primary | ICD-10-CM

## 2023-02-23 LAB — PSA SERPL-MCNC: 1 NG/ML (ref 0–4)

## 2023-02-23 NOTE — TELEPHONE ENCOUNTER
YOANDY Garrett called on behalf of the patient asking for CMP, CBC, lipid panel and TSH to be ordered for the pt ahead of his appt tomorrow  YOANDY Garrett states the patient had a PSA done this morning and states the patient said he did not want to come to the appt without lab results

## 2023-02-23 NOTE — TELEPHONE ENCOUNTER
Pt called in stating he has a Follow up appt  Tomorrow with Dr Byron Dey and usually has lab work ordered to be done  No labs noted in pt's chart  Pt would like a call back from the office if he needs anything before tomorrow  Please advise

## 2023-02-24 ENCOUNTER — OFFICE VISIT (OUTPATIENT)
Dept: FAMILY MEDICINE CLINIC | Facility: CLINIC | Age: 68
End: 2023-02-24

## 2023-02-24 VITALS
HEIGHT: 74 IN | TEMPERATURE: 97.4 F | DIASTOLIC BLOOD PRESSURE: 86 MMHG | WEIGHT: 312.6 LBS | SYSTOLIC BLOOD PRESSURE: 138 MMHG | BODY MASS INDEX: 40.12 KG/M2 | HEART RATE: 79 BPM | OXYGEN SATURATION: 97 %

## 2023-02-24 DIAGNOSIS — E78.5 DYSLIPIDEMIA: ICD-10-CM

## 2023-02-24 DIAGNOSIS — M79.671 PAIN IN BOTH FEET: ICD-10-CM

## 2023-02-24 DIAGNOSIS — I10 ESSENTIAL HYPERTENSION: ICD-10-CM

## 2023-02-24 DIAGNOSIS — G57.93 NEUROPATHY INVOLVING BOTH LOWER EXTREMITIES: ICD-10-CM

## 2023-02-24 DIAGNOSIS — K21.9 GASTROESOPHAGEAL REFLUX DISEASE WITHOUT ESOPHAGITIS: Primary | ICD-10-CM

## 2023-02-24 DIAGNOSIS — E03.9 ACQUIRED HYPOTHYROIDISM: ICD-10-CM

## 2023-02-24 DIAGNOSIS — M79.672 PAIN IN BOTH FEET: ICD-10-CM

## 2023-02-24 NOTE — PROGRESS NOTES
Assessment/Plan: Labs and records reviewed  Patient will be seeing urology in March PSA 1 0 CMP reviewed CBC reviewed  TSH 7 7 patient off labs for next visit  Chronic conditions stable  Patient will continue with current meds for chronic conditions listed below  Refills will be given when needed  Follow-up in 6 months       Diagnoses and all orders for this visit:    Gastroesophageal reflux disease without esophagitis    Acquired hypothyroidism    Essential hypertension    Dyslipidemia    Pain in both feet    Neuropathy involving both lower extremities            Subjective:        Patient ID: Segundo Guerra  is a 79 y o  male  Patient is here to follow-up on hypothyroidism hypertension GERD hyperlipidemia with history of neuropathy  Patient with ongoing neuropathy lower extremities on feet bilaterally  No significant worsening noted  Patient does use cream for this  No significant GERD issues  No chest pain or shortness of breath  No problems with urination or defecation  The following portions of the patient's history were reviewed and updated as appropriate: allergies, current medications, past family history, past medical history, past social history, past surgical history and problem list       Review of Systems   Constitutional: Negative  HENT: Negative  Eyes: Negative  Respiratory: Negative  Cardiovascular: Negative  Gastrointestinal: Negative  Endocrine: Negative  Genitourinary: Negative  Musculoskeletal: Positive for arthralgias and gait problem  Skin: Negative  Allergic/Immunologic: Negative  Neurological: Positive for numbness  Hematological: Negative  Psychiatric/Behavioral: Negative  Objective:      BMI Counseling: Body mass index is 40 14 kg/m²  The BMI is above normal  Nutrition recommendations include consuming healthier snacks  Exercise recommendations include moderate physical activity 150 minutes/week   Rationale for BMI follow-up plan is due to patient being overweight or obese  Depression Screening and Follow-up Plan: Clincally patient does not have depression  No treatment is required  /86 (BP Location: Right arm, Patient Position: Sitting, Cuff Size: Large)   Pulse 79   Temp (!) 97 4 °F (36 3 °C) (Temporal)   Ht 6' 2" (1 88 m)   Wt (!) 142 kg (312 lb 9 6 oz)   SpO2 97%   BMI 40 14 kg/m²          Physical Exam  Vitals and nursing note reviewed  Constitutional:       General: He is not in acute distress  Appearance: Normal appearance  He is not ill-appearing, toxic-appearing or diaphoretic  HENT:      Head: Normocephalic and atraumatic  Right Ear: Tympanic membrane, ear canal and external ear normal  There is no impacted cerumen  Left Ear: Tympanic membrane, ear canal and external ear normal  There is no impacted cerumen  Nose: Nose normal  No congestion or rhinorrhea  Mouth/Throat:      Mouth: Mucous membranes are moist       Pharynx: No oropharyngeal exudate or posterior oropharyngeal erythema  Eyes:      General: No scleral icterus  Right eye: No discharge  Left eye: No discharge  Extraocular Movements: Extraocular movements intact  Conjunctiva/sclera: Conjunctivae normal       Pupils: Pupils are equal, round, and reactive to light  Neck:      Vascular: No carotid bruit  Cardiovascular:      Rate and Rhythm: Normal rate and regular rhythm  Pulses: Normal pulses  Heart sounds: Normal heart sounds  No murmur heard  No friction rub  No gallop  Pulmonary:      Effort: Pulmonary effort is normal  No respiratory distress  Breath sounds: Normal breath sounds  No stridor  No wheezing, rhonchi or rales  Chest:      Chest wall: No tenderness  Musculoskeletal:         General: No swelling, tenderness, deformity or signs of injury  Normal range of motion  Cervical back: Normal range of motion and neck supple  No rigidity   No muscular tenderness  Right lower leg: No edema  Left lower leg: No edema  Lymphadenopathy:      Cervical: No cervical adenopathy  Skin:     General: Skin is warm and dry  Capillary Refill: Capillary refill takes less than 2 seconds  Coloration: Skin is not jaundiced  Findings: No bruising, erythema, lesion or rash  Neurological:      Mental Status: He is alert and oriented to person, place, and time  Mental status is at baseline  Cranial Nerves: No cranial nerve deficit  Sensory: Sensory deficit present  Motor: No weakness  Coordination: Coordination normal       Gait: Gait normal    Psychiatric:         Mood and Affect: Mood normal          Behavior: Behavior normal          Thought Content:  Thought content normal          Judgment: Judgment normal

## 2023-03-02 ENCOUNTER — OFFICE VISIT (OUTPATIENT)
Dept: UROLOGY | Facility: MEDICAL CENTER | Age: 68
End: 2023-03-02
Payer: MEDICARE

## 2023-03-02 VITALS
WEIGHT: 312 LBS | DIASTOLIC BLOOD PRESSURE: 80 MMHG | BODY MASS INDEX: 40.04 KG/M2 | HEART RATE: 64 BPM | OXYGEN SATURATION: 97 % | HEIGHT: 74 IN | SYSTOLIC BLOOD PRESSURE: 122 MMHG

## 2023-03-02 DIAGNOSIS — Z12.5 PROSTATE CANCER SCREENING: Primary | ICD-10-CM

## 2023-03-02 PROCEDURE — 99214 OFFICE O/P EST MOD 30 MIN: CPT | Performed by: NURSE PRACTITIONER

## 2023-03-02 NOTE — PROGRESS NOTES
3/2/2023      Chief Complaint   Patient presents with   • Benign Prostatic Hypertrophy     Assessment and Plan    76 y o  male managed by Dr Sherman Ro    1  Benign prostatic hyperplasia with lower urinary tract symptoms  ? Status post TURP 04/06/2021 with approximately 8 grams of tissue resected  Negative for malignancy on pathology noted  ? Bladder scan PVR 37 mL  ? Will continue to monitor for worsening/progression of urinary symptoms  ? Maintain adequate hydration upwards to 40 ounces of water intake per day while avoiding bladder irritating foods beverages  ? Ensure complete bladder emptying with urination  ? Timed voiding  ? Continue with pelvic floor/Kegel exercises  ? Follow up in the office in 1 year        2  Prostate cancer screening  ? PSA performed 2/23/2023 resulted 1 0  ? CYNTHIA-prostate approximate 40-45 g with no nodules  ? Repeat PSA and CYNTHIA in 1 year  ? Follow up in the office in 1 year    History of Present Illness  Juana Han  is a 76 y o  male here for follow up evaluation of  urinary symptoms secondary to benign prostatic hyperplasia   Patient is status post TURP 04/06/2021  Dago Vieyra is doing well postoperatively with reports of a stronger urinary stream   He reports adequate sensation of bladder emptying after urinating   He denies dysuria and hematuria  Pililistanali Vieyra continues to have mild urethral irritation after urinating but is not bothered by the symptoms at this time  Pililistine Azalia reports getting up less at night to urinate  Phyllistine Azalia is very happy with his current urinary status he reports less urinary hesitancy and less postvoid dribbling after urinating   He reports a strong urinary stream   He is very happy with his urinary symptoms at this time  Component       PSA, Total   Latest Ref Rng & Units       0 0 - 4 0 ng/mL   12/29/2020      10:02 AM 0 4   1/24/2022      5:28 PM 0 5   2/23/2023      7:18 AM 1 0       Review of Systems   Constitutional: Negative for chills and fever     Respiratory: Negative "for cough and shortness of breath  Cardiovascular: Negative for chest pain  Gastrointestinal: Negative for abdominal distention, abdominal pain, blood in stool, nausea and vomiting  Genitourinary: Negative for difficulty urinating, dysuria, enuresis, flank pain, frequency, hematuria and urgency  Skin: Negative for rash  Past Medical History  Past Medical History:   Diagnosis Date   • Anesthesia     \"cant have spinal anesthesia because of curvature of the spine and severe pain\"   • Anxiety    • Arthritis    • Athlete's foot     off and on   • Benign polyp of large intestine    • Cancer (HCC)     thyroid,    • Chronic pain disorder     bilat knees   • Colon polyp    • Curvature of spine    • DDD (degenerative disc disease), lumbar    • Dental crowns present    • Depression    • Disease of thyroid gland     removed   • Dizziness    • Dyslipidemia    • Enlarged prostate    • Exercises daily     \"walking/biking/lifts weights\"   • GERD (gastroesophageal reflux disease)    • Hiatal hernia    • History of radiation therapy     \"iodine pill for thyroid cancer\"   • Hypertension    • IBS (irritable bowel syndrome)    • Kidney stone     still has on the left, and small one on right\"   • Neuropathy    • Numbness and tingling of both feet    • Obesity    • Retinal hole or tear, bilateral     had laser surgery Dr Jayme Sinclair 3-4 yrs ago   • S/P TURP (status post transurethral resection of prostate)    • Seasonal allergies    • Teeth missing    • Tension headache     occas   • Tinea pedis    • Urinary frequency    • Urinary urgency    • Wears glasses        Past Social History  Past Surgical History:   Procedure Laterality Date   • CHOLECYSTECTOMY     • COLONOSCOPY     • FL RETROGRADE URETHROCYSTOGRAM  2/28/2020   • KNEE ARTHROSCOPY Bilateral     knee tim   • NC COLONOSCOPY FLX DX W/COLLJ SPEC WHEN PFRMD N/A 7/25/2018    Procedure: COLONOSCOPY;  Surgeon: Aravind Menard DO;  Location: [facility];   Service: " General   • VA CYSTO/URETERO W/LITHOTRIPSY &INDWELL STENT INSRT Left 2020    Procedure: CYSTOSCOPY URETEROSCOP RETROGRADE PYELOGRAM AND INSERTION STENT URETERAL;  Surgeon: Mavis Zimmerman MD;  Location: [facility];  Service: Urology   • VA CYSTO/URETERO W/LITHOTRIPSY &INDWELL STENT INSRT Left 3/25/2020    Procedure: CYSTOSCOPY URETEROSCOPIC STONE EXTRACTION, RETROGRADE PYELOGRAM AND INSERTION STENT URETERAL;  Surgeon: Mavis Zimmerman MD;  Location: [facility];  Service: Urology   • VA LITHOTRIPSY [address] Left 2017    Procedure: Earma Lather SHOCKWAVE (ESWL); Surgeon: Payal Pendleton DO;  Location: [facility];  Service: Urology   • VA RPR 1ST INGUN HRNA AGE 5 YRS/> REDUCIBLE Right 2019    Procedure: REPAIR INGUINAL HERNIA  DIRECT NO MESH;  Surgeon: Paris Henry DO;  Location: [address] [place] [address] MAIN OR;  Service: General   • VA TRURL ELECTROSURG RESCJ PROSTATE BLEED COMPLETE N/A 2021    Procedure: TRANSURETHRAL RESECTION OF PROSTATE (TURP);   Surgeon: Mavis Zimmerman MD;  Location: [facility];  Service: Urology   • RETINAL LASER PROCEDURE     • ROTATOR CUFF REPAIR Left    • THYROIDECTOMY      Subtotal and Total Thyroidectomy both mentioned in allscripts   • WISDOM TOOTH EXTRACTION       Social History     Tobacco Use   Smoking Status Former   • Years: 40 00   • Types: Cigarettes   • Quit date: 2014   • Years since quittin 0   Smokeless Tobacco Former       Past Family History  Family History   Problem Relation Age of Onset   • Diabetes Mother    • Heart disease Father    • Breast cancer Sister        Past Social history  Social History     Socioeconomic History   • Marital status: /Civil Union     Spouse name: Not on file   • Number of children: Not on file   • Years of education: Not on file   • Highest education level: Not on file   Occupational History   • Occupation: retired security   Tobacco Use   • Smoking status: Former     Years: 40 00     Types: Cigarettes     Quit date: 2014     Years since quittin 0   • Smokeless tobacco: Former   Vaping Use   • Vaping Use: Never used   Substance and Sexual Activity   • Alcohol use: Yes     Alcohol/week: 2 0 standard drinks of alcohol     Types: 1 Glasses of wine, 1 Cans of beer per week     Comment: very rarely   • Drug use: Never   • Sexual activity: Not Currently   Other Topics Concern   • Not on file   Social History Narrative   • Not on file     Social Determinants of Health     Financial Resource Strain: Medium Risk (2022)    Overall Financial Resource Strain (CARDIA)    • Difficulty of Paying Living Expenses: Somewhat hard   Food Insecurity: Not on file   Transportation Needs: No Transportation Needs (2022)    PRAPARE - Transportation    • Lack of Transportation (Medical): No    • Lack of Transportation (Non-Medical):  No   Physical Activity: Not on file   Stress: Not on file   Social Connections: Not on file   Intimate Partner Violence: Not on file   Housing Stability: Not on file       Current Medications  Current Outpatient Medications   Medication Sig Dispense Refill   • acetaminophen (TYLENOL) 325 mg tablet Take 650 mg by mouth every 6 (six) hours as needed for mild pain     • allopurinol (ZYLOPRIM) 300 mg tablet Take 1 tablet (300 mg total) by mouth daily 90 tablet 1   • chlordiazepoxide-clidinium (LIBRAX) 5-2 5 mg per capsule Take 1 capsule by mouth as needed for indigestion     • clotrimazole-betamethasone (LOTRISONE) 1-0 05 % cream Apply topically 2 (two) times a day 30 g 2   • levothyroxine 200 mcg tablet Take 1 tablet (200 mcg total) by mouth daily 90 tablet 1   • levothyroxine 50 mcg tablet Take 1 tablet (50 mcg total) by mouth daily 90 tablet 1   • Multiple Vitamin (MULTIVITAMIN) tablet Take 1 tablet by mouth daily     • Omega-3 Fatty Acids (OMEGA-3 2100 PO) Take by mouth     • omeprazole (PriLOSEC) 20 mg delayed release capsule TAKE 1 CAPSULE DAILY 90 capsule 3   • vitamin E, "tocopherol, 400 units capsule Take 400 Units by mouth daily     • benzonatate (TESSALON) 200 MG capsule Take 1 capsule (200 mg total) by mouth 3 (three) times a day as needed for cough 20 capsule 0   • diphenhydrAMINE (BENADRYL) 25 mg tablet Take 1 tablet (25 mg total) by mouth every 6 (six) hours as needed for itching or allergies (Patient not taking: Reported on 3/2/2023) 20 tablet 0   • EPINEPHrine (EPIPEN) 0 3 mg/0 3 mL SOAJ Inject 0 3 mL (0 3 mg total) into a muscle once as needed for anaphylaxis for up to 1 dose 0 6 mL 0   • ibuprofen (MOTRIN) 600 mg tablet Take 1 tablet (600 mg total) by mouth every 6 (six) hours as needed for mild pain or moderate pain (Patient not taking: Reported on 3/2/2023) 30 tablet 0     No current facility-administered medications for this visit  Allergies  No Known Allergies      The following portions of the patient's history were reviewed and updated as appropriate: allergies, current medications, past medical history, past social history, past surgical history and problem list       Vitals  Vitals:    03/02/23 1517   BP: 122/80   BP Location: Left arm   Patient Position: Sitting   Cuff Size: Standard   Pulse: 64   SpO2: 97%   Weight: (!) 142 kg (312 lb)   Height: 6' 2\" (1 88 m)           Physical Exam  Physical Exam  Vitals reviewed  Constitutional:       General: He is not in acute distress  Appearance: Normal appearance  He is normal weight  HENT:      Head: Normocephalic  Eyes:      Pupils: Pupils are equal, round, and reactive to light  Cardiovascular:      Rate and Rhythm: Normal rate  Pulmonary:      Effort: No respiratory distress  Breath sounds: Normal breath sounds  Genitourinary:     Comments: CYNTHIA 40- 45 g prostate  Skin:     General: Skin is warm and dry  Neurological:      General: No focal deficit present  Mental Status: He is alert and oriented to person, place, and time     Psychiatric:         Mood and Affect: Mood normal          " Behavior: Behavior normal            Results  No results found for this or any previous visit (from the past 1 hour(s)) ]  Lab Results   Component Value Date    PSA 1 0 02/23/2023    PSA 0 5 01/24/2022    PSA 0 4 12/29/2020     Lab Results   Component Value Date    GLUCOSE 91 06/27/2015    CALCIUM 8 7 11/12/2022     06/27/2015    K 4 2 11/12/2022    CO2 27 11/12/2022     11/12/2022    BUN 25 11/12/2022    CREATININE 1 08 11/12/2022     Lab Results   Component Value Date    WBC 9 19 11/12/2022    HGB 15 9 11/12/2022    HCT 46 9 11/12/2022    MCV 90 11/12/2022     11/12/2022           Orders  Orders Placed This Encounter   Procedures   • PSA, Total Screen     This is a patient instruction: This test is non-fasting  Please drink two glasses of water morning of bloodwork          Standing Status:   Future     Standing Expiration Date:   9/2/2024       JONNA Huynh

## 2023-06-27 NOTE — PROGRESS NOTES
4/30/2021      No chief complaint on file  Assessment and Plan    77 y o  male managed by Dr Lan Stain    1  Benign prostatic hyperplasia with lower urinary tract symptoms  ·  status post TURP 04/06/2021  ·  approximately 8 g of tissue resected, negative for malignancy  ·  bladder scan PVR 53 mL  · Continue to maintain adequate hydration upwards to 40-60 oz of water intake per day  ·  avoid bladder irritating foods and beverages  ·  discussed pelvic floor exercises as needed  ·  patient will discontinue Rapaflo and Avodart at this time  He is aware to we initiate therapy with Rapaflo in the event that he has recurrent urinary symptoms of frequency/urgency, sensation of incomplete bladder emptying and nocturia  If he is to restart the medication he will discontinue once again 1 week prior to his next office visit in 3 months  ·  follow up in the office in 3 months with bladder scan PVR        History of Present Illness  Jagdish Francis  is a 77 y o  male here for follow up evaluation of urinary symptoms secondary to benign prostatic hyperplasia  Patient is status post TURP 04/06/2021  He is doing well postoperatively with reports of a stronger urinary stream   He reports adequate sensation of bladder emptying after urinating  He denies dysuria and hematuria  He continues to have mild urethral irritation after urinating but is not bothered by the symptoms at this time  He reports getting up less at night to urinate  He is very happy with his current urinary status  Review of Systems   Constitutional: Negative for chills and fever  Respiratory: Negative for cough and shortness of breath  Cardiovascular: Negative for chest pain  Gastrointestinal: Negative for abdominal distention, abdominal pain, blood in stool, nausea and vomiting  Genitourinary: Negative for difficulty urinating, dysuria, enuresis, flank pain, frequency, hematuria and urgency  Musculoskeletal: Negative for back pain  Skin: Negative for rash  Neurological: Negative for dizziness  AUA SYMPTOM SCORE      Most Recent Value   AUA SYMPTOM SCORE   How often have you had a sensation of not emptying your bladder completely after you finished urinating? 1   How often have you had to urinate again less than two hours after you finished urinating? 3   How often have you found you stopped and started again several times when you urinate?  0   How often have you found it difficult to postpone urination? 1   How often have you had a weak urinary stream?  1   How often have you had to push or strain to begin urination? 0   How many times did you most typically get up to urinate from the time you went to bed at night until the time you got up in the morning?   4   Quality of Life: If you were to spend the rest of your life with your urinary condition just the way it is now, how would you feel about that?  2   AUA SYMPTOM SCORE  10             Past Medical History  Past Medical History:   Diagnosis Date    Anesthesia     "cant have spinal anesthesia because of curvature of the spine and severe pain"    Anxiety     Arthritis     Athlete's foot     off and on    Benign polyp of large intestine     Cancer (HCC)     thyroid,     Chronic pain disorder     bilat knees    Colon polyp     Curvature of spine     DDD (degenerative disc disease), lumbar     Dental crowns present     Disease of thyroid gland     removed    Dyslipidemia     Enlarged prostate     Exercises daily     "walking/biking/lifts weights"    GERD (gastroesophageal reflux disease)     Hiatal hernia     History of radiation therapy     "iodine pill for thyroid cancer"    Hypertension     IBS (irritable bowel syndrome)     Kidney stone     still has on the left, and small one on right"    Neuropathy     Numbness and tingling of both feet     Retinal hole or tear, bilateral     had laser surgery Dr Jose Cam 3-4 yrs ago    Seasonal allergies     Teeth missing     Tension headache     occas    Tinea pedis     Urinary frequency     Urinary urgency     Wears glasses        Past Social History  Past Surgical History:   Procedure Laterality Date    CHOLECYSTECTOMY      COLONOSCOPY      FL RETROGRADE URETHROCYSTOGRAM  2020    KNEE ARTHROSCOPY Bilateral     knee tim    AR COLONOSCOPY FLX DX W/COLLJ SPEC WHEN PFRMD N/A 2018    Procedure: COLONOSCOPY;  Surgeon: Holden Rivera DO;  Location: [address] [place] [address] GI LAB; Service: General    AR CYSTO/URETERO W/LITHOTRIPSY &INDWELL STENT INSRT Left 2020    Procedure: CYSTOSCOPY URETEROSCOP RETROGRADE PYELOGRAM AND INSERTION STENT URETERAL;  Surgeon: Stewart Chase MD;  [facility];  Service: Urology    AR CYSTO/URETERO W/LITHOTRIPSY &INDWELL STENT INSRT Left 3/25/2020    Procedure: CYSTOSCOPY URETEROSCOPIC STONE EXTRACTION, RETROGRADE PYELOGRAM AND INSERTION STENT URETERAL;  Surgeon: Stewart Chase MD;  [facility];  Service: Urology    AR FRAGMENT KIDNEY STONE/ ESWL Left 2017    Procedure: Isacc Henning SHOCKWAVE (ESWL); Surgeon: Jhonny Mott DO;  [facility];  Service: Urology    AR REPAIR [address] HERNIA,5+Y/O,REDUCIBL Right 2019    Procedure: REPAIR INGUINAL HERNIA  DIRECT NO MESH;  Surgeon: Holden Rivera DO;  Location: [address] [place] [address] MAIN OR;  Service: General    AR TRANSURETHRAL ELEC-SURG PROSTATECTOM N/A 2021    Procedure: TRANSURETHRAL RESECTION OF PROSTATE (TURP);   Surgeon: Stewart Chase MD;  [facility];  Service: Urology    RETINAL LASER PROCEDURE      ROTATOR CUFF REPAIR Left     THYROIDECTOMY      Subtotal and Total Thyroidectomy both mentioned in allscripts    WISDOM TOOTH EXTRACTION       Social History     Tobacco Use   Smoking Status Former Smoker    Years: 40 00    Quit date: 2014    Years since quittin 8   Smokeless Tobacco Former User       Past Family History  Family History   Problem Relation Age of Onset    Diabetes Mother     Heart disease Father     Breast cancer Sister        Past Social history  Social History     Socioeconomic History    Marital status: /Civil Union     Spouse name: Not on file    Number of children: Not on file    Years of education: Not on file    Highest education level: Not on file   Occupational History    Not on file   Social Needs    Financial resource strain: Not hard at all   Alyssa-Payal insecurity     Worry: Never true     Inability: Never true   [employer] needs     Medical: No     Non-medical: No   Tobacco Use    Smoking status: Former Smoker     Years: 40 00     Quit date: 2014     Years since quittin 8    Smokeless tobacco: Former User   Substance and Sexual Activity    Alcohol use: Yes     Alcohol/week: 2 0 standard drinks     Types: 1 Glasses of wine, 1 Cans of beer per week     Frequency: Monthly or less     Drinks per session: 1 or 2     Binge frequency: Never     Comment: very rarely    Drug use: No    Sexual activity: Not on file   Lifestyle    Physical activity     Days per week: 3 days     Minutes per session: Not on file    Stress:  Only a little   Relationships    Social connections     Talks on phone: Never     Gets together: Twice a week     Attends Rastafari service: More than 4 times per year     Active member of club or organization: Yes     Attends meetings of clubs or organizations: More than 4 times per year     Relationship status:     Intimate partner violence     Fear of current or ex partner: No     Emotionally abused: No     Physically abused: No     Forced sexual activity: No   Other Topics Concern    Not on file   Social History Narrative    Not on file       Current Medications  Current Outpatient Medications   Medication Sig Dispense Refill    allopurinol (ZYLOPRIM) 300 mg tablet Take 1 tablet (300 mg total) by mouth daily 90 tablet 3    levothyroxine 200 mcg tablet TAKE 1 TABLET DAILY 90 tablet 3    levothyroxine 25 mcg tablet Take 1 tablet (25 mcg total) by mouth daily 90 tablet 1    oxybutynin (DITROPAN-XL) 10 MG 24 hr tablet Take 1 tablet (10 mg total) by mouth daily at bedtime for 4 days 4 tablet 0    verapamil (CALAN-SR) 180 mg CR tablet TAKE 1 TABLET DAILY AS DIRECTED 90 tablet 1    acetaminophen (TYLENOL) 325 mg tablet Take 650 mg by mouth every 6 (six) hours as needed for mild pain      betamethasone dipropionate (DIPROSONE) 0 05 % cream Apply topically 2 (two) times a day (Patient not taking: Reported on 4/30/2021) 30 g 0    celecoxib (CeleBREX) 200 mg capsule Take 1 capsule (200 mg total) by mouth 2 (two) times a day (Patient not taking: Reported on 4/9/2021) 30 capsule 3    chlordiazepoxide-clidinium (LIBRAX) 5-2 5 mg per capsule Take 1 capsule by mouth as needed for indigestion      COVID-19 mRNA Virus Vaccine (MODERNA COVID-19 VACCINE IM) Inject into a muscle 2nd dose received on 3/9/2021      docusate sodium (COLACE) 100 mg capsule Take 1 capsule (100 mg total) by mouth 2 (two) times a day for 15 days (Patient not taking: Reported on 1/7/2021) 30 capsule 0    dutasteride (AVODART) 0 5 mg capsule Take 1 capsule (0 5 mg total) by mouth daily at bedtime (Patient not taking: Reported on 4/9/2021) 90 capsule 3    MAGNESIUM PO Take by mouth as needed       Multiple Vitamin (MULTIVITAMIN) tablet Take 1 tablet by mouth daily      naproxen (NAPROSYN) 500 mg tablet Take 1 tablet (500 mg total) by mouth 2 (two) times a day with meals for 5 days (Patient not taking: Reported on 1/7/2021) 10 tablet 0    omeprazole (PriLOSEC) 20 mg delayed release capsule TAKE 1 CAPSULE DAILY (Patient not taking: No sig reported) 90 capsule 1    vitamin E, tocopherol, 400 units capsule Take 400 Units by mouth daily       No current facility-administered medications for this visit          Allergies  No Known Allergies      The following portions of the patient's history were reviewed and updated as appropriate: allergies, current medications, past medical history, past social history, past surgical history and problem list       Vitals  Vitals:    04/30/21 0928   BP: 120/80   Pulse: 64   Weight: (!) 139 kg (306 lb)   Height: 6' 2" (1 88 m)           Physical Exam  Physical Exam  Vitals signs reviewed  Constitutional:       General: He is not in acute distress  Appearance: Normal appearance  He is normal weight  Pulmonary:      Effort: No respiratory distress  Breath sounds: Normal breath sounds  Skin:     General: Skin is warm and dry  Neurological:      General: No focal deficit present  Mental Status: He is alert and oriented to person, place, and time     Psychiatric:         Mood and Affect: Mood normal          Behavior: Behavior normal        Results  Recent Results (from the past 1 hour(s))   POCT Measure PVR    Collection Time: 04/30/21  9:42 AM   Result Value Ref Range    POST-VOID RESIDUAL VOLUME, ML POC 53 mL   POCT urine dip auto non-scope    Collection Time: 04/30/21  9:49 AM   Result Value Ref Range     COLOR,UA yellow     CLARITY,UA clear     SPECIFIC GRAVITY,UA 1 025      PH,UA 5 0     LEUKOCYTE ESTERASE,UA mod     NITRITE,UA neg     GLUCOSE, UA neg     KETONES,UA neg     BILIRUBIN,UA neg     BLOOD,UA mod     POCT URINE PROTEIN 30 mg/dl     SL AMB POCT UROBILINOGEN 0 2    ]  Lab Results   Component Value Date    PSA 0 4 12/29/2020    PSA 0 2 02/10/2018    PSA 0 3 05/07/2016     Lab Results   Component Value Date    GLUCOSE 91 06/27/2015    CALCIUM 8 9 03/13/2021     06/27/2015    K 4 4 03/13/2021    CO2 25 03/13/2021     03/13/2021    BUN 17 03/13/2021    CREATININE 1 08 03/13/2021     Lab Results   Component Value Date    WBC 6 02 03/13/2021    HGB 16 2 03/13/2021    HCT 49 3 03/13/2021    MCV 91 03/13/2021     03/13/2021     Orders  Orders Placed This Encounter   Procedures    POCT Measure PVR    POCT urine dip auto non-scope       JONNA Mariee Bactrim Counseling:  I discussed with the patient the risks of sulfa antibiotics including but not limited to GI upset, allergic reaction, drug rash, diarrhea, dizziness, photosensitivity, and yeast infections.  Rarely, more serious reactions can occur including but not limited to aplastic anemia, agranulocytosis, methemoglobinemia, blood dyscrasias, liver or kidney failure, lung infiltrates or desquamative/blistering drug rashes.

## 2023-07-31 DIAGNOSIS — E03.9 HYPOTHYROIDISM, UNSPECIFIED TYPE: ICD-10-CM

## 2023-07-31 DIAGNOSIS — N20.0 CALCULUS OF KIDNEY: ICD-10-CM

## 2023-07-31 RX ORDER — LEVOTHYROXINE SODIUM 0.2 MG/1
200 TABLET ORAL DAILY
Qty: 90 TABLET | Refills: 0 | Status: SHIPPED | OUTPATIENT
Start: 2023-07-31

## 2023-07-31 RX ORDER — LEVOTHYROXINE SODIUM 0.05 MG/1
50 TABLET ORAL DAILY
Qty: 90 TABLET | Refills: 0 | Status: SHIPPED | OUTPATIENT
Start: 2023-07-31

## 2023-07-31 RX ORDER — ALLOPURINOL 300 MG/1
300 TABLET ORAL DAILY
Qty: 90 TABLET | Refills: 0 | Status: SHIPPED | OUTPATIENT
Start: 2023-07-31

## 2023-08-10 ENCOUNTER — OFFICE VISIT (OUTPATIENT)
Dept: FAMILY MEDICINE CLINIC | Facility: CLINIC | Age: 68
End: 2023-08-10
Payer: MEDICARE

## 2023-08-10 VITALS
SYSTOLIC BLOOD PRESSURE: 140 MMHG | HEART RATE: 60 BPM | OXYGEN SATURATION: 94 % | WEIGHT: 314 LBS | BODY MASS INDEX: 40.3 KG/M2 | TEMPERATURE: 98.1 F | HEIGHT: 74 IN | DIASTOLIC BLOOD PRESSURE: 84 MMHG

## 2023-08-10 DIAGNOSIS — H10.32 ACUTE BACTERIAL CONJUNCTIVITIS OF LEFT EYE: Primary | ICD-10-CM

## 2023-08-10 PROCEDURE — 99214 OFFICE O/P EST MOD 30 MIN: CPT | Performed by: FAMILY MEDICINE

## 2023-08-10 RX ORDER — GENTAMICIN SULFATE 3 MG/ML
1 SOLUTION/ DROPS OPHTHALMIC EVERY 4 HOURS
Qty: 5 ML | Refills: 0 | Status: SHIPPED | OUTPATIENT
Start: 2023-08-10

## 2023-08-10 NOTE — PROGRESS NOTES
Assessment/Plan:      Diagnoses and all orders for this visit:    Acute bacterial conjunctivitis of left eye  -     gentamicin (GARAMYCIN) 0.3 % ophthalmic solution; Administer 1 drop into the left eye every 4 (four) hours    Body mass index (BMI) 40.0-44.9, adult ([facility])          Subjective:     Patient ID: Dae March. is a 76 y.o. male. Patient presents with:  L eye watery: Itching, blood vessel broke , does not remember injuring it. [facility]          Review of Systems   Eyes: Positive for discharge, redness and itching. Negative for pain. Objective:     Physical Exam  Vitals and nursing note reviewed. Constitutional:       Appearance: He is well-developed. HENT:      Head: Normocephalic and atraumatic. Eyes:      Extraocular Movements: Extraocular movements intact. Conjunctiva/sclera:      Right eye: Right conjunctiva is not injected. Left eye: Left conjunctiva is injected. Hemorrhage present. Pupils: Pupils are equal, round, and reactive to light. Cardiovascular:      Rate and Rhythm: Normal rate. Pulmonary:      Effort: Pulmonary effort is normal.      Breath sounds: No wheezing. Abdominal:      Palpations: Abdomen is soft. Musculoskeletal:      Cervical back: Normal range of motion and neck supple. Lymphadenopathy:      Cervical: No cervical adenopathy. Skin:     General: Skin is warm. Neurological:      Mental Status: He is alert and oriented to person, place, and time.

## 2023-09-06 ENCOUNTER — LAB (OUTPATIENT)
Dept: LAB | Facility: MEDICAL CENTER | Age: 68
End: 2023-09-06
Payer: MEDICARE

## 2023-09-06 DIAGNOSIS — I10 ESSENTIAL HYPERTENSION: ICD-10-CM

## 2023-09-06 DIAGNOSIS — E03.9 HYPOTHYROIDISM, UNSPECIFIED TYPE: ICD-10-CM

## 2023-09-06 DIAGNOSIS — M17.11 PRIMARY OSTEOARTHRITIS OF RIGHT KNEE: ICD-10-CM

## 2023-09-06 DIAGNOSIS — Z12.5 PROSTATE CANCER SCREENING: ICD-10-CM

## 2023-09-06 DIAGNOSIS — E78.5 DYSLIPIDEMIA: ICD-10-CM

## 2023-09-06 LAB
ALBUMIN SERPL BCP-MCNC: 4 G/DL (ref 3.5–5)
ALP SERPL-CCNC: 61 U/L (ref 34–104)
ALT SERPL W P-5'-P-CCNC: 28 U/L (ref 7–52)
ANION GAP SERPL CALCULATED.3IONS-SCNC: 4 MMOL/L
AST SERPL W P-5'-P-CCNC: 18 U/L (ref 13–39)
BASOPHILS # BLD AUTO: 0.04 THOUSANDS/ÂΜL (ref 0–0.1)
BASOPHILS NFR BLD AUTO: 1 % (ref 0–1)
BILIRUB SERPL-MCNC: 0.49 MG/DL (ref 0.2–1)
BUN SERPL-MCNC: 21 MG/DL (ref 5–25)
CALCIUM SERPL-MCNC: 9.3 MG/DL (ref 8.4–10.2)
CHLORIDE SERPL-SCNC: 109 MMOL/L (ref 96–108)
CHOLEST SERPL-MCNC: 181 MG/DL
CO2 SERPL-SCNC: 29 MMOL/L (ref 21–32)
CREAT SERPL-MCNC: 1.1 MG/DL (ref 0.6–1.3)
EOSINOPHIL # BLD AUTO: 0.18 THOUSAND/ÂΜL (ref 0–0.61)
EOSINOPHIL NFR BLD AUTO: 3 % (ref 0–6)
ERYTHROCYTE [DISTWIDTH] IN BLOOD BY AUTOMATED COUNT: 13.2 % (ref 11.6–15.1)
GFR SERPL CREATININE-BSD FRML MDRD: 68 ML/MIN/1.73SQ M
GLUCOSE P FAST SERPL-MCNC: 121 MG/DL (ref 65–99)
HCT VFR BLD AUTO: 48.6 % (ref 36.5–49.3)
HDLC SERPL-MCNC: 42 MG/DL
HGB BLD-MCNC: 15.9 G/DL (ref 12–17)
IMM GRANULOCYTES # BLD AUTO: 0.03 THOUSAND/UL (ref 0–0.2)
IMM GRANULOCYTES NFR BLD AUTO: 1 % (ref 0–2)
LDLC SERPL CALC-MCNC: 107 MG/DL (ref 0–100)
LYMPHOCYTES # BLD AUTO: 2.3 THOUSANDS/ÂΜL (ref 0.6–4.47)
LYMPHOCYTES NFR BLD AUTO: 35 % (ref 14–44)
MCH RBC QN AUTO: 30.1 PG (ref 26.8–34.3)
MCHC RBC AUTO-ENTMCNC: 32.7 G/DL (ref 31.4–37.4)
MCV RBC AUTO: 92 FL (ref 82–98)
MONOCYTES # BLD AUTO: 0.45 THOUSAND/ÂΜL (ref 0.17–1.22)
MONOCYTES NFR BLD AUTO: 7 % (ref 4–12)
NEUTROPHILS # BLD AUTO: 3.57 THOUSANDS/ÂΜL (ref 1.85–7.62)
NEUTS SEG NFR BLD AUTO: 53 % (ref 43–75)
NONHDLC SERPL-MCNC: 139 MG/DL
NRBC BLD AUTO-RTO: 0 /100 WBCS
PLATELET # BLD AUTO: 159 THOUSANDS/UL (ref 149–390)
PMV BLD AUTO: 9.3 FL (ref 8.9–12.7)
POTASSIUM SERPL-SCNC: 4.9 MMOL/L (ref 3.5–5.3)
PROT SERPL-MCNC: 6.3 G/DL (ref 6.4–8.4)
PSA SERPL-MCNC: 0.9 NG/ML (ref 0–4)
RBC # BLD AUTO: 5.28 MILLION/UL (ref 3.88–5.62)
SODIUM SERPL-SCNC: 142 MMOL/L (ref 135–147)
TRIGL SERPL-MCNC: 159 MG/DL
TSH SERPL DL<=0.05 MIU/L-ACNC: 2.37 UIU/ML (ref 0.45–4.5)
WBC # BLD AUTO: 6.57 THOUSAND/UL (ref 4.31–10.16)

## 2023-09-06 PROCEDURE — 84443 ASSAY THYROID STIM HORMONE: CPT

## 2023-09-06 PROCEDURE — 80061 LIPID PANEL: CPT

## 2023-09-06 PROCEDURE — 36415 COLL VENOUS BLD VENIPUNCTURE: CPT

## 2023-09-06 PROCEDURE — G0103 PSA SCREENING: HCPCS

## 2023-09-06 PROCEDURE — 85025 COMPLETE CBC W/AUTO DIFF WBC: CPT

## 2023-09-06 PROCEDURE — 80053 COMPREHEN METABOLIC PANEL: CPT

## 2023-09-07 ENCOUNTER — OFFICE VISIT (OUTPATIENT)
Dept: FAMILY MEDICINE CLINIC | Facility: CLINIC | Age: 68
End: 2023-09-07
Payer: MEDICARE

## 2023-09-07 VITALS
HEIGHT: 74 IN | SYSTOLIC BLOOD PRESSURE: 128 MMHG | WEIGHT: 312 LBS | TEMPERATURE: 98.2 F | DIASTOLIC BLOOD PRESSURE: 84 MMHG | BODY MASS INDEX: 40.04 KG/M2 | OXYGEN SATURATION: 96 % | HEART RATE: 61 BPM

## 2023-09-07 DIAGNOSIS — Z11.59 NEED FOR HEPATITIS C SCREENING TEST: ICD-10-CM

## 2023-09-07 DIAGNOSIS — Z23 NEED FOR IMMUNIZATION AGAINST INFLUENZA: Primary | ICD-10-CM

## 2023-09-07 DIAGNOSIS — Z00.00 MEDICARE ANNUAL WELLNESS VISIT, SUBSEQUENT: ICD-10-CM

## 2023-09-07 PROCEDURE — G0439 PPPS, SUBSEQ VISIT: HCPCS | Performed by: FAMILY MEDICINE

## 2023-09-07 PROCEDURE — G0008 ADMIN INFLUENZA VIRUS VAC: HCPCS

## 2023-09-07 PROCEDURE — 90662 IIV NO PRSV INCREASED AG IM: CPT

## 2023-09-07 NOTE — PATIENT INSTRUCTIONS
Medicare Preventive Visit Patient Instructions  Thank you for completing your Welcome to Medicare Visit or Medicare Annual Wellness Visit today. Your next wellness visit will be due in one year (9/7/2024). The screening/preventive services that you may require over the next 5-10 years are detailed below. Some tests may not apply to you based off risk factors and/or age. Screening tests ordered at today's visit but not completed yet may show as past due. Also, please note that scanned in results may not display below. Preventive Screenings:  Service Recommendations Previous Testing/Comments   Colorectal Cancer Screening  · Colonoscopy    · Fecal Occult Blood Test (FOBT)/Fecal Immunochemical Test (FIT)  · Fecal DNA/Cologuard Test  · Flexible Sigmoidoscopy Age: 43-73 years old   Colonoscopy: every 10 years (May be performed more frequently if at higher risk)  OR  FOBT/FIT: every 1 year  OR  Cologuard: every 3 years  OR  Sigmoidoscopy: every 5 years  Screening may be recommended earlier than age 39 if at higher risk for colorectal cancer. Also, an individualized decision between you and your healthcare provider will decide whether screening between the ages of 77-80 would be appropriate.  Colonoscopy: 09/22/2021  FOBT/FIT: Not on file  Cologuard: Not on file  Sigmoidoscopy: Not on file    Screening Current     Prostate Cancer Screening Individualized decision between patient and health care provider in men between ages of 53-66   Medicare will cover every 12 months beginning on the day after your 50th birthday PSA: 0.90 ng/mL     Screening Current     Hepatitis C Screening Once for adults born between 1945 and 1965  More frequently in patients at high risk for Hepatitis C Hep C Antibody: Not on file        Diabetes Screening 1-2 times per year if you're at risk for diabetes or have pre-diabetes Fasting glucose: 121 mg/dL (9/6/2023)  A1C: 5.4 % (6/1/2019)  Screening Current   Cholesterol Screening Once every 5 years if you don't have a lipid disorder. May order more often based on risk factors. Lipid panel: 09/06/2023  Screening Current      Other Preventive Screenings Covered by Medicare:  1. Abdominal Aortic Aneurysm (AAA) Screening: covered once if your at risk. You're considered to be at risk if you have a family history of AAA or a male between the age of 70-76 who smoking at least 100 cigarettes in your lifetime. 2. Lung Cancer Screening: covers low dose CT scan once per year if you meet all of the following conditions: (1) Age 48-67; (2) No signs or symptoms of lung cancer; (3) Current smoker or have quit smoking within the last 15 years; (4) You have a tobacco smoking history of at least 20 pack years (packs per day x number of years you smoked); (5) You get a written order from a healthcare provider. 3. Glaucoma Screening: covered annually if you're considered high risk: (1) You have diabetes OR (2) Family history of glaucoma OR (3)  aged 48 and older OR (3)  American aged 72 and older  3. Osteoporosis Screening: covered every 2 years if you meet one of the following conditions: (1) Have a vertebral abnormality; (2) On glucocorticoid therapy for more than 3 months; (3) Have primary hyperparathyroidism; (4) On osteoporosis medications and need to assess response to drug therapy. 5. HIV Screening: covered annually if you're between the age of 14-79. Also covered annually if you are younger than 13 and older than 72 with risk factors for HIV infection. For pregnant patients, it is covered up to 3 times per pregnancy.     Immunizations:  Immunization Recommendations   Influenza Vaccine Annual influenza vaccination during flu season is recommended for all persons aged >= 6 months who do not have contraindications   Pneumococcal Vaccine   * Pneumococcal conjugate vaccine = PCV13 (Prevnar 13), PCV15 (Vaxneuvance), PCV20 (Prevnar 20)  * Pneumococcal polysaccharide vaccine = PPSV23 (Pneumovax) Adults 20-63 years old: 1-3 doses may be recommended based on certain risk factors  Adults 72 years old: 1-2 doses may be recommended based off what pneumonia vaccine you previously received   Hepatitis B Vaccine 3 dose series if at intermediate or high risk (ex: diabetes, end stage renal disease, liver disease)   Tetanus (Td) Vaccine - COST NOT COVERED BY MEDICARE PART B Following completion of primary series, a booster dose should be given every 10 years to maintain immunity against tetanus. Td may also be given as tetanus wound prophylaxis. Tdap Vaccine - COST NOT COVERED BY MEDICARE PART B Recommended at least once for all adults. For pregnant patients, recommended with each pregnancy. Shingles Vaccine (Shingrix) - COST NOT COVERED BY MEDICARE PART B  2 shot series recommended in those aged 48 and above     Health Maintenance Due:      Topic Date Due   • Colorectal Cancer Screening  09/22/2031   • Hepatitis C Screening  Discontinued     Immunizations Due:      Topic Date Due   • Pneumococcal Vaccine: 65+ Years (1 - PCV) Never done   • COVID-19 Vaccine (3 - Moderna series) 05/04/2021   • Influenza Vaccine (1) 09/01/2023     Advance Directives   What are advance directives? Advance directives are legal documents that state your wishes and plans for medical care. These plans are made ahead of time in case you lose your ability to make decisions for yourself. Advance directives can apply to any medical decision, such as the treatments you want, and if you want to donate organs. What are the types of advance directives? There are many types of advance directives, and each state has rules about how to use them. You may choose a combination of any of the following:  · Living will: This is a written record of the treatment you want. You can also choose which treatments you do not want, which to limit, and which to stop at a certain time. This includes surgery, medicine, IV fluid, and tube feedings.    · Durable power of  [facility]): This is a written record that states who you want to make healthcare choices for you when you are unable to make them for yourself. This person, called a proxy, is usually a family member or a friend. You may choose more than 1 proxy. · Do not resuscitate (DNR) order:  A DNR order is used in case your heart stops beating or you stop breathing. It is a request not to have certain forms of treatment, such as CPR. A DNR order may be included in other types of advance directives. · Medical directive: This covers the care that you want if you are in a coma, near death, or unable to make decisions for yourself. You can list the treatments you want for each condition. Treatment may include pain medicine, surgery, blood transfusions, dialysis, IV or tube feedings, and a ventilator (breathing machine). · Values history: This document has questions about your views, beliefs, and how you feel and think about life. This information can help others choose the care that you would choose. Why are advance directives important? An advance directive helps you control your care. Although spoken wishes may be used, it is better to have your wishes written down. Spoken wishes can be misunderstood, or not followed. Treatments may be given even if you do not want them. An advance directive may make it easier for your family to make difficult choices about your care. Weight Management   Why it is important to manage your weight:  Being overweight increases your risk of health conditions such as heart disease, high blood pressure, type 2 diabetes, and certain types of cancer. It can also increase your risk for osteoarthritis, sleep apnea, and other respiratory problems. Aim for a slow, steady weight loss. Even a small amount of weight loss can lower your risk of health problems. How to lose weight safely:  A safe and healthy way to lose weight is to eat fewer calories and get regular exercise. You can lose up about 1 pound a week by decreasing the number of calories you eat by 500 calories each day. Healthy meal plan for weight management:  A healthy meal plan includes a variety of foods, contains fewer calories, and helps you stay healthy. A healthy meal plan includes the following:  · Eat whole-grain foods more often. A healthy meal plan should contain fiber. Fiber is the part of grains, fruits, and vegetables that is not broken down by your body. Whole-grain foods are healthy and provide extra fiber in your diet. Some examples of whole-grain foods are whole-wheat breads and pastas, oatmeal, brown rice, and bulgur. · Eat a variety of vegetables every day. Include dark, leafy greens such as spinach, kale, michelle greens, and mustard greens. Eat yellow and orange vegetables such as carrots, sweet potatoes, and winter squash. · Eat a variety of fruits every day. Choose fresh or canned fruit (canned in its own juice or light syrup) instead of juice. Fruit juice has very little or no fiber. · Eat low-fat dairy foods. Drink fat-free (skim) milk or 1% milk. Eat fat-free yogurt and low-fat cottage cheese. Try low-fat cheeses such as mozzarella and other reduced-fat cheeses. · Choose meat and other protein foods that are low in fat. Choose beans or other legumes such as split peas or lentils. Choose fish, skinless poultry (chicken or turkey), or lean cuts of red meat (beef or pork). Before you cook meat or poultry, cut off any visible fat. · Use less fat and oil. Try baking foods instead of frying them. Add less fat, such as margarine, sour cream, regular salad dressing and mayonnaise to foods. Eat fewer high-fat foods. Some examples of high-fat foods include french fries, doughnuts, ice cream, and cakes. · Eat fewer sweets. Limit foods and drinks that are high in sugar. This includes candy, cookies, regular soda, and sweetened drinks.   Exercise:  Exercise at least 30 minutes per day on most days of the week. Some examples of exercise include walking, biking, dancing, and swimming. You can also fit in more physical activity by taking the stairs instead of the elevator or parking farther away from stores. Ask your healthcare provider about the best exercise plan for you. © Copyright [employer] 2018 Information is for End User's use only and may not be sold, redistributed or otherwise used for commercial purposes.  All illustrations and images included in CareNotes® are the copyrighted property of [employer]., [employer] or  [employer]

## 2023-09-07 NOTE — PROGRESS NOTES
Assessment and Plan:     Problem List Items Addressed This Visit    None  Visit Diagnoses     Medicare annual wellness visit, subsequent        Need for hepatitis C screening test        Relevant Orders    Hepatitis C antibody           Preventive health issues were discussed with patient, and age appropriate screening tests were ordered as noted in patient's After Visit Summary. Personalized health advice and appropriate referrals for health education or preventive services given if needed, as noted in patient's After Visit Summary. History of Present Illness:     Patient presents for a Medicare Wellness Visit    HPI   Patient Care Team:  Lavinia Gonzalez DO as PCP - General  MD Zion Knott MD Exie Gain, DO as Endoscopist     Review of Systems:     Review of Systems     Problem List:     Patient Active Problem List   Diagnosis   • Dyslipidemia   • Essential hypertension   • Hypothyroidism   • Low vitamin D level   • Lumbar pain   • Neuropathy involving both lower extremities   • Tarsal tunnel syndrome of left side   • Tarsal tunnel syndrome of right side   • Chronic knee pain 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   • Rupture of anterior cruciate ligament of right knee   • Primary osteoarthritis of right knee   • Skin neoplasm   • Seborrheic keratosis   • Onychomycosis   • Acute non-recurrent maxillary sinusitis   • Right lower quadrant pain   • Direct inguinal hernia   • Cellulitis of right lower extremity   • Lower extremity edema   • Localized swelling of right foot   • Venous stasis   • Allergic reaction   • Gastroesophageal reflux disease without esophagitis   • Periumbilical pain   • Hepatic steatosis   • Kidney stone on left side   • Hydronephrosis with urinary obstruction due to renal calculus   • Acute kidney injury ([address])   • Corneal irritation of left eye   • Severe obesity (BMI 35.0-39. 9) with comorbidity ([address])   • Neuropathy   • Benign prostatic hyperplasia with incomplete bladder emptying   • Pain in both feet   • Rectal bleeding   • Angioedema   • Lower abdominal pain      Past Medical and Surgical History:     Past Medical History:   Diagnosis Date   • Anesthesia     "cant have spinal anesthesia because of curvature of the spine and severe pain"   • Anxiety    • Arthritis    • Athlete's foot     off and on   • Benign polyp of large intestine    • Cancer (HCC)     thyroid,    • Chronic pain disorder     bilat knees   • Colon polyp    • Curvature of spine    • DDD (degenerative disc disease), lumbar    • Dental crowns present    • Depression    • Disease of thyroid gland     removed   • Dizziness    • Dyslipidemia    • Enlarged prostate    • Exercises daily     "walking/biking/lifts weights"   • GERD (gastroesophageal reflux disease)    • Hiatal hernia    • History of radiation therapy     "iodine pill for thyroid cancer"   • Hypertension    • IBS (irritable bowel syndrome)    • Kidney stone     still has on the left, and small one on right"   • Neuropathy    • Numbness and tingling of both feet    • Obesity    • Retinal hole or tear, bilateral     had laser surgery Dr Tejinder Whittington 3-4 yrs ago   • S/P TURP (status post transurethral resection of prostate)    • Scoliosis    • Seasonal allergies    • Teeth missing    • Tension headache     occas   • Tinea pedis    • Urinary frequency    • Urinary urgency    • Wears glasses      Past Surgical History:   Procedure Laterality Date   • CHOLECYSTECTOMY     • COLONOSCOPY     • FL RETROGRADE URETHROCYSTOGRAM  02/28/2020   • HERNIA REPAIR     • KNEE ARTHROSCOPY Bilateral     knee tim   • ME COLONOSCOPY FLX DX W/COLLJ SPEC WHEN PFRMD N/A 07/25/2018    Procedure: COLONOSCOPY;  Surgeon: Helene Haskins DO;  Location: [address] [place] [address] GI LAB;   Service: General   • ME CYSTO/URETERO W/LITHOTRIPSY &INDWELL STENT INSRT Left 02/28/2020    Procedure: CYSTOSCOPY URETEROSCOP RETROGRADE PYELOGRAM AND INSERTION STENT URETERAL;  Surgeon: Heladio Noble MD; Location[facility];  Service: Urology   • ID CYSTO/URETERO W/LITHOTRIPSY &INDWELL STENT INSRT Left 2020    Procedure: CYSTOSCOPY URETEROSCOPIC STONE EXTRACTION, RETROGRADE PYELOGRAM AND INSERTION STENT URETERAL;  Surgeon: Heladio Noble MD;  Location[facility];  Service: Urology   • ID LITHOTRIPSY XTRCORP SHOCK WAVE Left 2017    Procedure: Oneta Ades SHOCKWAVE (ESWL); Surgeon: Matilde Mcpherson DO;  Location: [facility];  Service: Urology   • ID RPR 1ST INGUN HRNA AGE 5 YRS/> REDUCIBLE Right 2019    Procedure: REPAIR INGUINAL HERNIA  DIRECT NO MESH;  Surgeon: Helene Haskins DO;  Location: [address] [place] [address] MAIN OR;  Service: General   • ID TRURL ELECTROSURG RESCJ PROSTATE BLEED COMPLETE N/A 2021    Procedure: TRANSURETHRAL RESECTION OF PROSTATE (TURP); Surgeon: Heladio Noble MD;  Location: [facility];  Service: Urology   • PROSTATE SURGERY     • RETINAL LASER PROCEDURE     • ROTATOR CUFF REPAIR Left    • THYROIDECTOMY      Subtotal and Total Thyroidectomy both mentioned in allscripts   • WISDOM TOOTH EXTRACTION        Family History:     Family History   Problem Relation Age of Onset   • Diabetes Mother    • Heart disease Father    • Breast cancer Sister    • Depression Son    • Cancer Sister       Social History:     Social History     Socioeconomic History   • Marital status: /Civil Union     Spouse name: None   • Number of children: None   • Years of education: None   • Highest education level: None   Occupational History   • Occupation: retired security   Tobacco Use   • Smoking status: Former     Packs/day: 1.00     Years: 40.00     Total pack years: 40.00     Types: Cigarettes     Quit date: 2014     Years since quittin.2   • Smokeless tobacco: Never   Vaping Use   • Vaping Use: Never used   Substance and Sexual Activity   • Alcohol use:  Yes     Alcohol/week: 2.0 standard drinks of alcohol     Types: 1 Glasses of wine, 1 Cans of beer per week     Comment: very rarely   • Drug use: No   • Sexual activity: Not Currently   Other Topics Concern   • None   Social History Narrative   • None     Social Determinants of Health     Financial Resource Strain: Low Risk  (9/7/2023)    Overall Financial Resource Strain (CARDIA)    • Difficulty of Paying Living Expenses: Not hard at all   Food Insecurity: No Food Insecurity (2/24/2021)    Hunger Vital Sign    • Worried About Running Out of Food in the Last Year: Never true    • Ran Out of Food in the Last Year: Never true   Transportation Needs: No Transportation Needs (9/7/2023)    PRAPARE - Transportation    • Lack of Transportation (Medical): No    • Lack of Transportation (Non-Medical): No   Physical Activity: Unknown (2/24/2021)    Exercise Vital Sign    • Days of Exercise per Week: 3 days    • Minutes of Exercise per Session: Not on file   Stress: No Stress Concern Present (2/24/2021)    [facility]    • Feeling of Stress : Only a little   Social Connections: Moderately Integrated (2/24/2021)    Social Connection and Isolation Panel [NHANES]    • Frequency of Communication with Friends and Family: Never    • Frequency of Social Gatherings with Friends and Family: Twice a week    • Attends [employer] Services: More than 4 times per year    • Active Member of Clubs or Organizations:  Yes    • Attends Club or Organization Meetings: More than 4 times per year    • Marital Status:    Intimate Partner Violence: Not At Risk (2/24/2021)    Humiliation, Afraid, Rape, and Kick questionnaire    • Fear of Current or Ex-Partner: No    • Emotionally Abused: No    • Physically Abused: No    • Sexually Abused: No   Housing Stability: Not on file      Medications and Allergies:     Current Outpatient Medications   Medication Sig Dispense Refill   • acetaminophen (TYLENOL) 325 mg tablet Take 650 mg by mouth every 6 (six) hours as needed for mild pain     • allopurinol (ZYLOPRIM) 300 mg tablet TAKE 1 TABLET BY MOUTH DAILY 90 tablet 0   • chlordiazepoxide-clidinium (LIBRAX) 5-2.5 mg per capsule Take 1 capsule by mouth as needed for indigestion     • clotrimazole-betamethasone (LOTRISONE) 1-0.05 % cream Apply topically 2 (two) times a day 30 g 2   • gentamicin (GARAMYCIN) 0.3 % ophthalmic solution Administer 1 drop into the left eye every 4 (four) hours 5 mL 0   • levothyroxine 200 mcg tablet TAKE 1 TABLET BY MOUTH DAILY 90 tablet 0   • levothyroxine 50 mcg tablet TAKE 1 TABLET BY MOUTH DAILY 90 tablet 0   • Multiple Vitamin (MULTIVITAMIN) tablet Take 1 tablet by mouth daily     • Omega-3 Fatty Acids (OMEGA-3 2100 PO) Take by mouth     • omeprazole (PriLOSEC) 20 mg delayed release capsule TAKE 1 CAPSULE DAILY 90 capsule 3   • vitamin E, tocopherol, 400 units capsule Take 400 Units by mouth daily       No current facility-administered medications for this visit. No Known Allergies   Immunizations:     Immunization History   Administered Date(s) Administered   • COVID-19 MODERNA VACC 0.5 ML IM 02/08/2021, 03/09/2021   • Hep A, adult 05/17/2006, 03/21/2007   • INFLUENZA 11/23/2021, 11/07/2022   • Influenza, high dose seasonal 0.7 mL 11/23/2021, 11/07/2022      Health Maintenance:         Topic Date Due   • Colorectal Cancer Screening  09/22/2031   • Hepatitis C Screening  Discontinued         Topic Date Due   • Pneumococcal Vaccine: 65+ Years (1 - PCV) Never done   • COVID-19 Vaccine (3 - Rudolph Mis series) 05/04/2021   • Influenza Vaccine (1) 09/01/2023      Medicare Screening Tests and Risk Assessments:     Ralph Zavala is here for his Subsequent Wellness visit. Health Risk Assessment:   Patient rates overall health as very good. Patient feels that their physical health rating is same. Patient is satisfied with their life. Eyesight was rated as same. Hearing was rated as same. Patient feels that their emotional and mental health rating is same.  Patients states they are never, rarely angry. Patient states they are sometimes unusually tired/fatigued. Pain experienced in the last 7 days has been none. Patient states that he has experienced no weight loss or gain in last 6 months. Depression Screening:   PHQ-2 Score: 0      Fall Risk Screening: In the past year, patient has experienced: no history of falling in past year      Home Safety:  Patient does not have trouble with stairs inside or outside of their home. Patient has working smoke alarms and has working carbon monoxide detector. Home safety hazards include: none. Nutrition:   Current diet is Regular. Medications:   Patient is currently taking over-the-counter supplements. OTC medications include: see medication list. Patient is able to manage medications. Activities of Daily Living (ADLs)/Instrumental Activities of Daily Living (IADLs):   Walk and transfer into and out of bed and chair?: Yes  Dress and groom yourself?: Yes    Bathe or shower yourself?: Yes    Feed yourself? Yes  Do your laundry/housekeeping?: Yes  Manage your money, pay your bills and track your expenses?: Yes  Make your own meals?: Yes    Do your own shopping?: Yes    Previous Hospitalizations:   Any hospitalizations or ED visits within the last 12 months?: No      Advance Care Planning:   Living will: Yes    Durable POA for healthcare:  Yes    Advanced directive: Yes      Cognitive Screening:   Provider or family/friend/caregiver concerned regarding cognition?: No    PREVENTIVE SCREENINGS      Cardiovascular Screening:    General: Screening Current and Risks and Benefits Discussed      Diabetes Screening:     General: Screening Current and Risks and Benefits Discussed      Colorectal Cancer Screening:     General: Screening Current and Risks and Benefits Discussed      Prostate Cancer Screening:    General: Screening Current and Risks and Benefits Discussed      Osteoporosis Screening:    General: Risks and Benefits Discussed and Screening Not Indicated      Abdominal Aortic Aneurysm (AAA) Screening:    Risk factors include: age between 70-75 yo and tobacco use        General: Screening Not Indicated and Risks and Benefits Discussed      Lung Cancer Screening:     General: Risks and Benefits Discussed and Screening Not Indicated      Hepatitis C Screening:    General: Risks and Benefits Discussed    Hep C Screening Accepted: Yes      Other Counseling Topics:   Regular weightbearing exercise. No results found.      Physical Exam:     /84 (BP Location: Right arm, Patient Position: Sitting, Cuff Size: Large)   Pulse 61   Temp 98.2 °F (36.8 °C) (Temporal)   Ht 6' 2" (1.88 m)   Wt (!) 142 kg (312 lb)   SpO2 96%   BMI 40.06 kg/m²     Physical Exam     Jurgen Aline, DO

## 2023-10-31 ENCOUNTER — OFFICE VISIT (OUTPATIENT)
Dept: FAMILY MEDICINE CLINIC | Facility: CLINIC | Age: 68
End: 2023-10-31
Payer: MEDICARE

## 2023-10-31 VITALS
DIASTOLIC BLOOD PRESSURE: 88 MMHG | TEMPERATURE: 97.7 F | BODY MASS INDEX: 40.43 KG/M2 | OXYGEN SATURATION: 97 % | WEIGHT: 315 LBS | HEART RATE: 67 BPM | HEIGHT: 74 IN | RESPIRATION RATE: 21 BRPM | SYSTOLIC BLOOD PRESSURE: 140 MMHG

## 2023-10-31 DIAGNOSIS — L20.89 FLEXURAL ATOPIC DERMATITIS: Primary | ICD-10-CM

## 2023-10-31 DIAGNOSIS — R21 RASH: ICD-10-CM

## 2023-10-31 PROCEDURE — 99213 OFFICE O/P EST LOW 20 MIN: CPT | Performed by: FAMILY MEDICINE

## 2023-10-31 RX ORDER — BETAMETHASONE DIPROPIONATE 0.5 MG/G
CREAM TOPICAL 2 TIMES DAILY
Qty: 15 G | Refills: 0 | Status: SHIPPED | OUTPATIENT
Start: 2023-10-31

## 2023-10-31 NOTE — PROGRESS NOTES
Assessment/Plan:       Diagnoses and all orders for this visit:    Flexural atopic dermatitis  -     betamethasone, augmented, (DIPROLENE-AF) 0.05 % cream; Apply topically 2 (two) times a day            Subjective:        Patient ID: Castro Higgins is a 76 y.o. male. Patient with rash right antecubital region x1 week. Mild pruritus. No new change in detergents or fabric softeners. No fever. he did try steroid-based cream.    Rash          The following portions of the patient's history were reviewed and updated as appropriate: allergies, current medications, past family history, past medical history, past social history, past surgical history and problem list.      Review of Systems   Constitutional: Negative. HENT: Negative. Eyes: Negative. Respiratory: Negative. Cardiovascular: Negative. Gastrointestinal: Negative. Endocrine: Negative. Genitourinary: Negative. Musculoskeletal: Negative. Skin:  Positive for rash. Allergic/Immunologic: Negative. Neurological: Negative. Hematological: Negative. Psychiatric/Behavioral: Negative. Objective:               /100 (BP Location: Left arm, Patient Position: Sitting, Cuff Size: Large)   Pulse 67   Temp 97.7 °F (36.5 °C) (Temporal)   Resp 21   Ht 6' 2" (1.88 m)   Wt (!) 145 kg (320 lb)   SpO2 97%   BMI 41.09 kg/m²          Physical Exam  Vitals and nursing note reviewed. Skin:     Findings: Rash present.       Comments: Atopic dermatitis right antecubital

## 2023-11-07 DIAGNOSIS — N20.0 CALCULUS OF KIDNEY: ICD-10-CM

## 2023-11-08 ENCOUNTER — TELEPHONE (OUTPATIENT)
Dept: FAMILY MEDICINE CLINIC | Facility: CLINIC | Age: 68
End: 2023-11-08

## 2023-11-08 RX ORDER — ALLOPURINOL 300 MG/1
300 TABLET ORAL DAILY
Qty: 90 TABLET | Refills: 0 | Status: SHIPPED | OUTPATIENT
Start: 2023-11-08

## 2023-11-09 DIAGNOSIS — E03.9 HYPOTHYROIDISM, UNSPECIFIED TYPE: ICD-10-CM

## 2023-11-09 RX ORDER — LEVOTHYROXINE SODIUM 0.2 MG/1
200 TABLET ORAL DAILY
Qty: 90 TABLET | Refills: 0 | Status: SHIPPED | OUTPATIENT
Start: 2023-11-09

## 2023-11-09 RX ORDER — LEVOTHYROXINE SODIUM 0.05 MG/1
50 TABLET ORAL DAILY
Qty: 90 TABLET | Refills: 0 | Status: SHIPPED | OUTPATIENT
Start: 2023-11-09

## 2023-11-09 NOTE — TELEPHONE ENCOUNTER
Patient is also requesting a refill on Tesslon perles 200mg bu t I do not see it listed on medication list. Is it okay to refill this medication? Please advise.       Thank you

## 2023-11-10 ENCOUNTER — APPOINTMENT (OUTPATIENT)
Dept: LAB | Facility: MEDICAL CENTER | Age: 68
End: 2023-11-10
Payer: MEDICARE

## 2023-11-10 DIAGNOSIS — Z11.59 NEED FOR HEPATITIS C SCREENING TEST: ICD-10-CM

## 2023-11-10 DIAGNOSIS — R21 RASH: ICD-10-CM

## 2023-11-10 LAB — HCV AB SER QL: NORMAL

## 2023-11-10 PROCEDURE — 36415 COLL VENOUS BLD VENIPUNCTURE: CPT

## 2023-11-10 PROCEDURE — 86618 LYME DISEASE ANTIBODY: CPT

## 2023-11-10 PROCEDURE — 86803 HEPATITIS C AB TEST: CPT

## 2023-11-11 LAB — B BURGDOR IGG+IGM SER QL IA: NEGATIVE

## 2023-12-21 ENCOUNTER — OFFICE VISIT (OUTPATIENT)
Dept: FAMILY MEDICINE CLINIC | Facility: CLINIC | Age: 68
End: 2023-12-21
Payer: MEDICARE

## 2023-12-21 VITALS
OXYGEN SATURATION: 97 % | BODY MASS INDEX: 40.43 KG/M2 | DIASTOLIC BLOOD PRESSURE: 78 MMHG | WEIGHT: 315 LBS | SYSTOLIC BLOOD PRESSURE: 132 MMHG | HEART RATE: 73 BPM | TEMPERATURE: 97.6 F | HEIGHT: 74 IN

## 2023-12-21 DIAGNOSIS — M71.21 SYNOVIAL CYST OF RIGHT POPLITEAL SPACE: ICD-10-CM

## 2023-12-21 DIAGNOSIS — R05.1 ACUTE COUGH: Primary | ICD-10-CM

## 2023-12-21 DIAGNOSIS — J01.10 ACUTE NON-RECURRENT FRONTAL SINUSITIS: Primary | ICD-10-CM

## 2023-12-21 DIAGNOSIS — G25.81 RLS (RESTLESS LEGS SYNDROME): ICD-10-CM

## 2023-12-21 PROCEDURE — 99215 OFFICE O/P EST HI 40 MIN: CPT | Performed by: FAMILY MEDICINE

## 2023-12-21 RX ORDER — GABAPENTIN 300 MG/1
300 CAPSULE ORAL
Qty: 30 CAPSULE | Refills: 1 | Status: SHIPPED | OUTPATIENT
Start: 2023-12-21

## 2023-12-21 RX ORDER — AZITHROMYCIN 250 MG/1
TABLET, FILM COATED ORAL
Qty: 6 TABLET | Refills: 0 | Status: SHIPPED | OUTPATIENT
Start: 2023-12-21 | End: 2023-12-26

## 2023-12-21 RX ORDER — BENZONATATE 200 MG/1
200 CAPSULE ORAL 3 TIMES DAILY PRN
Qty: 20 CAPSULE | Refills: 0 | Status: SHIPPED | OUTPATIENT
Start: 2023-12-21

## 2023-12-21 NOTE — PROGRESS NOTES
Assessment/Plan:      Diagnoses and all orders for this visit:    Acute non-recurrent frontal sinusitis  -     azithromycin (ZITHROMAX) 250 mg tablet; Take 2 tablets today then 1 tablet daily x 4 days    RLS (restless legs syndrome)  -     gabapentin (Neurontin) 300 mg capsule; Take 1 capsule (300 mg total) by mouth daily at bedtime    Synovial cyst of right popliteal space     Site medicine as listed above.  He will follow-up with Dr. Slaughter in the next 2 weeks.    Subjective:     Patient ID: Fuad Fierro Jr. is a 68 y.o. male.    Patient presents with:  Sore Throat: Sore throat, nasal drainage, ear feel closed, patient requesting tessalon pearls//rr  Leg Pain: Upper leg pain, restless pain in right leg. Knee pain as well in that leg. //rr        Sore Throat   This is a new problem. The current episode started in the past 7 days. The problem has been gradually worsening. Neither side of throat is experiencing more pain than the other. There has been no fever. Associated symptoms include congestion. The treatment provided moderate relief.   Leg Pain   The incident occurred more than 1 week ago. There was no injury mechanism. The pain is present in the right knee. The quality of the pain is described as aching.       Review of Systems   Constitutional:  Positive for activity change and appetite change. Negative for fever.   HENT:  Positive for congestion and sore throat.    Respiratory: Negative.     Cardiovascular: Negative.    Gastrointestinal: Negative.    Musculoskeletal:  Positive for arthralgias and joint swelling.         Objective:     Physical Exam  Vitals and nursing note reviewed.   Constitutional:       Appearance: He is well-developed.   HENT:      Head: Atraumatic. Microcephalic.      Right Ear: Tenderness present.      Left Ear: Tenderness present.      Nose: Nose normal.      Mouth/Throat:      Pharynx: Pharyngeal swelling and posterior oropharyngeal erythema present. No oropharyngeal exudate.       Tonsils: Tonsillar exudate present. No tonsillar abscesses.   Eyes:      Pupils: Pupils are equal, round, and reactive to light.   Cardiovascular:      Rate and Rhythm: Normal rate.   Pulmonary:      Effort: Pulmonary effort is normal.      Breath sounds: No wheezing.   Abdominal:      Palpations: Abdomen is soft.   Musculoskeletal:         General: Swelling present.      Cervical back: Normal range of motion and neck supple.      Right knee: Swelling present. Decreased range of motion. Tenderness present.   Lymphadenopathy:      Cervical: No cervical adenopathy.   Skin:     General: Skin is warm.   Neurological:      General: No focal deficit present.      Mental Status: He is alert and oriented to person, place, and time.   Psychiatric:         Mood and Affect: Mood normal.

## 2024-01-05 ENCOUNTER — OFFICE VISIT (OUTPATIENT)
Dept: FAMILY MEDICINE CLINIC | Facility: CLINIC | Age: 69
End: 2024-01-05
Payer: MEDICARE

## 2024-01-05 VITALS
TEMPERATURE: 97.6 F | BODY MASS INDEX: 40.43 KG/M2 | HEIGHT: 74 IN | WEIGHT: 315 LBS | DIASTOLIC BLOOD PRESSURE: 94 MMHG | OXYGEN SATURATION: 99 % | SYSTOLIC BLOOD PRESSURE: 132 MMHG | HEART RATE: 64 BPM

## 2024-01-05 DIAGNOSIS — I10 ESSENTIAL HYPERTENSION: ICD-10-CM

## 2024-01-05 DIAGNOSIS — M17.11 PRIMARY OSTEOARTHRITIS OF RIGHT KNEE: Primary | ICD-10-CM

## 2024-01-05 DIAGNOSIS — J01.00 ACUTE NON-RECURRENT MAXILLARY SINUSITIS: ICD-10-CM

## 2024-01-05 DIAGNOSIS — M71.21 SYNOVIAL CYST OF RIGHT POPLITEAL SPACE: ICD-10-CM

## 2024-01-05 DIAGNOSIS — E03.9 ACQUIRED HYPOTHYROIDISM: ICD-10-CM

## 2024-01-05 PROCEDURE — 99214 OFFICE O/P EST MOD 30 MIN: CPT | Performed by: FAMILY MEDICINE

## 2024-01-05 NOTE — PROGRESS NOTES
Assessment/Plan: Patient completed Z-Nagi for sinusitis.  Will observe at this time.  Patient will go for blood work for hypertension hypothyroidism.  Patient will go for ultrasound of the soft tissue for possible Baker's cyst on the right.  Patient with no fracture of the right knee given chronic pain.  To consider injections etc.       Diagnoses and all orders for this visit:    Primary osteoarthritis of right knee  -     XR knee 3 vw right non injury; Future  -     CBC and differential; Future  -     Comprehensive metabolic panel; Future  -     Lipid panel; Future  -     TSH, 3rd generation with Free T4 reflex; Future    Acute non-recurrent maxillary sinusitis    Synovial cyst of right popliteal space  -     US extremity soft tissue; Future    Body mass index (BMI) 40.0-44.9, adult (HCC)    Essential hypertension  -     CBC and differential; Future  -     Comprehensive metabolic panel; Future  -     Lipid panel; Future  -     TSH, 3rd generation with Free T4 reflex; Future    Acquired hypothyroidism  -     CBC and differential; Future  -     Comprehensive metabolic panel; Future  -     Lipid panel; Future  -     TSH, 3rd generation with Free T4 reflex; Future            Subjective:        Patient ID: Fuad Fierro Jr. is a 68 y.o. male.      Patient here to follow-up on sinusitis as well as right knee pain posteriorly.  Patient with ongoing pain in the right knee especially after driving.  Patient able to use stationary bike without significant discomfort.  Does patient still with some rhinorrhea and postnasal drip.          The following portions of the patient's history were reviewed and updated as appropriate: allergies, current medications, past family history, past medical history, past social history, past surgical history and problem list.      Review of Systems   Constitutional: Negative.  Negative for fever.   HENT:  Positive for postnasal drip and rhinorrhea.    Eyes: Negative.    Respiratory: Negative.  "    Cardiovascular: Negative.    Gastrointestinal: Negative.    Endocrine: Negative.    Genitourinary: Negative.    Musculoskeletal:  Positive for arthralgias and gait problem.   Skin: Negative.    Allergic/Immunologic: Negative.    Hematological: Negative.    Psychiatric/Behavioral: Negative.             Objective:               /94 (BP Location: Right arm, Patient Position: Sitting, Cuff Size: Large)   Pulse 64   Temp 97.6 °F (36.4 °C) (Temporal)   Ht 6' 2\" (1.88 m)   Wt (!) 148 kg (326 lb)   SpO2 99%   BMI 41.86 kg/m²          Physical Exam  Vitals and nursing note reviewed.   Constitutional:       General: He is not in acute distress.     Appearance: Normal appearance. He is not ill-appearing, toxic-appearing or diaphoretic.   HENT:      Head: Normocephalic and atraumatic.      Right Ear: Tympanic membrane, ear canal and external ear normal. There is no impacted cerumen.      Left Ear: Tympanic membrane, ear canal and external ear normal. There is no impacted cerumen.      Nose: Nose normal. No congestion or rhinorrhea.      Mouth/Throat:      Mouth: Mucous membranes are moist.      Pharynx: No oropharyngeal exudate or posterior oropharyngeal erythema.   Eyes:      General: No scleral icterus.        Right eye: No discharge.         Left eye: No discharge.   Neck:      Vascular: No carotid bruit.   Cardiovascular:      Rate and Rhythm: Normal rate and regular rhythm.      Pulses: Normal pulses.      Heart sounds: Normal heart sounds. No murmur heard.     No friction rub. No gallop.   Pulmonary:      Effort: Pulmonary effort is normal. No respiratory distress.      Breath sounds: Normal breath sounds. No stridor. No wheezing, rhonchi or rales.   Chest:      Chest wall: No tenderness.   Musculoskeletal:         General: Swelling and tenderness present. No deformity or signs of injury.      Cervical back: Normal range of motion and neck supple. No rigidity. No muscular tenderness.      Right lower leg: " No edema.      Left lower leg: No edema.      Comments: Right knee Baker's cyst   Lymphadenopathy:      Cervical: No cervical adenopathy.   Skin:     General: Skin is warm and dry.      Capillary Refill: Capillary refill takes less than 2 seconds.      Coloration: Skin is not jaundiced.      Findings: No bruising, erythema, lesion or rash.   Neurological:      Mental Status: He is alert and oriented to person, place, and time. Mental status is at baseline.      Cranial Nerves: No cranial nerve deficit.      Sensory: No sensory deficit.      Motor: No weakness.      Coordination: Coordination normal.      Gait: Gait normal.   Psychiatric:         Mood and Affect: Mood normal.         Behavior: Behavior normal.         Thought Content: Thought content normal.         Judgment: Judgment normal.

## 2024-01-11 ENCOUNTER — HOSPITAL ENCOUNTER (OUTPATIENT)
Dept: ULTRASOUND IMAGING | Facility: HOSPITAL | Age: 69
Discharge: HOME | End: 2024-01-11
Payer: MEDICARE

## 2024-01-11 ENCOUNTER — APPOINTMENT (OUTPATIENT)
Dept: RADIOLOGY | Facility: MEDICAL CENTER | Age: 69
End: 2024-01-11
Payer: MEDICARE

## 2024-01-11 DIAGNOSIS — M17.11 PRIMARY OSTEOARTHRITIS OF RIGHT KNEE: ICD-10-CM

## 2024-01-11 DIAGNOSIS — M71.21 SYNOVIAL CYST OF RIGHT POPLITEAL SPACE: ICD-10-CM

## 2024-01-11 PROCEDURE — 76882 US LMTD JT/FCL EVL NVASC XTR: CPT

## 2024-01-11 PROCEDURE — 73562 X-RAY EXAM OF KNEE 3: CPT

## 2024-02-12 DIAGNOSIS — E03.9 HYPOTHYROIDISM, UNSPECIFIED TYPE: ICD-10-CM

## 2024-02-12 RX ORDER — LEVOTHYROXINE SODIUM 0.05 MG/1
50 TABLET ORAL DAILY
Qty: 90 TABLET | Refills: 0 | Status: SHIPPED | OUTPATIENT
Start: 2024-02-12

## 2024-02-12 RX ORDER — LEVOTHYROXINE SODIUM 0.2 MG/1
200 TABLET ORAL DAILY
Qty: 90 TABLET | Refills: 0 | Status: SHIPPED | OUTPATIENT
Start: 2024-02-12

## 2024-02-13 DIAGNOSIS — N20.0 CALCULUS OF KIDNEY: ICD-10-CM

## 2024-02-13 RX ORDER — ALLOPURINOL 300 MG/1
300 TABLET ORAL DAILY
Qty: 90 TABLET | Refills: 0 | Status: SHIPPED | OUTPATIENT
Start: 2024-02-13

## 2024-02-13 NOTE — TELEPHONE ENCOUNTER
Reason for call:   [x] Refill   [] Prior Auth  [] Other:     Office:   [x] PCP/Provider - Tommy Slaughter DO  [] Specialty/Provider -     Medication: allopurinol (ZYLOPRIM) 300 mg tablet     Dose/Frequency: 300 mg, Oral, Daily     Quantity: 90    Pharmacy: [facility]    Does the patient have enough for 3 days?   [x] Yes   [] No - Send as HP to POD

## 2024-02-21 PROBLEM — J01.00 ACUTE NON-RECURRENT MAXILLARY SINUSITIS: Status: RESOLVED | Noted: 2019-02-15 | Resolved: 2024-02-21

## 2024-02-29 ENCOUNTER — RA CDI HCC (OUTPATIENT)
Dept: OTHER | Facility: HOSPITAL | Age: 69
End: 2024-02-29

## 2024-02-29 NOTE — PROGRESS NOTES
HCC coding opportunities          Chart Reviewed number of suggestions sent to Provider: 1     Patients Insurance     Medicare Insurance: Medicare

## 2024-03-06 ENCOUNTER — APPOINTMENT (OUTPATIENT)
Dept: LAB | Facility: MEDICAL CENTER | Age: 69
End: 2024-03-06
Payer: MEDICARE

## 2024-03-06 DIAGNOSIS — M17.11 PRIMARY OSTEOARTHRITIS OF RIGHT KNEE: ICD-10-CM

## 2024-03-06 DIAGNOSIS — E03.9 ACQUIRED HYPOTHYROIDISM: ICD-10-CM

## 2024-03-06 DIAGNOSIS — I10 ESSENTIAL HYPERTENSION: ICD-10-CM

## 2024-03-06 LAB
ALBUMIN SERPL BCP-MCNC: 4.3 G/DL (ref 3.5–5)
ALP SERPL-CCNC: 58 U/L (ref 34–104)
ALT SERPL W P-5'-P-CCNC: 45 U/L (ref 7–52)
ANION GAP SERPL CALCULATED.3IONS-SCNC: 9 MMOL/L
AST SERPL W P-5'-P-CCNC: 33 U/L (ref 13–39)
BASOPHILS # BLD AUTO: 0.08 THOUSANDS/ÂΜL (ref 0–0.1)
BASOPHILS NFR BLD AUTO: 1 % (ref 0–1)
BILIRUB SERPL-MCNC: 0.73 MG/DL (ref 0.2–1)
BUN SERPL-MCNC: 20 MG/DL (ref 5–25)
CALCIUM SERPL-MCNC: 9.1 MG/DL (ref 8.4–10.2)
CHLORIDE SERPL-SCNC: 106 MMOL/L (ref 96–108)
CHOLEST SERPL-MCNC: 200 MG/DL
CO2 SERPL-SCNC: 25 MMOL/L (ref 21–32)
CREAT SERPL-MCNC: 0.95 MG/DL (ref 0.6–1.3)
EOSINOPHIL # BLD AUTO: 0.18 THOUSAND/ÂΜL (ref 0–0.61)
EOSINOPHIL NFR BLD AUTO: 3 % (ref 0–6)
ERYTHROCYTE [DISTWIDTH] IN BLOOD BY AUTOMATED COUNT: 12.8 % (ref 11.6–15.1)
GFR SERPL CREATININE-BSD FRML MDRD: 81 ML/MIN/1.73SQ M
GLUCOSE P FAST SERPL-MCNC: 132 MG/DL (ref 65–99)
HCT VFR BLD AUTO: 51.4 % (ref 36.5–49.3)
HDLC SERPL-MCNC: 42 MG/DL
HGB BLD-MCNC: 17.2 G/DL (ref 12–17)
IMM GRANULOCYTES # BLD AUTO: 0.03 THOUSAND/UL (ref 0–0.2)
IMM GRANULOCYTES NFR BLD AUTO: 0 % (ref 0–2)
LDLC SERPL CALC-MCNC: 112 MG/DL (ref 0–100)
LYMPHOCYTES # BLD AUTO: 2.45 THOUSANDS/ÂΜL (ref 0.6–4.47)
LYMPHOCYTES NFR BLD AUTO: 34 % (ref 14–44)
MCH RBC QN AUTO: 30.7 PG (ref 26.8–34.3)
MCHC RBC AUTO-ENTMCNC: 33.5 G/DL (ref 31.4–37.4)
MCV RBC AUTO: 92 FL (ref 82–98)
MONOCYTES # BLD AUTO: 0.56 THOUSAND/ÂΜL (ref 0.17–1.22)
MONOCYTES NFR BLD AUTO: 8 % (ref 4–12)
NEUTROPHILS # BLD AUTO: 3.85 THOUSANDS/ÂΜL (ref 1.85–7.62)
NEUTS SEG NFR BLD AUTO: 54 % (ref 43–75)
NONHDLC SERPL-MCNC: 158 MG/DL
NRBC BLD AUTO-RTO: 0 /100 WBCS
PLATELET # BLD AUTO: 166 THOUSANDS/UL (ref 149–390)
PMV BLD AUTO: 9.1 FL (ref 8.9–12.7)
POTASSIUM SERPL-SCNC: 4.6 MMOL/L (ref 3.5–5.3)
PROT SERPL-MCNC: 6.7 G/DL (ref 6.4–8.4)
RBC # BLD AUTO: 5.6 MILLION/UL (ref 3.88–5.62)
SODIUM SERPL-SCNC: 140 MMOL/L (ref 135–147)
TRIGL SERPL-MCNC: 228 MG/DL
TSH SERPL DL<=0.05 MIU/L-ACNC: 4.38 UIU/ML (ref 0.45–4.5)
WBC # BLD AUTO: 7.15 THOUSAND/UL (ref 4.31–10.16)

## 2024-03-06 PROCEDURE — 85025 COMPLETE CBC W/AUTO DIFF WBC: CPT

## 2024-03-06 PROCEDURE — 80053 COMPREHEN METABOLIC PANEL: CPT

## 2024-03-06 PROCEDURE — 36415 COLL VENOUS BLD VENIPUNCTURE: CPT

## 2024-03-06 PROCEDURE — 80061 LIPID PANEL: CPT

## 2024-03-06 PROCEDURE — 84443 ASSAY THYROID STIM HORMONE: CPT

## 2024-03-08 ENCOUNTER — OFFICE VISIT (OUTPATIENT)
Dept: FAMILY MEDICINE CLINIC | Facility: CLINIC | Age: 69
End: 2024-03-08
Payer: MEDICARE

## 2024-03-08 VITALS
SYSTOLIC BLOOD PRESSURE: 136 MMHG | WEIGHT: 315 LBS | OXYGEN SATURATION: 96 % | HEART RATE: 63 BPM | DIASTOLIC BLOOD PRESSURE: 88 MMHG | TEMPERATURE: 97.6 F | HEIGHT: 75 IN | BODY MASS INDEX: 39.17 KG/M2

## 2024-03-08 DIAGNOSIS — R73.03 PREDIABETES: ICD-10-CM

## 2024-03-08 DIAGNOSIS — I10 ESSENTIAL HYPERTENSION: ICD-10-CM

## 2024-03-08 DIAGNOSIS — E03.9 ACQUIRED HYPOTHYROIDISM: Primary | ICD-10-CM

## 2024-03-08 DIAGNOSIS — Z13.1 SCREENING FOR DIABETES MELLITUS: ICD-10-CM

## 2024-03-08 PROCEDURE — 99214 OFFICE O/P EST MOD 30 MIN: CPT | Performed by: FAMILY MEDICINE

## 2024-03-08 PROCEDURE — G2211 COMPLEX E/M VISIT ADD ON: HCPCS | Performed by: FAMILY MEDICINE

## 2024-03-08 NOTE — PROGRESS NOTES
"Assessment/Plan: A1c 5.9.  CMP CBC TSH lipid profile reviewed at this time.  Patient modify diet regarding prediabetic state.  Patient will add exercise and try to lose weight appropriately.  Pamphlet given.  Refills will be given when needed.  Follow-up in 6 months       Diagnoses and all orders for this visit:    Acquired hypothyroidism    Essential hypertension    Screening for diabetes mellitus    Prediabetes            Subjective:        Patient ID: Fuad Fierro Jr. is a 68 y.o. male.      Patient here to follow-up on hypertension and hypothyroidism.  Patient in normal state of health at this time.  No new chest pain shortness of breath or difficulty with urination or defecation.  No other new complaints at present time.  Patient did have laboratory studies done.          The following portions of the patient's history were reviewed and updated as appropriate: allergies, current medications, past family history, past medical history, past social history, past surgical history and problem list.      Review of Systems   Constitutional: Negative.    HENT: Negative.     Eyes: Negative.    Respiratory: Negative.     Cardiovascular: Negative.    Gastrointestinal: Negative.    Endocrine: Negative.    Genitourinary: Negative.    Musculoskeletal: Negative.    Skin: Negative.    Allergic/Immunologic: Negative.    Neurological: Negative.    Hematological: Negative.    Psychiatric/Behavioral: Negative.             Objective:        Depression Screening and Follow-up Plan: Patient was screened for depression during today's encounter. They screened negative with a PHQ-2 score of 0.            /88 (BP Location: Right arm, Patient Position: Sitting, Cuff Size: Large)   Pulse 63   Temp 97.6 °F (36.4 °C) (Tympanic)   Ht 6' 3\" (1.905 m)   Wt (!) 147 kg (325 lb)   SpO2 96%   BMI 40.62 kg/m²          Physical Exam  Vitals and nursing note reviewed.   Constitutional:       General: He is not in acute distress.     " Appearance: Normal appearance. He is not ill-appearing, toxic-appearing or diaphoretic.   HENT:      Head: Normocephalic and atraumatic.      Right Ear: Tympanic membrane, ear canal and external ear normal. There is no impacted cerumen.      Left Ear: Tympanic membrane, ear canal and external ear normal. There is no impacted cerumen.      Nose: Nose normal. No congestion or rhinorrhea.      Mouth/Throat:      Mouth: Mucous membranes are moist.      Pharynx: No oropharyngeal exudate or posterior oropharyngeal erythema.   Eyes:      General: No scleral icterus.        Right eye: No discharge.         Left eye: No discharge.   Neck:      Vascular: No carotid bruit.   Cardiovascular:      Rate and Rhythm: Normal rate and regular rhythm.      Pulses: Normal pulses.      Heart sounds: Normal heart sounds. No murmur heard.     No friction rub. No gallop.   Pulmonary:      Effort: Pulmonary effort is normal. No respiratory distress.      Breath sounds: Normal breath sounds. No stridor. No wheezing, rhonchi or rales.   Chest:      Chest wall: No tenderness.   Musculoskeletal:         General: No swelling, tenderness, deformity or signs of injury. Normal range of motion.      Cervical back: Normal range of motion and neck supple. No rigidity. No muscular tenderness.      Right lower leg: No edema.      Left lower leg: No edema.   Lymphadenopathy:      Cervical: No cervical adenopathy.   Skin:     General: Skin is warm and dry.      Capillary Refill: Capillary refill takes less than 2 seconds.      Coloration: Skin is not jaundiced.      Findings: No bruising, erythema, lesion or rash.   Neurological:      Mental Status: He is alert and oriented to person, place, and time. Mental status is at baseline.      Cranial Nerves: No cranial nerve deficit.      Sensory: No sensory deficit.      Motor: No weakness.      Coordination: Coordination normal.      Gait: Gait normal.   Psychiatric:         Mood and Affect: Mood normal.          Behavior: Behavior normal.         Thought Content: Thought content normal.         Judgment: Judgment normal.

## 2024-05-20 ENCOUNTER — TELEPHONE (OUTPATIENT)
Dept: FAMILY MEDICINE CLINIC | Facility: CLINIC | Age: 69
End: 2024-05-20

## 2024-05-20 DIAGNOSIS — N20.0 CALCULUS OF KIDNEY: ICD-10-CM

## 2024-05-20 DIAGNOSIS — E03.9 HYPOTHYROIDISM, UNSPECIFIED TYPE: ICD-10-CM

## 2024-05-20 DIAGNOSIS — K21.9 GASTROESOPHAGEAL REFLUX DISEASE WITHOUT ESOPHAGITIS: Primary | ICD-10-CM

## 2024-05-20 RX ORDER — LEVOTHYROXINE SODIUM 0.05 MG/1
50 TABLET ORAL DAILY
Qty: 90 TABLET | Refills: 0 | Status: SHIPPED | OUTPATIENT
Start: 2024-05-20

## 2024-05-20 RX ORDER — ALLOPURINOL 300 MG/1
300 TABLET ORAL DAILY
Qty: 90 TABLET | Refills: 0 | Status: SHIPPED | OUTPATIENT
Start: 2024-05-20

## 2024-05-20 RX ORDER — DICYCLOMINE HYDROCHLORIDE 10 MG/1
CAPSULE ORAL
Qty: 60 CAPSULE | Refills: 5 | Status: SHIPPED | OUTPATIENT
Start: 2024-05-20

## 2024-05-20 RX ORDER — LEVOTHYROXINE SODIUM 0.2 MG/1
200 TABLET ORAL DAILY
Qty: 90 TABLET | Refills: 0 | Status: SHIPPED | OUTPATIENT
Start: 2024-05-20

## 2024-06-07 ENCOUNTER — OFFICE VISIT (OUTPATIENT)
Dept: FAMILY MEDICINE CLINIC | Facility: CLINIC | Age: 69
End: 2024-06-07
Payer: MEDICARE

## 2024-06-07 VITALS
DIASTOLIC BLOOD PRESSURE: 72 MMHG | BODY MASS INDEX: 39.17 KG/M2 | SYSTOLIC BLOOD PRESSURE: 146 MMHG | HEIGHT: 75 IN | TEMPERATURE: 97.2 F | HEART RATE: 85 BPM | WEIGHT: 315 LBS | OXYGEN SATURATION: 95 %

## 2024-06-07 DIAGNOSIS — J01.00 ACUTE NON-RECURRENT MAXILLARY SINUSITIS: ICD-10-CM

## 2024-06-07 DIAGNOSIS — H61.23 BILATERAL IMPACTED CERUMEN: Primary | ICD-10-CM

## 2024-06-07 PROCEDURE — 99214 OFFICE O/P EST MOD 30 MIN: CPT | Performed by: FAMILY MEDICINE

## 2024-06-07 PROCEDURE — G2211 COMPLEX E/M VISIT ADD ON: HCPCS | Performed by: FAMILY MEDICINE

## 2024-06-07 RX ORDER — AMOXICILLIN 500 MG/1
1000 CAPSULE ORAL EVERY 12 HOURS SCHEDULED
Qty: 28 CAPSULE | Refills: 0 | Status: SHIPPED | OUTPATIENT
Start: 2024-06-07 | End: 2024-06-14

## 2024-06-07 NOTE — PROGRESS NOTES
"Assessment/Plan: Bilateral cerumen impaction removed with irrigation and cerumen loop.  Patient will use amoxicillin as directed for sinusitis and bronchitis.  Follow-up as needed/per routine.       Diagnoses and all orders for this visit:    Bilateral impacted cerumen    Acute non-recurrent maxillary sinusitis  -     amoxicillin (AMOXIL) 500 mg capsule; Take 2 capsules (1,000 mg total) by mouth every 12 (twelve) hours for 7 days            Subjective:        Patient ID: Fuad Fierro Jr. is a 69 y.o. male.      Patient's ear is clogged.  Patient to use Debrox and Mucinex and peroxide.  Patient also some cough and sputum production and postnasal drip and sinus issues.  Some going on for roughly 1 week.  Patient also use DayQuil and NyQuil.  No fever noted.          The following portions of the patient's history were reviewed and updated as appropriate: allergies, current medications, past family history, past medical history, past social history, past surgical history and problem list.      Review of Systems   Constitutional: Negative.  Negative for fever.   HENT:  Positive for congestion, ear pain, postnasal drip, rhinorrhea, sinus pressure and sinus pain.    Eyes: Negative.    Respiratory:  Positive for cough.    Cardiovascular: Negative.    Gastrointestinal: Negative.    Endocrine: Negative.    Genitourinary: Negative.    Musculoskeletal: Negative.    Skin: Negative.    Allergic/Immunologic: Negative.    Neurological: Negative.    Hematological: Negative.    Psychiatric/Behavioral: Negative.             Objective:        Depression Screening and Follow-up Plan: Patient was screened for depression during today's encounter. They screened negative with a PHQ-2 score of 2.            /72 (BP Location: Right arm, Patient Position: Sitting, Cuff Size: Standard)   Pulse 85   Temp (!) 97.2 °F (36.2 °C) (Temporal)   Ht 6' 3\" (1.905 m)   Wt (!) 145 kg (319 lb)   SpO2 95%   BMI 39.87 kg/m²          Physical " Exam  Vitals and nursing note reviewed.   Constitutional:       General: He is not in acute distress.     Appearance: Normal appearance. He is not ill-appearing, toxic-appearing or diaphoretic.   HENT:      Head: Normocephalic and atraumatic.      Right Ear: Tympanic membrane, ear canal and external ear normal. There is impacted cerumen.      Left Ear: Tympanic membrane, ear canal and external ear normal. There is impacted cerumen.      Nose: Nose normal. No congestion or rhinorrhea.      Mouth/Throat:      Mouth: Mucous membranes are moist.      Pharynx: Oropharyngeal exudate present. No posterior oropharyngeal erythema.   Eyes:      General: No scleral icterus.        Right eye: No discharge.         Left eye: No discharge.   Neck:      Vascular: No carotid bruit.   Cardiovascular:      Rate and Rhythm: Normal rate and regular rhythm.      Pulses: Normal pulses.      Heart sounds: Normal heart sounds. No murmur heard.     No friction rub. No gallop.   Pulmonary:      Effort: Pulmonary effort is normal. No respiratory distress.      Breath sounds: Normal breath sounds. No stridor. No wheezing, rhonchi or rales.   Chest:      Chest wall: No tenderness.   Musculoskeletal:         General: No swelling, tenderness, deformity or signs of injury. Normal range of motion.      Cervical back: Normal range of motion and neck supple. No rigidity. No muscular tenderness.      Right lower leg: No edema.      Left lower leg: No edema.   Lymphadenopathy:      Cervical: No cervical adenopathy.   Skin:     General: Skin is warm and dry.      Capillary Refill: Capillary refill takes less than 2 seconds.      Coloration: Skin is not jaundiced.      Findings: No bruising, erythema, lesion or rash.   Neurological:      Mental Status: He is alert and oriented to person, place, and time. Mental status is at baseline.      Cranial Nerves: No cranial nerve deficit.      Sensory: No sensory deficit.      Motor: No weakness.       Coordination: Coordination normal.      Gait: Gait normal.   Psychiatric:         Mood and Affect: Mood normal.         Behavior: Behavior normal.         Thought Content: Thought content normal.         Judgment: Judgment normal.

## 2024-06-19 ENCOUNTER — OFFICE VISIT (OUTPATIENT)
Dept: FAMILY MEDICINE CLINIC | Facility: CLINIC | Age: 69
End: 2024-06-19
Payer: COMMERCIAL

## 2024-06-19 VITALS
OXYGEN SATURATION: 95 % | HEART RATE: 64 BPM | TEMPERATURE: 98.2 F | SYSTOLIC BLOOD PRESSURE: 144 MMHG | HEIGHT: 74 IN | DIASTOLIC BLOOD PRESSURE: 90 MMHG | BODY MASS INDEX: 40.43 KG/M2 | WEIGHT: 315 LBS

## 2024-06-19 DIAGNOSIS — S20.312A ABRASION OF LEFT CHEST WALL WITH INFECTION, INITIAL ENCOUNTER: ICD-10-CM

## 2024-06-19 DIAGNOSIS — G89.29 CHRONIC PAIN OF LEFT THUMB: ICD-10-CM

## 2024-06-19 DIAGNOSIS — R07.81 RIB PAIN ON RIGHT SIDE: Primary | ICD-10-CM
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
## 2024-06-19 DIAGNOSIS — L08.9 ABRASION OF LEFT CHEST WALL WITH INFECTION, INITIAL ENCOUNTER: ICD-10-CM

## 2024-06-19 DIAGNOSIS — M79.645 CHRONIC PAIN OF LEFT THUMB: ICD-10-CM

## 2024-06-19 DIAGNOSIS — S60.511A ABRASION OF RIGHT HAND, INITIAL ENCOUNTER: ICD-10-CM

## 2024-06-19 DIAGNOSIS — G89.29 CHRONIC PAIN OF RIGHT THUMB: ICD-10-CM

## 2024-06-19 DIAGNOSIS — M79.644 CHRONIC PAIN OF RIGHT THUMB: ICD-10-CM

## 2024-06-19 PROCEDURE — 99214 OFFICE O/P EST MOD 30 MIN: CPT | Performed by: FAMILY MEDICINE
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
## 2024-06-27 DIAGNOSIS — Z12.5 PROSTATE CANCER SCREENING: Primary | ICD-10-CM

## 2024-07-01 ENCOUNTER — TELEPHONE (OUTPATIENT)
Age: 69
End: 2024-07-01

## 2024-07-01 DIAGNOSIS — N40.1 BENIGN PROSTATIC HYPERPLASIA WITH LOWER URINARY TRACT SYMPTOMS, SYMPTOM DETAILS UNSPECIFIED: Primary | ICD-10-CM

## 2024-07-01 DIAGNOSIS — Z12.5 PROSTATE CANCER SCREENING: ICD-10-CM

## 2024-07-01 NOTE — TELEPHONE ENCOUNTER
Patient spouse Marlo called stating that the patient has appt on 7/3 at 1:15 PM and would like to have PSA drawn tomorrow.    Please place PSA order.    Call back if needed [phone]

## 2024-07-02 ENCOUNTER — APPOINTMENT (OUTPATIENT)
Dept: LAB | Facility: MEDICAL CENTER | Age: 69
End: 2024-07-02
Payer: MEDICARE

## 2024-07-02 DIAGNOSIS — N40.1 BENIGN PROSTATIC HYPERPLASIA WITH LOWER URINARY TRACT SYMPTOMS, SYMPTOM DETAILS UNSPECIFIED: ICD-10-CM

## 2024-07-02 DIAGNOSIS — Z12.5 PROSTATE CANCER SCREENING: ICD-10-CM

## 2024-07-02 LAB — PSA SERPL-MCNC: 1.13 NG/ML (ref 0–4)

## 2024-07-02 PROCEDURE — 84153 ASSAY OF PSA TOTAL: CPT

## 2024-07-02 PROCEDURE — 36415 COLL VENOUS BLD VENIPUNCTURE: CPT

## 2024-07-03 ENCOUNTER — TELEPHONE (OUTPATIENT)
Dept: UROLOGY | Facility: MEDICAL CENTER | Age: 69
End: 2024-07-03

## 2024-07-03 ENCOUNTER — OFFICE VISIT (OUTPATIENT)
Dept: UROLOGY | Facility: MEDICAL CENTER | Age: 69
End: 2024-07-03
Payer: MEDICARE

## 2024-07-03 VITALS
BODY MASS INDEX: 26.31 KG/M2 | HEIGHT: 74 IN | OXYGEN SATURATION: 96 % | HEART RATE: 67 BPM | SYSTOLIC BLOOD PRESSURE: 138 MMHG | DIASTOLIC BLOOD PRESSURE: 92 MMHG | WEIGHT: 205 LBS

## 2024-07-03 DIAGNOSIS — Z12.5 PROSTATE CANCER SCREENING: ICD-10-CM

## 2024-07-03 DIAGNOSIS — R39.15 URINARY URGENCY: ICD-10-CM

## 2024-07-03 DIAGNOSIS — N40.1 BENIGN PROSTATIC HYPERPLASIA WITH LOWER URINARY TRACT SYMPTOMS, SYMPTOM DETAILS UNSPECIFIED: Primary | ICD-10-CM

## 2024-07-03 DIAGNOSIS — R73.01 ELEVATED FASTING BLOOD SUGAR: ICD-10-CM

## 2024-07-03 LAB — POST-VOID RESIDUAL VOLUME, ML POC: 15 ML

## 2024-07-03 PROCEDURE — 99214 OFFICE O/P EST MOD 30 MIN: CPT | Performed by: UROLOGY

## 2024-07-03 PROCEDURE — 51798 US URINE CAPACITY MEASURE: CPT | Performed by: UROLOGY

## 2024-07-03 NOTE — TELEPHONE ENCOUNTER
Dr. Simms wants this patient in with him for a 4week Cystoscopy and he has nothing available.  Could you please assist in scheduling this patient.

## 2024-07-03 NOTE — PROGRESS NOTES
"Ambulatory Visit  Name: Fuad Fierro Jr.      : 1955      MRN: [id number]  Encounter Provider: Jayden Simms MD  Encounter Date: 7/3/2024   Encounter department: [facility] UROLOGY [place]    Assessment & Plan   1. Benign prostatic hyperplasia with lower urinary tract symptoms, symptom details unspecified  Comments:  Status post TURP  for benign disease only 8 g of tissue-PVR 15 cc-plan cystoscopy to reevaluate bladder outlet  Orders:  -     POCT Measure PVR  2. Urinary urgency  Comments:  Rare urinary urgency and occasional urge incontinence not prevalent enough for the patient to request medication.  Plan cystoscopy.  PVR low  3. Prostate cancer screening  Comments:  CYNTHIA and PSA not suspicious for malignancy  4. Elevated fasting blood sugar  Comments:  Ordered hemoglobin A1c  Orders:  -     Hemoglobin A1C      History of Present Illness     Fuad Fierro Jr. is a 69 y.o. male who presents with a history of TURP in .  Only 8 g of benign tissue were resected.  Postoperatively the patient voided quite well without obstructive or irritative symptomatology.  The patient now notes that occasionally he will get the urge to void and only a small amount of urine comes out.  Recent PVR was 15 cc.  The patient does note some urinary urgency rare urgency incontinence and nocturia.  He notes occasionally has a good urinary flow but this is variable as well.  He presents for evaluation.  PSA is well within normal limits    Review of Systems   Genitourinary:  Positive for frequency and urgency.        AUA symptom score 24   Musculoskeletal:  Positive for myalgias.   All other systems reviewed and are negative.    Pertinent Medical History           Medical History Reviewed by provider this encounter:  Tobacco  Allergies  Meds  Problems  Med Hx  Surg Hx  Fam Hx  Soc   Hx      Past Medical History   Past Medical History:   Diagnosis Date    Anesthesia     \"cant have spinal anesthesia " "because of curvature of the spine and severe pain\"    Anxiety     Arthritis     Athlete's foot     off and on    Benign polyp of large intestine     Cancer (HCC)     thyroid,     Chronic pain disorder     bilat knees    Colon polyp     Curvature of spine     DDD (degenerative disc disease), lumbar     Dental crowns present     Depression     Disease of thyroid gland     removed    Dizziness     Dyslipidemia     Enlarged prostate     Exercises daily     \"walking/biking/lifts weights\"    GERD (gastroesophageal reflux disease)     Hiatal hernia     History of radiation therapy     \"iodine pill for thyroid cancer\"    Hypertension     IBS (irritable bowel syndrome)     Kidney stone     still has on the left, and small one on right\"    Neuropathy     Numbness and tingling of both feet     Obesity     Retinal hole or tear, bilateral     had laser surgery Dr Caceres/ 3-4 yrs ago    S/P TURP (status post transurethral resection of prostate)     Scoliosis     Seasonal allergies     Teeth missing     Tension headache     occas    Tinea pedis     Urinary frequency     Urinary urgency     Wears glasses      Past Surgical History:   Procedure Laterality Date    CHOLECYSTECTOMY      COLONOSCOPY      FL RETROGRADE URETHROCYSTOGRAM  02/28/2020    HERNIA REPAIR      KNEE ARTHROSCOPY Bilateral     knee tim    TN COLONOSCOPY FLX DX W/COLLJ SPEC WHEN PFRMD N/A 07/25/2018    Procedure: COLONOSCOPY;  Surgeon: Alvarez Mayberry DO;  Location:  GI LAB;  Service: General    TN CYSTO/URETERO W/LITHOTRIPSY &INDWELL STENT INSRT Left 02/28/2020    Procedure: CYSTOSCOPY URETEROSCOP RETROGRADE PYELOGRAM AND INSERTION STENT URETERAL;  Surgeon: Jayden Simms MD;  [facility];  Service: Urology    TN CYSTO/URETERO W/LITHOTRIPSY &INDWELL STENT INSRT Left 03/25/2020    Procedure: CYSTOSCOPY URETEROSCOPIC STONE EXTRACTION, RETROGRADE PYELOGRAM AND INSERTION STENT URETERAL;  Surgeon: Jayden Simms MD;  [facility];  Service: " Urology    MI LITHOTRIPSY XTRCORP SHOCK WAVE Left 07/27/2017    Procedure: LITHROTRIPSY EXTRACORPORAL SHOCKWAVE (ESWL);  Surgeon: Crispin Romero DO;  [facility];  Service: Urology    MI RPR 1ST INGUN HRNA AGE 5 YRS/> REDUCIBLE Right 07/23/2019    Procedure: REPAIR INGUINAL HERNIA  DIRECT NO MESH;  Surgeon: Alvarez Mayberry DO;  Location:  MAIN OR;  Service: General    MI TRURL ELECTROSURG RESCJ PROSTATE BLEED COMPLETE N/A 04/06/2021    Procedure: TRANSURETHRAL RESECTION OF PROSTATE (TURP);  Surgeon: Jayden Simms MD;  [facility];  Service: Urology    PROSTATE SURGERY      RETINAL LASER PROCEDURE      ROTATOR CUFF REPAIR Left     THYROIDECTOMY      Subtotal and Total Thyroidectomy both mentioned in allscripts    WISDOM TOOTH EXTRACTION       Family History   Problem Relation Age of Onset    Diabetes Mother     Heart disease Father     Breast cancer Sister     Depression Son     Cancer Sister      Current Outpatient Medications on File Prior to Visit   Medication Sig Dispense Refill    acetaminophen (TYLENOL) 325 mg tablet Take 650 mg by mouth every 6 (six) hours as needed for mild pain      allopurinol (ZYLOPRIM) 300 mg tablet TAKE 1 TABLET BY MOUTH DAILY 90 tablet 0    benzonatate (TESSALON) 200 MG capsule Take 1 capsule (200 mg total) by mouth 3 (three) times a day as needed for cough 20 capsule 0    betamethasone, augmented, (DIPROLENE-AF) 0.05 % cream Apply topically 2 (two) times a day 15 g 0    clotrimazole-betamethasone (LOTRISONE) 1-0.05 % cream Apply topically 2 (two) times a day 30 g 2    dicyclomine (BENTYL) 10 mg capsule Use 1 pill twice daily before meals as needed 60 capsule 5    levothyroxine 200 mcg tablet TAKE 1 TABLET BY MOUTH DAILY 90 tablet 0    levothyroxine 50 mcg tablet TAKE 1 TABLET BY MOUTH DAILY 90 tablet 0    Multiple Vitamin (MULTIVITAMIN) tablet Take 1 tablet by mouth daily      Omega-3 Fatty Acids (OMEGA-3 2100 PO) Take by mouth      omeprazole (PriLOSEC) 20 mg  delayed release capsule TAKE 1 CAPSULE DAILY 90 capsule 3    vitamin E, tocopherol, 400 units capsule Take 400 Units by mouth daily      [DISCONTINUED] gabapentin (Neurontin) 300 mg capsule Take 1 capsule (300 mg total) by mouth daily at bedtime (Patient not taking: Reported on 6/7/2024) 30 capsule 1     No current facility-administered medications on file prior to visit.   No Known Allergies   Current Outpatient Medications on File Prior to Visit   Medication Sig Dispense Refill    acetaminophen (TYLENOL) 325 mg tablet Take 650 mg by mouth every 6 (six) hours as needed for mild pain      allopurinol (ZYLOPRIM) 300 mg tablet TAKE 1 TABLET BY MOUTH DAILY 90 tablet 0    benzonatate (TESSALON) 200 MG capsule Take 1 capsule (200 mg total) by mouth 3 (three) times a day as needed for cough 20 capsule 0    betamethasone, augmented, (DIPROLENE-AF) 0.05 % cream Apply topically 2 (two) times a day 15 g 0    clotrimazole-betamethasone (LOTRISONE) 1-0.05 % cream Apply topically 2 (two) times a day 30 g 2    dicyclomine (BENTYL) 10 mg capsule Use 1 pill twice daily before meals as needed 60 capsule 5    levothyroxine 200 mcg tablet TAKE 1 TABLET BY MOUTH DAILY 90 tablet 0    levothyroxine 50 mcg tablet TAKE 1 TABLET BY MOUTH DAILY 90 tablet 0    Multiple Vitamin (MULTIVITAMIN) tablet Take 1 tablet by mouth daily      Omega-3 Fatty Acids (OMEGA-3 2100 PO) Take by mouth      omeprazole (PriLOSEC) 20 mg delayed release capsule TAKE 1 CAPSULE DAILY 90 capsule 3    vitamin E, tocopherol, 400 units capsule Take 400 Units by mouth daily      [DISCONTINUED] gabapentin (Neurontin) 300 mg capsule Take 1 capsule (300 mg total) by mouth daily at bedtime (Patient not taking: Reported on 6/7/2024) 30 capsule 1     No current facility-administered medications on file prior to visit.      Social History     Tobacco Use    Smoking status: Former     Current packs/day: 0.00     Average packs/day: 1 pack/day for 40.0 years (40.0 ttl pk-yrs)      "Types: Cigarettes     Start date: 6/20/1974     Quit date: 6/20/2014     Years since quitting: 10.0    Smokeless tobacco: Never   Vaping Use    Vaping status: Never Used   Substance and Sexual Activity    Alcohol use: Yes     Alcohol/week: 2.0 standard drinks of alcohol     Types: 1 Glasses of wine, 1 Cans of beer per week     Comment: very rarely    Drug use: No    Sexual activity: Not Currently     AUA SYMPTOM SCORE      Flowsheet Row Most Recent Value   AUA SYMPTOM SCORE    How often have you had a sensation of not emptying your bladder completely after you finished urinating? 3   How often have you had to urinate again less than two hours after you finished urinating? 3   How often have you found you stopped and started again several times when you urinate? 3   How often have you found it difficult to postpone urination? 3   How often have you had a weak urinary stream? 4   How often have you had to push or strain to begin urination? 3   How many times did you most typically get up to urinate from the time you went to bed at night until the time you got up in the morning? 5   Quality of Life: If you were to spend the rest of your life with your urinary condition just the way it is now, how would you feel about that? 4   AUA SYMPTOM SCORE 24          Objective     /92 (BP Location: Left arm, Patient Position: Sitting, Cuff Size: Adult)   Pulse 67   Ht 6' 2\" (1.88 m)   Wt 93 kg (205 lb)   SpO2 96%   BMI 26.32 kg/m²   Physical Exam  Vitals reviewed.   Constitutional:       General: He is not in acute distress.     Appearance: Normal appearance. He is not ill-appearing, toxic-appearing or diaphoretic.   HENT:      Head: Normocephalic and atraumatic.      Nose: Nose normal.   Eyes:      Extraocular Movements: Extraocular movements intact.   Pulmonary:      Effort: Pulmonary effort is normal. No respiratory distress.   Abdominal:      General: There is no distension.      Palpations: Abdomen is soft. "   Genitourinary:     Penis: Normal.       Testes: Normal.      Rectum: Normal.      Comments: CYNTHIA 40 g a nodular nontender prostate  Musculoskeletal:      Cervical back: Neck supple.   Skin:     General: Skin is dry.   Neurological:      Mental Status: He is alert and oriented to person, place, and time.   Psychiatric:         Mood and Affect: Mood normal.         Behavior: Behavior normal.         Thought Content: Thought content normal.         Judgment: Judgment normal.       Results  Lab Results   Component Value Date    PSA 1.128 07/02/2024    PSA 0.90 09/06/2023    PSA 1.0 02/23/2023     Lab Results   Component Value Date    GLUCOSE 91 06/27/2015    CALCIUM 9.1 03/06/2024     06/27/2015    K 4.6 03/06/2024    CO2 25 03/06/2024     03/06/2024    BUN 20 03/06/2024    CREATININE 0.95 03/06/2024     Lab Results   Component Value Date    WBC 7.15 03/06/2024    HGB 17.2 (H) 03/06/2024    HCT 51.4 (H) 03/06/2024    MCV 92 03/06/2024     03/06/2024       Office Urine Dip  Recent Results (from the past 1 hour(s))   POCT Measure PVR    Collection Time: 07/03/24  1:16 PM   Result Value Ref Range    POST-VOID RESIDUAL VOLUME, ML POC 15 mL   ]    Administrative Statements

## 2024-07-03 NOTE — LETTER
July 3, 2024     Mynor Slaughter DO  [address] [facility] [address]    Patient: Fuad Fierro Jr.   YOB: 1955   Date of Visit: 7/3/2024       Dear Dr. Slaughter:    Thank you for referring Fuad Fierro to me for evaluation. Below are my notes for this consultation.    If you have questions, please do not hesitate to call me. I look forward to following your patient along with you.         Sincerely,        Jayden Simms MD        CC: No Recipients    Jayden Simms MD  7/3/2024  2:00 PM  Sign when Signing Visit  Ambulatory Visit  Name: Fuad Fierro Jr.      : 1955      MRN: [id number]  Encounter Provider: Jayden Simms MD  Encounter Date: 7/3/2024   Encounter department: [facility] UROLOGY [place]    Assessment & Plan  1. Benign prostatic hyperplasia with lower urinary tract symptoms, symptom details unspecified  Comments:  Status post TURP  for benign disease only 8 g of tissue-PVR 15 cc-plan cystoscopy to reevaluate bladder outlet  Orders:  -     POCT Measure PVR  2. Urinary urgency  Comments:  Rare urinary urgency and occasional urge incontinence not prevalent enough for the patient to request medication.  Plan cystoscopy.  PVR low  3. Prostate cancer screening  Comments:  CYNTHIA and PSA not suspicious for malignancy  4. Elevated fasting blood sugar  Comments:  Ordered hemoglobin A1c  Orders:  -     Hemoglobin A1C      History of Present Illness    Fuad Fierro Jr. is a 69 y.o. male who presents with a history of TURP in .  Only 8 g of benign tissue were resected.  Postoperatively the patient voided quite well without obstructive or irritative symptomatology.  The patient now notes that occasionally he will get the urge to void and only a small amount of urine comes out.  Recent PVR was 15 cc.  The patient does note some urinary urgency rare urgency incontinence and nocturia.  He notes occasionally has a good urinary flow but this is variable as well.  " He presents for evaluation.  PSA is well within normal limits    Review of Systems   Genitourinary:  Positive for frequency and urgency.        AUA symptom score 24   Musculoskeletal:  Positive for myalgias.   All other systems reviewed and are negative.    Pertinent Medical History          Medical History Reviewed by provider this encounter:  Tobacco  Allergies  Meds  Problems  Med Hx  Surg Hx  Fam Hx  Soc   Hx      Past Medical History  Past Medical History:   Diagnosis Date   • Anesthesia     \"cant have spinal anesthesia because of curvature of the spine and severe pain\"   • Anxiety    • Arthritis    • Athlete's foot     off and on   • Benign polyp of large intestine    • Cancer (HCC)     thyroid,    • Chronic pain disorder     bilat knees   • Colon polyp    • Curvature of spine    • DDD (degenerative disc disease), lumbar    • Dental crowns present    • Depression    • Disease of thyroid gland     removed   • Dizziness    • Dyslipidemia    • Enlarged prostate    • Exercises daily     \"walking/biking/lifts weights\"   • GERD (gastroesophageal reflux disease)    • Hiatal hernia    • History of radiation therapy     \"iodine pill for thyroid cancer\"   • Hypertension    • IBS (irritable bowel syndrome)    • Kidney stone     still has on the left, and small one on right\"   • Neuropathy    • Numbness and tingling of both feet    • Obesity    • Retinal hole or tear, bilateral     had laser surgery Dr Caceres/ 3-4 yrs ago   • S/P TURP (status post transurethral resection of prostate)    • Scoliosis    • Seasonal allergies    • Teeth missing    • Tension headache     occas   • Tinea pedis    • Urinary frequency    • Urinary urgency    • Wears glasses      Past Surgical History:   Procedure Laterality Date   • CHOLECYSTECTOMY     • COLONOSCOPY     • FL RETROGRADE URETHROCYSTOGRAM  02/28/2020   • HERNIA REPAIR     • KNEE ARTHROSCOPY Bilateral     knee tim   • NM COLONOSCOPY FLX DX W/COLLJ SPEC WHEN PFRMD N/A " 07/25/2018    Procedure: COLONOSCOPY;  Surgeon: Alvarez Mayberry DO;  Location:  GI LAB;  Service: General   • AR CYSTO/URETERO W/LITHOTRIPSY &INDWELL STENT INSRT Left 02/28/2020    Procedure: CYSTOSCOPY URETEROSCOP RETROGRADE PYELOGRAM AND INSERTION STENT URETERAL;  Surgeon: Jayden Simms MD;  [facility];  Service: Urology   • AR CYSTO/URETERO W/LITHOTRIPSY &INDWELL STENT INSRT Left 03/25/2020    Procedure: CYSTOSCOPY URETEROSCOPIC STONE EXTRACTION, RETROGRADE PYELOGRAM AND INSERTION STENT URETERAL;  Surgeon: Jayden Simms MD;  [facility];  Service: Urology   • AR LITHOTRIPSY XTRCORP SHOCK WAVE Left 07/27/2017    Procedure: LITHROTRIPSY EXTRACORPORAL SHOCKWAVE (ESWL);  Surgeon: Crispin Romero DO;  [facility];  Service: Urology   • AR RPR 1ST INGUN HRNA AGE 5 YRS/> REDUCIBLE Right 07/23/2019    Procedure: REPAIR INGUINAL HERNIA  DIRECT NO MESH;  Surgeon: Alvarez Mayberry DO;  Location:  MAIN OR;  Service: General   • AR TRURL ELECTROSURG RESCJ PROSTATE BLEED COMPLETE N/A 04/06/2021    Procedure: TRANSURETHRAL RESECTION OF PROSTATE (TURP);  Surgeon: Jayden Simms MD;  [facility];  Service: Urology   • PROSTATE SURGERY     • RETINAL LASER PROCEDURE     • ROTATOR CUFF REPAIR Left    • THYROIDECTOMY      Subtotal and Total Thyroidectomy both mentioned in allscripts   • WISDOM TOOTH EXTRACTION       Family History   Problem Relation Age of Onset   • Diabetes Mother    • Heart disease Father    • Breast cancer Sister    • Depression Son    • Cancer Sister      Current Outpatient Medications on File Prior to Visit   Medication Sig Dispense Refill   • acetaminophen (TYLENOL) 325 mg tablet Take 650 mg by mouth every 6 (six) hours as needed for mild pain     • allopurinol (ZYLOPRIM) 300 mg tablet TAKE 1 TABLET BY MOUTH DAILY 90 tablet 0   • benzonatate (TESSALON) 200 MG capsule Take 1 capsule (200 mg total) by mouth 3 (three) times a day as needed for cough 20 capsule 0   •  betamethasone, augmented, (DIPROLENE-AF) 0.05 % cream Apply topically 2 (two) times a day 15 g 0   • clotrimazole-betamethasone (LOTRISONE) 1-0.05 % cream Apply topically 2 (two) times a day 30 g 2   • dicyclomine (BENTYL) 10 mg capsule Use 1 pill twice daily before meals as needed 60 capsule 5   • levothyroxine 200 mcg tablet TAKE 1 TABLET BY MOUTH DAILY 90 tablet 0   • levothyroxine 50 mcg tablet TAKE 1 TABLET BY MOUTH DAILY 90 tablet 0   • Multiple Vitamin (MULTIVITAMIN) tablet Take 1 tablet by mouth daily     • Omega-3 Fatty Acids (OMEGA-3 2100 PO) Take by mouth     • omeprazole (PriLOSEC) 20 mg delayed release capsule TAKE 1 CAPSULE DAILY 90 capsule 3   • vitamin E, tocopherol, 400 units capsule Take 400 Units by mouth daily     • [DISCONTINUED] gabapentin (Neurontin) 300 mg capsule Take 1 capsule (300 mg total) by mouth daily at bedtime (Patient not taking: Reported on 6/7/2024) 30 capsule 1     No current facility-administered medications on file prior to visit.   No Known Allergies   Current Outpatient Medications on File Prior to Visit   Medication Sig Dispense Refill   • acetaminophen (TYLENOL) 325 mg tablet Take 650 mg by mouth every 6 (six) hours as needed for mild pain     • allopurinol (ZYLOPRIM) 300 mg tablet TAKE 1 TABLET BY MOUTH DAILY 90 tablet 0   • benzonatate (TESSALON) 200 MG capsule Take 1 capsule (200 mg total) by mouth 3 (three) times a day as needed for cough 20 capsule 0   • betamethasone, augmented, (DIPROLENE-AF) 0.05 % cream Apply topically 2 (two) times a day 15 g 0   • clotrimazole-betamethasone (LOTRISONE) 1-0.05 % cream Apply topically 2 (two) times a day 30 g 2   • dicyclomine (BENTYL) 10 mg capsule Use 1 pill twice daily before meals as needed 60 capsule 5   • levothyroxine 200 mcg tablet TAKE 1 TABLET BY MOUTH DAILY 90 tablet 0   • levothyroxine 50 mcg tablet TAKE 1 TABLET BY MOUTH DAILY 90 tablet 0   • Multiple Vitamin (MULTIVITAMIN) tablet Take 1 tablet by mouth daily     •  Omega-3 Fatty Acids (OMEGA-3 2100 PO) Take by mouth     • omeprazole (PriLOSEC) 20 mg delayed release capsule TAKE 1 CAPSULE DAILY 90 capsule 3   • vitamin E, tocopherol, 400 units capsule Take 400 Units by mouth daily     • [DISCONTINUED] gabapentin (Neurontin) 300 mg capsule Take 1 capsule (300 mg total) by mouth daily at bedtime (Patient not taking: Reported on 6/7/2024) 30 capsule 1     No current facility-administered medications on file prior to visit.      Social History     Tobacco Use   • Smoking status: Former     Current packs/day: 0.00     Average packs/day: 1 pack/day for 40.0 years (40.0 ttl pk-yrs)     Types: Cigarettes     Start date: 6/20/1974     Quit date: 6/20/2014     Years since quitting: 10.0   • Smokeless tobacco: Never   Vaping Use   • Vaping status: Never Used   Substance and Sexual Activity   • Alcohol use: Yes     Alcohol/week: 2.0 standard drinks of alcohol     Types: 1 Glasses of wine, 1 Cans of beer per week     Comment: very rarely   • Drug use: No   • Sexual activity: Not Currently     AUA SYMPTOM SCORE      Flowsheet Row Most Recent Value   AUA SYMPTOM SCORE    How often have you had a sensation of not emptying your bladder completely after you finished urinating? 3   How often have you had to urinate again less than two hours after you finished urinating? 3   How often have you found you stopped and started again several times when you urinate? 3   How often have you found it difficult to postpone urination? 3   How often have you had a weak urinary stream? 4   How often have you had to push or strain to begin urination? 3   How many times did you most typically get up to urinate from the time you went to bed at night until the time you got up in the morning? 5   Quality of Life: If you were to spend the rest of your life with your urinary condition just the way it is now, how would you feel about that? 4   AUA SYMPTOM SCORE 24          Objective    /92 (BP Location: Left  "arm, Patient Position: Sitting, Cuff Size: Adult)   Pulse 67   Ht 6' 2\" (1.88 m)   Wt 93 kg (205 lb)   SpO2 96%   BMI 26.32 kg/m²   Physical Exam  Vitals reviewed.   Constitutional:       General: He is not in acute distress.     Appearance: Normal appearance. He is not ill-appearing, toxic-appearing or diaphoretic.   HENT:      Head: Normocephalic and atraumatic.      Nose: Nose normal.   Eyes:      Extraocular Movements: Extraocular movements intact.   Pulmonary:      Effort: Pulmonary effort is normal. No respiratory distress.   Abdominal:      General: There is no distension.      Palpations: Abdomen is soft.   Genitourinary:     Penis: Normal.       Testes: Normal.      Rectum: Normal.      Comments: CYNTHIA 40 g a nodular nontender prostate  Musculoskeletal:      Cervical back: Neck supple.   Skin:     General: Skin is dry.   Neurological:      Mental Status: He is alert and oriented to person, place, and time.   Psychiatric:         Mood and Affect: Mood normal.         Behavior: Behavior normal.         Thought Content: Thought content normal.         Judgment: Judgment normal.       Results  Lab Results   Component Value Date    PSA 1.128 07/02/2024    PSA 0.90 09/06/2023    PSA 1.0 02/23/2023     Lab Results   Component Value Date    GLUCOSE 91 06/27/2015    CALCIUM 9.1 03/06/2024     06/27/2015    K 4.6 03/06/2024    CO2 25 03/06/2024     03/06/2024    BUN 20 03/06/2024    CREATININE 0.95 03/06/2024     Lab Results   Component Value Date    WBC 7.15 03/06/2024    HGB 17.2 (H) 03/06/2024    HCT 51.4 (H) 03/06/2024    MCV 92 03/06/2024     03/06/2024       Office Urine Dip  Recent Results (from the past 1 hour(s))   POCT Measure PVR    Collection Time: 07/03/24  1:16 PM   Result Value Ref Range    POST-VOID RESIDUAL VOLUME, ML POC 15 mL   ]    Administrative Statements          "

## 2024-07-07 PROBLEM — H61.23 BILATERAL IMPACTED CERUMEN: Status: RESOLVED | Noted: 2024-06-07 | Resolved: 2024-07-07

## 2024-07-07 PROBLEM — J01.00 ACUTE NON-RECURRENT MAXILLARY SINUSITIS: Status: RESOLVED | Noted: 2024-06-07 | Resolved: 2024-07-07

## 2024-07-27 ENCOUNTER — APPOINTMENT (OUTPATIENT)
Dept: LAB | Facility: MEDICAL CENTER | Age: 69
End: 2024-07-27
Attending: UROLOGY
Payer: MEDICARE

## 2024-07-27 LAB
EST. AVERAGE GLUCOSE BLD GHB EST-MCNC: 123 MG/DL
HBA1C MFR BLD: 5.9 %

## 2024-07-30 ENCOUNTER — PROCEDURE VISIT (OUTPATIENT)
Dept: UROLOGY | Facility: MEDICAL CENTER | Age: 69
End: 2024-07-30
Payer: MEDICARE

## 2024-07-30 VITALS
HEIGHT: 74 IN | SYSTOLIC BLOOD PRESSURE: 120 MMHG | OXYGEN SATURATION: 98 % | WEIGHT: 205 LBS | HEART RATE: 78 BPM | DIASTOLIC BLOOD PRESSURE: 80 MMHG | BODY MASS INDEX: 26.31 KG/M2

## 2024-07-30 DIAGNOSIS — R39.15 URINARY URGENCY: ICD-10-CM

## 2024-07-30 DIAGNOSIS — N40.1 BENIGN PROSTATIC HYPERPLASIA WITH LOWER URINARY TRACT SYMPTOMS, SYMPTOM DETAILS UNSPECIFIED: Primary | ICD-10-CM

## 2024-07-30 DIAGNOSIS — Z90.79 S/P TURP: ICD-10-CM

## 2024-07-30 PROCEDURE — 52000 CYSTOURETHROSCOPY: CPT | Performed by: UROLOGY

## 2024-07-30 PROCEDURE — 81003 URINALYSIS AUTO W/O SCOPE: CPT | Performed by: UROLOGY

## 2024-07-30 PROCEDURE — 99214 OFFICE O/P EST MOD 30 MIN: CPT | Performed by: UROLOGY

## 2024-07-30 RX ORDER — SULFAMETHOXAZOLE AND TRIMETHOPRIM 800; 160 MG/1; MG/1
1 TABLET ORAL EVERY 12 HOURS SCHEDULED
Qty: 4 TABLET | Refills: 0 | Status: SHIPPED | OUTPATIENT
Start: 2024-07-30 | End: 2024-08-01

## 2024-07-30 NOTE — PROGRESS NOTES
"H&P Exam - Urology   Fuad Fierro Jr. 69 y.o. male MRN: [id number]  Unit/Bed#:  Encounter: [id number]    Assessment & Plan     Assessment:  BPH with lower urinary tract symptoms  Plan:  TURP    History of Present Illness   HPI:  Fuad Fierro Jr. is a 69 y.o. male who presents with a history of prostate enlargement undergoing TURP in 2021.  The patient initially did well with a good urinary stream and uncomplicated postoperative course however he returned noting an AUA symptom score of 18 with some weakening of the urinary stream nocturia 4 times per night intermittency and urgency.  Repeat cystourethroscopy revealed persistent lateral lobe prostate tissue on the floor of the prostate.  Although the patient had an adequate open bladder neck and anterior prostatic urethra there is still a significant amount of prostatic tissue present and because of persistent voiding symptoms repeat TURP will be performed.  Anticholinergics may be necessary after repeat TURP.    I spoke to the patient concerning the procedure with the patient visually observing cystoscopy in the office on the monitor.  He agreed to TURP understanding risks of bleeding infection bladder neck contracture urinary incontinence perforation need for Resendez catheter or suprapubic tube or operative intervention otherwise.  The patient agrees to the procedure and provides verbal and signed informed consent.    Review of Systems   Genitourinary:         AUA symptom score 18   All other systems reviewed and are negative.      Historical Information   Past Medical History:   Diagnosis Date    Anesthesia     \"cant have spinal anesthesia because of curvature of the spine and severe pain\"    Anxiety     Arthritis     Athlete's foot     off and on    Benign polyp of large intestine     Cancer (HCC)     thyroid,     Chronic pain disorder     bilat knees    Colon polyp     Curvature of spine     DDD (degenerative disc disease), lumbar     Dental crowns present     " "Depression     Disease of thyroid gland     removed    Dizziness     Dyslipidemia     Enlarged prostate     Exercises daily     \"walking/biking/lifts weights\"    GERD (gastroesophageal reflux disease)     Hiatal hernia     History of radiation therapy     \"iodine pill for thyroid cancer\"    Hypertension     IBS (irritable bowel syndrome)     Kidney stone     still has on the left, and small one on right\"    Neuropathy     Numbness and tingling of both feet     Obesity     Retinal hole or tear, bilateral     had laser surgery Dr Caceres/ 3-4 yrs ago    S/P TURP (status post transurethral resection of prostate)     Scoliosis     Seasonal allergies     Teeth missing     Tension headache     occas    Tinea pedis     Urinary frequency     Urinary urgency     Wears glasses      Past Surgical History:   Procedure Laterality Date    CHOLECYSTECTOMY      COLONOSCOPY      FL RETROGRADE URETHROCYSTOGRAM  02/28/2020    HERNIA REPAIR      KNEE ARTHROSCOPY Bilateral     knee tim    MO COLONOSCOPY FLX DX W/COLLJ SPEC WHEN PFRMD N/A 07/25/2018    Procedure: COLONOSCOPY;  Surgeon: Alvarez Mayberry DO;  Location:  GI LAB;  Service: General    MO CYSTO/URETERO W/LITHOTRIPSY &INDWELL STENT INSRT Left 02/28/2020    Procedure: CYSTOSCOPY URETEROSCOP RETROGRADE PYELOGRAM AND INSERTION STENT URETERAL;  Surgeon: Jayden Simms MD;  [facility];  Service: Urology    MO CYSTO/URETERO W/LITHOTRIPSY &INDWELL STENT INSRT Left 03/25/2020    Procedure: CYSTOSCOPY URETEROSCOPIC STONE EXTRACTION, RETROGRADE PYELOGRAM AND INSERTION STENT URETERAL;  Surgeon: Jayden Simms MD;  [facility];  Service: Urology    MO LITHOTRIPSY XTRCORP SHOCK WAVE Left 07/27/2017    Procedure: LITHROTRIPSY EXTRACORPORAL SHOCKWAVE (ESWL);  Surgeon: Crispin Romero DO;  [facility];  Service: Urology    MO RPR 1ST INGUN HRNA AGE 5 YRS/> REDUCIBLE Right 07/23/2019    Procedure: REPAIR INGUINAL HERNIA  DIRECT NO MESH;  Surgeon: Alvarez Mayberry, " "DO;  Location:  MAIN OR;  Service: General    CO TRURL ELECTROSURG RESCJ PROSTATE BLEED COMPLETE N/A 04/06/2021    Procedure: TRANSURETHRAL RESECTION OF PROSTATE (TURP);  Surgeon: Jayden Simms MD;  [facility];  Service: Urology    PROSTATE SURGERY      RETINAL LASER PROCEDURE      ROTATOR CUFF REPAIR Left     THYROIDECTOMY      Subtotal and Total Thyroidectomy both mentioned in allscripts    WISDOM TOOTH EXTRACTION       Social History   Social History     Substance and Sexual Activity   Alcohol Use Yes    Alcohol/week: 2.0 standard drinks of alcohol    Types: 1 Glasses of wine, 1 Cans of beer per week    Comment: very rarely     Social History     Substance and Sexual Activity   Drug Use No     Social History     Tobacco Use   Smoking Status Former    Current packs/day: 0.00    Average packs/day: 1 pack/day for 40.0 years (40.0 ttl pk-yrs)    Types: Cigarettes    Start date: 6/20/1974    Quit date: 6/20/2014    Years since quitting: 10.1   Smokeless Tobacco Never     Family History:   Family History   Problem Relation Age of Onset    Diabetes Mother     Heart disease Father     Breast cancer Sister     Depression Son     Cancer Sister        Meds/Allergies   all medications and allergies reviewed  No Known Allergies    Objective   Vitals: Blood pressure 120/80, pulse 78, height 6' 2\" (1.88 m), weight 93 kg (205 lb), SpO2 98%.    [unfilled]    Invasive Devices       Drain  Duration             Ureteral Drain/Stent Left ureter 6 Fr. 1588 days                    Physical Exam  Vitals reviewed.   Constitutional:       General: He is not in acute distress.     Appearance: Normal appearance. He is obese. He is not ill-appearing, toxic-appearing or diaphoretic.   HENT:      Head: Normocephalic and atraumatic.      Nose: Nose normal.      Mouth/Throat:      Mouth: Mucous membranes are moist.   Eyes:      Extraocular Movements: Extraocular movements intact.   Cardiovascular:      Rate and Rhythm: " "Normal rate and regular rhythm.   Pulmonary:      Effort: Pulmonary effort is normal. No respiratory distress.      Breath sounds: No wheezing, rhonchi or rales.   Abdominal:      General: Bowel sounds are normal. There is no distension.      Palpations: Abdomen is soft.      Tenderness: There is no abdominal tenderness. There is no guarding.   Genitourinary:     Penis: Normal.       Testes: Normal.      Rectum: Normal.   Musculoskeletal:         General: Normal range of motion.      Cervical back: Neck supple.   Neurological:      Mental Status: He is alert and oriented to person, place, and time.   Psychiatric:         Mood and Affect: Mood normal.         Behavior: Behavior normal.         Thought Content: Thought content normal.         Judgment: Judgment normal.         Lab Results: I have personally reviewed pertinent reports.    Imaging: I have personally reviewed pertinent reports.    EKG, Pathology, and Other Studies: I have personally reviewed pertinent reports.    VTE Prophylaxis: Sequential compression device (Venodyne)     Code Status: [unfilled]  Advance Directive and Living Will:      Power of :    POLST:      Counseling / Coordination of Care  Total floor / unit time spent today 30 minutes.  Greater than 50% of total time was spent with the patient and / or family counseling and / or coordination of care.  A description of the counseling / coordination of care:  .       Cystoscopy     Date/Time  7/30/2024 2:30 PM     Performed by  Jayden Simms MD   Authorized by  Jayden Simms MD     Universal Protocol:  Consent: Verbal consent obtained. Written consent obtained.  Risks and benefits: risks, benefits and alternatives were discussed  Consent given by: patient  Time out: Immediately prior to procedure a \"time out\" was called to verify the correct patient, procedure, equipment, support staff and site/side marked as required.  Patient understanding: patient states understanding of " the procedure being performed  Patient consent: the patient's understanding of the procedure matches consent given  Procedure consent: procedure consent matches procedure scheduled  Required items: required blood products, implants, devices, and special equipment available  Patient identity confirmed: verbally with patient      Procedure Details:  Procedure type: cystoscopy    Patient tolerance: Patient tolerated the procedure well with no immediate complications    Additional Procedure Details: With patient in supine position after prepping the urethral meatus with Betadine 2% lidocaine lubricant was instilled per urethra and left indwelling for 5 minutes.  Flexible cystourethroscopy took place revealing a normal anterior urethra without stricture or lesion.  Prostatic urethra revealed no evidence of bladder neck contracture with a patent anterior posterior urethral channel however there is significant amount of lateral lobe hyperplastic tissue flopping into the floor of the bladder.  No mucosal lesions were identified.  Urinary bladder was moderately trabeculated without intrinsic lesion or extrinsic mass compression and both ureteral orifice ease are normal position and configuration bilaterally with clear reflux bilaterally.  Retroflexion revealed a well resected bladder neck.    In short the bladder neck resection appears widely patent as does the anterior portion of the prostatic urethra.  There is however significant mid gland apical by lobar lateral lobe tissue flopped down on the floor of the bladder somewhat blocking the remainder of the urethral outlet.    The cystoscope was removed atraumatically and the patient I discussed the findings which he observed on the monitor.  He agrees to repeat TURP and this will be arranged.

## 2024-07-30 NOTE — LETTER
July 30, 2024     Mynor Slaughter DO  [facility] [address]    Patient: Fuad Fierro Jr.   YOB: 1955   Date of Visit: 7/30/2024       Dear Dr. Slaughter:    Thank you for referring Fuad Fierro to me for evaluation. Below are my notes for this consultation.    If you have questions, please do not hesitate to call me. I look forward to following your patient along with you.         Sincerely,        Jayden Simms MD        CC: No Recipients    Jayden Simms MD  7/30/2024  3:40 PM  Sign when Signing Visit  H&P Exam - Urology   Fuad Fierro Jr. 69 y.o. male MRN: [id number]  Unit/Bed#:  Encounter: [id number]    Assessment & Plan    Assessment:  BPH with lower urinary tract symptoms  Plan:  TURP    History of Present Illness  HPI:  Fuad Fierro Jr. is a 69 y.o. male who presents with a history of prostate enlargement undergoing TURP in 2021.  The patient initially did well with a good urinary stream and uncomplicated postoperative course however he returned noting an AUA symptom score of 18 with some weakening of the urinary stream nocturia 4 times per night intermittency and urgency.  Repeat cystourethroscopy revealed persistent lateral lobe prostate tissue on the floor of the prostate.  Although the patient had an adequate open bladder neck and anterior prostatic urethra there is still a significant amount of prostatic tissue present and because of persistent voiding symptoms repeat TURP will be performed.  Anticholinergics may be necessary after repeat TURP.    I spoke to the patient concerning the procedure with the patient visually observing cystoscopy in the office on the monitor.  He agreed to TURP understanding risks of bleeding infection bladder neck contracture urinary incontinence perforation need for Resendez catheter or suprapubic tube or operative intervention otherwise.  The patient agrees to the procedure and provides verbal and signed informed consent.    Review of  "Systems   Genitourinary:         AUA symptom score 18   All other systems reviewed and are negative.      Historical Information  Past Medical History:   Diagnosis Date   • Anesthesia     \"cant have spinal anesthesia because of curvature of the spine and severe pain\"   • Anxiety    • Arthritis    • Athlete's foot     off and on   • Benign polyp of large intestine    • Cancer (HCC)     thyroid,    • Chronic pain disorder     bilat knees   • Colon polyp    • Curvature of spine    • DDD (degenerative disc disease), lumbar    • Dental crowns present    • Depression    • Disease of thyroid gland     removed   • Dizziness    • Dyslipidemia    • Enlarged prostate    • Exercises daily     \"walking/biking/lifts weights\"   • GERD (gastroesophageal reflux disease)    • Hiatal hernia    • History of radiation therapy     \"iodine pill for thyroid cancer\"   • Hypertension    • IBS (irritable bowel syndrome)    • Kidney stone     still has on the left, and small one on right\"   • Neuropathy    • Numbness and tingling of both feet    • Obesity    • Retinal hole or tear, bilateral     had laser surgery Dr Caceres/ 3-4 yrs ago   • S/P TURP (status post transurethral resection of prostate)    • Scoliosis    • Seasonal allergies    • Teeth missing    • Tension headache     occas   • Tinea pedis    • Urinary frequency    • Urinary urgency    • Wears glasses      Past Surgical History:   Procedure Laterality Date   • CHOLECYSTECTOMY     • COLONOSCOPY     • FL RETROGRADE URETHROCYSTOGRAM  02/28/2020   • HERNIA REPAIR     • KNEE ARTHROSCOPY Bilateral     knee tim   • UT COLONOSCOPY FLX DX W/COLLJ SPEC WHEN PFRMD N/A 07/25/2018    Procedure: COLONOSCOPY;  Surgeon: Alvarez Mayberry DO;  Location:  GI LAB;  Service: General   • UT CYSTO/URETERO W/LITHOTRIPSY &INDWELL STENT INSRT Left 02/28/2020    Procedure: CYSTOSCOPY URETEROSCOP RETROGRADE PYELOGRAM AND INSERTION STENT URETERAL;  Surgeon: Jayden Simms MD;  [facility];  " "Service: Urology   • ME CYSTO/URETERO W/LITHOTRIPSY &INDWELL STENT INSRT Left 03/25/2020    Procedure: CYSTOSCOPY URETEROSCOPIC STONE EXTRACTION, RETROGRADE PYELOGRAM AND INSERTION STENT URETERAL;  Surgeon: Jayden Simms MD;  [facility];  Service: Urology   • ME LITHOTRIPSY XTRCORP SHOCK WAVE Left 07/27/2017    Procedure: LITHROTRIPSY EXTRACORPORAL SHOCKWAVE (ESWL);  Surgeon: Crispin Romero DO;  [facility];  Service: Urology   • ME RPR 1ST INGUN HRNA AGE 5 YRS/> REDUCIBLE Right 07/23/2019    Procedure: REPAIR INGUINAL HERNIA  DIRECT NO MESH;  Surgeon: Alvarez Mayberry DO;  Location:  MAIN OR;  Service: General   • ME TRURL ELECTROSURG RESCJ PROSTATE BLEED COMPLETE N/A 04/06/2021    Procedure: TRANSURETHRAL RESECTION OF PROSTATE (TURP);  Surgeon: Jayden Simms MD;  [facility];  Service: Urology   • PROSTATE SURGERY     • RETINAL LASER PROCEDURE     • ROTATOR CUFF REPAIR Left    • THYROIDECTOMY      Subtotal and Total Thyroidectomy both mentioned in allscripts   • WISDOM TOOTH EXTRACTION       Social History  Social History     Substance and Sexual Activity   Alcohol Use Yes   • Alcohol/week: 2.0 standard drinks of alcohol   • Types: 1 Glasses of wine, 1 Cans of beer per week    Comment: very rarely     Social History     Substance and Sexual Activity   Drug Use No     Social History     Tobacco Use   Smoking Status Former   • Current packs/day: 0.00   • Average packs/day: 1 pack/day for 40.0 years (40.0 ttl pk-yrs)   • Types: Cigarettes   • Start date: 6/20/1974   • Quit date: 6/20/2014   • Years since quitting: 10.1   Smokeless Tobacco Never     Family History:   Family History   Problem Relation Age of Onset   • Diabetes Mother    • Heart disease Father    • Breast cancer Sister    • Depression Son    • Cancer Sister        Meds/Allergies  all medications and allergies reviewed  No Known Allergies    Objective  Vitals: Blood pressure 120/80, pulse 78, height 6' 2\" (1.88 m), " weight 93 kg (205 lb), SpO2 98%.    [unfilled]    Invasive Devices       Drain  Duration             Ureteral Drain/Stent Left ureter 6 Fr. 1588 days                    Physical Exam  Vitals reviewed.   Constitutional:       General: He is not in acute distress.     Appearance: Normal appearance. He is obese. He is not ill-appearing, toxic-appearing or diaphoretic.   HENT:      Head: Normocephalic and atraumatic.      Nose: Nose normal.      Mouth/Throat:      Mouth: Mucous membranes are moist.   Eyes:      Extraocular Movements: Extraocular movements intact.   Cardiovascular:      Rate and Rhythm: Normal rate and regular rhythm.   Pulmonary:      Effort: Pulmonary effort is normal. No respiratory distress.      Breath sounds: No wheezing, rhonchi or rales.   Abdominal:      General: Bowel sounds are normal. There is no distension.      Palpations: Abdomen is soft.      Tenderness: There is no abdominal tenderness. There is no guarding.   Genitourinary:     Penis: Normal.       Testes: Normal.      Rectum: Normal.   Musculoskeletal:         General: Normal range of motion.      Cervical back: Neck supple.   Neurological:      Mental Status: He is alert and oriented to person, place, and time.   Psychiatric:         Mood and Affect: Mood normal.         Behavior: Behavior normal.         Thought Content: Thought content normal.         Judgment: Judgment normal.         Lab Results: I have personally reviewed pertinent reports.    Imaging: I have personally reviewed pertinent reports.    EKG, Pathology, and Other Studies: I have personally reviewed pertinent reports.    VTE Prophylaxis: Sequential compression device (Venodyne)     Code Status: [unfilled]  Advance Directive and Living Will:      Power of :    POLST:      Counseling / Coordination of Care  Total floor / unit time spent today 30 minutes.  Greater than 50% of total time was spent with the patient and / or family counseling and / or  "coordination of care.  A description of the counseling / coordination of care:  .       Cystoscopy     Date/Time  7/30/2024 2:30 PM     Performed by  Jayden Simms MD   Authorized by  Jayden Simms MD     Universal Protocol:  Consent: Verbal consent obtained. Written consent obtained.  Risks and benefits: risks, benefits and alternatives were discussed  Consent given by: patient  Time out: Immediately prior to procedure a \"time out\" was called to verify the correct patient, procedure, equipment, support staff and site/side marked as required.  Patient understanding: patient states understanding of the procedure being performed  Patient consent: the patient's understanding of the procedure matches consent given  Procedure consent: procedure consent matches procedure scheduled  Required items: required blood products, implants, devices, and special equipment available  Patient identity confirmed: verbally with patient      Procedure Details:  Procedure type: cystoscopy    Patient tolerance: Patient tolerated the procedure well with no immediate complications    Additional Procedure Details: With patient in supine position after prepping the urethral meatus with Betadine 2% lidocaine lubricant was instilled per urethra and left indwelling for 5 minutes.  Flexible cystourethroscopy took place revealing a normal anterior urethra without stricture or lesion.  Prostatic urethra revealed no evidence of bladder neck contracture with a patent anterior posterior urethral channel however there is significant amount of lateral lobe hyperplastic tissue flopping into the floor of the bladder.  No mucosal lesions were identified.  Urinary bladder was moderately trabeculated without intrinsic lesion or extrinsic mass compression and both ureteral orifice ease are normal position and configuration bilaterally with clear reflux bilaterally.  Retroflexion revealed a well resected bladder neck.    In short the bladder neck " resection appears widely patent as does the anterior portion of the prostatic urethra.  There is however significant mid gland apical by lobar lateral lobe tissue flopped down on the floor of the bladder somewhat blocking the remainder of the urethral outlet.    The cystoscope was removed atraumatically and the patient I discussed the findings which he observed on the monitor.  He agrees to repeat TURP and this will be arranged.

## 2024-07-30 NOTE — H&P
"H&P Exam - Urology   Fuad Fierro Jr. 69 y.o. male MRN: [id number]  Unit/Bed#:  Encounter: [id number]    Assessment & Plan    Assessment:  BPH with lower urinary tract symptoms  Plan:  TURP    History of Present Illness  HPI:  Fuad Fierro Jr. is a 69 y.o. male who presents with a history of prostate enlargement undergoing TURP in 2021.  The patient initially did well with a good urinary stream and uncomplicated postoperative course however he returned noting an AUA symptom score of 18 with some weakening of the urinary stream nocturia 4 times per night intermittency and urgency.  Repeat cystourethroscopy revealed persistent lateral lobe prostate tissue on the floor of the prostate.  Although the patient had an adequate open bladder neck and anterior prostatic urethra there is still a significant amount of prostatic tissue present and because of persistent voiding symptoms repeat TURP will be performed.  Anticholinergics may be necessary after repeat TURP.    I spoke to the patient concerning the procedure with the patient visually observing cystoscopy in the office on the monitor.  He agreed to TURP understanding risks of bleeding infection bladder neck contracture urinary incontinence perforation need for Resendez catheter or suprapubic tube or operative intervention otherwise.  The patient agrees to the procedure and provides verbal and signed informed consent.    Review of Systems   Genitourinary:         AUA symptom score 18   All other systems reviewed and are negative.      Historical Information  Past Medical History:   Diagnosis Date    Anesthesia     \"cant have spinal anesthesia because of curvature of the spine and severe pain\"    Anxiety     Arthritis     Athlete's foot     off and on    Benign polyp of large intestine     Cancer (HCC)     thyroid,     Chronic pain disorder     bilat knees    Colon polyp     Curvature of spine     DDD (degenerative disc disease), lumbar     Dental crowns present     " "Depression     Disease of thyroid gland     removed    Dizziness     Dyslipidemia     Enlarged prostate     Exercises daily     \"walking/biking/lifts weights\"    GERD (gastroesophageal reflux disease)     Hiatal hernia     History of radiation therapy     \"iodine pill for thyroid cancer\"    Hypertension     IBS (irritable bowel syndrome)     Kidney stone     still has on the left, and small one on right\"    Neuropathy     Numbness and tingling of both feet     Obesity     Retinal hole or tear, bilateral     had laser surgery Dr Caceres/ 3-4 yrs ago    S/P TURP (status post transurethral resection of prostate)     Scoliosis     Seasonal allergies     Teeth missing     Tension headache     occas    Tinea pedis     Urinary frequency     Urinary urgency     Wears glasses      Past Surgical History:   Procedure Laterality Date    CHOLECYSTECTOMY      COLONOSCOPY      FL RETROGRADE URETHROCYSTOGRAM  02/28/2020    HERNIA REPAIR      KNEE ARTHROSCOPY Bilateral     knee tim    PA COLONOSCOPY FLX DX W/COLLJ SPEC WHEN PFRMD N/A 07/25/2018    Procedure: COLONOSCOPY;  Surgeon: Alvarez Mayberry DO;  Location:  GI LAB;  Service: General    PA CYSTO/URETERO W/LITHOTRIPSY &INDWELL STENT INSRT Left 02/28/2020    Procedure: CYSTOSCOPY URETEROSCOP RETROGRADE PYELOGRAM AND INSERTION STENT URETERAL;  Surgeon: Jayden Simms MD;  Location[facility];  Service: Urology    PA CYSTO/URETERO W/LITHOTRIPSY &INDWELL STENT INSRT Left 03/25/2020    Procedure: CYSTOSCOPY URETEROSCOPIC STONE EXTRACTION, RETROGRADE PYELOGRAM AND INSERTION STENT URETERAL;  Surgeon: Jayden Simms MD;  [facility];  Service: Urology    PA LITHOTRIPSY XTRCORP SHOCK WAVE Left 07/27/2017    Procedure: LITHROTRIPSY EXTRACORPORAL SHOCKWAVE (ESWL);  Surgeon: Crispin Romero DO;  Location[facility];  Service: Urology    PA RPR 1ST INGUN HRNA AGE 5 YRS/> REDUCIBLE Right 07/23/2019    Procedure: REPAIR INGUINAL HERNIA  DIRECT NO MESH;  Surgeon: Alvarez Mayberry, " "DO;  Location:  MAIN OR;  Service: General    WY TRURL ELECTROSURG RESCJ PROSTATE BLEED COMPLETE N/A 04/06/2021    Procedure: TRANSURETHRAL RESECTION OF PROSTATE (TURP);  Surgeon: Jayden Simms MD;  Location[facility];  Service: Urology    PROSTATE SURGERY      RETINAL LASER PROCEDURE      ROTATOR CUFF REPAIR Left     THYROIDECTOMY      Subtotal and Total Thyroidectomy both mentioned in allscripts    WISDOM TOOTH EXTRACTION       Social History  Social History     Substance and Sexual Activity   Alcohol Use Yes    Alcohol/week: 2.0 standard drinks of alcohol    Types: 1 Glasses of wine, 1 Cans of beer per week    Comment: very rarely     Social History     Substance and Sexual Activity   Drug Use No     Social History     Tobacco Use   Smoking Status Former    Current packs/day: 0.00    Average packs/day: 1 pack/day for 40.0 years (40.0 ttl pk-yrs)    Types: Cigarettes    Start date: 6/20/1974    Quit date: 6/20/2014    Years since quitting: 10.1   Smokeless Tobacco Never     Family History:   Family History   Problem Relation Age of Onset    Diabetes Mother     Heart disease Father     Breast cancer Sister     Depression Son     Cancer Sister        Meds/Allergies  all medications and allergies reviewed  No Known Allergies    Objective  Vitals: Blood pressure 120/80, pulse 78, height 6' 2\" (1.88 m), weight 93 kg (205 lb), SpO2 98%.    [unfilled]    Invasive Devices       Drain  Duration             Ureteral Drain/Stent Left ureter 6 Fr. 1588 days                    Physical Exam  Vitals reviewed.   Constitutional:       General: He is not in acute distress.     Appearance: Normal appearance. He is obese. He is not ill-appearing, toxic-appearing or diaphoretic.   HENT:      Head: Normocephalic and atraumatic.      Nose: Nose normal.      Mouth/Throat:      Mouth: Mucous membranes are moist.   Eyes:      Extraocular Movements: Extraocular movements intact.   Cardiovascular:      Rate and Rhythm: Normal " "rate and regular rhythm.   Pulmonary:      Effort: Pulmonary effort is normal. No respiratory distress.      Breath sounds: No wheezing, rhonchi or rales.   Abdominal:      General: Bowel sounds are normal. There is no distension.      Palpations: Abdomen is soft.      Tenderness: There is no abdominal tenderness. There is no guarding.   Genitourinary:     Penis: Normal.       Testes: Normal.      Rectum: Normal.   Musculoskeletal:         General: Normal range of motion.      Cervical back: Neck supple.   Neurological:      Mental Status: He is alert and oriented to person, place, and time.   Psychiatric:         Mood and Affect: Mood normal.         Behavior: Behavior normal.         Thought Content: Thought content normal.         Judgment: Judgment normal.         Lab Results: I have personally reviewed pertinent reports.    Imaging: I have personally reviewed pertinent reports.    EKG, Pathology, and Other Studies: I have personally reviewed pertinent reports.    VTE Prophylaxis: Sequential compression device (Venodyne)     Code Status: [unfilled]  Advance Directive and Living Will:      Power of :    POLST:      Counseling / Coordination of Care  Total floor / unit time spent today 30 minutes.  Greater than 50% of total time was spent with the patient and / or family counseling and / or coordination of care.  A description of the counseling / coordination of care:  .       Cystoscopy     Date/Time  7/30/2024 2:30 PM     Performed by  Jayden Simms MD   Authorized by  Jayden Simms MD     Universal Protocol:  Consent: Verbal consent obtained. Written consent obtained.  Risks and benefits: risks, benefits and alternatives were discussed  Consent given by: patient  Time out: Immediately prior to procedure a \"time out\" was called to verify the correct patient, procedure, equipment, support staff and site/side marked as required.  Patient understanding: patient states understanding of the " procedure being performed  Patient consent: the patient's understanding of the procedure matches consent given  Procedure consent: procedure consent matches procedure scheduled  Required items: required blood products, implants, devices, and special equipment available  Patient identity confirmed: verbally with patient      Procedure Details:  Procedure type: cystoscopy    Patient tolerance: Patient tolerated the procedure well with no immediate complications    Additional Procedure Details: With patient in supine position after prepping the urethral meatus with Betadine 2% lidocaine lubricant was instilled per urethra and left indwelling for 5 minutes.  Flexible cystourethroscopy took place revealing a normal anterior urethra without stricture or lesion.  Prostatic urethra revealed no evidence of bladder neck contracture with a patent anterior posterior urethral channel however there is significant amount of lateral lobe hyperplastic tissue flopping into the floor of the bladder.  No mucosal lesions were identified.  Urinary bladder was moderately trabeculated without intrinsic lesion or extrinsic mass compression and both ureteral orifice ease are normal position and configuration bilaterally with clear reflux bilaterally.  Retroflexion revealed a well resected bladder neck.    In short the bladder neck resection appears widely patent as does the anterior portion of the prostatic urethra.  There is however significant mid gland apical by lobar lateral lobe tissue flopped down on the floor of the bladder somewhat blocking the remainder of the urethral outlet.    The cystoscope was removed atraumatically and the patient I discussed the findings which he observed on the monitor.  He agrees to repeat TURP and this will be arranged.

## 2024-08-01 ENCOUNTER — TELEPHONE (OUTPATIENT)
Dept: UROLOGY | Facility: MEDICAL CENTER | Age: 69
End: 2024-08-01

## 2024-08-02 NOTE — TELEPHONE ENCOUNTER
Spoke with patient's wife and confirmed surgery date of: 9/3/2024  Type of surgery: TURP  Operating physician: Dr. Simms  [facility] of surgery: [facility]    Verbally went over prep with patient's wife  on: 8/2/2024  NPO  Bowel prep? no  Hospital calls afternoon prior with arrival time -Calls Friday afternoon for Monday surgeries  Patient needs ride to and from surgery (outpatient/inpatient)   Pre-op testing to be done 2 weeks prior to surgery   CBC, CMP, T&S, Urine C&S, EKG  Blood thinners:   none  Clearances needed: none    Emailed packet on: 8/2/2024  Copy of packet scanned into Media on: 8/2/2024  Labs in packet  Soap instructions in packet  Pre-op & Post-op in packet  H&P on admit  PO       Consent: in media

## 2024-08-02 NOTE — TELEPHONE ENCOUNTER
Clinical, patient requires a 4-6 week PO visit with a AP. None available, please contact the patient to arrange

## 2024-08-16 ENCOUNTER — APPOINTMENT (OUTPATIENT)
Dept: LAB | Facility: HOSPITAL | Age: 69
End: 2024-08-16
Payer: MEDICARE

## 2024-08-16 DIAGNOSIS — Z01.810 PRE-OPERATIVE CARDIOVASCULAR EXAMINATION: ICD-10-CM

## 2024-08-16 DIAGNOSIS — I10 PRIMARY HYPERTENSION: ICD-10-CM

## 2024-08-16 DIAGNOSIS — N40.1 BENIGN PROSTATIC HYPERPLASIA WITH LOWER URINARY TRACT SYMPTOMS, SYMPTOM DETAILS UNSPECIFIED: ICD-10-CM

## 2024-08-16 DIAGNOSIS — Z01.812 PRE-OPERATIVE LABORATORY EXAMINATION: ICD-10-CM

## 2024-08-16 DIAGNOSIS — R39.89 SUSPECTED UTI: ICD-10-CM

## 2024-08-16 LAB
ABO GROUP BLD: NORMAL
ALBUMIN SERPL BCG-MCNC: 4.1 G/DL (ref 3.5–5)
ALP SERPL-CCNC: 57 U/L (ref 34–104)
ALT SERPL W P-5'-P-CCNC: 38 U/L (ref 7–52)
ANION GAP SERPL CALCULATED.3IONS-SCNC: 6 MMOL/L (ref 4–13)
AST SERPL W P-5'-P-CCNC: 24 U/L (ref 13–39)
ATRIAL RATE: 55 BPM
BASOPHILS # BLD AUTO: 0.06 THOUSANDS/ÂΜL (ref 0–0.1)
BASOPHILS NFR BLD AUTO: 1 % (ref 0–1)
BILIRUB SERPL-MCNC: 0.51 MG/DL (ref 0.2–1)
BLD GP AB SCN SERPL QL: NEGATIVE
BUN SERPL-MCNC: 18 MG/DL (ref 5–25)
CALCIUM SERPL-MCNC: 9.1 MG/DL (ref 8.4–10.2)
CHLORIDE SERPL-SCNC: 108 MMOL/L (ref 96–108)
CO2 SERPL-SCNC: 25 MMOL/L (ref 21–32)
CREAT SERPL-MCNC: 0.99 MG/DL (ref 0.6–1.3)
EOSINOPHIL # BLD AUTO: 0.17 THOUSAND/ÂΜL (ref 0–0.61)
EOSINOPHIL NFR BLD AUTO: 3 % (ref 0–6)
ERYTHROCYTE [DISTWIDTH] IN BLOOD BY AUTOMATED COUNT: 13 % (ref 11.6–15.1)
GFR SERPL CREATININE-BSD FRML MDRD: 77 ML/MIN/1.73SQ M
GLUCOSE P FAST SERPL-MCNC: 121 MG/DL (ref 65–99)
HCT VFR BLD AUTO: 47.8 % (ref 36.5–49.3)
HGB BLD-MCNC: 16.1 G/DL (ref 12–17)
IMM GRANULOCYTES # BLD AUTO: 0.03 THOUSAND/UL (ref 0–0.2)
IMM GRANULOCYTES NFR BLD AUTO: 1 % (ref 0–2)
LYMPHOCYTES # BLD AUTO: 2.15 THOUSANDS/ÂΜL (ref 0.6–4.47)
LYMPHOCYTES NFR BLD AUTO: 34 % (ref 14–44)
MCH RBC QN AUTO: 30.5 PG (ref 26.8–34.3)
MCHC RBC AUTO-ENTMCNC: 33.7 G/DL (ref 31.4–37.4)
MCV RBC AUTO: 91 FL (ref 82–98)
MONOCYTES # BLD AUTO: 0.45 THOUSAND/ÂΜL (ref 0.17–1.22)
MONOCYTES NFR BLD AUTO: 7 % (ref 4–12)
NEUTROPHILS # BLD AUTO: 3.51 THOUSANDS/ÂΜL (ref 1.85–7.62)
NEUTS SEG NFR BLD AUTO: 54 % (ref 43–75)
NRBC BLD AUTO-RTO: 0 /100 WBCS
P AXIS: 3 DEGREES
PLATELET # BLD AUTO: 141 THOUSANDS/UL (ref 149–390)
PMV BLD AUTO: 8.2 FL (ref 8.9–12.7)
POTASSIUM SERPL-SCNC: 4.5 MMOL/L (ref 3.5–5.3)
PR INTERVAL: 188 MS
PROT SERPL-MCNC: 6.7 G/DL (ref 6.4–8.4)
QRS AXIS: -14 DEGREES
QRSD INTERVAL: 84 MS
QT INTERVAL: 406 MS
QTC INTERVAL: 388 MS
RBC # BLD AUTO: 5.28 MILLION/UL (ref 3.88–5.62)
RH BLD: POSITIVE
SODIUM SERPL-SCNC: 139 MMOL/L (ref 135–147)
SPECIMEN EXPIRATION DATE: NORMAL
T WAVE AXIS: 36 DEGREES
VENTRICULAR RATE: 55 BPM
WBC # BLD AUTO: 6.37 THOUSAND/UL (ref 4.31–10.16)

## 2024-08-16 PROCEDURE — 85025 COMPLETE CBC W/AUTO DIFF WBC: CPT

## 2024-08-16 PROCEDURE — 36415 COLL VENOUS BLD VENIPUNCTURE: CPT

## 2024-08-16 PROCEDURE — 86850 RBC ANTIBODY SCREEN: CPT

## 2024-08-16 PROCEDURE — 87086 URINE CULTURE/COLONY COUNT: CPT

## 2024-08-16 PROCEDURE — 86900 BLOOD TYPING SEROLOGIC ABO: CPT

## 2024-08-16 PROCEDURE — 80053 COMPREHEN METABOLIC PANEL: CPT

## 2024-08-16 PROCEDURE — 86901 BLOOD TYPING SEROLOGIC RH(D): CPT

## 2024-08-17 LAB — BACTERIA UR CULT: NORMAL

## 2024-08-19 DIAGNOSIS — E03.9 HYPOTHYROIDISM, UNSPECIFIED TYPE: ICD-10-CM

## 2024-08-19 DIAGNOSIS — N20.0 CALCULUS OF KIDNEY: ICD-10-CM

## 2024-08-20 RX ORDER — ALLOPURINOL 300 MG/1
300 TABLET ORAL DAILY
Qty: 90 TABLET | Refills: 0 | Status: SHIPPED | OUTPATIENT
Start: 2024-08-20

## 2024-08-20 RX ORDER — LEVOTHYROXINE SODIUM 50 UG/1
50 TABLET ORAL DAILY
Qty: 90 TABLET | Refills: 0 | Status: SHIPPED | OUTPATIENT
Start: 2024-08-20

## 2024-08-20 RX ORDER — LEVOTHYROXINE SODIUM 200 UG/1
200 TABLET ORAL DAILY
Qty: 90 TABLET | Refills: 0 | Status: SHIPPED | OUTPATIENT
Start: 2024-08-20

## 2024-08-20 NOTE — DISCHARGE INSTRUCTIONS
Patient Education     Transurethral Resection of the Prostate Discharge Instructions   About this topic   The prostate is a gland at the base of the bladder. Sometimes, the prostate may become too large. This makes it hard for urine to flow out of the bladder opening. Your doctor can remove the inner part of the prostate to make passing urine easier. This procedure is a transurethral resection of the prostate or TURP. TURP removes part of the prostate gland.     What care is needed at home?   Ask your doctor what you need to do when you go home. Make sure you ask questions if you do not understand what you need to do.  You need to limit your activity and rest after the procedure for 1 to 2 days. Your doctor will tell you when you can return to your normal activity level.  You need to drink 6 to 8 glasses of water each day. Drinking water is very important if your urine becomes red or you pass blood clots. Red urine means your treated area is bleeding.  Your doctor will give you drugs to prevent infection. Follow the instructions given to you with the drugs.  You need to keep your penis clean to prevent infection. Wash your penis with soap and water at least two times each day.  Avoid straining or lifting heavy objects until your doctor tells you everything is healed.  What follow-up care is needed?   Your doctor may ask you to make visits to the office to check on your progress. Be sure to keep your visits.  Your doctor may send the cut tissue to a lab for testing. Ask your doctor when you can get the results.  The urine catheter will be removed. Do not try to take your catheter out by yourself.  What drugs may be needed?   The doctor may order drugs to:  Relieve spasms  Prevent infection  Control your pain  Will physical activity be limited?   You will need to limit your activity while the urine catheter is in place. Talk to your doctor about when you can go back to work and driving.  You need to limit your sexual  activity after the procedure for about 4 to 6 weeks.  Try to go for regular short walks.  Do not lift things over 10 pounds (4.5 kg).  What changes to diet are needed?   Eat foods high in fiber like fruit, bran, cereal, and beans. Fiber will help prevent hard stools and straining.  Avoid coffee, soft drinks, and beer, wine, or mixed drinks (alcohol).  What problems could happen?   Infection. Urinary tract infection is the most common.  Passing the semen into the bladder instead of out through the urethra  Injury to the urethra, causing growth of scar tissue  Loss of fertility. If you plan on having children, talk to your doctor before your procedure.  Problem with urine control, which is rarely long-lasting.  Need for another TURP procedure, either because the signs never improved or simply return over time as the prostate continues to get bigger.  Problem with erection, which is rare.  Bleeding or small blood clots in the urine  A hole in the bladder  Reduced sexual activity  Blood clots  When do I need to call the doctor?   Signs of infection such as a fever of 100.4°F (38°C) or higher, chills, mouth sores, wound that will not heal, or anal itching or pain.  Very bad pain in the belly that cannot be treated by pain relievers  Not able to drink or eat  Problems with your urine such as thick, yellow, green, or milky drainage; burning feeling when you pass urine; little urine or no urine at all; urine smells bad.  Passing large clots the size of a quarter or worsening blood in the urine  Unable to pass urine and a feeling of fullness  You are not feeling better in 2 to 3 days or you are feeling worse  If you go home with a tube in your urethra, call your doctor for these signs:  Worsening pain near the catheter  Leaking urine  Not passing urine  More blood in your urine  Tube seems blocked or you see grit or stones in your urine  Catheter falls out  Helpful tips   Always keep the tube's drainage bag below the level  of your bladder.  Avoid loops in catheter's drainage tubing.  You may take showers. Do not take baths until the tube is removed.  Teach Back: Helping You Understand   The Teach Back Method helps you understand the information we are giving you. After you talk with the staff, tell them in your own words what you learned. This helps to make sure the staff has described each thing clearly. It also helps to explain things that may have been confusing. Before going home, make sure you can do these:  I can tell you about my procedure.  I can tell you how to care for my catheter, if I have one.  I can tell you what I will do if I have a fever, chills, or problems with my catheter or urine.  Last Reviewed Date   2021-09-10  Consumer Information Use and Disclaimer   This generalized information is a limited summary of diagnosis, treatment, and/or medication information. It is not meant to be comprehensive and should be used as a tool to help the user understand and/or assess potential diagnostic and treatment options. It does NOT include all information about conditions, treatments, medications, side effects, or risks that may apply to a specific patient. It is not intended to be medical advice or a substitute for the medical advice, diagnosis, or treatment of a health care provider based on the health care provider's examination and assessment of a patient’s specific and unique circumstances. Patients must speak with a health care provider for complete information about their health, medical questions, and treatment options, including any risks or benefits regarding use of medications. This information does not endorse any treatments or medications as safe, effective, or approved for treating a specific patient. [employer] and its affiliates disclaim any warranty or liability relating to this information or the use thereof. The use of this information is governed by the Terms of Use, available at  https://www.woltersImmunomedicsuwer.com/en/know/clinical-effectiveness-terms   Copyright   Copyright © 2024 [employer] and its affiliates and/or licensors. All rights reserved.

## 2024-08-27 NOTE — PRE-PROCEDURE INSTRUCTIONS
Pre-Surgery Instructions:   Medication Instructions    acetaminophen (TYLENOL) 325 mg tablet Uses PRN- OK to take day of surgery    allopurinol (ZYLOPRIM) 300 mg tablet Take night before surgery    betamethasone, augmented, (DIPROLENE-AF) 0.05 % cream Hold day of surgery.    clotrimazole-betamethasone (LOTRISONE) 1-0.05 % cream Hold day of surgery.    levothyroxine 200 mcg tablet Take day of surgery.    levothyroxine 50 mcg tablet Take day of surgery.    Multiple Vitamin (MULTIVITAMIN) tablet Stop taking 7 days prior to surgery.    Omega-3 Fatty Acids (OMEGA-3 2100 PO) Stop taking 7 days prior to surgery.    omeprazole (PriLOSEC) 20 mg delayed release capsule Uses PRN- OK to take day of surgery    vitamin E, tocopherol, 400 units capsule Stop taking 7 days prior to surgery.     Medication instructions for day surgery reviewed. Please use only a sip of water to take your instructed medications. Avoid all over the counter vitamins, supplements and NSAIDS for one week prior to surgery per anesthesia guidelines. Tylenol is ok to take as needed.     You will receive a call one business day prior to surgery with an arrival time and hospital directions. If your surgery is scheduled on a Monday, the hospital will be calling you on the Friday prior to your surgery. If you have not heard from anyone by 8pm, please call the hospital supervisor through the hospital  at [phone]. ([place] [phone] or [place] [phone]).    Do not eat or drink anything after midnight the night before your surgery, including candy, mints, lifesavers, or chewing gum. Do not drink alcohol 24hrs before your surgery. Try not to smoke at least 24hrs before your surgery.       Follow the pre surgery showering instructions as listed in the “My Surgical Experience Booklet” or otherwise provided by your surgeon's office. Do not use a blade to shave the surgical area 1 week before surgery. It is okay to use a clean electric clippers up  to 24 hours before surgery. Do not apply any lotions, creams, including makeup, cologne, deodorant, or perfumes after showering on the day of your surgery. Do not use dry shampoo, hair spray, hair gel, or any type of hair products.     No contact lenses, eye make-up, or artificial eyelashes. Remove nail polish, including gel polish, and any artificial, gel, or acrylic nails if possible. Remove all jewelry including rings and body piercing jewelry.     Wear causal clothing that is easy to take on and off. Consider your type of surgery.    Keep any valuables, jewelry, piercings at home. Please bring any specially ordered equipment (sling, braces) if indicated.    Arrange for a responsible person to drive you to and from the hospital on the day of your surgery. Please confirm the visitor policy for the day of your procedure when you receive your phone call with an arrival time.     Call the surgeon's office with any new illnesses, exposures, or additional questions prior to surgery.    Please reference your “My Surgical Experience Booklet” for additional information to prepare for your upcoming surgery.

## 2024-08-29 ENCOUNTER — ANESTHESIA EVENT (OUTPATIENT)
Dept: PERIOP | Facility: HOSPITAL | Age: 69
End: 2024-08-29
Payer: MEDICARE

## 2024-09-03 ENCOUNTER — ANESTHESIA (OUTPATIENT)
Dept: PERIOP | Facility: HOSPITAL | Age: 69
End: 2024-09-03
Payer: MEDICARE

## 2024-09-03 ENCOUNTER — HOSPITAL ENCOUNTER (OUTPATIENT)
Facility: HOSPITAL | Age: 69
Setting detail: OUTPATIENT SURGERY
Discharge: HOME | End: 2024-09-04
Attending: UROLOGY | Admitting: UROLOGY
Payer: MEDICARE

## 2024-09-03 DIAGNOSIS — R39.14 BENIGN PROSTATIC HYPERPLASIA WITH INCOMPLETE BLADDER EMPTYING: Primary | Chronic | ICD-10-CM

## 2024-09-03 DIAGNOSIS — N40.1 BENIGN PROSTATIC HYPERPLASIA WITH LOWER URINARY TRACT SYMPTOMS, SYMPTOM DETAILS UNSPECIFIED: ICD-10-CM

## 2024-09-03 DIAGNOSIS — N40.1 BENIGN PROSTATIC HYPERPLASIA WITH INCOMPLETE BLADDER EMPTYING: Primary | Chronic | ICD-10-CM

## 2024-09-03 PROCEDURE — NC001 PR NO CHARGE: Performed by: UROLOGY

## 2024-09-03 PROCEDURE — 52601 PROSTATECTOMY (TURP): CPT | Performed by: UROLOGY

## 2024-09-03 PROCEDURE — 88305 TISSUE EXAM BY PATHOLOGIST: CPT | Performed by: STUDENT IN AN ORGANIZED HEALTH CARE EDUCATION/TRAINING PROGRAM

## 2024-09-03 RX ORDER — LIDOCAINE HYDROCHLORIDE 20 MG/ML
INJECTION, SOLUTION EPIDURAL; INFILTRATION; INTRACAUDAL; PERINEURAL AS NEEDED
Status: DISCONTINUED | OUTPATIENT
Start: 2024-09-03 | End: 2024-09-03

## 2024-09-03 RX ORDER — OXYBUTYNIN CHLORIDE 10 MG/1
10 TABLET, EXTENDED RELEASE ORAL DAILY
Status: DISCONTINUED | OUTPATIENT
Start: 2024-09-03 | End: 2024-09-04 | Stop reason: HOSPADM

## 2024-09-03 RX ORDER — SODIUM CHLORIDE, SODIUM LACTATE, POTASSIUM CHLORIDE, CALCIUM CHLORIDE 600; 310; 30; 20 MG/100ML; MG/100ML; MG/100ML; MG/100ML
125 INJECTION, SOLUTION INTRAVENOUS CONTINUOUS
Status: DISCONTINUED | OUTPATIENT
Start: 2024-09-03 | End: 2024-09-03

## 2024-09-03 RX ORDER — SULFAMETHOXAZOLE/TRIMETHOPRIM 800-160 MG
1 TABLET ORAL DAILY
Qty: 7 TABLET | Refills: 0 | Status: SHIPPED | OUTPATIENT
Start: 2024-09-03 | End: 2024-09-04 | Stop reason: SDUPTHER

## 2024-09-03 RX ORDER — ACETAMINOPHEN 325 MG/1
650 TABLET ORAL EVERY 6 HOURS PRN
Status: DISCONTINUED | OUTPATIENT
Start: 2024-09-03 | End: 2024-09-04 | Stop reason: HOSPADM

## 2024-09-03 RX ORDER — FENTANYL CITRATE 50 UG/ML
INJECTION, SOLUTION INTRAMUSCULAR; INTRAVENOUS AS NEEDED
Status: DISCONTINUED | OUTPATIENT
Start: 2024-09-03 | End: 2024-09-03

## 2024-09-03 RX ORDER — HYDROMORPHONE HCL/PF 1 MG/ML
0.5 SYRINGE (ML) INJECTION
Status: DISCONTINUED | OUTPATIENT
Start: 2024-09-03 | End: 2024-09-03 | Stop reason: HOSPADM

## 2024-09-03 RX ORDER — SULFAMETHOXAZOLE/TRIMETHOPRIM 800-160 MG
1 TABLET ORAL DAILY
Status: DISCONTINUED | OUTPATIENT
Start: 2024-09-03 | End: 2024-09-04 | Stop reason: HOSPADM

## 2024-09-03 RX ORDER — ONDANSETRON 2 MG/ML
INJECTION INTRAMUSCULAR; INTRAVENOUS AS NEEDED
Status: DISCONTINUED | OUTPATIENT
Start: 2024-09-03 | End: 2024-09-03

## 2024-09-03 RX ORDER — MIDAZOLAM HYDROCHLORIDE 2 MG/2ML
INJECTION, SOLUTION INTRAMUSCULAR; INTRAVENOUS AS NEEDED
Status: DISCONTINUED | OUTPATIENT
Start: 2024-09-03 | End: 2024-09-03

## 2024-09-03 RX ORDER — OXYCODONE AND ACETAMINOPHEN 5; 325 MG/1; MG/1
1 TABLET ORAL EVERY 4 HOURS PRN
Status: DISCONTINUED | OUTPATIENT
Start: 2024-09-03 | End: 2024-09-04 | Stop reason: HOSPADM

## 2024-09-03 RX ORDER — FENTANYL CITRATE/PF 50 MCG/ML
25 SYRINGE (ML) INJECTION
Status: DISCONTINUED | OUTPATIENT
Start: 2024-09-03 | End: 2024-09-03 | Stop reason: HOSPADM

## 2024-09-03 RX ORDER — BENZONATATE 100 MG/1
200 CAPSULE ORAL 3 TIMES DAILY PRN
Status: DISCONTINUED | OUTPATIENT
Start: 2024-09-03 | End: 2024-09-04 | Stop reason: HOSPADM

## 2024-09-03 RX ORDER — CEFAZOLIN SODIUM 2 G/50ML
2000 SOLUTION INTRAVENOUS ONCE
Status: COMPLETED | OUTPATIENT
Start: 2024-09-03 | End: 2024-09-03

## 2024-09-03 RX ORDER — MEPERIDINE HYDROCHLORIDE 25 MG/ML
12.5 INJECTION INTRAMUSCULAR; INTRAVENOUS; SUBCUTANEOUS
Status: DISCONTINUED | OUTPATIENT
Start: 2024-09-03 | End: 2024-09-03 | Stop reason: HOSPADM

## 2024-09-03 RX ORDER — DEXAMETHASONE SODIUM PHOSPHATE 10 MG/ML
INJECTION, SOLUTION INTRAMUSCULAR; INTRAVENOUS AS NEEDED
Status: DISCONTINUED | OUTPATIENT
Start: 2024-09-03 | End: 2024-09-03

## 2024-09-03 RX ORDER — SORBITOL 30 G/1000ML
IRRIGANT IRRIGATION AS NEEDED
Status: DISCONTINUED | OUTPATIENT
Start: 2024-09-03 | End: 2024-09-03 | Stop reason: HOSPADM

## 2024-09-03 RX ORDER — PROPOFOL 10 MG/ML
INJECTION, EMULSION INTRAVENOUS AS NEEDED
Status: DISCONTINUED | OUTPATIENT
Start: 2024-09-03 | End: 2024-09-03

## 2024-09-03 RX ORDER — ONDANSETRON 2 MG/ML
4 INJECTION INTRAMUSCULAR; INTRAVENOUS ONCE AS NEEDED
Status: DISCONTINUED | OUTPATIENT
Start: 2024-09-03 | End: 2024-09-03 | Stop reason: HOSPADM

## 2024-09-03 RX ORDER — DEXTROSE MONOHYDRATE, SODIUM CHLORIDE, AND POTASSIUM CHLORIDE 50; 1.49; 4.5 G/1000ML; G/1000ML; G/1000ML
75 INJECTION, SOLUTION INTRAVENOUS CONTINUOUS
Status: DISCONTINUED | OUTPATIENT
Start: 2024-09-03 | End: 2024-09-04 | Stop reason: HOSPADM

## 2024-09-03 RX ORDER — PANTOPRAZOLE SODIUM 40 MG/1
40 TABLET, DELAYED RELEASE ORAL
Status: DISCONTINUED | OUTPATIENT
Start: 2024-09-04 | End: 2024-09-04 | Stop reason: HOSPADM

## 2024-09-03 RX ORDER — ALLOPURINOL 300 MG/1
300 TABLET ORAL DAILY
Status: DISCONTINUED | OUTPATIENT
Start: 2024-09-03 | End: 2024-09-04 | Stop reason: HOSPADM

## 2024-09-03 RX ORDER — OXYBUTYNIN CHLORIDE 10 MG/1
10 TABLET, EXTENDED RELEASE ORAL
Qty: 6 TABLET | Refills: 0 | Status: SHIPPED | OUTPATIENT
Start: 2024-09-05 | End: 2024-09-04 | Stop reason: SDUPTHER

## 2024-09-03 RX ORDER — LEVOTHYROXINE SODIUM 100 UG/1
200 TABLET ORAL
Status: DISCONTINUED | OUTPATIENT
Start: 2024-09-04 | End: 2024-09-04 | Stop reason: HOSPADM

## 2024-09-03 RX ORDER — HYDROCODONE BITARTRATE AND ACETAMINOPHEN 5; 325 MG/1; MG/1
1 TABLET ORAL EVERY 8 HOURS PRN
Qty: 9 TABLET | Refills: 0 | Status: SHIPPED | OUTPATIENT
Start: 2024-09-03 | End: 2024-09-04 | Stop reason: SDUPTHER

## 2024-09-03 RX ORDER — CEFAZOLIN SODIUM 1 G/3ML
INJECTION, POWDER, FOR SOLUTION INTRAMUSCULAR; INTRAVENOUS AS NEEDED
Status: DISCONTINUED | OUTPATIENT
Start: 2024-09-03 | End: 2024-09-03

## 2024-09-03 RX ORDER — LEVOTHYROXINE SODIUM 50 UG/1
50 TABLET ORAL
Status: DISCONTINUED | OUTPATIENT
Start: 2024-09-04 | End: 2024-09-04 | Stop reason: HOSPADM

## 2024-09-03 RX ORDER — ROCURONIUM BROMIDE 10 MG/ML
INJECTION, SOLUTION INTRAVENOUS AS NEEDED
Status: DISCONTINUED | OUTPATIENT
Start: 2024-09-03 | End: 2024-09-03

## 2024-09-03 RX ADMIN — PROPOFOL 200 MG: 10 INJECTION, EMULSION INTRAVENOUS at 12:47

## 2024-09-03 RX ADMIN — CEFAZOLIN 1000 MG: 1 INJECTION, POWDER, FOR SOLUTION INTRAMUSCULAR; INTRAVENOUS; PARENTERAL at 12:51

## 2024-09-03 RX ADMIN — DEXAMETHASONE SODIUM PHOSPHATE 10 MG: 10 INJECTION INTRAMUSCULAR; INTRAVENOUS at 12:52

## 2024-09-03 RX ADMIN — PROPOFOL 100 MG: 10 INJECTION, EMULSION INTRAVENOUS at 13:33

## 2024-09-03 RX ADMIN — MIDAZOLAM 2 MG: 1 INJECTION INTRAMUSCULAR; INTRAVENOUS at 12:42

## 2024-09-03 RX ADMIN — CEFAZOLIN SODIUM 2000 MG: 2 SOLUTION INTRAVENOUS at 12:40

## 2024-09-03 RX ADMIN — OXYBUTYNIN CHLORIDE 10 MG: 10 TABLET, EXTENDED RELEASE ORAL at 15:59

## 2024-09-03 RX ADMIN — ROCURONIUM BROMIDE 80 MG: 10 INJECTION, SOLUTION INTRAVENOUS at 12:47

## 2024-09-03 RX ADMIN — LIDOCAINE HYDROCHLORIDE 50 MG: 20 INJECTION, SOLUTION EPIDURAL; INFILTRATION; INTRACAUDAL at 12:47

## 2024-09-03 RX ADMIN — DEXTROSE, SODIUM CHLORIDE, AND POTASSIUM CHLORIDE 75 ML/HR: 5; .45; .15 INJECTION INTRAVENOUS at 15:59

## 2024-09-03 RX ADMIN — SUGAMMADEX 400 MG: 100 INJECTION, SOLUTION INTRAVENOUS at 13:44

## 2024-09-03 RX ADMIN — SODIUM CHLORIDE, SODIUM LACTATE, POTASSIUM CHLORIDE, AND CALCIUM CHLORIDE: .6; .31; .03; .02 INJECTION, SOLUTION INTRAVENOUS at 14:05

## 2024-09-03 RX ADMIN — ONDANSETRON 4 MG: 2 INJECTION INTRAMUSCULAR; INTRAVENOUS at 12:52

## 2024-09-03 RX ADMIN — SULFAMETHOXAZOLE AND TRIMETHOPRIM 1 TABLET: 800; 160 TABLET ORAL at 15:59

## 2024-09-03 RX ADMIN — SODIUM CHLORIDE, SODIUM LACTATE, POTASSIUM CHLORIDE, AND CALCIUM CHLORIDE 125 ML/HR: .6; .31; .03; .02 INJECTION, SOLUTION INTRAVENOUS at 11:35

## 2024-09-03 RX ADMIN — FENTANYL CITRATE 100 MCG: 50 INJECTION INTRAMUSCULAR; INTRAVENOUS at 12:47

## 2024-09-03 RX ADMIN — ALLOPURINOL 300 MG: 300 TABLET ORAL at 15:59

## 2024-09-03 RX ADMIN — ROCURONIUM BROMIDE 20 MG: 10 INJECTION, SOLUTION INTRAVENOUS at 13:33

## 2024-09-03 NOTE — PLAN OF CARE
Problem: GENITOURINARY - ADULT  Goal: Maintains or returns to baseline urinary function  Description: INTERVENTIONS:  - Assess urinary function  - Encourage oral fluids to ensure adequate hydration if ordered  - Administer IV fluids as ordered to ensure adequate hydration  - Administer ordered medications as needed  - Offer frequent toileting  - Follow urinary retention protocol if ordered  Outcome: Progressing  Goal: Absence of urinary retention  Description: INTERVENTIONS:  - Assess patient’s ability to void and empty bladder  - Monitor I/O  - Bladder scan as needed  - Discuss with physician/AP medications to alleviate retention as needed  - Discuss catheterization for long term situations as appropriate  Outcome: Progressing  Goal: Urinary catheter remains patent  Description: INTERVENTIONS:  - Assess patency of urinary catheter  - If patient has a chronic ma, consider changing catheter if non-functioning  - Follow guidelines for intermittent irrigation of non-functioning urinary catheter  Outcome: Progressing

## 2024-09-03 NOTE — H&P
"        History and physical examination    I saw the patient in the holding area reviewed the proposed procedure step-by-step answered all patient questions discussed risks and complications of bleeding infection incontinence retention stricture contracture perforation potential need for operative intervention or catheter.  Suprapubic tube was also discussed.  The patient expressed understanding provided verbal and signed informed consent      Assessment & Plan     Assessment:  BPH with lower urinary tract symptoms  Plan:  TURP     History of Present Illness  HPI:  Fuad Fierro Jr. is a 69 y.o. male who presents with a history of prostate enlargement undergoing TURP in 2021.  The patient initially did well with a good urinary stream and uncomplicated postoperative course however he returned noting an AUA symptom score of 18 with some weakening of the urinary stream nocturia 4 times per night intermittency and urgency.  Repeat cystourethroscopy revealed persistent lateral lobe prostate tissue on the floor of the prostate.  Although the patient had an adequate open bladder neck and anterior prostatic urethra there is still a significant amount of prostatic tissue present and because of persistent voiding symptoms repeat TURP will be performed.  Anticholinergics may be necessary after repeat TURP.     I spoke to the patient concerning the procedure with the patient visually observing cystoscopy in the office on the monitor.  He agreed to TURP understanding risks of bleeding infection bladder neck contracture urinary incontinence perforation need for Resendez catheter or suprapubic tube or operative intervention otherwise.  The patient agrees to the procedure and provides verbal and signed informed consent.     Review of Systems   Genitourinary:         AUA symptom score 18   All other systems reviewed and are negative.        Historical Information       Past Medical History:   Diagnosis Date    Anesthesia       \"cant " "have spinal anesthesia because of curvature of the spine and severe pain\"    Anxiety      Arthritis      Athlete's foot       off and on    Benign polyp of large intestine      Cancer (HCC)       thyroid,     Chronic pain disorder       bilat knees    Colon polyp      Curvature of spine      DDD (degenerative disc disease), lumbar      Dental crowns present      Depression      Disease of thyroid gland       removed    Dizziness      Dyslipidemia      Enlarged prostate      Exercises daily       \"walking/biking/lifts weights\"    GERD (gastroesophageal reflux disease)      Hiatal hernia      History of radiation therapy       \"iodine pill for thyroid cancer\"    Hypertension      IBS (irritable bowel syndrome)      Kidney stone       still has on the left, and small one on right\"    Neuropathy      Numbness and tingling of both feet      Obesity      Retinal hole or tear, bilateral       had laser surgery Dr Caceres/ 3-4 yrs ago    S/P TURP (status post transurethral resection of prostate)      Scoliosis      Seasonal allergies      Teeth missing      Tension headache       occas    Tinea pedis      Urinary frequency      Urinary urgency      Wears glasses              Past Surgical History:   Procedure Laterality Date    CHOLECYSTECTOMY        COLONOSCOPY        FL RETROGRADE URETHROCYSTOGRAM   02/28/2020    HERNIA REPAIR        KNEE ARTHROSCOPY Bilateral       knee tim    NH COLONOSCOPY FLX DX W/COLLJ SPEC WHEN PFRMD N/A 07/25/2018     Procedure: COLONOSCOPY;  Surgeon: Alvarez Mayberry DO;  Location:  GI LAB;  Service: General    NH CYSTO/URETERO W/LITHOTRIPSY &INDWELL STENT INSRT Left 02/28/2020     Procedure: CYSTOSCOPY URETEROSCOP RETROGRADE PYELOGRAM AND INSERTION STENT URETERAL;  Surgeon: Jayden Simms MD;  Location: [facility] OR;  Service: Urology    NH CYSTO/URETERO W/LITHOTRIPSY &INDWELL STENT INSRT Left 03/25/2020     Procedure: CYSTOSCOPY URETEROSCOPIC STONE EXTRACTION, RETROGRADE PYELOGRAM AND INSERTION " STENT URETERAL;  Surgeon: Jayden Simms MD;  [facility];  Service: Urology    OK LITHOTRIPSY XTRCORP SHOCK WAVE Left 07/27/2017     Procedure: LITHROTRIPSY EXTRACORPORAL SHOCKWAVE (ESWL);  Surgeon: Crispin Romero DO;  [facility]: [facility];  Service: Urology    OK RPR 1ST INGUN HRNA AGE 5 YRS/> REDUCIBLE Right 07/23/2019     Procedure: REPAIR INGUINAL HERNIA  DIRECT NO MESH;  Surgeon: Alvarez Mayberry DO;  Location:  MAIN OR;  Service: General    OK TRURL ELECTROSURG RESCJ PROSTATE BLEED COMPLETE N/A 04/06/2021     Procedure: TRANSURETHRAL RESECTION OF PROSTATE (TURP);  Surgeon: Jayden Simms MD;  [facility];  Service: Urology    PROSTATE SURGERY        RETINAL LASER PROCEDURE        ROTATOR CUFF REPAIR Left      THYROIDECTOMY         Subtotal and Total Thyroidectomy both mentioned in allscripts    WISDOM TOOTH EXTRACTION          Social History  Social History           Substance and Sexual Activity   Alcohol Use Yes    Alcohol/week: 2.0 standard drinks of alcohol    Types: 1 Glasses of wine, 1 Cans of beer per week     Comment: very rarely      Social History          Substance and Sexual Activity   Drug Use No      Social History           Tobacco Use   Smoking Status Former    Current packs/day: 0.00    Average packs/day: 1 pack/day for 40.0 years (40.0 ttl pk-yrs)    Types: Cigarettes    Start date: 6/20/1974    Quit date: 6/20/2014    Years since quitting: 10.1   Smokeless Tobacco Never      Family History:         Family History   Problem Relation Age of Onset    Diabetes Mother      Heart disease Father      Breast cancer Sister      Depression Son      Cancer Sister           Meds/Allergies  all medications and allergies reviewed  No Known Allergies     Objective     Physical Exam  Vitals reviewed.   Constitutional:       General: He is not in acute distress.     Appearance: Normal appearance. He is obese. He is not ill-appearing, toxic-appearing or diaphoretic.   HENT:       Head: Normocephalic and atraumatic.      Nose: Nose normal.      Mouth/Throat:      Mouth: Mucous membranes are moist.   Eyes:      Extraocular Movements: Extraocular movements intact.   Cardiovascular:      Rate and Rhythm: Normal rate and regular rhythm.   Pulmonary:      Effort: Pulmonary effort is normal. No respiratory distress.      Breath sounds: No wheezing, rhonchi or rales.   Abdominal:      General: Bowel sounds are normal. There is no distension.      Palpations: Abdomen is soft.      Tenderness: There is no abdominal tenderness. There is no guarding.   Genitourinary:     Penis: Normal.       Testes: Normal.      Rectum: Normal.   Musculoskeletal:         General: Normal range of motion.      Cervical back: Neck supple.   Neurological:      Mental Status: He is alert and oriented to person, place, and time.   Psychiatric:         Mood and Affect: Mood normal.         Behavior: Behavior normal.         Thought Content: Thought content normal.         Judgment: Judgment normal.

## 2024-09-03 NOTE — INTERVAL H&P NOTE
H&P reviewed. After examining the patient I find no changes in the patients condition since the H&P had been written.    Vitals:    09/03/24 1050   BP: 135/91   Pulse: 70   Resp: 16   Temp: 97.6 °F (36.4 °C)   SpO2: 97%

## 2024-09-03 NOTE — ANESTHESIA PREPROCEDURE EVALUATION
Procedure:  (TURP) (Urethra)    Relevant Problems   ANESTHESIA (within normal limits)      CARDIO   (+) Essential hypertension   (+) Rib pain on right side      ENDO   (+) Hypothyroidism      GI/HEPATIC   (+) Gastroesophageal reflux disease without esophagitis   (+) Hepatic steatosis   (+) Rectal bleeding      /RENAL   (+) Acute kidney injury (HCC)   (+) Benign prostatic hyperplasia with incomplete bladder emptying   (+) Hydronephrosis with urinary obstruction due to renal calculus   (+) Kidney stone on left side      MUSCULOSKELETAL   (+) Lumbar pain   (+) Primary osteoarthritis of right knee      NEURO/PSYCH   (+) Chronic knee pain 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   (+) Chronic pain of left thumb   (+) Chronic pain of right thumb        Physical Exam    Airway    Mallampati score: II  TM Distance: >3 FB  Neck ROM: full     Dental   No notable dental hx     Cardiovascular  Rhythm: regular, Rate: normal, Cardiovascular exam normal    Pulmonary  Pulmonary exam normal Breath sounds clear to auscultation    Other Findings        Anesthesia Plan  ASA Score- 3     Anesthesia Type- general with ASA Monitors.         Additional Monitors:     Airway Plan:            Plan Factors-Exercise tolerance (METS): >4 METS.    Chart reviewed. EKG reviewed.  Existing labs reviewed. Patient summary reviewed.    Patient is not a current smoker.              Induction- intravenous.    Postoperative Plan-         Informed Consent- Anesthetic plan and risks discussed with patient and spouse.

## 2024-09-03 NOTE — ANESTHESIA POSTPROCEDURE EVALUATION
Post-Op Assessment Note    CV Status:  Stable    Pain management: adequate       Mental Status:  Alert and awake   Hydration Status:  Euvolemic   PONV Controlled:  Controlled   Airway Patency:  Patent     Post Op Vitals Reviewed: Yes    No anethesia notable event occurred.    Staff: Anesthesiologist               BP      Temp      Pulse     Resp      SpO2      /86 (BP Location: Left arm)   Pulse 59   Temp 97.5 °F (36.4 °C) (Oral)   Resp 18   Ht 6' (1.829 m)   Wt (!) 144 kg (317 lb 7.4 oz)   SpO2 92%   BMI 43.06 kg/m²

## 2024-09-03 NOTE — OP NOTE
OPERATIVE REPORT  PATIENT NAME: Fuad Fierro Jr.    :  1955  MRN: [id number]  Pt Location: [facility] ROOM 05    SURGERY DATE: 9/3/2024    Surgeons and Role:     * Jayden Simms MD - Primary    Preop Diagnosis:  Benign prostatic hyperplasia with lower urinary tract symptoms, symptom details unspecified [N40.1]    Post-Op Diagnosis Codes:     * Benign prostatic hyperplasia with lower urinary tract symptoms, symptom details unspecified [N40.1]    Procedure(s):  (TURP)    Specimen(s):  ID Type Source Tests Collected by Time Destination   1 : prostate tissue Tissue Prostate TISSUE EXAM Jaydne Simms MD 9/3/2024 1345        Estimated Blood Loss:   Minimal    Drains:  Urethral Catheter Non-latex 24 Fr. (Active)   Number of days: 0       Ureteral Drain/Stent Left ureter 6 Fr. (Active)   Number of days: 1623       Continuous Bladder Irrigation Three-way (Active)   Number of days: 0       Anesthesia Type:   General    Operative Indications:  Benign prostatic hyperplasia with lower urinary tract symptoms, symptom details unspecified [N40.1]       Operative Findings:  See operative note below      Complications:   None    Procedure and Technique:  I saw the patient in the holding area reviewed the procedure step-by-step answered all patient questions discussed risks and complications.  Patient expressed understanding reaffirming previously signed informed consent.  The patient was taken to the operating room placed on the operating table into dorsal lithotomy position prepped and draped in usual sterile fashion after induction of general anesthesia and appropriate timeout.  Using a 26 Azeri resectoscopic obturator and sheath the urethra was intubated in the urinary bladder.  A 30 degree telescope was used throughout the procedure.  The anterior urethra was within normal limits without stricture or lesion.  The prostatic urethra showed signs of previous TURP however there was significant floor and lateral lobe  tissue particularly at the apex and mid glans.  Using a type I cutting current the prostate was resected from the bladder neck proximally to the verumontanum distally surgically down to the capsular circular fibers of the prostate.  There was a median lobe protruding into the urinary bladder which was also resected from the floor of the prostatic urethra using type I cutting current.  The Ilich evacuator's were then used to evacuate all prostatic specimen from the bladder and urethra.  The coagulation current was then used to fulgurate any bleeding points within the prostatic urethra.  With no bleeding points identified with no residual prostatic chips within the bladder or urethra the resectoscope was removed after confirming no prostatic capsular or bladder perforations and no injury to the ureteral orifices which were easily seen with clear E flux at the end of the procedure.  The resectoscope was then removed from the patient and a 24 Nepalese three-way Resendez catheter placed per urethra to the urinary bladder.  60 cc of saline was placed on the Resendez balloon which was held on traction for 3 minutes.  Traction was released and there was clear urinary E flux from the catheter.  Three-way irrigation was then connected the patient was extubated and transferred the PACU in good condition.  There were no complications.  The patient will be admitted to a no charge overnight bed for Resendez catheter management and if his urine is light pink to clear tomorrow will be discharged home with instructions to return to the office in the middle of next week for Resendez catheter removal and a voiding trial.  There were no complications   I was present for the entire procedure. and I was present for all critical portions of the procedure.    Patient Disposition:  PACU              SIGNATURE: Jayden Simms MD  DATE: September 3, 2024  TIME: 1:48 PM

## 2024-09-04 ENCOUNTER — TELEPHONE (OUTPATIENT)
Dept: OTHER | Facility: HOSPITAL | Age: 69
End: 2024-09-04

## 2024-09-04 ENCOUNTER — TELEPHONE (OUTPATIENT)
Dept: UROLOGY | Facility: MEDICAL CENTER | Age: 69
End: 2024-09-04

## 2024-09-04 ENCOUNTER — HOME HEALTH ADMISSION (OUTPATIENT)
Dept: HOME HEALTH SERVICES | Facility: HOME HEALTHCARE | Age: 69
End: 2024-09-04
Payer: MEDICARE

## 2024-09-04 VITALS
RESPIRATION RATE: 20 BRPM | TEMPERATURE: 97.9 F | BODY MASS INDEX: 42.66 KG/M2 | WEIGHT: 315 LBS | HEART RATE: 61 BPM | DIASTOLIC BLOOD PRESSURE: 76 MMHG | SYSTOLIC BLOOD PRESSURE: 131 MMHG | HEIGHT: 72 IN | OXYGEN SATURATION: 94 %

## 2024-09-04 DIAGNOSIS — R39.14 BENIGN PROSTATIC HYPERPLASIA WITH INCOMPLETE BLADDER EMPTYING: Chronic | ICD-10-CM

## 2024-09-04 DIAGNOSIS — N40.1 BENIGN PROSTATIC HYPERPLASIA WITH INCOMPLETE BLADDER EMPTYING: Chronic | ICD-10-CM

## 2024-09-04 PROCEDURE — 99024 POSTOP FOLLOW-UP VISIT: CPT | Performed by: UROLOGY

## 2024-09-04 PROCEDURE — NC001 PR NO CHARGE: Performed by: UROLOGY

## 2024-09-04 RX ORDER — OXYBUTYNIN CHLORIDE 10 MG/1
10 TABLET, EXTENDED RELEASE ORAL
Qty: 6 TABLET | Refills: 0 | Status: SHIPPED | OUTPATIENT
Start: 2024-09-05 | End: 2024-09-11

## 2024-09-04 RX ORDER — HYDROCODONE BITARTRATE AND ACETAMINOPHEN 5; 325 MG/1; MG/1
1 TABLET ORAL EVERY 8 HOURS PRN
Qty: 9 TABLET | Refills: 0 | Status: SHIPPED | OUTPATIENT
Start: 2024-09-04

## 2024-09-04 RX ORDER — SULFAMETHOXAZOLE/TRIMETHOPRIM 800-160 MG
1 TABLET ORAL DAILY
Qty: 7 TABLET | Refills: 0 | Status: SHIPPED | OUTPATIENT
Start: 2024-09-04 | End: 2024-09-11

## 2024-09-04 RX ADMIN — LEVOTHYROXINE SODIUM 200 MCG: 100 TABLET ORAL at 05:43

## 2024-09-04 RX ADMIN — ALLOPURINOL 300 MG: 300 TABLET ORAL at 07:49

## 2024-09-04 RX ADMIN — SULFAMETHOXAZOLE AND TRIMETHOPRIM 1 TABLET: 800; 160 TABLET ORAL at 07:49

## 2024-09-04 RX ADMIN — PANTOPRAZOLE SODIUM 40 MG: 40 TABLET, DELAYED RELEASE ORAL at 05:43

## 2024-09-04 RX ADMIN — ACETAMINOPHEN 650 MG: 325 TABLET ORAL at 01:24

## 2024-09-04 RX ADMIN — OXYBUTYNIN CHLORIDE 10 MG: 10 TABLET, EXTENDED RELEASE ORAL at 07:49

## 2024-09-04 RX ADMIN — LEVOTHYROXINE SODIUM 50 MCG: 50 TABLET ORAL at 05:47

## 2024-09-04 RX ADMIN — DEXTROSE, SODIUM CHLORIDE, AND POTASSIUM CHLORIDE 75 ML/HR: 5; .45; .15 INJECTION INTRAVENOUS at 05:43

## 2024-09-04 NOTE — PROGRESS NOTES
Progress Note - Urology  Fuad Fierro Jr. 1955, 69 y.o. male MRN: [id number]    Unit/Bed#: [address]01 Encounter: [id number]      * Enlarged prostate  Assessment & Plan  Status post TURP on 9/3 with Dr. Simms  Pathology pending   No complications were noted during the procedure  Patient currently with a three-way Ma  CBI overnight  Urine clear and yellow, no clots appreciated  CBI clamped this morning at 0800  Vital signs stable  Patient afebrile  Patient continues to take acetaminophen as needed  Plan for patient to be discharged home with Ma today   Trial void to be scheduled for next week   Office notified       Subjective: Fuad Fierro Jr. is a 69 y.o. male s/p TURP 9/3 with Dr. Simms. Patient presents with a history of prostate enlargement undergoing TURP in 2021. The patient initially did well with a good urinary stream and uncomplicated postoperative course however he returned noting an AUA symptom score of 18 with some weakening of the urinary stream nocturia 4 times per night intermittency and urgency. Repeat cystourethroscopy revealed persistent lateral lobe prostate tissue on the floor of the prostate. No complications were noted during most recent TURP. Patient was then transitioned to the PACU in stable condition. Patient currently with a three-way ma with CBI overnight. CBI was clamped this morning. Urine overnight was clear yellow. Patient denies fever, chills, or significant pain. VSS.     Review of Systems   Constitutional:  Negative for chills and fever.   HENT:  Negative for ear pain and sore throat.    Eyes:  Negative for pain and visual disturbance.   Respiratory:  Negative for cough and shortness of breath.    Cardiovascular:  Negative for chest pain and palpitations.   Gastrointestinal:  Negative for abdominal pain, blood in stool, constipation, diarrhea, nausea and vomiting.   Genitourinary:  Negative for decreased urine volume, difficulty urinating (ma  "inplace), dysuria, flank pain, frequency, hematuria, penile pain, penile swelling and urgency.   Musculoskeletal:  Negative for arthralgias and back pain.   Skin:  Negative for color change and rash.   Neurological:  Negative for seizures and syncope.   All other systems reviewed and are negative.    Objective:  Vitals: Blood pressure 131/76, pulse 61, temperature 97.9 °F (36.6 °C), resp. rate 20, height 6' (1.829 m), weight (!) 144 kg (317 lb 7.4 oz), SpO2 94%.    Physical Exam  Vitals reviewed.   Constitutional:       Appearance: Normal appearance.   HENT:      Head: Normocephalic and atraumatic.   Eyes:      Conjunctiva/sclera: Conjunctivae normal.   Pulmonary:      Effort: Pulmonary effort is normal.   Abdominal:      General: Abdomen is flat. There is no distension.      Palpations: Abdomen is soft.      Tenderness: There is no abdominal tenderness.   Genitourinary:     Penis: Normal. No tenderness or swelling.       Testes: Normal.   Musculoskeletal:         General: Normal range of motion.      Cervical back: Normal range of motion.   Skin:     General: Skin is warm and dry.   Neurological:      General: No focal deficit present.      Mental Status: He is alert and oriented to person, place, and time.   Psychiatric:         Mood and Affect: Mood normal.         Behavior: Behavior normal.         Thought Content: Thought content normal.         Judgment: Judgment normal.       Labs:  No results for input(s): \"WBC\" in the last 72 hours.  No results for input(s): \"HGB\" in the last 72 hours.  No results for input(s): \"CREATININE\" in the last 72 hours.    History:    Past Medical History:   Diagnosis Date    Anesthesia     \"cant have spinal anesthesia because of curvature of the spine and severe pain\"    Anxiety     Arthritis     Athlete's foot     off and on    Benign polyp of large intestine     Cancer (HCC)     thyroid,     Chronic pain disorder     bilat knees    Colon polyp     Curvature of spine     DDD " "(degenerative disc disease), lumbar     Dental crowns present     Depression     Disease of thyroid gland     removed    Dizziness     Dyslipidemia     Enlarged prostate     Exercises daily     \"walking/biking/lifts weights\"    GERD (gastroesophageal reflux disease)     Hiatal hernia     History of radiation therapy     \"iodine pill for thyroid cancer\"    Hypertension     IBS (irritable bowel syndrome)     Kidney stone     still has on the left, and small one on right\"    Neuropathy     Numbness and tingling of both feet     Obesity     Retinal hole or tear, bilateral     had laser surgery Dr Caceres/ 3-4 yrs ago    S/P TURP (status post transurethral resection of prostate)     Scoliosis     Seasonal allergies     Teeth missing     Tension headache     occas    Tinea pedis     Urinary frequency     Urinary urgency     Wears glasses      Past Surgical History:   Procedure Laterality Date    CHOLECYSTECTOMY      COLONOSCOPY      FL RETROGRADE URETHROCYSTOGRAM  02/28/2020    HERNIA REPAIR      KNEE ARTHROSCOPY Bilateral     knee tim    KS COLONOSCOPY FLX DX W/COLLJ SPEC WHEN PFRMD N/A 07/25/2018    Procedure: COLONOSCOPY;  Surgeon: Alvarez Mayberry DO;  Location:  GI LAB;  Service: General    KS CYSTO/URETERO W/LITHOTRIPSY &INDWELL STENT INSRT Left 02/28/2020    Procedure: CYSTOSCOPY URETEROSCOP RETROGRADE PYELOGRAM AND INSERTION STENT URETERAL;  Surgeon: Jayden Simms MD;  [facility];  Service: Urology    KS CYSTO/URETERO W/LITHOTRIPSY &INDWELL STENT INSRT Left 03/25/2020    Procedure: CYSTOSCOPY URETEROSCOPIC STONE EXTRACTION, RETROGRADE PYELOGRAM AND INSERTION STENT URETERAL;  Surgeon: Jayden Simms MD;  [facility];  Service: Urology    KS LITHOTRIPSY XTRCORP SHOCK WAVE Left 07/27/2017    Procedure: LITHROTRIPSY EXTRACORPORAL SHOCKWAVE (ESWL);  Surgeon: Crispin Romero DO;  [facility];  Service: Urology    KS RPR 1ST INGUN HRNA AGE 5 YRS/> REDUCIBLE Right 07/23/2019    " Procedure: REPAIR INGUINAL HERNIA  DIRECT NO MESH;  Surgeon: Alvarez Mayberry DO;  Location:  MAIN OR;  Service: General    ND TRURL ELECTROSURG RESCJ PROSTATE BLEED COMPLETE N/A 04/06/2021    Procedure: TRANSURETHRAL RESECTION OF PROSTATE (TURP);  Surgeon: Jayden Simms MD;  [facility];  Service: Urology    ND TRURL ELECTROSURG RESCJ PROSTATE BLEED COMPLETE N/A 9/3/2024    Procedure: (TURP);  Surgeon: Jayden Simms MD;  [facility];  Service: Urology    PROSTATE SURGERY      RETINAL LASER PROCEDURE      ROTATOR CUFF REPAIR Left     THYROIDECTOMY      Subtotal and Total Thyroidectomy both mentioned in allscripts    WISDOM TOOTH EXTRACTION       Family History   Problem Relation Age of Onset    Diabetes Mother     Heart disease Father     Breast cancer Sister     Depression Son     Cancer Sister      Social History     Socioeconomic History    Marital status: /Civil Union     Spouse name: None    Number of children: None    Years of education: None    Highest education level: None   Occupational History    Occupation: retired security   Tobacco Use    Smoking status: Former     Current packs/day: 0.00     Average packs/day: 1 pack/day for 40.0 years (40.0 ttl pk-yrs)     Types: Cigarettes     Start date: 6/20/1974     Quit date: 6/20/2014     Years since quitting: 10.2    Smokeless tobacco: Never   Vaping Use    Vaping status: Never Used   Substance and Sexual Activity    Alcohol use: Yes     Alcohol/week: 2.0 standard drinks of alcohol     Types: 1 Glasses of wine, 1 Cans of beer per week     Comment: very rarely.    Drug use: No    Sexual activity: Not Currently   Other Topics Concern    None   Social History Narrative    None     Social Determinants of Health     Financial Resource Strain: Low Risk  (9/7/2023)    Overall Financial Resource Strain (CARDIA)     Difficulty of Paying Living Expenses: Not hard at all   Food Insecurity: No Food Insecurity (2/24/2021)    Hunger Vital  "Sign     Worried About Running Out of Food in the Last Year: Never true     Ran Out of Food in the Last Year: Never true   Transportation Needs: No Transportation Needs (9/7/2023)    PRAPARE - Transportation     Lack of Transportation (Medical): No     Lack of Transportation (Non-Medical): No   Physical Activity: Unknown (2/24/2021)    Exercise Vital Sign     Days of Exercise per Week: 3 days     Minutes of Exercise per Session: Not on file   Stress: No Stress Concern Present (2/24/2021)    [employer] - Occupational Stress Questionnaire     Feeling of Stress : Only a little   Social Connections: Moderately Integrated (2/24/2021)    Social Connection and Isolation Panel [NHANES]     Frequency of Communication with Friends and Family: Never     Frequency of Social Gatherings with Friends and Family: Twice a week     Attends Je[employer] witness Services: More than 4 times per year     Active Member of Clubs or Organizations: Yes     Attends Club or Organization Meetings: More than 4 times per year     Marital Status:    Intimate Partner Violence: Not At Risk (2/24/2021)    Humiliation, Afraid, Rape, and Kick questionnaire     Fear of Current or Ex-Partner: No     Emotionally Abused: No     Physically Abused: No     Sexually Abused: No   Housing Stability: Not on file         JONNA Rocha  Date: 9/4/2024 Time: 10:34 AM     Portions of the above record have been created with voice recognition software via dictation. Occasional wrong word or \"sound alike\" substitution may have occurred due to the inherent limitations of voice recognition software. Read the chart carefully and recognize, using context, where substitution may have occurred.    "

## 2024-09-04 NOTE — TELEPHONE ENCOUNTER
"Post Op Note    Fuad Fierro Jr. is a 69 y.o. male s/p  (TURP) (Urethra) performed 9/3/24 with Dr. Simms.     How would you rate your pain on a scale from 1 to 10, 10 being the worst pain ever? 0  Have you had a fever? No  Have your bowel movements been regular? No  Do you have any difficulty urinating? No pt will start stool softeners    If the patient has a ma- are you comfortable caring for your ma? Yes Is it draining urine? Yes  Do you have any other questions or concerns that I can address at this time?     Pt states he is doing \"good\"  Confirmed ma removal/void trial ordered for one week post op.  Surgery post op appt was confirmed for 10/14/24. Pt questioned whether he needs to take oxybutinin.  Advised he only should take it if he is having painful bladder spasms.  He doesn't have to take it.   Pt knows to contact the office with any issues or concerns.         "

## 2024-09-04 NOTE — NURSING NOTE
Pt cleared for discharge home at this time. D/C instructions reviewed with and given to pt and spouse at bedside. Scripts re-sent to preferred pharmacy by discharging provider. All questions and concerns addressed at this time. IV site and Masimo removed. Belongings gathered. Pt escorted in wheelchair to car for transport home with spouse.
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
## 2024-09-04 NOTE — ASSESSMENT & PLAN NOTE
Status post TURP on 9/3 with Dr. Simms  Pathology pending   No complications were noted during the procedure  Patient currently with a three-way Resendez  CBI overnight  Urine clear and yellow, no clots appreciated  CBI clamped this morning at 0800  Vital signs stable  Patient afebrile  Patient continues to take acetaminophen as needed  Plan for patient to be discharged home with Resendez today   Trial void to be scheduled for next week   Office notified

## 2024-09-04 NOTE — PLAN OF CARE
Problem: PAIN - ADULT  Goal: Verbalizes/displays adequate comfort level or baseline comfort level  Description: Interventions:  - Encourage patient to monitor pain and request assistance  - Assess pain using appropriate pain scale  - Administer analgesics based on type and severity of pain and evaluate response  - Implement non-pharmacological measures as appropriate and evaluate response  - Consider cultural and social influences on pain and pain management  - Notify physician/advanced practitioner if interventions unsuccessful or patient reports new pain  Outcome: Progressing     Problem: INFECTION - ADULT  Goal: Absence or prevention of progression during hospitalization  Description: INTERVENTIONS:  - Assess and monitor for signs and symptoms of infection  - Monitor lab/diagnostic results  - Monitor all insertion sites, i.e. indwelling lines, tubes, and drains  - Monitor endotracheal if appropriate and nasal secretions for changes in amount and color  - [place] appropriate cooling/warming therapies per order  - Administer medications as ordered  - Instruct and encourage patient and family to use good hand hygiene technique  - Identify and instruct in appropriate isolation precautions for identified infection/condition  Outcome: Progressing  Goal: Absence of fever/infection during neutropenic period  Description: INTERVENTIONS:  - Monitor WBC    Outcome: Progressing     Problem: SAFETY ADULT  Goal: Patient will remain free of falls  Description: INTERVENTIONS:  - Educate patient/family on patient safety including physical limitations  - Instruct patient to call for assistance with activity   - Consult OT/PT to assist with strengthening/mobility   - Keep Call bell within reach  - Keep bed low and locked with side rails adjusted as appropriate  - Keep care items and personal belongings within reach  - Initiate and maintain comfort rounds  - Make Fall Risk Sign visible to staff  - Offer Toileting every 2 Hours,  in advance of need  - Initiate/Maintain bed alarm  - Obtain necessary fall risk management equipment: bed alarm  - Apply yellow socks and bracelet for high fall risk patients  - Consider moving patient to room near nurses station  Outcome: Progressing  Goal: Maintain or return to baseline ADL function  Description: INTERVENTIONS:  -  Assess patient's ability to carry out ADLs; assess patient's baseline for ADL function and identify physical deficits which impact ability to perform ADLs (bathing, care of mouth/teeth, toileting, grooming, dressing, etc.)  - Assess/evaluate cause of self-care deficits   - Assess range of motion  - Assess patient's mobility; develop plan if impaired  - Assess patient's need for assistive devices and provide as appropriate  - Encourage maximum independence but intervene and supervise when necessary  - Involve family in performance of ADLs  - Assess for home care needs following discharge   - Consider OT consult to assist with ADL evaluation and planning for discharge  - Provide patient education as appropriate  Outcome: Progressing  Goal: Maintains/Returns to pre admission functional level  Description: INTERVENTIONS:  - Perform AM-PAC 6 Click Basic Mobility/ Daily Activity assessment daily.  - Set and communicate daily mobility goal to care team and patient/family/caregiver.   - Collaborate with rehabilitation services on mobility goals if consulted  - Perform Range of Motion 3 times a day.  - Reposition patient every 2 hours.  - Dangle patient 3 times a day  - Stand patient 3 times a day  - Ambulate patient 3 times a day  - Out of bed to chair 3 times a day   - Out of bed for meals 3 times a day  - Out of bed for toileting  - Record patient progress and toleration of activity level   Outcome: Progressing     Problem: DISCHARGE PLANNING  Goal: Discharge to home or other facility with appropriate resources  Description: INTERVENTIONS:  - Identify barriers to discharge w/patient and  caregiver  - Arrange for needed discharge resources and transportation as appropriate  - Identify discharge learning needs (meds, wound care, etc.)  - Arrange for interpretive services to assist at discharge as needed  - Refer to Case Management Department for coordinating discharge planning if the patient needs post-hospital services based on physician/advanced practitioner order or complex needs related to functional status, cognitive ability, or social support system  Outcome: Progressing     Problem: Knowledge Deficit  Goal: Patient/family/caregiver demonstrates understanding of disease process, treatment plan, medications, and discharge instructions  Description: Complete learning assessment and assess knowledge base.  Interventions:  - Provide teaching at level of understanding  - Provide teaching via preferred learning methods  Outcome: Progressing     Problem: GENITOURINARY - ADULT  Goal: Maintains or returns to baseline urinary function  Description: INTERVENTIONS:  - Assess urinary function  - Encourage oral fluids to ensure adequate hydration if ordered  - Administer IV fluids as ordered to ensure adequate hydration  - Administer ordered medications as needed  - Offer frequent toileting  - Follow urinary retention protocol if ordered  Outcome: Progressing  Goal: Absence of urinary retention  Description: INTERVENTIONS:  - Assess patient’s ability to void and empty bladder  - Monitor I/O  - Bladder scan as needed  - Discuss with physician/AP medications to alleviate retention as needed  - Discuss catheterization for long term situations as appropriate  Outcome: Progressing  Goal: Urinary catheter remains patent  Description: INTERVENTIONS:  - Assess patency of urinary catheter  - If patient has a chronic ma, consider changing catheter if non-functioning  - Follow guidelines for intermittent irrigation of non-functioning urinary catheter  Outcome: Progressing

## 2024-09-04 NOTE — TELEPHONE ENCOUNTER
Pt wife wants to clarify when he is supposed to stop.  SIG is to not take medication day of ma removal     But pt said hospital instruction to stop 3 days prior to ma removal    Please advise  pt wife can be reached at  [phone]

## 2024-09-04 NOTE — RESTORATIVE TECHNICIAN NOTE
Restorative Technician Note      Patient Name: Fuad Fierro      Restorative Tech Visit Date: 09/04/24  Note Type: Mobility  Patient Position Upon Consult: Bedside chair  Activity Performed: Ambulated; Range of motion  Patient Position at End of Consult: All needs within reach; Bedside chair

## 2024-09-04 NOTE — TELEPHONE ENCOUNTER
Patient is S/p TURP on 9/3 with Dr. Simms. Patient to be discharged home with ma today. Plan for TOV with ma removal next Wednesday. Please schedule patient apt.

## 2024-09-04 NOTE — DISCHARGE SUMMARY
Discharge Summary - Fuad Fierro Jr. 69 y.o. male MRN: [id number]    Unit/Bed#: [place] [address] -01 Encounter: [id number]    Admission Date: 9/3/2024     Discharge Date: 09/04/24    HPI: Fuad Fierro Jr. is a 69 y.o. male who presented for TURP by Dr. Simms      Procedure(s):  (TURP)  Surgeon(s):  Jayden Simms MD  9/3/2024    Hospital Course: Fuad Fierro Jr. is a 69 y.o. male s/p TURP 9/3 with Dr. Simms POD #1. No complications were noted during the TURP. Pathology pending. Patient was then transitioned to the PACU in stable condition. Patient currently with a three-way ma with CBI overnight. CBI was clamped this morning. Patient tolerating food well. No bowel movement thus far.  Urine overnight was clear yellow. Patient denies fever, chills, or significant pain. VSS. Plan to follow up in 1 week with TOV/ma removal.      Discharge Diagnosis: Enlarged prostate    Condition at Discharge: good    Discharge Medications:  See after visit summary for reconciled discharge medications provided to patient and family.  Patient was discharged home on home medications with the addition of Bactrim, oxybutynin, acetaminophen.    Discharge instructions/Information to patient and family:   See after visit summary for written and verbal information which has been provided to patient and family.  Plan for patient to schedule a trial of void/Ma removal in 1 week.  Office was notified will be in contact with patient    Provisions for Follow-Up Care:  See after visit summary for information related to follow-up care and any pertinent home health orders.      Disposition: Home with [facility]    Planned Readmission: No    Discharge Statement   I spent 20 minutes discharging the patient. This time was spent on the day of discharge. I had direct contact with the patient on the day of discharge. Additional documentation is required if more than 30 minutes were spent on discharge.     Signature:   Lizeth Del Valle  JONNA  Date: 9/4/2024 Time: 10:42 AM

## 2024-09-05 ENCOUNTER — HOME CARE VISIT (OUTPATIENT)
Dept: HOME HEALTH SERVICES | Facility: HOME HEALTHCARE | Age: 69
End: 2024-09-05

## 2024-09-05 PROCEDURE — 88305 TISSUE EXAM BY PATHOLOGIST: CPT | Performed by: STUDENT IN AN ORGANIZED HEALTH CARE EDUCATION/TRAINING PROGRAM

## 2024-09-05 NOTE — TELEPHONE ENCOUNTER
Spoke to pt and wife previously in another encounter with post op assessment.  They were told pt does not need to take oxybutinin.  It was prescribed in case of bladder spasms which he stated he was not experiencing.  He knows if he does take the med he is to stop it 2 days prior to ma removal.

## 2024-09-06 ENCOUNTER — HOME CARE VISIT (OUTPATIENT)
Dept: HOME HEALTH SERVICES | Facility: HOME HEALTHCARE | Age: 69
End: 2024-09-06
Payer: MEDICARE

## 2024-09-06 VITALS
DIASTOLIC BLOOD PRESSURE: 84 MMHG | TEMPERATURE: 97.7 F | SYSTOLIC BLOOD PRESSURE: 138 MMHG | HEART RATE: 68 BPM | OXYGEN SATURATION: 96 %

## 2024-09-06 PROCEDURE — G0299 HHS/HOSPICE OF RN EA 15 MIN: HCPCS

## 2024-09-06 PROCEDURE — 10330081 VN NO-PAY CLAIM PROCEDURE

## 2024-09-06 PROCEDURE — 400013 VN SOC

## 2024-09-09 ENCOUNTER — TELEPHONE (OUTPATIENT)
Dept: HOME HEALTH SERVICES | Facility: HOME HEALTHCARE | Age: 69
End: 2024-09-09

## 2024-09-10 ENCOUNTER — HOME CARE VISIT (OUTPATIENT)
Dept: HOME HEALTH SERVICES | Facility: HOME HEALTHCARE | Age: 69
End: 2024-09-10
Payer: MEDICARE

## 2024-09-10 PROCEDURE — G0299 HHS/HOSPICE OF RN EA 15 MIN: HCPCS

## 2024-09-11 ENCOUNTER — PROCEDURE VISIT (OUTPATIENT)
Dept: UROLOGY | Facility: MEDICAL CENTER | Age: 69
End: 2024-09-11
Payer: MEDICARE

## 2024-09-11 VITALS
WEIGHT: 311 LBS | HEIGHT: 73 IN | SYSTOLIC BLOOD PRESSURE: 132 MMHG | DIASTOLIC BLOOD PRESSURE: 78 MMHG | BODY MASS INDEX: 41.22 KG/M2

## 2024-09-11 DIAGNOSIS — N40.1 BENIGN PROSTATIC HYPERPLASIA WITH INCOMPLETE BLADDER EMPTYING: Primary | ICD-10-CM

## 2024-09-11 DIAGNOSIS — R39.14 BENIGN PROSTATIC HYPERPLASIA WITH INCOMPLETE BLADDER EMPTYING: Primary | ICD-10-CM

## 2024-09-11 LAB — POST-VOID RESIDUAL VOLUME, ML POC: 65 ML

## 2024-09-11 PROCEDURE — 51798 US URINE CAPACITY MEASURE: CPT

## 2024-09-11 PROCEDURE — 99024 POSTOP FOLLOW-UP VISIT: CPT

## 2024-09-11 NOTE — PROGRESS NOTES
"9/11/2024    Fuad Fierro Jr.  1955  [id number]    Diagnosis  Chief Complaint    Benign Prostatic Hypertrophy         Patient presents for ma catheter s/p TURP managed by Dr. Simms    Plan  Return to the office this afternoon for PVR s/p ma catheter removal     Procedure Ma removal/voiding trial    Ma catheter removed after deflation of an intact balloon. Patient tolerated well. Encouraged patient to hydrate well and return this afternoon for post void residual.  he knows he may return early if uncomfortable and unable to urinate. Patient agrees to this plan.    Patient returned this afternoon. Patient states able to void. Patient voided prior to arriving to the office. Bladder ultrasound performed and PVR measured 65ml.  Encouraged pt to hydrate well and to monitor his symptoms. Pt is scheduled for FU on 10-. He is aware he can contact the office with any questions or concerns he should have prior.         Vitals:    09/11/24 0901   BP: 132/78   BP Location: Left arm   Patient Position: Sitting   Cuff Size: Standard   Weight: (!) 141 kg (311 lb)   Height: 6' 1\" (1.854 m)           Molly Barry RN       "

## 2024-09-12 ENCOUNTER — HOME CARE VISIT (OUTPATIENT)
Dept: HOME HEALTH SERVICES | Facility: HOME HEALTHCARE | Age: 69
End: 2024-09-12
Payer: MEDICARE

## 2024-09-12 PROCEDURE — G0299 HHS/HOSPICE OF RN EA 15 MIN: HCPCS

## 2024-09-16 VITALS
SYSTOLIC BLOOD PRESSURE: 130 MMHG | HEART RATE: 73 BPM | RESPIRATION RATE: 18 BRPM | TEMPERATURE: 97.1 F | OXYGEN SATURATION: 97 % | DIASTOLIC BLOOD PRESSURE: 70 MMHG | RESPIRATION RATE: 18 BRPM | HEART RATE: 61 BPM | DIASTOLIC BLOOD PRESSURE: 78 MMHG | SYSTOLIC BLOOD PRESSURE: 130 MMHG | TEMPERATURE: 97.8 F | OXYGEN SATURATION: 98 %

## 2024-09-27 ENCOUNTER — NURSE TRIAGE (OUTPATIENT)
Age: 69
End: 2024-09-27

## 2024-09-27 NOTE — TELEPHONE ENCOUNTER
Patient of Dr. Simms, last seen on 9/3/2024 for a TURP, called in stating he is experiencing bright red blood in his urine. He states he has been having pink tinged urine for about a week. He states he has 1 episode of bright red blood which was the color of fruit punch. He has been drinking more water and has noticed a decrease in brightness of the red.He states he does have a burning sensation at the tip of his penis and has nocturia.He denies fever, chills, abdominal or flank pain, or inability to urinate. I advised to drink 64 fluid oz of water, avoid bladder irritants and reviewed ED precautions. Please advise.    Reason for Disposition   The patient has gross hematuria without clots, new onset, no urine testing    Answer Assessment - Initial Assessment Questions  1. When did you start with blood in your urine, and can you describe things in detail? Do you have any blood clots, is the hematuria continuous or intermittent and can you describe the color of the blood in your urine in relation to a beverage?   Bright red blood started 11 am this morning, pink tinged urine for a week, has been intermittent, states the red is like fruit punch    2. Do you have lower back, flank, or lower abdominal/bladder pain?   Denies    3. Are you experiencing urinary frequency, urgency, pain or burning or any other changes in your urination like being unable to urinate?   Burning at the tip of penis, nocturia    4. Do you take any blood thinners?   No    5. Have you recently taken rifampin or Pyridium? If yes, for what?  No    6. Have you had any recent urine testing or imaging? (UA with micro, urine culture, kidney ultrasound, CT scan)? Or any recent urologic surgery or procedures?  No    Protocols used: Urology-Gross Hemaruria-ADULT-[place]

## 2024-09-27 NOTE — TELEPHONE ENCOUNTER
Patient's wife called stating he is having bright blood after Turp done on 09/03/24.  He wants to know  how to proceed.  Warm transfer to Triage nurse

## 2024-09-27 NOTE — TELEPHONE ENCOUNTER
Agree with recommendations provided.  Blood in the urine is still normal given recent procedure.  Intermittent blood in the urine and irritative lower urinary tract symptoms can last 4-6 postsurgery.  Continue to monitor urine output and increase hydration.  Call if:   If your urine looks like red wine with clots   Fever above 100.4    Persistent, severe pain   Inability to urinate

## 2024-10-01 NOTE — TELEPHONE ENCOUNTER
Patient called in to report that he is continuing to have hematuria with small clots. States over the weekend, the clots were the size of a quarter and now they are the size of a nickel. Reports having some troubles earlier with urinating but was then able to urinate a full stream after releasing clot. Denies any fevers. Strongly advised to increase water intake and monitor for worsening symptoms. ED precautions reviewed. Please advise on further recommendations.

## 2024-10-01 NOTE — TELEPHONE ENCOUNTER
Please see if patient is able to send us a picture of his urine.  If he is truly having gross hematuria with clot passage he should be seen sooner than 10/14.  Please also make sure Dr. Simms is aware.  Please send response back to the pool

## 2024-10-02 NOTE — TELEPHONE ENCOUNTER
Spoke to pt's wife and advised:    Jayden Simms MD  You17 hours ago (4:31 PM)     No need to bring him in for this.  Not unusual to pass a few clots this soon after surgery.  Have him keep a vigorous oral fluid intake     Advised pt to call office with worsening symptoms.

## 2024-10-04 ENCOUNTER — RA CDI HCC (OUTPATIENT)
Dept: OTHER | Facility: HOSPITAL | Age: 69
End: 2024-10-04

## 2024-10-10 NOTE — PROGRESS NOTES
10/14/2024    Chief Complaint   Patient presents with    Benign Prostatic Hypertrophy     Assessment and Plan    69 y.o. male managed by Dr. Simms       1. Benign prostatic hyperplasia with lower urinary tract symptoms, symptom details unspecified  Assessment & Plan:  S/p TURP on 9/3 with Dr. Simms   Preop diagnosis- BPH with LUTS   Complications- none   Pathology- negative for malignancy  Resendez removed- 9/11  PVR today- 35  Good flow, frequency during day, worse at night    Urgency also, no incontinence   Discussed allowing more time for bladder to adjust   Discussed starting trospium- patient would like to allow more time first   Discussed bladder irritants   Plan to follow up in 1 month to discuss starting trospium   Orders:  -     POCT Measure PVR      Lab Results   Component Value Date    PSA 1.128 07/02/2024    PSA 0.90 09/06/2023    PSA 1.0 02/23/2023       History of Present Illness  Fuad Fierro Jr. is a 69 y.o. male here for evaluation status post TURP on 9/3.  Patient reports to be doing well since surgery.  He did have episodes of clot and pink-tinged urine.  Patient reports that this has now resolved.  Patient reports a good urinary stream.  He does report urinary frequency during the day and also at nighttime.  Patient feels that it is worse at nighttime.  He does drink 3 to 4 cups of coffee during the day, water, iced tea.  He also reports drinking fluids close to bedtime.  He denies episodes of incontinence.  He also reports that he gets the urge to urinate but then nothing comes out.     Review of Systems   Constitutional:  Negative for chills and fever.   HENT:  Negative for ear pain and sore throat.    Eyes:  Negative for pain and visual disturbance.   Respiratory:  Negative for cough and shortness of breath.    Cardiovascular:  Negative for chest pain and palpitations.   Gastrointestinal:  Negative for abdominal pain and vomiting.   Genitourinary:  Positive for frequency and urgency.  "Negative for decreased urine volume, difficulty urinating, dysuria, flank pain and hematuria.   Musculoskeletal:  Negative for arthralgias and back pain.   Skin:  Negative for color change and rash.   Neurological:  Negative for seizures and syncope.   All other systems reviewed and are negative.      Vitals  Vitals:    10/14/24 1254   BP: 142/86   Pulse: 64   SpO2: 98%   Weight: (!) 147 kg (323 lb)   Height: 6' 1\" (1.854 m)       Physical Exam  Vitals reviewed.   Constitutional:       Appearance: Normal appearance.   HENT:      Head: Normocephalic and atraumatic.   Eyes:      Conjunctiva/sclera: Conjunctivae normal.   Pulmonary:      Effort: Pulmonary effort is normal.   Abdominal:      General: Abdomen is flat. There is no distension.      Palpations: Abdomen is soft.      Tenderness: There is no abdominal tenderness.   Musculoskeletal:         General: Normal range of motion.      Cervical back: Normal range of motion.   Skin:     General: Skin is warm and dry.   Neurological:      General: No focal deficit present.      Mental Status: He is alert and oriented to person, place, and time.   Psychiatric:         Mood and Affect: Mood normal.         Behavior: Behavior normal.         Thought Content: Thought content normal.         Judgment: Judgment normal.       Past History  Past Medical History:   Diagnosis Date    Anesthesia     \"cant have spinal anesthesia because of curvature of the spine and severe pain\"    Anxiety     Arthritis     Athlete's foot     off and on    Benign polyp of large intestine     Cancer (HCC)     thyroid,     Chronic pain disorder     bilat knees    Colon polyp     Curvature of spine     DDD (degenerative disc disease), lumbar     Dental crowns present     Depression     Disease of thyroid gland     removed    Dizziness     Dyslipidemia     Enlarged prostate     Exercises daily     \"walking/biking/lifts weights\"    GERD (gastroesophageal reflux disease)     Hiatal hernia     History of " "radiation therapy     \"iodine pill for thyroid cancer\"    Hypertension     IBS (irritable bowel syndrome)     Kidney stone     still has on the left, and small one on right\"    Neuropathy     Numbness and tingling of both feet     Obesity     Retinal hole or tear, bilateral     had laser surgery Dr Caceres/ 3-4 yrs ago    S/P TURP (status post transurethral resection of prostate)     Scoliosis     Seasonal allergies     Teeth missing     Tension headache     occas    Tinea pedis     Urinary frequency     Urinary urgency     Wears glasses      Social History     Socioeconomic History    Marital status: /Civil Union     Spouse name: Not on file    Number of children: Not on file    Years of education: Not on file    Highest education level: Not on file   Occupational History    Occupation: [employer]   Tobacco Use    Smoking status: Former     Current packs/day: 0.00     Average packs/day: 1 pack/day for 40.0 years (40.0 ttl pk-yrs)     Types: Cigarettes     Start date: 6/20/1974     Quit date: 6/20/2014     Years since quitting: 10.3    Smokeless tobacco: Never   Vaping Use    Vaping status: Never Used   Substance and Sexual Activity    Alcohol use: Yes     Alcohol/week: 2.0 standard drinks of alcohol     Types: 1 Glasses of wine, 1 Cans of beer per week     Comment: very rarely.    Drug use: No    Sexual activity: Not Currently   Other Topics Concern    Not on file   Social History Narrative    Not on file     Social Determinants of Health     Financial Resource Strain: Low Risk  (9/7/2023)    Overall Financial Resource Strain (CARDIA)     Difficulty of Paying Living Expenses: Not hard at all   Food Insecurity: No Food Insecurity (10/11/2024)    Hunger Vital Sign     Worried About Running Out of Food in the Last Year: Never true     Ran Out of Food in the Last Year: Never true   Transportation Needs: No Transportation Needs (10/11/2024)    PRAPARE - Transportation     Lack of Transportation (Medical): " No     Lack of Transportation (Non-Medical): No   Physical Activity: Unknown (2/24/2021)    Exercise Vital Sign     Days of Exercise per Week: 3 days     Minutes of Exercise per Session: Not on file   Stress: No Stress Concern Present (2/24/2021)    [employer] [place] [employer] - Occupational Stress Questionnaire     Feeling of Stress : Only a little   Social Connections: Moderately Integrated (2/24/2021)    Social Connection and Isolation Panel [NHANES]     Frequency of Communication with Friends and Family: Never     Frequency of Social Gatherings with Friends and Family: Twice a week     Attends [employer] Services: More than 4 times per year     Active Member of Clubs or Organizations: Yes     Attends Club or Organization Meetings: More than 4 times per year     Marital Status:    Intimate Partner Violence: Not At Risk (2/24/2021)    Humiliation, Afraid, Rape, and Kick questionnaire     Fear of Current or Ex-Partner: No     Emotionally Abused: No     Physically Abused: No     Sexually Abused: No   Housing Stability: Low Risk  (10/11/2024)    Housing Stability Vital Sign     Unable to Pay for Housing in the Last Year: No     Number of Times Moved in the Last Year: 0     Homeless in the Last Year: No     Social History     Tobacco Use   Smoking Status Former    Current packs/day: 0.00    Average packs/day: 1 pack/day for 40.0 years (40.0 ttl pk-yrs)    Types: Cigarettes    Start date: 6/20/1974    Quit date: 6/20/2014    Years since quitting: 10.3   Smokeless Tobacco Never     Family History   Problem Relation Age of Onset    Diabetes Mother     Heart disease Father     Breast cancer Sister     Depression Son     Cancer Sister        The following portions of the patient's history were reviewed and updated as appropriate: allergies, current medications, past medical history, past social history, past surgical history and problem list.    Results  Recent Results (from the past 1 hour(s))   POCT  Measure PVR    Collection Time: 10/14/24  1:05 PM   Result Value Ref Range    POST-VOID RESIDUAL VOLUME, ML POC 36 mL   ]  Lab Results   Component Value Date    PSA 1.128 07/02/2024    PSA 0.90 09/06/2023    PSA 1.0 02/23/2023    PSA 0.5 01/24/2022     Lab Results   Component Value Date    GLUCOSE 91 06/27/2015    CALCIUM 9.1 08/16/2024     06/27/2015    K 4.5 08/16/2024    CO2 25 08/16/2024     08/16/2024    BUN 18 08/16/2024    CREATININE 0.99 08/16/2024     Lab Results   Component Value Date    WBC 6.37 08/16/2024    HGB 16.1 08/16/2024    HCT 47.8 08/16/2024    MCV 91 08/16/2024     (L) 08/16/2024

## 2024-10-11 ENCOUNTER — OFFICE VISIT (OUTPATIENT)
Age: 69
End: 2024-10-11
Payer: MEDICARE

## 2024-10-11 VITALS
OXYGEN SATURATION: 96 % | WEIGHT: 315 LBS | DIASTOLIC BLOOD PRESSURE: 80 MMHG | RESPIRATION RATE: 18 BRPM | HEART RATE: 62 BPM | TEMPERATURE: 97.9 F | HEIGHT: 73 IN | SYSTOLIC BLOOD PRESSURE: 126 MMHG | BODY MASS INDEX: 41.75 KG/M2

## 2024-10-11 DIAGNOSIS — Z12.12 SCREENING FOR COLORECTAL CANCER: ICD-10-CM

## 2024-10-11 DIAGNOSIS — M79.645 CHRONIC PAIN OF LEFT THUMB: ICD-10-CM

## 2024-10-11 DIAGNOSIS — Z12.11 SCREENING FOR COLORECTAL CANCER: ICD-10-CM

## 2024-10-11 DIAGNOSIS — Z00.00 MEDICARE ANNUAL WELLNESS VISIT, SUBSEQUENT: Primary | ICD-10-CM

## 2024-10-11 DIAGNOSIS — Z23 ENCOUNTER FOR IMMUNIZATION: ICD-10-CM

## 2024-10-11 DIAGNOSIS — L03.012 PARONYCHIA OF FINGER, LEFT: ICD-10-CM

## 2024-10-11 DIAGNOSIS — D69.6 PLATELETS DECREASED (HCC): ICD-10-CM

## 2024-10-11 DIAGNOSIS — G89.29 CHRONIC PAIN OF LEFT THUMB: ICD-10-CM

## 2024-10-11 PROCEDURE — 90662 IIV NO PRSV INCREASED AG IM: CPT

## 2024-10-11 PROCEDURE — G0008 ADMIN INFLUENZA VIRUS VAC: HCPCS

## 2024-10-11 PROCEDURE — G0439 PPPS, SUBSEQ VISIT: HCPCS | Performed by: FAMILY MEDICINE

## 2024-10-11 RX ORDER — CEPHALEXIN 500 MG/1
500 CAPSULE ORAL 3 TIMES DAILY
Qty: 30 CAPSULE | Refills: 0 | Status: SHIPPED | OUTPATIENT
Start: 2024-10-11 | End: 2024-10-21

## 2024-10-11 NOTE — PATIENT INSTRUCTIONS
Medicare Preventive Visit Patient Instructions  Thank you for completing your Welcome to Medicare Visit or Medicare Annual Wellness Visit today. Your next wellness visit will be due in one year (10/12/2025).  The screening/preventive services that you may require over the next 5-10 years are detailed below. Some tests may not apply to you based off risk factors and/or age. Screening tests ordered at today's visit but not completed yet may show as past due. Also, please note that scanned in results may not display below.  Preventive Screenings:  Service Recommendations Previous Testing/Comments   Colorectal Cancer Screening  Colonoscopy    Fecal Occult Blood Test (FOBT)/Fecal Immunochemical Test (FIT)  Fecal DNA/Cologuard Test  Flexible Sigmoidoscopy Age: 45-75 years old   Colonoscopy: every 10 years (May be performed more frequently if at higher risk)  OR  FOBT/FIT: every 1 year  OR  Cologuard: every 3 years  OR  Sigmoidoscopy: every 5 years  Screening may be recommended earlier than age 45 if at higher risk for colorectal cancer. Also, an individualized decision between you and your healthcare provider will decide whether screening between the ages of 76-85 would be appropriate. Colonoscopy: 09/22/2021  FOBT/FIT: Not on file  Cologuard: Not on file  Sigmoidoscopy: Not on file    Screening Current     Prostate Cancer Screening Individualized decision between patient and health care provider in men between ages of 55-69   Medicare will cover every 12 months beginning on the day after your 50th birthday PSA: 1.128 ng/mL     Screening Current     Hepatitis C Screening Once for adults born between 1945 and 1965  More frequently in patients at high risk for Hepatitis C Hep C Antibody: 11/10/2023        Diabetes Screening 1-2 times per year if you're at risk for diabetes or have pre-diabetes Fasting glucose: 121 mg/dL (8/16/2024)  A1C: 5.9 % (7/27/2024)  Screening Current   Cholesterol Screening Once every 5 years if you  don't have a lipid disorder. May order more often based on risk factors. Lipid panel: 03/06/2024  Screening Current      Other Preventive Screenings Covered by Medicare:  Abdominal Aortic Aneurysm (AAA) Screening: covered once if your at risk. You're considered to be at risk if you have a family history of AAA or a male between the age of 65-75 who smoking at least 100 cigarettes in your lifetime.  Lung Cancer Screening: covers low dose CT scan once per year if you meet all of the following conditions: (1) Age 55-77; (2) No signs or symptoms of lung cancer; (3) Current smoker or have quit smoking within the last 15 years; (4) You have a tobacco smoking history of at least 20 pack years (packs per day x number of years you smoked); (5) You get a written order from a healthcare provider.  Glaucoma Screening: covered annually if you're considered high risk: (1) You have diabetes OR (2) Family history of glaucoma OR (3)  aged 50 and older OR (4)  American aged 65 and older  Osteoporosis Screening: covered every 2 years if you meet one of the following conditions: (1) Have a vertebral abnormality; (2) On glucocorticoid therapy for more than 3 months; (3) Have primary hyperparathyroidism; (4) On osteoporosis medications and need to assess response to drug therapy.  HIV Screening: covered annually if you're between the age of 15-65. Also covered annually if you are younger than 15 and older than 65 with risk factors for HIV infection. For pregnant patients, it is covered up to 3 times per pregnancy.    Immunizations:  Immunization Recommendations   Influenza Vaccine Annual influenza vaccination during flu season is recommended for all persons aged >= 6 months who do not have contraindications   Pneumococcal Vaccine   * Pneumococcal conjugate vaccine = PCV13 (Prevnar 13), PCV15 (Vaxneuvance), PCV20 (Prevnar 20)  * Pneumococcal polysaccharide vaccine = PPSV23 (Pneumovax) Adults 19-65 yo with certain  risk factors or if 65+ yo  If never received any pneumonia vaccine: recommend Prevnar 20 (PCV20)  Give PCV20 if previously received 1 dose of PCV13 or PPSV23   Hepatitis B Vaccine 3 dose series if at intermediate or high risk (ex: diabetes, end stage renal disease, liver disease)   Respiratory syncytial virus (RSV) Vaccine - COVERED BY MEDICARE PART D  * RSVPreF3 (Arexvy) CDC recommends that adults 60 years of age and older may receive a single dose of RSV vaccine using shared clinical decision-making (SCDM)   Tetanus (Td) Vaccine - COST NOT COVERED BY MEDICARE PART B Following completion of primary series, a booster dose should be given every 10 years to maintain immunity against tetanus. Td may also be given as tetanus wound prophylaxis.   Tdap Vaccine - COST NOT COVERED BY MEDICARE PART B Recommended at least once for all adults. For pregnant patients, recommended with each pregnancy.   Shingles Vaccine (Shingrix) - COST NOT COVERED BY MEDICARE PART B  2 shot series recommended in those 19 years and older who have or will have weakened immune systems or those 50 years and older     Health Maintenance Due:      Topic Date Due   • Lung Cancer Screening  Never done   • Colorectal Cancer Screening  09/22/2031   • Hepatitis C Screening  Discontinued     Immunizations Due:      Topic Date Due   • Pneumococcal Vaccine: 65+ Years (1 of 1 - PCV) Never done   • Influenza Vaccine (1) 09/01/2024   • COVID-19 Vaccine (3 - 2023-24 season) 09/01/2024     Advance Directives   What are advance directives?  Advance directives are legal documents that state your wishes and plans for medical care. These plans are made ahead of time in case you lose your ability to make decisions for yourself. Advance directives can apply to any medical decision, such as the treatments you want, and if you want to donate organs.   What are the types of advance directives?  There are many types of advance directives, and each state has rules about how  to use them. You may choose a combination of any of the following:  Living will:  This is a written record of the treatment you want. You can also choose which treatments you do not want, which to limit, and which to stop at a certain time. This includes surgery, medicine, IV fluid, and tube feedings.   Durable power of  for healthcare (DPAHC):  This is a written record that states who you want to make healthcare choices for you when you are unable to make them for yourself. This person, called a proxy, is usually a family member or a friend. You may choose more than 1 proxy.  Do not resuscitate (DNR) order:  A DNR order is used in case your heart stops beating or you stop breathing. It is a request not to have certain forms of treatment, such as CPR. A DNR order may be included in other types of advance directives.  Medical directive:  This covers the care that you want if you are in a coma, near death, or unable to make decisions for yourself. You can list the treatments you want for each condition. Treatment may include pain medicine, surgery, blood transfusions, dialysis, IV or tube feedings, and a ventilator (breathing machine).  Values history:  This document has questions about your views, beliefs, and how you feel and think about life. This information can help others choose the care that you would choose.  Why are advance directives important?  An advance directive helps you control your care. Although spoken wishes may be used, it is better to have your wishes written down. Spoken wishes can be misunderstood, or not followed. Treatments may be given even if you do not want them. An advance directive may make it easier for your family to make difficult choices about your care.   Weight Management   Why it is important to manage your weight:  Being overweight increases your risk of health conditions such as heart disease, high blood pressure, type 2 diabetes, and certain types of cancer. It can also  increase your risk for osteoarthritis, sleep apnea, and other respiratory problems. Aim for a slow, steady weight loss. Even a small amount of weight loss can lower your risk of health problems.  How to lose weight safely:  A safe and healthy way to lose weight is to eat fewer calories and get regular exercise. You can lose up about 1 pound a week by decreasing the number of calories you eat by 500 calories each day.   Healthy meal plan for weight management:  A healthy meal plan includes a variety of foods, contains fewer calories, and helps you stay healthy. A healthy meal plan includes the following:  Eat whole-grain foods more often.  A healthy meal plan should contain fiber. Fiber is the part of grains, fruits, and vegetables that is not broken down by your body. Whole-grain foods are healthy and provide extra fiber in your diet. Some examples of whole-grain foods are whole-wheat breads and pastas, oatmeal, brown rice, and bulgur.  Eat a variety of vegetables every day.  Include dark, leafy greens such as spinach, kale, michelle greens, and mustard greens. Eat yellow and orange vegetables such as carrots, sweet potatoes, and winter squash.   Eat a variety of fruits every day.  Choose fresh or canned fruit (canned in its own juice or light syrup) instead of juice. Fruit juice has very little or no fiber.  Eat low-fat dairy foods.  Drink fat-free (skim) milk or 1% milk. Eat fat-free yogurt and low-fat cottage cheese. Try low-fat cheeses such as mozzarella and other reduced-fat cheeses.  Choose meat and other protein foods that are low in fat.  Choose beans or other legumes such as split peas or lentils. Choose fish, skinless poultry (chicken or turkey), or lean cuts of red meat (beef or pork). Before you cook meat or poultry, cut off any visible fat.   Use less fat and oil.  Try baking foods instead of frying them. Add less fat, such as margarine, sour cream, regular salad dressing and mayonnaise to foods. Eat  fewer high-fat foods. Some examples of high-fat foods include french fries, doughnuts, ice cream, and cakes.  Eat fewer sweets.  Limit foods and drinks that are high in sugar. This includes candy, cookies, regular soda, and sweetened drinks.  Exercise:  Exercise at least 30 minutes per day on most days of the week. Some examples of exercise include walking, biking, dancing, and swimming. You can also fit in more physical activity by taking the stairs instead of the elevator or parking farther away from stores. Ask your healthcare provider about the best exercise plan for you.      © Copyright [employer] 2018 Information is for End User's use only and may not be sold, redistributed or otherwise used for commercial purposes. All illustrations and images included in CareNotes® are the copyrighted property of [employer] or [employer]

## 2024-10-11 NOTE — PROGRESS NOTES
Ambulatory Visit  Name: Fuad Fierro Jr.      : 1955      MRN: [id number]  Encounter Provider: Tommy Slaughter DO  Encounter Date: 10/11/2024   Encounter department: [facility] PRIMARY CARE    Assessment & Plan  Medicare annual wellness visit, subsequent         Screening for colorectal cancer    Orders:    Ambulatory referral to Gastroenterology; Future    Encounter for immunization    Orders:    influenza vaccine, high-dose, PF 0.5 mL (Fluzone High Dose)    Chronic pain of left thumb    Orders:    XR hand 3+ vw left; Future    Ambulatory Referral to Occupational Therapy; Future    Platelets decreased (HCC)         Paronychia of finger, left    Orders:    cephalexin (KEFLEX) 500 mg capsule; Take 1 capsule (500 mg total) by mouth 3 (three) times a day for 10 days       Preventive health issues were discussed with patient, and age appropriate screening tests were ordered as noted in patient's After Visit Summary. Personalized health advice and appropriate referrals for health education or preventive services given if needed, as noted in patient's After Visit Summary.    History of Present Illness     HPI   Patient Care Team:  Mynor Slaughter DO as PCP - General  MD Franki Wagner MD Peter Isaac, DO as Endoscopist    Review of Systems  Medical History Reviewed by provider this encounter:  Tobacco  Allergies  Meds  Problems  Med Hx  Surg Hx  Fam Hx       Annual Wellness Visit Questionnaire   Fuad is here for his Subsequent Wellness visit.     Health Risk Assessment:   Patient rates overall health as very good. Patient feels that their physical health rating is much better. Patient is very satisfied with their life. Eyesight was rated as same. Hearing was rated as same. Patient feels that their emotional and mental health rating is slightly better. Patients states they are never, rarely angry. Patient states they are sometimes unusually tired/fatigued. Pain experienced in  the last 7 days has been some. Patient's pain rating has been 6/10. Patient states that he has experienced no weight loss or gain in last 6 months.     Depression Screening:   PHQ-2 Score: 1      Fall Risk Screening:   In the past year, patient has experienced: no history of falling in past year      Home Safety:  Patient does not have trouble with stairs inside or outside of their home. Patient has working smoke alarms and has working carbon monoxide detector. Home safety hazards include: none.     Medications:   Patient is currently taking over-the-counter supplements. OTC medications include: see medication list. Patient is able to manage medications.     Activities of Daily Living (ADLs)/Instrumental Activities of Daily Living (IADLs):   Walk and transfer into and out of bed and chair?: Yes  Dress and groom yourself?: Yes    Bathe or shower yourself?: Yes    Feed yourself? Yes  Do your laundry/housekeeping?: Yes  Manage your money, pay your bills and track your expenses?: Yes  Make your own meals?: Yes    Do your own shopping?: Yes    Previous Hospitalizations:   Any hospitalizations or ED visits within the last 12 months?: Yes    How many hospitalizations have you had in the last year?: 1-2    Advance Care Planning:   Living will: Yes    Durable POA for healthcare: Yes    Advanced directive: Yes      Cognitive Screening:   Provider or family/friend/caregiver concerned regarding cognition?: No    PREVENTIVE SCREENINGS      Cardiovascular Screening:    General: Screening Current and Risks and Benefits Discussed      Diabetes Screening:     General: Screening Current and Risks and Benefits Discussed      Colorectal Cancer Screening:     General: Screening Current    Due for: Colonoscopy - Low Risk      Prostate Cancer Screening:    General: Screening Current and Risks and Benefits Discussed      Osteoporosis Screening:    General: Risks and Benefits Discussed and Screening Not Indicated      Abdominal Aortic  "Aneurysm (AAA) Screening:    Risk factors include: age between 65-74 yo and tobacco use        General: Risks and Benefits Discussed and Screening Not Indicated      Lung Cancer Screening:     General: Risks and Benefits Discussed and Screening Not Indicated      Hepatitis C Screening:    General: Risks and Benefits Discussed and Screening Current    Screening, Brief Intervention, and Referral to Treatment (SBIRT)    Screening      Single Item Drug Screening:  How often have you used an illegal drug (including marijuana) or a prescription medication for non-medical reasons in the past year? never    Single Item Drug Screen Score: 0  Interpretation: Negative screen for possible drug use disorder    Other Counseling Topics:   Regular weightbearing exercise.     Social Determinants of Health     Financial Resource Strain: Low Risk  (9/7/2023)    Overall Financial Resource Strain (CARDIA)     Difficulty of Paying Living Expenses: Not hard at all   Food Insecurity: No Food Insecurity (10/11/2024)    Hunger Vital Sign     Worried About Running Out of Food in the Last Year: Never true     Ran Out of Food in the Last Year: Never true   Transportation Needs: No Transportation Needs (10/11/2024)    PRAPARE - Transportation     Lack of Transportation (Medical): No     Lack of Transportation (Non-Medical): No   Housing Stability: Low Risk  (10/11/2024)    Housing Stability Vital Sign     Unable to Pay for Housing in the Last Year: No     Number of Times Moved in the Last Year: 0     Homeless in the Last Year: No   Utilities: Not At Risk (10/11/2024)    [facility] Utilities     Threatened with loss of utilities: No     No results found.    Objective     /80 (BP Location: Right arm, Patient Position: Sitting, Cuff Size: Adult)   Pulse 62   Temp 97.9 °F (36.6 °C) (Tympanic)   Resp 18   Ht 6' 1\" (1.854 m)   Wt (!) 147 kg (323 lb)   SpO2 96%   BMI 42.61 kg/m²     Physical Exam    "

## 2024-10-14 ENCOUNTER — OFFICE VISIT (OUTPATIENT)
Dept: UROLOGY | Facility: MEDICAL CENTER | Age: 69
End: 2024-10-14

## 2024-10-14 VITALS
OXYGEN SATURATION: 98 % | SYSTOLIC BLOOD PRESSURE: 142 MMHG | BODY MASS INDEX: 41.75 KG/M2 | WEIGHT: 315 LBS | HEART RATE: 64 BPM | HEIGHT: 73 IN | DIASTOLIC BLOOD PRESSURE: 86 MMHG

## 2024-10-14 DIAGNOSIS — N40.1 BENIGN PROSTATIC HYPERPLASIA WITH LOWER URINARY TRACT SYMPTOMS, SYMPTOM DETAILS UNSPECIFIED: Primary | ICD-10-CM

## 2024-10-14 LAB — POST-VOID RESIDUAL VOLUME, ML POC: 36 ML

## 2024-10-14 NOTE — ASSESSMENT & PLAN NOTE
S/p TURP on 9/3 with Dr. Simms   Preop diagnosis- BPH with LUTS   Complications- none   Pathology- negative for malignancy  Resendez removed- 9/11  PVR today- 35  Good flow, frequency during day, worse at night    Urgency also, no incontinence   Discussed allowing more time for bladder to adjust   Discussed starting trospium- patient would like to allow more time first   Discussed bladder irritants   Plan to follow up in 1 month to discuss starting trospium

## 2024-11-08 ENCOUNTER — TELEPHONE (OUTPATIENT)
Dept: GASTROENTEROLOGY | Facility: MEDICAL CENTER | Age: 69
End: 2024-11-08

## 2024-11-08 NOTE — TELEPHONE ENCOUNTER
11/08/24  Screened by: Zahra Givens    Referring Provider Mynor Slaughter, DO    Pre- Screening:     BMI = 42.61    Has patient been referred for a routine screening Colonoscopy? yes  Is the patient between 45-75 years old? yes      Previous Colonoscopy yes   If yes:    Date: 2021    Facility:     Reason:       SCHEDULING STAFF: If the patient is between 45yrs-49yrs, please advise patient to confirm benefits/coverage with their insurance company for a routine screening colonoscopy, some insurance carriers will only cover at 50yrs or older. If the patient is over 75years old, please schedule an office visit.     Does the patient want to see a Gastroenterologist prior to their procedure OR are they having any GI symptoms? yes    Has the patient been hospitalized or had abdominal surgery in the past 6 months? yes  Prostrate surgery 8 weeks ago/cleared  Does the patient use supplemental oxygen? no    Does the patient take Coumadin, Lovenox, Plavix, Elliquis, Xarelto, or other blood thinning medication? no    Has the patient had a stroke, cardiac event, or stent placed in the past year? no    SCHEDULING STAFF: If patient answers NO to above questions, then schedule procedure. If patient answers YES to above questions, then schedule office appointment.     If patient is between 45yrs - 49yrs, please advise patient that we will have to confirm benefits & coverage with their insurance company for a routine screening colonoscopy.

## 2024-11-08 NOTE — TELEPHONE ENCOUNTER
Patient came in to schedule colonoscopy with Dr. Francis at recommendation of Dr. Mynor Slaughter; he requested an office visit first before scheduling procedure.      Scheduled patient with Dr. Francis for 1/30 @ 9am at , provided appt reminder letter and address for patient along with phone number in case of needing to reschedule.

## 2024-11-11 DIAGNOSIS — E03.9 HYPOTHYROIDISM, UNSPECIFIED TYPE: ICD-10-CM

## 2024-11-11 DIAGNOSIS — N20.0 CALCULUS OF KIDNEY: ICD-10-CM

## 2024-11-12 RX ORDER — ALLOPURINOL 300 MG/1
300 TABLET ORAL DAILY
Qty: 90 TABLET | Refills: 1 | Status: SHIPPED | OUTPATIENT
Start: 2024-11-12

## 2024-11-12 RX ORDER — LEVOTHYROXINE SODIUM 50 UG/1
50 TABLET ORAL DAILY
Qty: 90 TABLET | Refills: 1 | Status: SHIPPED | OUTPATIENT
Start: 2024-11-12

## 2024-11-12 RX ORDER — LEVOTHYROXINE SODIUM 200 UG/1
200 TABLET ORAL DAILY
Qty: 90 TABLET | Refills: 1 | Status: SHIPPED | OUTPATIENT
Start: 2024-11-12

## 2024-11-15 ENCOUNTER — HOSPITAL ENCOUNTER (OUTPATIENT)
Dept: RADIOLOGY | Facility: HOSPITAL | Age: 69
Discharge: HOME | End: 2024-11-15
Payer: MEDICARE

## 2024-11-15 DIAGNOSIS — G89.29 CHRONIC PAIN OF LEFT THUMB: ICD-10-CM

## 2024-11-15 DIAGNOSIS — M79.645 CHRONIC PAIN OF LEFT THUMB: ICD-10-CM

## 2024-11-15 PROCEDURE — 73130 X-RAY EXAM OF HAND: CPT

## 2024-11-17 ENCOUNTER — RESULTS FOLLOW-UP (OUTPATIENT)
Age: 69
End: 2024-11-17

## 2024-11-18 NOTE — TELEPHONE ENCOUNTER
----- Message from Rachael SELBY sent at 11/18/2024  1:11 PM EST -----    ----- Message -----  From: Mynor Slaughter DO  Sent: 11/17/2024  10:26 AM EST  To: [facility]    Call patient.  X-ray of the hand shows mod amount of arthritis in the thumb.  Possible remote fracture deformity distal radius.  Check x-ray left wrist

## 2024-11-25 ENCOUNTER — TELEPHONE (OUTPATIENT)
Age: 69
End: 2024-11-25

## 2024-11-25 NOTE — TELEPHONE ENCOUNTER
Last visit 10/11/2024  Next appt 04/14/2025    Marlo, the patient's spouse, stated that the patient has had stomach pains for the last couple of days and would like to be scheduled with Dr Slaughter for tomorrow. I did mentioned to call early in the morning to check for any openings. If anything comes up, patient would like to be notified. Thank you.

## 2024-11-26 ENCOUNTER — OFFICE VISIT (OUTPATIENT)
Age: 69
End: 2024-11-26
Payer: MEDICARE

## 2024-11-26 VITALS
HEART RATE: 78 BPM | DIASTOLIC BLOOD PRESSURE: 88 MMHG | SYSTOLIC BLOOD PRESSURE: 138 MMHG | HEIGHT: 73 IN | WEIGHT: 315 LBS | OXYGEN SATURATION: 97 % | TEMPERATURE: 97.3 F | BODY MASS INDEX: 41.75 KG/M2

## 2024-11-26 DIAGNOSIS — D69.6 PLATELETS DECREASED (HCC): ICD-10-CM

## 2024-11-26 DIAGNOSIS — K57.92 DIVERTICULITIS: ICD-10-CM

## 2024-11-26 DIAGNOSIS — N39.46 MIXED STRESS AND URGE URINARY INCONTINENCE: Primary | ICD-10-CM

## 2024-11-26 PROBLEM — R32 URINARY INCONTINENCE: Status: ACTIVE | Noted: 2024-11-26

## 2024-11-26 LAB
BILIRUB UR QL STRIP: NEGATIVE
CLARITY UR: CLEAR
COLOR UR: YELLOW
GLUCOSE UR STRIP-MCNC: NEGATIVE MG/DL
HGB UR QL STRIP.AUTO: NEGATIVE
KETONES UR STRIP-MCNC: NEGATIVE MG/DL
LEUKOCYTE ESTERASE UR QL STRIP: NEGATIVE
NITRITE UR QL STRIP: NEGATIVE
PH UR STRIP.AUTO: 5.5 [PH]
PROT UR STRIP-MCNC: NEGATIVE MG/DL
SP GR UR STRIP.AUTO: 1.01 (ref 1–1.03)
UROBILINOGEN UR STRIP-ACNC: <2 MG/DL

## 2024-11-26 PROCEDURE — 87086 URINE CULTURE/COLONY COUNT: CPT | Performed by: FAMILY MEDICINE

## 2024-11-26 PROCEDURE — 99214 OFFICE O/P EST MOD 30 MIN: CPT | Performed by: FAMILY MEDICINE

## 2024-11-26 PROCEDURE — 81003 URINALYSIS AUTO W/O SCOPE: CPT | Performed by: FAMILY MEDICINE

## 2024-11-26 RX ORDER — CIPROFLOXACIN 500 MG/1
500 TABLET, FILM COATED ORAL EVERY 12 HOURS SCHEDULED
Qty: 14 TABLET | Refills: 0 | Status: SHIPPED | OUTPATIENT
Start: 2024-11-26 | End: 2024-12-03

## 2024-11-26 NOTE — PROGRESS NOTES
Assessment/Plan: UA CNS to be done at this time.  Patient will follow with urology appropriately regarding incontinence.  Patient will use Cipro as directed for possible UTI versus diverticulitis.  Patient does see GI also.       Diagnoses and all orders for this visit:    Mixed stress and urge urinary incontinence  -     ciprofloxacin (CIPRO) 500 mg tablet; Take 1 tablet (500 mg total) by mouth every 12 (twelve) hours for 7 days    Diverticulitis  -     ciprofloxacin (CIPRO) 500 mg tablet; Take 1 tablet (500 mg total) by mouth every 12 (twelve) hours for 7 days    Platelets decreased (HCC)            Subjective:        Patient ID: Fuad Fierro Jr. is a 69 y.o. male.      Patient is here with lower quadrant abdominal pain over the past week or so.  Pain is constant overall.  No nausea or vomiting.  Patient with some incontinence of urine.  Patient seening urology on Monday.  No dysuria or hematuria.  Patient to use Imodium as well as Pepto-Bismol.    Abdominal Pain          The following portions of the patient's history were reviewed and updated as appropriate: allergies, current medications, past family history, past medical history, past social history, past surgical history and problem list.      Review of Systems   Constitutional: Negative.    HENT: Negative.     Eyes: Negative.    Respiratory: Negative.     Cardiovascular: Negative.    Gastrointestinal:  Positive for abdominal pain.        Soft romero noted   Endocrine: Negative.    Genitourinary: Negative.         Urinary incontinence   Musculoskeletal: Negative.    Skin: Negative.    Allergic/Immunologic: Negative.    Neurological: Negative.    Hematological: Negative.    Psychiatric/Behavioral: Negative.             Objective:        Depression Screening and Follow-up Plan: Patient was screened for depression during today's encounter. They screened negative with a PHQ-2 score of 0.            /88 (BP Location: Right arm, Patient Position: Sitting,  "Cuff Size: Large)   Pulse 78   Temp (!) 97.3 °F (36.3 °C) (Temporal)   Ht 6' 1\" (1.854 m)   Wt (!) 148 kg (326 lb)   SpO2 97%   BMI 43.01 kg/m²          Physical Exam  Vitals and nursing note reviewed.   Constitutional:       General: He is not in acute distress.     Appearance: Normal appearance. He is not ill-appearing, toxic-appearing or diaphoretic.   HENT:      Head: Normocephalic and atraumatic.      Right Ear: Tympanic membrane, ear canal and external ear normal. There is no impacted cerumen.      Left Ear: Tympanic membrane, ear canal and external ear normal. There is no impacted cerumen.      Nose: Nose normal. No congestion or rhinorrhea.   Eyes:      General: No scleral icterus.        Right eye: No discharge.         Left eye: No discharge.   Neck:      Vascular: No carotid bruit.   Cardiovascular:      Rate and Rhythm: Normal rate and regular rhythm.      Pulses: Normal pulses.      Heart sounds: Normal heart sounds. No murmur heard.     No friction rub. No gallop.   Pulmonary:      Effort: Pulmonary effort is normal. No respiratory distress.      Breath sounds: Normal breath sounds. No stridor. No wheezing, rhonchi or rales.   Chest:      Chest wall: No tenderness.   Abdominal:      General: Abdomen is flat. Bowel sounds are normal. There is no distension.      Palpations: Abdomen is soft.      Tenderness: There is abdominal tenderness. There is no guarding or rebound.   Musculoskeletal:         General: No swelling, tenderness, deformity or signs of injury. Normal range of motion.      Cervical back: Normal range of motion and neck supple. No rigidity. No muscular tenderness.      Right lower leg: No edema.      Left lower leg: No edema.   Lymphadenopathy:      Cervical: No cervical adenopathy.   Skin:     General: Skin is warm and dry.      Capillary Refill: Capillary refill takes less than 2 seconds.      Coloration: Skin is not jaundiced.      Findings: No bruising, erythema, lesion or rash. "   Neurological:      Mental Status: He is alert and oriented to person, place, and time.      Cranial Nerves: No cranial nerve deficit.      Sensory: No sensory deficit.      Motor: No weakness.      Coordination: Coordination normal.      Gait: Gait normal.   Psychiatric:         Mood and Affect: Mood normal.         Behavior: Behavior normal.         Thought Content: Thought content normal.         Judgment: Judgment normal.

## 2024-11-27 LAB — BACTERIA UR CULT: NORMAL

## 2024-11-29 NOTE — PROGRESS NOTES
12/2/2024      Chief Complaint   Patient presents with    Oceractive Bladder    Benign Prostatic Hypertrophy     Assessment and Plan    69 y.o. male managed by Dr. Simms       1. Urinary urgency  Assessment & Plan:  S/p TURP on 9/3 with Dr. Simms   Preop diagnosis- BPH with LUTS   Complications- none   Pathology- negative for malignancy  Resendez removed- 9/11  PVR 10/14- 35  UA and urine culture 11/26- negative for infection   Good flow, frequency during day, worse at night    Urgency ongoing, no incontinence   Plan to trial trospium for his urgency   Medication sent   Discussed bladder irritants   Sometimes drinks coffee at dinnertime or around bedtime  Discontinue fluids at least 2 hours before bed time  Recommend discontinuing coffee near bedtime  Plan to follow up in 3 months with [facility]   Orders:  -     trospium chloride (SANCTURA) 20 mg tablet; Take 1 tablet (20 mg total) by mouth 2 (two) times a day    History of Present Illness  Fuad Fierro Jr. is a 69 y.o. male here for evaluation of urinary urgency.  Patient status post TURP on 9/8 with Dr. Simms.  Preop diagnosis was BPH with lower urinary tract symptoms.  Since surgery patient reports strong urinary stream.  He reports frequency during the day and at nighttime.  He reports his frequency is worse at night.  He also reports urgency that has been ongoing now.  He denies incontinence.  He would like to trial medication for his urgency.  He reports that he drinks coffee throughout the day and sometimes at dinnertime.  He also drinks soda occasionally.  He denies dysuria or gross hematuria.    Lab Results   Component Value Date    PSA 1.128 07/02/2024    PSA 0.90 09/06/2023    PSA 1.0 02/23/2023     Due for annual visit in July    Review of Systems   Constitutional:  Negative for chills and fever.   HENT:  Negative for ear pain and sore throat.    Eyes:  Negative for pain and visual disturbance.   Respiratory:  Negative for cough and shortness of  "breath.    Cardiovascular:  Negative for chest pain and palpitations.   Gastrointestinal:  Negative for abdominal pain and vomiting.   Genitourinary:  Positive for frequency and urgency. Negative for decreased urine volume, difficulty urinating, dysuria, flank pain and hematuria.   Musculoskeletal:  Negative for arthralgias and back pain.   Skin:  Negative for color change and rash.   Neurological:  Negative for seizures and syncope.   All other systems reviewed and are negative.               Vitals  Vitals:    12/02/24 1218   BP: 130/70   BP Location: Left arm   Patient Position: Sitting   Cuff Size: Standard   Pulse: 78   SpO2: 98%   Weight: (!) 148 kg (326 lb)   Height: 6' 1\" (1.854 m)       Physical Exam  Vitals reviewed.   Constitutional:       Appearance: Normal appearance.   HENT:      Head: Normocephalic and atraumatic.   Eyes:      Conjunctiva/sclera: Conjunctivae normal.   Pulmonary:      Effort: Pulmonary effort is normal.   Abdominal:      General: Abdomen is flat. There is no distension.      Palpations: Abdomen is soft.      Tenderness: There is no abdominal tenderness.   Musculoskeletal:         General: Normal range of motion.      Cervical back: Normal range of motion.   Skin:     General: Skin is warm and dry.   Neurological:      General: No focal deficit present.      Mental Status: He is alert and oriented to person, place, and time.   Psychiatric:         Mood and Affect: Mood normal.         Behavior: Behavior normal.         Thought Content: Thought content normal.         Judgment: Judgment normal.           Past History  Past Medical History:   Diagnosis Date    Anesthesia     \"cant have spinal anesthesia because of curvature of the spine and severe pain\"    Anxiety     Arthritis     Athlete's foot     off and on    Benign polyp of large intestine     Cancer (HCC)     thyroid,     Chronic pain disorder     bilat knees    Colon polyp     Curvature of spine     DDD (degenerative disc " "disease), lumbar     Dental crowns present     Depression     Disease of thyroid gland     removed    Dizziness     Dyslipidemia     Enlarged prostate     Exercises daily     \"walking/biking/lifts weights\"    GERD (gastroesophageal reflux disease)     Hiatal hernia     History of radiation therapy     \"iodine pill for thyroid cancer\"    Hypertension     IBS (irritable bowel syndrome)     Kidney stone     still has on the left, and small one on right\"    Neuropathy     Numbness and tingling of both feet     Obesity     Retinal hole or tear, bilateral     had laser surgery Dr Caceres/ 3-4 yrs ago    S/P TURP (status post transurethral resection of prostate)     Scoliosis     Seasonal allergies     Teeth missing     Tension headache     occas    Tinea pedis     Urinary frequency     Urinary urgency     Wears glasses      Social History     Socioeconomic History    Marital status: /Civil Union     Spouse name: Not on file    Number of children: Not on file    Years of education: Not on file    Highest education level: Not on file   Occupational History    Occupation: retired security   Tobacco Use    Smoking status: Former     Current packs/day: 0.00     Average packs/day: 1 pack/day for 40.0 years (40.0 ttl pk-yrs)     Types: Cigarettes     Start date: 6/20/1974     Quit date: 6/20/2014     Years since quitting: 10.4    Smokeless tobacco: Never   Vaping Use    Vaping status: Never Used   Substance and Sexual Activity    Alcohol use: Yes     Alcohol/week: 2.0 standard drinks of alcohol     Types: 1 Glasses of wine, 1 Cans of beer per week     Comment: very rarely.    Drug use: No    Sexual activity: Not Currently   Other Topics Concern    Not on file   Social History Narrative    Not on file     Social Drivers of Health     Financial Resource Strain: Low Risk  (9/7/2023)    Overall Financial Resource Strain (CARDIA)     Difficulty of Paying Living Expenses: Not hard at all   Food Insecurity: No Food Insecurity " (10/11/2024)    Nursing - Inadequate Food Risk Classification     Worried About Running Out of Food in the Last Year: Never true     Ran Out of Food in the Last Year: Never true     Ran Out of Food in the Last Year: Not on file   Transportation Needs: No Transportation Needs (10/11/2024)    PRAPARE - Transportation     Lack of Transportation (Medical): No     Lack of Transportation (Non-Medical): No   Physical Activity: Unknown (2/24/2021)    Exercise Vital Sign     Days of Exercise per Week: 3 days     Minutes of Exercise per Session: Not on file   Stress: No Stress Concern Present (2/24/2021)    [employer] [employer] [place] [employer] - Occupational Stress Questionnaire     Feeling of Stress : Only a little   Social Connections: Moderately Integrated (2/24/2021)    Social Connection and Isolation Panel [NHANES]     Frequency of Communication with Friends and Family: Never     Frequency of Social Gatherings with Friends and Family: Twice a week     Attends Zoroastrian Services: More than 4 times per year     Active Member of Clubs or Organizations: Yes     Attends Club or Organization Meetings: More than 4 times per year     Marital Status:    Intimate Partner Violence: Not At Risk (2/24/2021)    Humiliation, Afraid, Rape, and Kick questionnaire     Fear of Current or Ex-Partner: No     Emotionally Abused: No     Physically Abused: No     Sexually Abused: No   Housing Stability: Low Risk  (10/11/2024)    Housing Stability Vital Sign     Unable to Pay for Housing in the Last Year: No     Number of Times Moved in the Last Year: 0     Homeless in the Last Year: No     Social History     Tobacco Use   Smoking Status Former    Current packs/day: 0.00    Average packs/day: 1 pack/day for 40.0 years (40.0 ttl pk-yrs)    Types: Cigarettes    Start date: 6/20/1974    Quit date: 6/20/2014    Years since quitting: 10.4   Smokeless Tobacco Never     Family History   Problem Relation Age of Onset    Diabetes Mother      Heart disease Father     Breast cancer Sister     Depression Son     Cancer Sister        The following portions of the patient's history were reviewed and updated as appropriate: allergies, current medications, past medical history, past social history, past surgical history and problem list.    Results  No results found for this or any previous visit (from the past hour).]  Lab Results   Component Value Date    PSA 1.128 07/02/2024    PSA 0.90 09/06/2023    PSA 1.0 02/23/2023    PSA 0.5 01/24/2022     Lab Results   Component Value Date    GLUCOSE 91 06/27/2015    CALCIUM 9.1 08/16/2024     06/27/2015    K 4.5 08/16/2024    CO2 25 08/16/2024     08/16/2024    BUN 18 08/16/2024    CREATININE 0.99 08/16/2024     Lab Results   Component Value Date    WBC 6.37 08/16/2024    HGB 16.1 08/16/2024    HCT 47.8 08/16/2024    MCV 91 08/16/2024     (L) 08/16/2024

## 2024-12-02 ENCOUNTER — OFFICE VISIT (OUTPATIENT)
Dept: UROLOGY | Facility: MEDICAL CENTER | Age: 69
End: 2024-12-02

## 2024-12-02 VITALS
BODY MASS INDEX: 41.75 KG/M2 | HEART RATE: 78 BPM | DIASTOLIC BLOOD PRESSURE: 70 MMHG | WEIGHT: 315 LBS | HEIGHT: 73 IN | SYSTOLIC BLOOD PRESSURE: 130 MMHG | OXYGEN SATURATION: 98 %

## 2024-12-02 DIAGNOSIS — R39.15 URINARY URGENCY: Primary | ICD-10-CM

## 2024-12-02 PROCEDURE — 99024 POSTOP FOLLOW-UP VISIT: CPT

## 2024-12-02 RX ORDER — TROSPIUM CHLORIDE 20 MG/1
20 TABLET, FILM COATED ORAL 2 TIMES DAILY
Qty: 90 TABLET | Refills: 3 | Status: SHIPPED | OUTPATIENT
Start: 2024-12-02

## 2024-12-02 NOTE — ASSESSMENT & PLAN NOTE
S/p TURP on 9/3 with Dr. Simms   Preop diagnosis- BPH with LUTS   Complications- none   Pathology- negative for malignancy  Ersendez removed- 9/11  PVR 10/14- 35  UA and urine culture 11/26- negative for infection   Good flow, frequency during day, worse at night    Urgency ongoing, no incontinence   Plan to trial trospium for his urgency   Medication sent   Discussed bladder irritants   Sometimes drinks coffee at dinnertime or around bedtime  Discontinue fluids at least 2 hours before bed time  Recommend discontinuing coffee near bedtime  Plan to follow up in 3 months with [facility]

## 2024-12-10 ENCOUNTER — TELEPHONE (OUTPATIENT)
Age: 69
End: 2024-12-10

## 2024-12-10 NOTE — TELEPHONE ENCOUNTER
Agree with recommendations provided.  If abdominal cramping was due to medication can discuss trialing different medication.

## 2024-12-10 NOTE — TELEPHONE ENCOUNTER
Abdominal cramping since starting the Trospium    Advised to stop the medication to see if cramping resolves, agreeable. Will call back in a few days to update us.    Please advise if any other recommendations or to change medication    CB #[phone], leave a message if no answer

## 2024-12-12 NOTE — TELEPHONE ENCOUNTER
Call placed. LMOM per med com consent advising pt to give our office a call back to check on how pt is doing with his abdominal cramping. Call back # provided.

## 2024-12-12 NOTE — TELEPHONE ENCOUNTER
Wife returned call;  states that abdominal cramping has stopped.  No further concerns at this time; wife stated they will probably discuss new medication at next appointment on 3/3/25;  She will call office sooner with any concerns.

## 2025-01-30 ENCOUNTER — OFFICE VISIT (OUTPATIENT)
Dept: GASTROENTEROLOGY | Facility: CLINIC | Age: 70
End: 2025-01-30
Payer: MEDICARE

## 2025-01-30 VITALS
SYSTOLIC BLOOD PRESSURE: 130 MMHG | WEIGHT: 315 LBS | DIASTOLIC BLOOD PRESSURE: 82 MMHG | BODY MASS INDEX: 40.43 KG/M2 | HEIGHT: 74 IN | TEMPERATURE: 96.7 F

## 2025-01-30 DIAGNOSIS — R10.30 LOWER ABDOMINAL PAIN: ICD-10-CM

## 2025-01-30 DIAGNOSIS — Z86.0100 HISTORY OF COLON POLYPS: Primary | ICD-10-CM

## 2025-01-30 DIAGNOSIS — K76.0 HEPATIC STEATOSIS: ICD-10-CM

## 2025-01-30 DIAGNOSIS — K21.9 GASTROESOPHAGEAL REFLUX DISEASE WITHOUT ESOPHAGITIS: ICD-10-CM

## 2025-01-30 PROBLEM — K62.5 RECTAL BLEEDING: Status: RESOLVED | Noted: 2021-09-17 | Resolved: 2025-01-30

## 2025-01-30 PROCEDURE — G2211 COMPLEX E/M VISIT ADD ON: HCPCS | Performed by: INTERNAL MEDICINE

## 2025-01-30 PROCEDURE — 99204 OFFICE O/P NEW MOD 45 MIN: CPT | Performed by: INTERNAL MEDICINE

## 2025-01-30 NOTE — ASSESSMENT & PLAN NOTE
Is lower abdominal pain is likely functional in nature as it has responded to dicyclomine as needed.  He can continue this.

## 2025-01-30 NOTE — ASSESSMENT & PLAN NOTE
He has a history of hepatic steatosis and I encouraged him to have slow sustained weight loss through combination of diet and exercise as this will reduce his risk of progressing to cirrhosis.

## 2025-01-30 NOTE — ASSESSMENT & PLAN NOTE
His reflux is responded well to omeprazole as needed and I encouraged him to continue this.  I will also share with him information about diet and lifestyle modifications for the reflux.

## 2025-01-30 NOTE — PROGRESS NOTES
Name: Fuad Fierro Jr.      : 1955      MRN: [id number]  Encounter Provider: Jose Daniel Francis MD  Encounter Date: 2025   Encounter department: [facility] [place]  :  Assessment & Plan  History of colon polyps  For his history of colon polyps, I will schedule him for repeat colonoscopy.  Orders:    Ambulatory referral to Gastroenterology    Lower abdominal pain  Is lower abdominal pain is likely functional in nature as it has responded to dicyclomine as needed.  He can continue this.       Gastroesophageal reflux disease without esophagitis  His reflux is responded well to omeprazole as needed and I encouraged him to continue this.  I will also share with him information about diet and lifestyle modifications for the reflux.       Hepatic steatosis  He has a history of hepatic steatosis and I encouraged him to have slow sustained weight loss through combination of diet and exercise as this will reduce his risk of progressing to cirrhosis.           History of Present Illness   HPI  Fuad Fierro Jr. is a 69 y.o. male who presents for evaluation because of his history of gastroesophageal reflux disease, colon polyps, lower abdominal pain, and hepatic steatosis.  He said that he has been taking omeprazole as needed for his reflux and this has been helping.  He denies any difficulty swallowing or vomiting.  He occasionally gets lower abdominal pain and this is responded well to dicyclomine as needed.  He has a history of colon polyps and his last colonoscopy was in 2021.  His last upper endoscopy was in 2022 and it was notable for a small hiatal hernia and gastritis.    He has a history of hepatic steatosis and said he does not drink a large amount of alcohol.  In the past he has been told this is likely due to his weight.      Review of Systems   Constitutional:  Negative for chills, fatigue, fever and unexpected weight change.   HENT:  Negative for  "trouble swallowing.    Eyes:  Negative for visual disturbance.   Respiratory:  Negative for cough and shortness of breath.    Cardiovascular:  Negative for chest pain.   Gastrointestinal:  Positive for constipation. Negative for abdominal distention, abdominal pain, blood in stool, diarrhea, nausea and vomiting.   Musculoskeletal:  Negative for arthralgias, gait problem and myalgias.   Skin:  Negative for pallor and rash.   Neurological:  Positive for numbness. Negative for dizziness, weakness and headaches.   Hematological:  Negative for adenopathy. Does not bruise/bleed easily.   Psychiatric/Behavioral:  Negative for dysphoric mood. The patient is not nervous/anxious.           Objective   /82 (BP Location: Right arm, Patient Position: Sitting, Cuff Size: Large)   Temp (!) 96.7 °F (35.9 °C) (Tympanic)   Ht 6' 2\" (1.88 m)   Wt (!) 151 kg (332 lb 6.4 oz)   BMI 42.68 kg/m²      Physical Exam  Constitutional:       Appearance: He is well-developed.   HENT:      Head: Normocephalic and atraumatic.   Eyes:      General: No scleral icterus.     Conjunctiva/sclera: Conjunctivae normal.   Cardiovascular:      Rate and Rhythm: Normal rate and regular rhythm.      Heart sounds: Normal heart sounds.   Pulmonary:      Effort: Pulmonary effort is normal.      Breath sounds: Normal breath sounds. No wheezing.   Abdominal:      General: Bowel sounds are normal. There is no distension.      Palpations: Abdomen is soft. There is no mass.      Tenderness: There is no abdominal tenderness. There is no guarding or rebound.   Musculoskeletal:         General: Normal range of motion.      Cervical back: Normal range of motion and neck supple.   Lymphadenopathy:      Cervical: No cervical adenopathy.   Skin:     General: Skin is warm and dry.      Findings: No rash.   Neurological:      Mental Status: He is alert and oriented to person, place, and time.           "

## 2025-01-30 NOTE — PATIENT INSTRUCTIONS
Scheduled date of colonoscopy (as of today):08.07.25  Physician performing colonoscopy:DR CASTANEDA  Location of colonoscopy:  Bowel prep reviewed with patient:JEN/MAG CITRATE  Instructions reviewed with patient by:SHAWNA  Clearances: N/A

## 2025-02-05 ENCOUNTER — OFFICE VISIT (OUTPATIENT)
Age: 70
End: 2025-02-05
Payer: MEDICARE

## 2025-02-05 VITALS
TEMPERATURE: 98.5 F | HEART RATE: 87 BPM | OXYGEN SATURATION: 98 % | WEIGHT: 315 LBS | SYSTOLIC BLOOD PRESSURE: 124 MMHG | HEIGHT: 74 IN | DIASTOLIC BLOOD PRESSURE: 80 MMHG | BODY MASS INDEX: 40.43 KG/M2

## 2025-02-05 DIAGNOSIS — R06.2 WHEEZING: ICD-10-CM

## 2025-02-05 DIAGNOSIS — J01.90 ACUTE NON-RECURRENT SINUSITIS, UNSPECIFIED LOCATION: ICD-10-CM

## 2025-02-05 DIAGNOSIS — J20.9 ACUTE BRONCHITIS, UNSPECIFIED ORGANISM: Primary | ICD-10-CM

## 2025-02-05 PROCEDURE — 99214 OFFICE O/P EST MOD 30 MIN: CPT | Performed by: FAMILY MEDICINE

## 2025-02-05 PROCEDURE — G2211 COMPLEX E/M VISIT ADD ON: HCPCS | Performed by: FAMILY MEDICINE

## 2025-02-05 PROCEDURE — 87636 SARSCOV2 & INF A&B AMP PRB: CPT | Performed by: FAMILY MEDICINE

## 2025-02-05 RX ORDER — PREDNISONE 20 MG/1
40 TABLET ORAL DAILY
Qty: 10 TABLET | Refills: 0 | Status: SHIPPED | OUTPATIENT
Start: 2025-02-05 | End: 2025-02-10

## 2025-02-05 RX ORDER — AZITHROMYCIN 250 MG/1
TABLET, FILM COATED ORAL
Qty: 6 TABLET | Refills: 0 | Status: SHIPPED | OUTPATIENT
Start: 2025-02-05 | End: 2025-02-09

## 2025-02-05 NOTE — PROGRESS NOTES
Assessment/Plan:       Diagnoses and all orders for this visit:    Acute bronchitis, unspecified organism  Comments:  [place].  Viral testing done at this time.  Orders:  -     azithromycin (ZITHROMAX) 250 mg tablet; Take 2 tablets today then 1 tablet daily x 4 days    Acute non-recurrent sinusitis, unspecified location  Comments:  Azithromycin  Orders:  -     azithromycin (ZITHROMAX) 250 mg tablet; Take 2 tablets today then 1 tablet daily x 4 days    Wheezing  Comments:  Prednisone  Orders:  -     predniSONE 20 mg tablet; Take 2 tablets (40 mg total) by mouth daily for 5 days            Subjective:        Patient ID: Fuad Fierro Jr. is a 69 y.o. male.      Patient is here with chills, body aches, fatigue since last Friday.  Patient was having sweats.  Mild cough noted.  No sore throat.  No vomiting noted.  Some looser stools noted.  Patient with some back pain lumbar region bilaterally.  Urination is normal.  Some abdominal discomfort.  Patient did see GI.  Patient is using dicyclomine with some relief.  Patient using NyQuil and DayQuil also.  Patient to use Tessalon.  Patient using Tylenol as well as Benadryl.    Back Pain  Associated symptoms include abdominal pain. Pertinent negatives include no fever.   Cough  Associated symptoms include chills and postnasal drip. Pertinent negatives include no fever.   Abdominal Pain  Associated symptoms include arthralgias and diarrhea. Pertinent negatives include no fever or vomiting.         The following portions of the patient's history were reviewed and updated as appropriate: allergies, current medications, past family history, past medical history, past social history, past surgical history and problem list.      Review of Systems   Constitutional:  Positive for chills and fatigue. Negative for fever.   HENT:  Positive for postnasal drip.    Eyes: Negative.    Respiratory:  Positive for cough.    Cardiovascular: Negative.    Gastrointestinal:  Positive for abdominal  "pain and diarrhea. Negative for blood in stool and vomiting.   Endocrine: Negative.    Genitourinary: Negative.    Musculoskeletal:  Positive for arthralgias and back pain.   Skin: Negative.    Allergic/Immunologic: Negative.    Neurological: Negative.    Hematological: Negative.    Psychiatric/Behavioral: Negative.             Objective:        Depression Screening and Follow-up Plan: Patient was screened for depression during today's encounter. They screened negative with a PHQ-2 score of 0.            /80 (BP Location: Left arm, Patient Position: Sitting, Cuff Size: Large)   Pulse 87   Temp 98.5 °F (36.9 °C) (Temporal)   Ht 6' 2\" (1.88 m)   Wt (!) 145 kg (320 lb)   SpO2 98%   BMI 41.09 kg/m²          Physical Exam  Vitals and nursing note reviewed.   Constitutional:       General: He is not in acute distress.     Appearance: He is ill-appearing. He is not toxic-appearing or diaphoretic.   HENT:      Head: Normocephalic and atraumatic.      Right Ear: Tympanic membrane, ear canal and external ear normal. There is no impacted cerumen.      Left Ear: Tympanic membrane, ear canal and external ear normal. There is no impacted cerumen.      Nose: Nose normal. No congestion or rhinorrhea.      Mouth/Throat:      Mouth: Mucous membranes are moist.      Pharynx: No oropharyngeal exudate or posterior oropharyngeal erythema.   Eyes:      General: No scleral icterus.        Right eye: No discharge.         Left eye: No discharge.   Neck:      Vascular: No carotid bruit.   Cardiovascular:      Rate and Rhythm: Normal rate and regular rhythm.      Pulses: Normal pulses.      Heart sounds: Normal heart sounds. No murmur heard.     No friction rub. No gallop.   Pulmonary:      Effort: Pulmonary effort is normal. No respiratory distress.      Breath sounds: No stridor. Wheezing present. No rhonchi or rales.   Chest:      Chest wall: No tenderness.   Musculoskeletal:         General: Tenderness present. No swelling, " deformity or signs of injury.      Cervical back: Normal range of motion and neck supple. No rigidity. No muscular tenderness.      Right lower leg: No edema.      Left lower leg: No edema.   Lymphadenopathy:      Cervical: No cervical adenopathy.   Skin:     General: Skin is warm and dry.      Capillary Refill: Capillary refill takes less than 2 seconds.      Coloration: Skin is not jaundiced.      Findings: No bruising, erythema, lesion or rash.   Neurological:      Mental Status: He is alert and oriented to person, place, and time. Mental status is at baseline.      Cranial Nerves: No cranial nerve deficit.      Sensory: No sensory deficit.      Motor: No weakness.      Coordination: Coordination normal.      Gait: Gait normal.   Psychiatric:         Mood and Affect: Mood normal.         Behavior: Behavior normal.         Thought Content: Thought content normal.         Judgment: Judgment normal.

## 2025-02-06 ENCOUNTER — RESULTS FOLLOW-UP (OUTPATIENT)
Age: 70
End: 2025-02-06

## 2025-02-06 LAB
FLUAV RNA RESP QL NAA+PROBE: POSITIVE
FLUBV RNA RESP QL NAA+PROBE: NEGATIVE
SARS-COV-2 RNA RESP QL NAA+PROBE: NEGATIVE

## 2025-02-17 ENCOUNTER — OFFICE VISIT (OUTPATIENT)
Age: 70
End: 2025-02-17
Payer: MEDICARE

## 2025-02-17 VITALS
OXYGEN SATURATION: 98 % | WEIGHT: 315 LBS | TEMPERATURE: 98.1 F | HEART RATE: 58 BPM | BODY MASS INDEX: 40.43 KG/M2 | DIASTOLIC BLOOD PRESSURE: 80 MMHG | HEIGHT: 74 IN | SYSTOLIC BLOOD PRESSURE: 132 MMHG

## 2025-02-17 DIAGNOSIS — G62.9 NEUROPATHY: ICD-10-CM

## 2025-02-17 DIAGNOSIS — R60.0 LOWER EXTREMITY EDEMA: ICD-10-CM

## 2025-02-17 DIAGNOSIS — L03.116 CELLULITIS OF LEFT LOWER EXTREMITY: Primary | ICD-10-CM

## 2025-02-17 DIAGNOSIS — I87.8 VENOUS STASIS: ICD-10-CM

## 2025-02-17 PROCEDURE — G2211 COMPLEX E/M VISIT ADD ON: HCPCS | Performed by: FAMILY MEDICINE

## 2025-02-17 PROCEDURE — 99214 OFFICE O/P EST MOD 30 MIN: CPT | Performed by: FAMILY MEDICINE

## 2025-02-17 RX ORDER — CEPHALEXIN 500 MG/1
500 CAPSULE ORAL 3 TIMES DAILY
Qty: 30 CAPSULE | Refills: 0 | Status: SHIPPED | OUTPATIENT
Start: 2025-02-17 | End: 2025-02-27

## 2025-02-17 NOTE — PROGRESS NOTES
"Assessment/Plan:       Diagnoses and all orders for this visit:    Cellulitis of left lower extremity  Comments:  Patient will elevate leg and use warm compresses and Keflex as directed.  Orders:  -     cephalexin (KEFLEX) 500 mg capsule; Take 1 capsule (500 mg total) by mouth 3 (three) times a day for 10 days    Lower extremity edema  Comments:  Elevate leg.  Orders:  -     cephalexin (KEFLEX) 500 mg capsule; Take 1 capsule (500 mg total) by mouth 3 (three) times a day for 10 days    Neuropathy  Comments:  Stable.    Venous stasis  Comments:  Elevate legs.    Body mass index (BMI) 40.0-44.9, adult ([facility])  Comments:  Diet exercise.            Subjective:        Patient ID: Fuad Fierro Jr. is a 69 y.o. male.      Patient is here with redness over the left foot dorsal aspect over the past day or so.  Patient with some swelling.  Patient with calluses on bilateral feet plantar aspect.  Possible wheezing bilateral.  No fever.  No calf tenderness.    Wheezing  Associated symptoms include leg swelling and wheezing.         The following portions of the patient's history were reviewed and updated as appropriate: allergies, current medications, past family history, past medical history, past social history, past surgical history and problem list.      Review of Systems   Respiratory:  Positive for wheezing.    Cardiovascular:  Positive for leg swelling.   Skin:  Positive for color change and rash.           Objective:               /80 (BP Location: Right arm, Patient Position: Sitting, Cuff Size: Large)   Pulse 58   Temp 98.1 °F (36.7 °C) (Temporal)   Ht 6' 2\" (1.88 m)   Wt (!) 145 kg (320 lb)   SpO2 98%   BMI 41.09 kg/m²          Physical Exam  Vitals and nursing note reviewed.   Constitutional:       General: He is not in acute distress.     Appearance: Normal appearance. He is not ill-appearing, toxic-appearing or diaphoretic.   HENT:      Head: Normocephalic and atraumatic.      Right Ear: Tympanic " membrane, ear canal and external ear normal. There is no impacted cerumen.      Left Ear: Tympanic membrane, ear canal and external ear normal. There is no impacted cerumen.      Nose: Nose normal. No congestion or rhinorrhea.      Mouth/Throat:      Mouth: Mucous membranes are moist.      Pharynx: No oropharyngeal exudate or posterior oropharyngeal erythema.   Eyes:      General: No scleral icterus.        Right eye: No discharge.         Left eye: No discharge.   Neck:      Vascular: No carotid bruit.   Cardiovascular:      Rate and Rhythm: Normal rate and regular rhythm.      Pulses: Normal pulses.      Heart sounds: Normal heart sounds. No murmur heard.     No friction rub. No gallop.   Pulmonary:      Effort: Pulmonary effort is normal. No respiratory distress.      Breath sounds: Normal breath sounds. No stridor. No wheezing, rhonchi or rales.   Chest:      Chest wall: No tenderness.   Musculoskeletal:         General: Tenderness present. No swelling, deformity or signs of injury.      Cervical back: Normal range of motion and neck supple. No rigidity. No muscular tenderness.      Right lower leg: No edema.      Left lower leg: No edema.      Comments: Redness over the dorsum of the left foot with swelling and some pain noted.   Lymphadenopathy:      Cervical: No cervical adenopathy.   Skin:     General: Skin is warm and dry.      Capillary Refill: Capillary refill takes less than 2 seconds.      Coloration: Skin is not jaundiced.      Findings: Erythema present. No bruising, lesion or rash.   Neurological:      Mental Status: He is alert and oriented to person, place, and time. Mental status is at baseline.      Cranial Nerves: No cranial nerve deficit.      Sensory: No sensory deficit.      Motor: No weakness.      Coordination: Coordination normal.      Gait: Gait normal.   Psychiatric:         Mood and Affect: Mood normal.         Behavior: Behavior normal.         Thought Content: Thought content normal.          Judgment: Judgment normal.

## 2025-02-28 NOTE — PROGRESS NOTES
3/3/2025      No chief complaint on file.        Assessment and Plan    69 y.o. male managed by KRISTAN Simms       1. Urinary urgency  Assessment & Plan:  S/p TURP on 9/3 with Dr. Simms   Preop diagnosis- BPH with LUTS   Complications- none   Pathology- negative for malignancy  Resendez removed- 9/11  PVR-39  UA and urine culture 11/26- negative for infection   Continue with dietary modifications  Trospium discontinued due to abdominal cramping  Plan to follow-up in July for annual can reevaluate need for medication  If so can trial Vesicare 5 mg  Orders:  -     POCT Measure PVR  2. Screening for prostate cancer  Assessment & Plan:  Due for annual in July  PSA ordered for prior    Lab Results   Component Value Date    PSA 1.128 07/02/2024    PSA 0.90 09/06/2023    PSA 1.0 02/23/2023     Orders:  -     PSA, Total Screen; Future; Expected date: 07/08/2025        History of Present Illness  Fuad Fierro Jr. is a 69 y.o. male here for evaluation ofevaluation of urinary urgency. Patient status post TURP on 9/8 with Dr. Simms.  Since surgery patient reports strong urinary stream.     He reports frequency during the day and at nighttime. He reports his frequency is worse at night. He also reports urgency that has been ongoing now. He denies incontinence. He reports that he drinks coffee throughout the day and sometimes at dinnertime. He also drinks soda occasionally. He denies dysuria or gross hematuria. He has been cutting the soda out and drinking diet tea.     He was trailed on tropsium but medication was stopped due to abdominal cramping.  He like to continue with dietary modifications due to side effects of OAB medication.        Review of Systems   Constitutional:  Negative for chills and fever.   HENT:  Negative for ear pain and sore throat.    Eyes:  Negative for pain and visual disturbance.   Respiratory:  Negative for cough and shortness of breath.    Cardiovascular:  Negative for chest pain and  "palpitations.   Gastrointestinal:  Negative for abdominal pain and vomiting.   Genitourinary:  Positive for frequency (noctauria) and urgency. Negative for decreased urine volume, difficulty urinating, dysuria and hematuria.   Musculoskeletal:  Negative for arthralgias and back pain.   Skin:  Negative for color change and rash.   Neurological:  Negative for seizures and syncope.   All other systems reviewed and are negative.               Vitals  Vitals:    03/03/25 1302   BP: 140/80   BP Location: Left arm   Patient Position: Sitting   Cuff Size: Standard   Pulse: 84   SpO2: 94%   Weight: (!) 147 kg (323 lb)   Height: 6' 2\" (1.88 m)       Physical Exam  Vitals reviewed.   Constitutional:       Appearance: Normal appearance.   HENT:      Head: Normocephalic and atraumatic.   Eyes:      Conjunctiva/sclera: Conjunctivae normal.   Pulmonary:      Effort: Pulmonary effort is normal.   Abdominal:      General: Abdomen is flat. There is no distension.      Palpations: Abdomen is soft.      Tenderness: There is no abdominal tenderness.   Musculoskeletal:         General: Normal range of motion.      Cervical back: Normal range of motion.   Skin:     General: Skin is warm and dry.   Neurological:      General: No focal deficit present.      Mental Status: He is alert and oriented to person, place, and time.   Psychiatric:         Mood and Affect: Mood normal.         Behavior: Behavior normal.         Thought Content: Thought content normal.         Judgment: Judgment normal.           Past History  Past Medical History:   Diagnosis Date   • Anesthesia     \"cant have spinal anesthesia because of curvature of the spine and severe pain\"   • Anxiety    • Arthritis    • Athlete's foot     off and on   • Benign polyp of large intestine    • Cancer (HCC)     thyroid,    • Chronic pain disorder     bilat knees   • Colon polyp    • Curvature of spine    • DDD (degenerative disc disease), lumbar    • Dental crowns present    • " "Depression    • Disease of thyroid gland     removed   • Dizziness    • Dyslipidemia    • Enlarged prostate    • Exercises daily     \"walking/biking/lifts weights\"   • GERD (gastroesophageal reflux disease)    • Hiatal hernia    • History of radiation therapy     \"iodine pill for thyroid cancer\"   • Hypertension    • IBS (irritable bowel syndrome)    • Kidney stone     still has on the left, and small one on right\"   • Neuropathy    • Neuropathy     Both feet   • Numbness and tingling of both feet    • Obesity    • Retinal hole or tear, bilateral     had laser surgery Dr Caceres/ 3-4 yrs ago   • S/P TURP (status post transurethral resection of prostate)    • Scoliosis    • Seasonal allergies    • Teeth missing    • Tension headache     occas   • Tinea pedis    • Urinary frequency    • Urinary urgency    • Wears glasses      Social History     Socioeconomic History   • Marital status: /Civil Union     Spouse name: None   • Number of children: None   • Years of education: None   • Highest education level: None   Occupational History   • Occupation: retired security   Tobacco Use   • Smoking status: Former     Current packs/day: 0.00     Average packs/day: 1 pack/day for 40.0 years (40.0 ttl pk-yrs)     Types: Cigarettes     Start date: 6/20/1974     Quit date: 6/20/2014     Years since quitting: 10.7   • Smokeless tobacco: Never   Vaping Use   • Vaping status: Never Used   Substance and Sexual Activity   • Alcohol use: Yes     Alcohol/week: 2.0 standard drinks of alcohol     Types: 1 Glasses of wine, 1 Cans of beer per week     Comment: very rarely.   • Drug use: No   • Sexual activity: Not Currently   Other Topics Concern   • None   Social History Narrative   • None     Social Drivers of Health     Financial Resource Strain: Low Risk  (9/7/2023)    Overall Financial Resource Strain (CARDIA)    • Difficulty of Paying Living Expenses: Not hard at all   Food Insecurity: No Food Insecurity (10/11/2024)    Nursing " - Inadequate Food Risk Classification    • Worried About Running Out of Food in the Last Year: Never true    • Ran Out of Food in the Last Year: Never true    • Ran Out of Food in the Last Year: Not on file   Transportation Needs: No Transportation Needs (10/11/2024)    PRAPARE - Transportation    • Lack of Transportation (Medical): No    • Lack of Transportation (Non-Medical): No   Physical Activity: Unknown (2/24/2021)    Exercise Vital Sign    • Days of Exercise per Week: 3 days    • Minutes of Exercise per Session: Not on file   Stress: No Stress Concern Present (2/24/2021)    [employer] - Occupational Stress Questionnaire    • Feeling of Stress : Only a little   Social Connections: Moderately Integrated (2/24/2021)    Social Connection and Isolation Panel [NHANES]    • Frequency of Communication with Friends and Family: Never    • Frequency of Social Gatherings with Friends and Family: Twice a week    • Attends Rastafarian Services: More than 4 times per year    • Active Member of Clubs or Organizations: Yes    • Attends Club or Organization Meetings: More than 4 times per year    • Marital Status:    Intimate Partner Violence: Not At Risk (2/24/2021)    Humiliation, Afraid, Rape, and Kick questionnaire    • Fear of Current or Ex-Partner: No    • Emotionally Abused: No    • Physically Abused: No    • Sexually Abused: No   Housing Stability: Low Risk  (10/11/2024)    Housing Stability Vital Sign    • Unable to Pay for Housing in the Last Year: No    • Number of Times Moved in the Last Year: 0    • Homeless in the Last Year: No     Social History     Tobacco Use   Smoking Status Former   • Current packs/day: 0.00   • Average packs/day: 1 pack/day for 40.0 years (40.0 ttl pk-yrs)   • Types: Cigarettes   • Start date: 6/20/1974   • Quit date: 6/20/2014   • Years since quitting: 10.7   Smokeless Tobacco Never     Family History   Problem Relation Age of Onset   • Diabetes Mother     • Heart disease Father    • Breast cancer Sister    • Depression Son    • Cancer Sister        The following portions of the patient's history were reviewed and updated as appropriate: allergies, current medications, past medical history, past social history, past surgical history and problem list.    Results  Recent Results (from the past hour)   POCT Measure PVR    Collection Time: 03/03/25  1:38 PM   Result Value Ref Range    POST-VOID RESIDUAL VOLUME, ML POC 39 mL   ]  Lab Results   Component Value Date    PSA 1.128 07/02/2024    PSA 0.90 09/06/2023    PSA 1.0 02/23/2023    PSA 0.5 01/24/2022     Lab Results   Component Value Date    GLUCOSE 91 06/27/2015    CALCIUM 9.1 08/16/2024     06/27/2015    K 4.5 08/16/2024    CO2 25 08/16/2024     08/16/2024    BUN 18 08/16/2024    CREATININE 0.99 08/16/2024     Lab Results   Component Value Date    WBC 6.37 08/16/2024    HGB 16.1 08/16/2024    HCT 47.8 08/16/2024    MCV 91 08/16/2024     (L) 08/16/2024

## 2025-03-03 ENCOUNTER — OFFICE VISIT (OUTPATIENT)
Dept: UROLOGY | Facility: MEDICAL CENTER | Age: 70
End: 2025-03-03
Payer: MEDICARE

## 2025-03-03 VITALS
OXYGEN SATURATION: 94 % | BODY MASS INDEX: 40.43 KG/M2 | HEIGHT: 74 IN | SYSTOLIC BLOOD PRESSURE: 140 MMHG | HEART RATE: 84 BPM | DIASTOLIC BLOOD PRESSURE: 80 MMHG | WEIGHT: 315 LBS

## 2025-03-03 DIAGNOSIS — R39.15 URINARY URGENCY: Primary | ICD-10-CM

## 2025-03-03 DIAGNOSIS — Z12.5 SCREENING FOR PROSTATE CANCER: ICD-10-CM

## 2025-03-03 LAB — POST-VOID RESIDUAL VOLUME, ML POC: 39 ML

## 2025-03-03 PROCEDURE — 99213 OFFICE O/P EST LOW 20 MIN: CPT

## 2025-03-03 PROCEDURE — 51798 US URINE CAPACITY MEASURE: CPT

## 2025-03-03 RX ORDER — SOLIFENACIN SUCCINATE 10 MG/1
5 TABLET, FILM COATED ORAL DAILY
Qty: 90 TABLET | Refills: 1 | Status: CANCELLED | OUTPATIENT
Start: 2025-03-03

## 2025-03-03 NOTE — ASSESSMENT & PLAN NOTE
S/p TURP on 9/3 with Dr. Simms   Preop diagnosis- BPH with LUTS   Complications- none   Pathology- negative for malignancy  Resendez removed- 9/11  PVR-39  UA and urine culture 11/26- negative for infection   Continue with dietary modifications  Trospium discontinued due to abdominal cramping  Plan to follow-up in July for annual can reevaluate need for medication  If so can trial Vesicare 5 mg

## 2025-03-03 NOTE — ASSESSMENT & PLAN NOTE
Due for annual in July  PSA ordered for prior    Lab Results   Component Value Date    PSA 1.128 07/02/2024    PSA 0.90 09/06/2023    PSA 1.0 02/23/2023

## 2025-03-26 ENCOUNTER — OFFICE VISIT (OUTPATIENT)
Age: 70
End: 2025-03-26
Payer: MEDICARE

## 2025-03-26 VITALS
OXYGEN SATURATION: 96 % | DIASTOLIC BLOOD PRESSURE: 88 MMHG | HEART RATE: 75 BPM | WEIGHT: 315 LBS | TEMPERATURE: 98.5 F | SYSTOLIC BLOOD PRESSURE: 130 MMHG | BODY MASS INDEX: 40.43 KG/M2 | HEIGHT: 74 IN

## 2025-03-26 DIAGNOSIS — M54.16 LUMBAR RADICULOPATHY: ICD-10-CM

## 2025-03-26 DIAGNOSIS — M25.552 LEFT HIP PAIN: ICD-10-CM

## 2025-03-26 DIAGNOSIS — J01.00 ACUTE NON-RECURRENT MAXILLARY SINUSITIS: Primary | ICD-10-CM

## 2025-03-26 PROCEDURE — 99214 OFFICE O/P EST MOD 30 MIN: CPT | Performed by: FAMILY MEDICINE

## 2025-03-26 PROCEDURE — G2211 COMPLEX E/M VISIT ADD ON: HCPCS | Performed by: FAMILY MEDICINE

## 2025-03-26 RX ORDER — AMOXICILLIN 500 MG/1
1000 CAPSULE ORAL EVERY 12 HOURS SCHEDULED
Qty: 28 CAPSULE | Refills: 0 | Status: SHIPPED | OUTPATIENT
Start: 2025-03-26 | End: 2025-04-02

## 2025-03-26 NOTE — PROGRESS NOTES
Name: Fuad Fierro Jr.      : 1955      MRN: [id number]  Encounter Provider: Tommy Slaughter DO  Encounter Date: 3/26/2025   Encounter department: [facility]  :  Assessment & Plan  Acute non-recurrent maxillary sinusitis  Patient use amoxicillin as directed.  Patient may use nasal saline as directed.    Orders:    amoxicillin (AMOXIL) 500 mg capsule; Take 2 capsules (1,000 mg total) by mouth every 12 (twelve) hours for 7 days    Left hip pain  Check x-ray left hip.  Naproxen as needed    Orders:    XR hip/pelv 2-3 vws left if performed; Future    Lumbar radiculopathy  Patient will go for x-ray of the lumbar spine.  Naproxen as needed.  To consider physical therapy referral to orthopedics.    Orders:    XR spine lumbar minimum 4 views non injury; Future          Depression Screening and Follow-up Plan: Patient was screened for depression during today's encounter. They screened negative with a PHQ-2 score of 0.        History of Present Illness   patient is here with some sinus related pressure, nasal discharge nasal crusting along with some bloody nasal discharge which is gray.  This started on .  Patient did use Vicks as well as DayQuil and Tessalon.  Cough patient with cough with postnasal drip.  Patient also with some hoarseness.  Patient did have some fatigue and bodyaches associated with this.  Patient also having pain in left posterior hip/lumbar region with some pain down the left lateral leg.  Patient using naproxen with some improvement.    Sinusitis  Associated symptoms include congestion, coughing, headaches, sinus pressure and a sore throat.     Review of Systems   Constitutional:  Positive for fatigue.   HENT:  Positive for congestion, postnasal drip, rhinorrhea, sinus pressure, sinus pain, sore throat and voice change.    Eyes: Negative.    Respiratory:  Positive for cough.    Cardiovascular: Negative.    Gastrointestinal: Negative.    Endocrine: Negative.   "  Genitourinary: Negative.    Musculoskeletal:  Positive for arthralgias and gait problem.   Skin: Negative.    Allergic/Immunologic: Negative.    Neurological:  Positive for headaches.   Hematological: Negative.    Psychiatric/Behavioral: Negative.         Objective   /88 (BP Location: Left arm, Patient Position: Sitting, Cuff Size: Large)   Pulse 75   Temp 98.5 °F (36.9 °C) (Temporal)   Ht 6' 2\" (1.88 m)   Wt (!) 144 kg (317 lb)   SpO2 96%   BMI 40.70 kg/m²      Physical Exam  Vitals and nursing note reviewed.   Constitutional:       General: He is not in acute distress.     Appearance: Normal appearance. He is well-developed. He is not ill-appearing, toxic-appearing or diaphoretic.   HENT:      Head: Normocephalic and atraumatic.      Right Ear: Tympanic membrane, ear canal and external ear normal.      Left Ear: Tympanic membrane, ear canal and external ear normal.      Nose: Nose normal.      Mouth/Throat:      Mouth: Mucous membranes are moist.      Pharynx: Oropharyngeal exudate and posterior oropharyngeal erythema present.   Eyes:      General:         Right eye: No discharge.         Left eye: No discharge.   Neck:      Thyroid: No thyromegaly.      Vascular: No carotid bruit.      Trachea: No tracheal deviation.   Cardiovascular:      Rate and Rhythm: Normal rate and regular rhythm.      Pulses: Normal pulses.      Heart sounds: Normal heart sounds. No murmur heard.     No gallop.   Pulmonary:      Effort: Pulmonary effort is normal. No respiratory distress.      Breath sounds: Normal breath sounds. No stridor. No wheezing or rales.   Chest:      Chest wall: No tenderness.   Musculoskeletal:         General: Tenderness present. No deformity.      Cervical back: Normal range of motion and neck supple.      Comments: Tenderness left hip laterally/posteriorly.   Lymphadenopathy:      Cervical: No cervical adenopathy.   Skin:     General: Skin is warm and dry.      Coloration: Skin is not pale.     "  Findings: No erythema or rash.   Neurological:      Mental Status: He is alert and oriented to person, place, and time. Mental status is at baseline.      Cranial Nerves: No cranial nerve deficit.      Motor: No abnormal muscle tone.      Coordination: Coordination normal.      Deep Tendon Reflexes: Reflexes normal.   Psychiatric:         Mood and Affect: Mood normal.         Behavior: Behavior normal.         Thought Content: Thought content normal.         Judgment: Judgment normal.

## 2025-03-26 NOTE — ASSESSMENT & PLAN NOTE
Patient use amoxicillin as directed.  Patient may use nasal saline as directed.    Orders:    amoxicillin (AMOXIL) 500 mg capsule; Take 2 capsules (1,000 mg total) by mouth every 12 (twelve) hours for 7 days

## 2025-03-26 NOTE — ADDENDUM NOTE
Addended by: DIANE PRESLEY on: 3/26/2025 12:40 PM     Modules accepted: Orders, Level of Service

## 2025-03-26 NOTE — ASSESSMENT & PLAN NOTE
Patient will go for x-ray of the lumbar spine.  Naproxen as needed.  To consider physical therapy referral to orthopedics.    Orders:    XR spine lumbar minimum 4 views non injury; Future

## 2025-03-26 NOTE — ASSESSMENT & PLAN NOTE
Check x-ray left hip.  Naproxen as needed    Orders:    XR hip/pelv 2-3 vws left if performed; Future

## 2025-04-02 PROBLEM — Z12.5 SCREENING FOR PROSTATE CANCER: Status: RESOLVED | Noted: 2018-07-25 | Resolved: 2025-04-02

## 2025-04-07 ENCOUNTER — RA CDI HCC (OUTPATIENT)
Dept: OTHER | Facility: HOSPITAL | Age: 70
End: 2025-04-07

## 2025-04-14 ENCOUNTER — OFFICE VISIT (OUTPATIENT)
Age: 70
End: 2025-04-14
Payer: MEDICARE

## 2025-04-14 VITALS
HEIGHT: 74 IN | TEMPERATURE: 97.6 F | DIASTOLIC BLOOD PRESSURE: 84 MMHG | OXYGEN SATURATION: 100 % | SYSTOLIC BLOOD PRESSURE: 132 MMHG | HEART RATE: 74 BPM | BODY MASS INDEX: 40.43 KG/M2 | WEIGHT: 315 LBS

## 2025-04-14 DIAGNOSIS — M17.0 BILATERAL PRIMARY OSTEOARTHRITIS OF KNEE: Primary | ICD-10-CM

## 2025-04-14 PROCEDURE — G2211 COMPLEX E/M VISIT ADD ON: HCPCS | Performed by: FAMILY MEDICINE

## 2025-04-14 PROCEDURE — 99213 OFFICE O/P EST LOW 20 MIN: CPT | Performed by: FAMILY MEDICINE

## 2025-04-14 NOTE — ASSESSMENT & PLAN NOTE
Informed consent obtained.  Betadine and alcohol use.  1 cc of Depo-Medrol 80 and 2 cc lidocaine without epinephrine used for arthrocentesis bilateral knees.  Procedure was done sterilely.  Patient tolerated well.  Band-Aid applied.

## 2025-04-14 NOTE — PROGRESS NOTES
"Name: Fuad Fierro Jr.      : 1955      MRN: [id number]  Encounter Provider: Tommy Slaughter DO  Encounter Date: 2025   Encounter department: [facility]  :  Assessment & Plan  Bilateral primary osteoarthritis of knee  Informed consent obtained.  Betadine and alcohol use.  1 cc of Depo-Medrol 80 and 2 cc lidocaine without epinephrine used for arthrocentesis bilateral knees.  Procedure was done sterilely.  Patient tolerated well.  Band-Aid applied.                History of Present Illness   Patient is here for injections of bilateral knees.  Patient with history of osteoarthritis bilaterally.    Knee Pain       Review of Systems   Constitutional: Negative.    HENT: Negative.     Eyes: Negative.    Respiratory: Negative.     Cardiovascular: Negative.    Gastrointestinal: Negative.    Endocrine: Negative.    Genitourinary: Negative.    Musculoskeletal:  Positive for arthralgias and gait problem.   Skin: Negative.    Allergic/Immunologic: Negative.    Hematological: Negative.    Psychiatric/Behavioral: Negative.         Objective   /84 (BP Location: Right arm, Patient Position: Sitting, Cuff Size: Large)   Pulse 74   Temp 97.6 °F (36.4 °C) (Temporal)   Ht 6' 2\" (1.88 m)   Wt (!) 147 kg (323 lb)   SpO2 100%   BMI 41.47 kg/m²      Physical Exam  Vitals and nursing note reviewed.   Constitutional:       General: He is not in acute distress.     Appearance: Normal appearance. He is not ill-appearing, toxic-appearing or diaphoretic.   Musculoskeletal:         General: Tenderness and deformity present.      Comments: Osteoarthritic changes bilateral knees.   Neurological:      Mental Status: He is alert.         "

## 2025-04-25 PROBLEM — J01.00 ACUTE NON-RECURRENT MAXILLARY SINUSITIS: Status: RESOLVED | Noted: 2025-03-26 | Resolved: 2025-04-25

## 2025-05-06 ENCOUNTER — DOCUMENTATION (OUTPATIENT)
Dept: ADMINISTRATIVE | Facility: OTHER | Age: 70
End: 2025-05-06

## 2025-05-06 ENCOUNTER — OFFICE VISIT (OUTPATIENT)
Dept: URGENT CARE | Facility: MEDICAL CENTER | Age: 70
End: 2025-05-06
Payer: MEDICARE

## 2025-05-06 VITALS
HEART RATE: 68 BPM | RESPIRATION RATE: 20 BRPM | DIASTOLIC BLOOD PRESSURE: 84 MMHG | TEMPERATURE: 98.1 F | OXYGEN SATURATION: 97 % | SYSTOLIC BLOOD PRESSURE: 162 MMHG

## 2025-05-06 DIAGNOSIS — M25.561 ACUTE PAIN OF RIGHT KNEE: Primary | ICD-10-CM

## 2025-05-06 DIAGNOSIS — M17.0 OSTEOARTHRITIS OF BOTH KNEES, UNSPECIFIED OSTEOARTHRITIS TYPE: ICD-10-CM

## 2025-05-06 DIAGNOSIS — M17.0 PRIMARY OSTEOARTHRITIS OF BOTH KNEES: ICD-10-CM

## 2025-05-06 PROCEDURE — G0463 HOSPITAL OUTPT CLINIC VISIT: HCPCS | Performed by: NURSE PRACTITIONER

## 2025-05-06 PROCEDURE — 99213 OFFICE O/P EST LOW 20 MIN: CPT | Performed by: NURSE PRACTITIONER

## 2025-05-06 NOTE — PROGRESS NOTES
[facility]        NAME: Fuad Fierro Jr. is a 70 y.o. male  : 1955    MRN: [id number]  DATE: May 6, 2025  TIME: 9:55 AM    Assessment and Plan   Acute pain of right knee [M25.561]  1. Acute pain of right knee  Ambulatory Referral to Orthopedic Surgery      2. Primary osteoarthritis of both knees  Ambulatory Referral to Orthopedic Surgery      3. Osteoarthritis of both knees, unspecified osteoarthritis type  Ambulatory Referral to Orthopedic Surgery      Discussed with patient x-ray will not show any torn ligaments.  Will need MRI for further evaluation.  Referral given to orthopedic surgeon as he recently had steroid injections in the knee.  Not interested in oral steroids at this time.  Already taking Tylenol Advil and doing movement as tolerated.  Patient agreement with plan.      Patient Instructions     Follow up with PCP in 3-5 days.  Proceed to  ER if symptoms worsen.    Chief Complaint     Chief Complaint   Patient presents with    Knee Pain     Patient has had increased right knee pain for the past 3 weeks, has chronic knee pain for over 10 years in the knee, history of prior knee surgery.          History of Present Illness   Fuad Fierro Jr. presents to the clinic c/o    Knee Pain (Patient has had increased right knee pain for the past 3 weeks, has chronic knee pain for over 10 years in the knee, history of prior knee surgery. )    Saw PCP 3 weeks ago who performed a steroid injection into bilateral knees -   States no improvement   He is established with orthopedic, however, has not scheduled a f/u with them either at this time.   Came to be seen here in hopes of getting imaging of the knee to determine extent of tears in his tendons and ligaments -   Told in the past he had some - thinking they worsened.   Had approx 3 days of improvement with steroid injections from pcp   Not interested in joint replacement yet.        Review of Systems   Review of Systems   All other systems  reviewed and are negative.        Current Medications     Long-Term Medications   Medication Sig Dispense Refill    allopurinol (ZYLOPRIM) 300 mg tablet TAKE 1 TABLET BY MOUTH DAILY 90 tablet 1    dicyclomine (BENTYL) 10 mg capsule Use 1 pill twice daily before meals as needed 60 capsule 5    levothyroxine 200 mcg tablet TAKE 1 TABLET BY MOUTH DAILY 90 tablet 1    levothyroxine 50 mcg tablet TAKE 1 TABLET BY MOUTH DAILY 90 tablet 1    Multiple Vitamin (MULTIVITAMIN) tablet Take 1 tablet by mouth daily not taking       trospium chloride (SANCTURA) 20 mg tablet Take 1 tablet (20 mg total) by mouth 2 (two) times a day 90 tablet 3    vitamin E, tocopherol, 400 units capsule Take 400 Units by mouth daily Not taking          Current Allergies     Allergies as of 05/06/2025    (No Known Allergies)            The following portions of the patient's history were reviewed and updated as appropriate: allergies, current medications, past family history, past medical history, past social history, past surgical history and problem list.    Objective   /84   Pulse 68   Temp 98.1 °F (36.7 °C)   Resp 20   SpO2 97%        Physical Exam     Physical Exam  Vitals and nursing note reviewed.   Constitutional:       Appearance: Normal appearance. He is well-developed.   HENT:      Head: Normocephalic and atraumatic.   Eyes:      General: Lids are normal.      Conjunctiva/sclera: Conjunctivae normal.      Pupils: Pupils are equal, round, and reactive to light.   Cardiovascular:      Rate and Rhythm: Normal rate and regular rhythm.      Heart sounds: Normal heart sounds, S1 normal and S2 normal.   Pulmonary:      Effort: Pulmonary effort is normal.      Breath sounds: Normal breath sounds.   Musculoskeletal:      Right knee: Bony tenderness present. No swelling, deformity, effusion, erythema, ecchymosis, lacerations or crepitus. Decreased range of motion. Tenderness present. No LCL laxity, MCL laxity, ACL laxity or PCL laxity.  Normal alignment, normal meniscus and normal patellar mobility. Normal pulse.      Left knee: Bony tenderness present. No swelling, deformity, effusion, erythema, ecchymosis, lacerations or crepitus. Decreased range of motion. Tenderness present. No LCL laxity, MCL laxity, ACL laxity or PCL laxity.Normal alignment, normal meniscus and normal patellar mobility. Normal pulse.   Skin:     General: Skin is warm and dry.   Neurological:      Mental Status: He is alert.   Psychiatric:         Speech: Speech normal.         Behavior: Behavior normal.         Thought Content: Thought content normal.         Judgment: Judgment normal.

## 2025-05-06 NOTE — PATIENT INSTRUCTIONS
"Patient Education     Chronic Knee Pain   About this topic   The knee is a large, complex joint. It is made up of 4 bones: The thigh bone, two lower leg bones, and the kneecap. There is a capsule around the joint. Cartilage acts like a shock absorber in the knee and reduces friction, which helps the knee move more smoothly. The knee also has ligaments and tendons. Ligaments are bands of tissue that join one bone to another. Tendons are bands of tissue that join muscles to the bone. There are many muscles around the knee and in front and back of the thigh. If there is a problem with any of these parts of the joint, you can have pain in your knee.       What are the causes?   Arthritis ? There are a few types of arthritis which all cause swelling of a joint. Osteoarthritis is the most common and happens over time with \"wear and tear\" on the joint.  Overuse ? Repeat motions or too much bending at the knee can lead to pain.  Bursitis ? Bursae are small fluid-filled sacs that help tendons glide easier. These can get swollen and hurt. This often happens to people who do work on their knees, like gardeners, roofers, and carpet layers.  Ligament tear or sprain ? Ligament injuries can make the knee shaky or unstable and painful.  Meniscus tear ? Injuries to the meniscus or cartilage can make your knee lock and cause pain with some movements.  Muscle strain ? Injuries to the muscles near the knee happen often with sports. If these do not heal the right way, ongoing pain can happen.  Patellar tendinopathy ? The tendon that goes over your kneecap can get swollen and hurt due to overuse and repetitive stress on the knee.  Chondromalacia patella ? This is a health problem where there is pain under the kneecap from cartilage being worn.  Dislocating kneecap ? If your kneecap slips out of its groove, pain can happen.  Baker's cyst ? This is a lump that is behind the knee. This is also known as a popliteal cyst.  Hip, ankle, back " problems ? Problems in the back and in nearby joints, such as the hip and ankle, can cause pain in the knee.  Fluid collection ? Fluid can collect in the knee joint after a knee injury and lead to pain if not treated.  Osgood-Schlatter disease ? This is a health problem seen in children and teens. The front of the knee below the kneecap is bothered by repeat movements.  Osteochondritis dissecans ? This is a health problem seen in children and teens. There is a problem with the blood flow to the bone and cartilage of the knee.  Plica syndrome ? Plica are folds of tissue that are left over from growth before birth. These can get sore and cause pain.  Patellofemoral pain syndrome - This happens when you have pain in front of your knee or around or behind your kneecap.  Iliotibial band syndrome - This happens when the iliotibial band, tissues that runs down the outside of the thigh from the hip to the shin, is overused. It causes pain on the outside of the knee.  What are the main signs?   Pain or stiffness  Swelling  Warmth or redness  Visible deformity  Hard to walk, go up or down stairs, or put weight on your leg  Knee locking ? not able to bend or straighten your knee fully  Knee is weak, luis, or gives way  Crunching and popping noises in the knee  How does the doctor diagnose this health problem?   Your doctor will look at your knee. Your doctor will feel all over your knee to find where the pain is. The doctor may move your knee and push or pull on many parts to check your motion and strength. Your doctor may have you stand and walk to see if your knee is stable. The doctor may order:  Blood tests  X-ray  CT or MRI scan  Ultrasound  Surgery ? At times, arthroscopic surgery may be needed to find the cause of the pain.  How does the doctor treat this health problem?   Finding out the true cause of your knee pain is the most important step to fix the problem and lower your pain. Some things that may lessen your  knee pain are:  Rest  Ice  Compression  Elevation - keeping the knee raised  Braces or supports  Heat may be used later but not right away. Heat can make swelling worse.  Exercises to stretch and strengthen the knee  Physical therapy (PT)  Surgery  What drugs may be needed?   The doctor may order drugs to:  Help with pain and swelling  Fight an infection  The doctor may give you a shot of an anti-inflammatory drug called a corticosteroid. This will help with swelling. Talk with your doctor about the risks of this shot.  What can be done to prevent this health problem?   If your knee pain is due to overuse, do not do repetitive movements that caused the problem if possible.  Take breaks often when doing things that use repeat movements.  Do not sit or keep your knee in one position for long periods of time.  If you sleep on your side, use a pillow in between your legs. This can help take stress off of the knee.  Always warm up and stretch before a workout and cool down after.  If you are a runner, actively stretch before a run. Talk to your doctor to make sure you understand the difference between active and passive stretching. Use good ways to train, such as slowly adding to how far you run.  Run on softer surfaces such as a track. This is easier on your knee than a hard surface like cement.  Try activities like swimming or biking rather than running. Running can put a lot of stress on your knee joint.  Stay away from activities that could result in twisting, sudden stops and starts, and blows to the knee. Sports such as basketball, skiing, football, and jogging are some common sports that can lead to knee injuries.  Wear shoes with good support. Replace your shoes often.  Keep a healthy weight. Being too heavy puts more stress on the knee joint. This makes the knee more at risk for injury.  Stay active and work out to keep your muscles strong and flexible.  Last Reviewed Date   2020-04-23  Consumer Information Use  and Disclaimer   This generalized information is a limited summary of diagnosis, treatment, and/or medication information. It is not meant to be comprehensive and should be used as a tool to help the user understand and/or assess potential diagnostic and treatment options. It does NOT include all information about conditions, treatments, medications, side effects, or risks that may apply to a specific patient. It is not intended to be medical advice or a substitute for the medical advice, diagnosis, or treatment of a health care provider based on the health care provider's examination and assessment of a patient’s specific and unique circumstances. Patients must speak with a health care provider for complete information about their health, medical questions, and treatment options, including any risks or benefits regarding use of medications. This information does not endorse any treatments or medications as safe, effective, or approved for treating a specific patient. [employer] and its affiliates disclaim any warranty or liability relating to this information or the use thereof. The use of this information is governed by the Terms of Use, available at https://www.[employer].com/en/know/clinical-effectiveness-terms   Copyright   Copyright © 2024 [employer], [employer] and its affiliates and/or licensors. All rights reserved.

## 2025-05-06 NOTE — PROGRESS NOTES
Urgent Care BP  Received: Today  JONNA Andrade  P Patient Reported Team         Blood pressure elevated  Appointment department: [facility]  Appointment provider: JONNA Andrade  Blood pressure  05/06/25 0953 162/84  05/06/25 0931 (!) 178/100  Active  Date User Comment   05/06/25 0953 JONNA Andrade [[id number]] None

## 2025-05-07 NOTE — PROGRESS NOTES
05/07/25 2:42 PM    Patient was called after the Urgent Care visit ; a message was left for the patient to return the call    Thank you.  Cristal Heaton  [facility]

## 2025-05-09 ENCOUNTER — OFFICE VISIT (OUTPATIENT)
Dept: OBGYN CLINIC | Facility: MEDICAL CENTER | Age: 70
End: 2025-05-09
Payer: MEDICARE

## 2025-05-09 VITALS — HEIGHT: 74 IN | WEIGHT: 315 LBS | BODY MASS INDEX: 40.43 KG/M2

## 2025-05-09 DIAGNOSIS — G89.29 CHRONIC PAIN OF RIGHT KNEE: Primary | ICD-10-CM

## 2025-05-09 DIAGNOSIS — M25.561 CHRONIC PAIN OF RIGHT KNEE: Primary | ICD-10-CM

## 2025-05-09 DIAGNOSIS — M17.0 PRIMARY OSTEOARTHRITIS OF BOTH KNEES: ICD-10-CM

## 2025-05-09 PROCEDURE — 99203 OFFICE O/P NEW LOW 30 MIN: CPT | Performed by: EMERGENCY MEDICINE

## 2025-05-09 RX ORDER — CEPHALEXIN 500 MG/1
CAPSULE ORAL
COMMUNITY
Start: 2025-05-09

## 2025-05-09 NOTE — ASSESSMENT & PLAN NOTE
Orders:    Ambulatory Referral to Orthopedic Surgery    Injection Procedure Prior Authorization; Future
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
## 2025-05-09 NOTE — ASSESSMENT & PLAN NOTE
RIGHT Knee VISCOSUPPLEMENTATION:  Patient has failed conservative treatment including:  NSAIDs   Tylenol   Home exercises (exercise bike, cutting grass)  Weight loss  Assistive devices.  Patient has failed corticosteroid injection  Patient is symptomatic and pain interferes with ADLs/sleep  Pain score 10/10    Reviewed Xrays Left knee showing severe DJD  Orders:    Injection Procedure Prior Authorization; Future

## 2025-05-16 ENCOUNTER — PROCEDURE VISIT (OUTPATIENT)
Dept: OBGYN CLINIC | Facility: MEDICAL CENTER | Age: 70
End: 2025-05-16
Payer: MEDICARE

## 2025-05-16 VITALS — BODY MASS INDEX: 40.43 KG/M2 | WEIGHT: 315 LBS | HEIGHT: 74 IN

## 2025-05-16 DIAGNOSIS — G89.29 CHRONIC PAIN OF RIGHT KNEE: Primary | ICD-10-CM

## 2025-05-16 DIAGNOSIS — M25.561 CHRONIC PAIN OF RIGHT KNEE: Primary | ICD-10-CM

## 2025-05-16 DIAGNOSIS — M17.0 PRIMARY OSTEOARTHRITIS OF BOTH KNEES: ICD-10-CM

## 2025-05-16 PROCEDURE — 20610 DRAIN/INJ JOINT/BURSA W/O US: CPT | Performed by: EMERGENCY MEDICINE

## 2025-05-16 NOTE — ASSESSMENT & PLAN NOTE
Durolane buy and bill provided today  No benefit from last CSI with PCP, t/c repeat  Severe OA  Orders:    Large joint arthrocentesis: R knee
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
## 2025-05-28 DIAGNOSIS — N20.0 CALCULUS OF KIDNEY: ICD-10-CM

## 2025-05-28 DIAGNOSIS — E03.9 HYPOTHYROIDISM, UNSPECIFIED TYPE: ICD-10-CM

## 2025-05-29 RX ORDER — LEVOTHYROXINE SODIUM 50 UG/1
50 TABLET ORAL DAILY
Qty: 90 TABLET | Refills: 0 | Status: SHIPPED | OUTPATIENT
Start: 2025-05-29

## 2025-05-29 RX ORDER — LEVOTHYROXINE SODIUM 200 UG/1
200 TABLET ORAL DAILY
Qty: 90 TABLET | Refills: 0 | Status: SHIPPED | OUTPATIENT
Start: 2025-05-29

## 2025-05-29 RX ORDER — ALLOPURINOL 300 MG/1
300 TABLET ORAL DAILY
Qty: 90 TABLET | Refills: 0 | Status: SHIPPED | OUTPATIENT
Start: 2025-05-29

## 2025-06-10 ENCOUNTER — OFFICE VISIT (OUTPATIENT)
Age: 70
End: 2025-06-10
Payer: MEDICARE

## 2025-06-10 VITALS
DIASTOLIC BLOOD PRESSURE: 98 MMHG | WEIGHT: 315 LBS | SYSTOLIC BLOOD PRESSURE: 140 MMHG | TEMPERATURE: 97.7 F | HEART RATE: 80 BPM | OXYGEN SATURATION: 97 % | HEIGHT: 74 IN | BODY MASS INDEX: 40.43 KG/M2

## 2025-06-10 DIAGNOSIS — R03.0 ELEVATED BLOOD PRESSURE READING: ICD-10-CM

## 2025-06-10 DIAGNOSIS — H61.23 EXCESSIVE CERUMEN IN BOTH EAR CANALS: Primary | ICD-10-CM

## 2025-06-10 PROCEDURE — 69209 REMOVE IMPACTED EAR WAX UNI: CPT | Performed by: FAMILY MEDICINE

## 2025-06-10 PROCEDURE — 99214 OFFICE O/P EST MOD 30 MIN: CPT | Performed by: FAMILY MEDICINE

## 2025-06-10 NOTE — PROGRESS NOTES
"Name: Fuad Fierro Jr.      : 1955      MRN: [id number]  Encounter Provider: Johnny Dunlap DO  Encounter Date: 6/10/2025   Encounter department: [facility] PRIMARY CARE  :  Assessment & Plan  Excessive cerumen in both ear canals    Orders:  •  Ear cerumen removal    Elevated blood pressure reading       He will follow-up with Dr. Slaughter in the near future to recheck the blood pressure.         History of Present Illness   He presents today complaining of.  Also noted his blood pressure is elevated today which she said is unusual for him.        Review of Systems   Constitutional: Negative.    HENT:  Positive for ear pain.    Respiratory: Negative.     Cardiovascular: Negative.    Gastrointestinal: Negative.        Objective   /98 (BP Location: Right arm, Patient Position: Sitting)   Pulse 80   Temp 97.7 °F (36.5 °C)   Ht 6' 2\" (1.88 m)   Wt (!) 145 kg (319 lb)   SpO2 97%   BMI 40.96 kg/m²      Physical Exam  Vitals and nursing note reviewed.   Constitutional:       Appearance: He is well-developed.   HENT:      Head: Normocephalic and atraumatic.     Eyes:      Pupils: Pupils are equal, round, and reactive to light.       Cardiovascular:      Rate and Rhythm: Normal rate.   Pulmonary:      Effort: Pulmonary effort is normal.   Abdominal:      Palpations: Abdomen is soft.     Musculoskeletal:         General: Normal range of motion.      Cervical back: Normal range of motion and neck supple.   Lymphadenopathy:      Cervical: No cervical adenopathy.     Skin:     General: Skin is warm.     Neurological:      Mental Status: He is alert and oriented to person, place, and time.     Psychiatric:         Behavior: Behavior normal.       Ear cerumen removal    Date/Time: 6/10/2025 12:15 PM    Performed by: Johnny Dunlap DO  Authorized by: Johnny Dunlap DO    Patient location:  Clinic  Procedure details:     Location:  Both ears    Procedure type: irrigation only      Approach:  " External  Post-procedure details:     Complication:  None    Hearing quality:  Normal    Patient tolerance of procedure:  Tolerated with difficulty

## 2025-06-16 ENCOUNTER — OFFICE VISIT (OUTPATIENT)
Dept: OBGYN CLINIC | Facility: MEDICAL CENTER | Age: 70
End: 2025-06-16
Payer: MEDICARE

## 2025-06-16 VITALS — WEIGHT: 315 LBS | BODY MASS INDEX: 40.43 KG/M2 | HEIGHT: 74 IN

## 2025-06-16 DIAGNOSIS — G89.29 CHRONIC PAIN OF RIGHT KNEE: Primary | ICD-10-CM

## 2025-06-16 DIAGNOSIS — M25.561 CHRONIC PAIN OF RIGHT KNEE: Primary | ICD-10-CM

## 2025-06-16 DIAGNOSIS — M17.0 PRIMARY OSTEOARTHRITIS OF BOTH KNEES: ICD-10-CM

## 2025-06-16 PROCEDURE — 99212 OFFICE O/P EST SF 10 MIN: CPT | Performed by: EMERGENCY MEDICINE

## 2025-06-16 NOTE — ASSESSMENT & PLAN NOTE
Pallavi 5/16 with significant improvement, may repeat as soon as November 16, 2025  Declines referral to orthopedic surgeon  No benefit from CSI from PCP, however t/c repeat CSI
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
## 2025-07-01 ENCOUNTER — OFFICE VISIT (OUTPATIENT)
Age: 70
End: 2025-07-01
Payer: MEDICARE

## 2025-07-01 ENCOUNTER — APPOINTMENT (OUTPATIENT)
Dept: LAB | Facility: MEDICAL CENTER | Age: 70
End: 2025-07-01
Payer: MEDICARE

## 2025-07-01 VITALS
TEMPERATURE: 98.4 F | DIASTOLIC BLOOD PRESSURE: 84 MMHG | HEIGHT: 74 IN | HEART RATE: 91 BPM | WEIGHT: 314 LBS | BODY MASS INDEX: 40.3 KG/M2 | OXYGEN SATURATION: 96 % | SYSTOLIC BLOOD PRESSURE: 138 MMHG

## 2025-07-01 DIAGNOSIS — I10 ESSENTIAL HYPERTENSION: Primary | ICD-10-CM

## 2025-07-01 DIAGNOSIS — Z12.5 SCREENING FOR PROSTATE CANCER: ICD-10-CM

## 2025-07-01 LAB — PSA SERPL-MCNC: 0.64 NG/ML (ref 0–4)

## 2025-07-01 PROCEDURE — G0103 PSA SCREENING: HCPCS

## 2025-07-01 PROCEDURE — 36415 COLL VENOUS BLD VENIPUNCTURE: CPT

## 2025-07-01 PROCEDURE — G2211 COMPLEX E/M VISIT ADD ON: HCPCS | Performed by: FAMILY MEDICINE

## 2025-07-01 PROCEDURE — 99213 OFFICE O/P EST LOW 20 MIN: CPT | Performed by: FAMILY MEDICINE

## 2025-07-01 NOTE — PROGRESS NOTES
"Name: Fuad Fierro Jr.      : 1955      MRN: [id number]  Encounter Provider: Tommy Slaughter DO  Encounter Date: 2025   Encounter department: [facility] PRIMARY CARE  :  Assessment & Plan  Essential hypertension  Patient will continue with low-salt diet exercise and weight loss.  Recheck 1 month.              History of Present Illness   Patient is here to follow-up on hypertension.  Patient is exercising watching sodium intake.  Patient asymptomatic at this time in this regard.  Patient able to ride a bike without any symptoms.  No chest pain shortness of breath headache visual changes dizziness or change urination or defecation.    Hypertension    Rash      Review of Systems   Constitutional: Negative.    HENT: Negative.     Eyes: Negative.    Respiratory: Negative.     Cardiovascular: Negative.    Gastrointestinal: Negative.    Endocrine: Negative.    Genitourinary: Negative.    Musculoskeletal: Negative.    Skin: Negative.  Negative for rash.   Allergic/Immunologic: Negative.    Neurological: Negative.    Hematological: Negative.    Psychiatric/Behavioral: Negative.         Objective   /84 (BP Location: Left arm, Patient Position: Sitting, Cuff Size: Large)   Pulse 91   Temp 98.4 °F (36.9 °C) (Temporal)   Ht 6' 2\" (1.88 m)   Wt (!) 142 kg (314 lb)   SpO2 96%   BMI 40.32 kg/m²      Physical Exam  Vitals and nursing note reviewed.   Constitutional:       General: He is not in acute distress.     Appearance: Normal appearance. He is not ill-appearing, toxic-appearing or diaphoretic.     Cardiovascular:      Rate and Rhythm: Normal rate and regular rhythm.      Pulses: Normal pulses.      Heart sounds: Normal heart sounds.   Pulmonary:      Effort: Pulmonary effort is normal.      Breath sounds: Normal breath sounds.     Neurological:      Mental Status: He is alert.         "

## 2025-07-02 NOTE — PROGRESS NOTES
7/7/2025      No chief complaint on file.        Assessment and Plan    70 y.o. male managed by Dr. Simms     1. Screening for prostate cancer  Assessment & Plan:  Most recent PSA level stable   Follow up in 1 year with PSA prior      Lab Results   Component Value Date    PSA 0.644 07/01/2025    PSA 1.128 07/02/2024    PSA 0.90 09/06/2023     Orders:  -     PSA, Total Screen; Future; Expected date: 07/07/2026 (Use expected date for collection)  2. Benign prostatic hyperplasia with urinary frequency  Assessment & Plan:  BPH follow up- urinary urgency and nocturia   S/p TURP on 9/3 with Dr. Simms  Previous PVRs have been now   Continues with dietary modifications  Trospium discontinued due to abdominal cramping  Reports being content with urination   Plan to hold off on vesicare for now   Plan to follow up in 1 year- aware to call for any change urination         History of Present Illness  Fuad Fierro Jr. is a 70 y.o. male here for evaluation of evaluation ofevaluation of urinary urgency. Patient status post TURP on 9/8 with Dr. Simms.  Since surgery patient reports strong urinary stream.      He reports frequency during the day and at nighttime. He reports his frequency is worse at night. He also reports urgency that has been ongoing now. He denies incontinence. He reports that he drinks coffee throughout the day and sometimes at dinnertime. He also drinks soda occasionally. He denies dysuria or gross hematuria. He has been cutting the soda out and drinking diet tea.      He was trailed on tropsium but medication was stopped due to abdominal cramping.  He like to continue with dietary modifications due to side effects of OAB medication. He reports being content with urination. Continues to drink fluids late at night. No concern for infection.            Review of Systems   Constitutional:  Negative for chills and fever.   HENT:  Negative for ear pain and sore throat.    Eyes:  Negative for pain and  visual disturbance.   Respiratory:  Negative for cough and shortness of breath.    Cardiovascular:  Negative for chest pain and palpitations.   Gastrointestinal:  Negative for abdominal pain and vomiting.   Genitourinary:  Positive for frequency. Negative for decreased urine volume, difficulty urinating, dysuria, flank pain and hematuria.   Musculoskeletal:  Negative for arthralgias and back pain.   Skin:  Negative for color change and rash.   Neurological:  Negative for seizures and syncope.   All other systems reviewed and are negative.               Vitals  Vitals:    07/07/25 1259   BP: 124/78   BP Location: Left arm   Patient Position: Sitting   Cuff Size: Standard   Pulse: 61   SpO2: 95%   Weight: (!) 142 kg (314 lb)       Physical Exam  Vitals reviewed.   Constitutional:       Appearance: Normal appearance.   HENT:      Head: Normocephalic and atraumatic.     Eyes:      Conjunctiva/sclera: Conjunctivae normal.     Pulmonary:      Effort: Pulmonary effort is normal.   Abdominal:      General: Abdomen is flat. There is no distension.      Palpations: Abdomen is soft.      Tenderness: There is no abdominal tenderness.     Musculoskeletal:         General: Normal range of motion.      Cervical back: Normal range of motion.     Skin:     General: Skin is warm and dry.     Neurological:      General: No focal deficit present.      Mental Status: He is alert and oriented to person, place, and time.     Psychiatric:         Mood and Affect: Mood normal.         Behavior: Behavior normal.         Thought Content: Thought content normal.         Judgment: Judgment normal.           Past History  Past Medical History[1]  Social History[2]  Tobacco Use History[3]  Family History[4]    The following portions of the patient's history were reviewed and updated as appropriate: allergies, current medications, past medical history, past social history, past surgical history and problem list.    Results  No results found for  "this or any previous visit (from the past hour).]  Lab Results   Component Value Date    PSA 0.644 07/01/2025    PSA 1.128 07/02/2024    PSA 0.90 09/06/2023    PSA 1.0 02/23/2023     Lab Results   Component Value Date    GLUCOSE 91 06/27/2015    CALCIUM 9.1 08/16/2024     06/27/2015    K 4.5 08/16/2024    CO2 25 08/16/2024     08/16/2024    BUN 18 08/16/2024    CREATININE 0.99 08/16/2024     Lab Results   Component Value Date    WBC 6.37 08/16/2024    HGB 16.1 08/16/2024    HCT 47.8 08/16/2024    MCV 91 08/16/2024     (L) 08/16/2024              [1]  Past Medical History:  Diagnosis Date   • Anesthesia     \"cant have spinal anesthesia because of curvature of the spine and severe pain\"   • Anxiety    • Arthritis    • Athlete's foot     off and on   • Benign polyp of large intestine    • Cancer (HCC)     thyroid,    • Chronic pain disorder     bilat knees   • Colon polyp    • Curvature of spine    • DDD (degenerative disc disease), lumbar    • Dental crowns present    • Depression    • Disease of thyroid gland     removed   • Dizziness    • Dyslipidemia    • Enlarged prostate    • Exercises daily     \"walking/biking/lifts weights\"   • GERD (gastroesophageal reflux disease)    • Hiatal hernia    • History of radiation therapy     \"iodine pill for thyroid cancer\"   • Hypertension    • IBS (irritable bowel syndrome)    • Kidney stone     still has on the left, and small one on right\"   • Neurological disorder    • Neuropathy    • Neuropathy     Both feet   • Numbness and tingling of both feet    • Obesity    • Osteoarthritis    • Retinal hole or tear, bilateral     had laser surgery Dr Caceres/ 3-4 yrs ago   • S/P TURP (status post transurethral resection of prostate)    • Scoliosis    • Seasonal allergies    • Teeth missing    • Tension headache     occas   • Tinea pedis    • Urinary frequency    • Urinary urgency    • Wears glasses    [2]  Social History  Socioeconomic History   • Marital status: " /Civil Union   Occupational History   • Occupation: retired security   Tobacco Use   • Smoking status: Former     Current packs/day: 0.00     Average packs/day: 1 pack/day for 40.0 years (40.0 ttl pk-yrs)     Types: Cigarettes     Start date: 1974     Quit date: 2014     Years since quittin.0   • Smokeless tobacco: Never   Vaping Use   • Vaping status: Never Used   Substance and Sexual Activity   • Alcohol use: Yes     Alcohol/week: 2.0 standard drinks of alcohol     Types: 1 Glasses of wine, 1 Cans of beer per week     Comment: very rarely.   • Drug use: No   • Sexual activity: Not Currently     Social Drivers of Health     Financial Resource Strain: Low Risk  (2023)    Overall Financial Resource Strain (CARDIA)    • Difficulty of Paying Living Expenses: Not hard at all   Food Insecurity: No Food Insecurity (10/11/2024)    Nursing - Inadequate Food Risk Classification    • Worried About Running Out of Food in the Last Year: Never true    • Ran Out of Food in the Last Year: Never true   Transportation Needs: No Transportation Needs (10/11/2024)    PRAPARE - Transportation    • Lack of Transportation (Medical): No    • Lack of Transportation (Non-Medical): No   Physical Activity: Unknown (2021)    Exercise Vital Sign    • Days of Exercise per Week: 3 days   Stress: No Stress Concern Present (2021)    [employer] - Occupational Stress Questionnaire    • Feeling of Stress : Only a little   Social Connections: Moderately Integrated (2021)    Social Connection and Isolation Panel    • Frequency of Communication with Friends and Family: Never    • Frequency of Social Gatherings with Friends and Family: Twice a week    • Attends [employer] Services: More than 4 times per year    • Active Member of Clubs or Organizations: Yes    • Attends Club or Organization Meetings: More than 4 times per year    • Marital Status:    Intimate Partner Violence: Not  At Risk (2021)    Humiliation, Afraid, Rape, and Kick questionnaire    • Fear of Current or Ex-Partner: No    • Emotionally Abused: No    • Physically Abused: No    • Sexually Abused: No   Housing Stability: Low Risk  (10/11/2024)    Housing Stability Vital Sign    • Unable to Pay for Housing in the Last Year: No    • Number of Times Moved in the Last Year: 0    • Homeless in the Last Year: No   [3]  Social History  Tobacco Use   Smoking Status Former   • Current packs/day: 0.00   • Average packs/day: 1 pack/day for 40.0 years (40.0 ttl pk-yrs)   • Types: Cigarettes   • Start date: 1974   • Quit date: 2014   • Years since quittin.0   Smokeless Tobacco Never   [4]  Family History  Problem Relation Name Age of Onset   • Diabetes Mother Tanya    • Heart disease Father Fuad Sr    • Breast cancer Sister Patsy-sister    • Depression Son Osman    • Cancer Sister Patsy

## 2025-07-07 ENCOUNTER — OFFICE VISIT (OUTPATIENT)
Dept: UROLOGY | Facility: MEDICAL CENTER | Age: 70
End: 2025-07-07
Payer: MEDICARE

## 2025-07-07 VITALS
HEART RATE: 61 BPM | WEIGHT: 314 LBS | DIASTOLIC BLOOD PRESSURE: 78 MMHG | SYSTOLIC BLOOD PRESSURE: 124 MMHG | OXYGEN SATURATION: 95 % | BODY MASS INDEX: 40.32 KG/M2

## 2025-07-07 DIAGNOSIS — R35.0 BENIGN PROSTATIC HYPERPLASIA WITH URINARY FREQUENCY: ICD-10-CM

## 2025-07-07 DIAGNOSIS — N40.1 BENIGN PROSTATIC HYPERPLASIA WITH URINARY FREQUENCY: ICD-10-CM

## 2025-07-07 DIAGNOSIS — Z12.5 SCREENING FOR PROSTATE CANCER: Primary | ICD-10-CM

## 2025-07-07 PROBLEM — N17.9 ACUTE KIDNEY INJURY (HCC): Status: RESOLVED | Noted: 2020-03-06 | Resolved: 2025-07-07

## 2025-07-07 PROBLEM — R32 URINARY INCONTINENCE: Status: RESOLVED | Noted: 2024-11-26 | Resolved: 2025-07-07

## 2025-07-07 PROCEDURE — 99213 OFFICE O/P EST LOW 20 MIN: CPT

## 2025-07-07 NOTE — ASSESSMENT & PLAN NOTE
Most recent PSA level stable   Follow up in 1 year with PSA prior      Lab Results   Component Value Date    PSA 0.644 07/01/2025    PSA 1.128 07/02/2024    PSA 0.90 09/06/2023

## 2025-07-07 NOTE — ASSESSMENT & PLAN NOTE
BPH follow up- urinary urgency and nocturia   S/p TURP on 9/3 with Dr. Simms  Previous PVRs have been now   Continues with dietary modifications  Trospium discontinued due to abdominal cramping  Reports being content with urination   Plan to hold off on vesicare for now   Plan to follow up in 1 year- aware to call for any change urination

## 2025-07-21 ENCOUNTER — RA CDI HCC (OUTPATIENT)
Dept: OTHER | Facility: HOSPITAL | Age: 70
End: 2025-07-21

## 2025-07-27 DIAGNOSIS — E03.9 HYPOTHYROIDISM, UNSPECIFIED TYPE: ICD-10-CM

## 2025-07-27 DIAGNOSIS — K21.9 GASTROESOPHAGEAL REFLUX DISEASE WITHOUT ESOPHAGITIS: ICD-10-CM

## 2025-07-29 RX ORDER — LEVOTHYROXINE SODIUM 50 UG/1
50 TABLET ORAL DAILY
Qty: 90 TABLET | Refills: 0 | Status: SHIPPED | OUTPATIENT
Start: 2025-07-29

## 2025-07-29 RX ORDER — DICYCLOMINE HYDROCHLORIDE 10 MG/1
CAPSULE ORAL
Qty: 60 CAPSULE | Refills: 0 | Status: SHIPPED | OUTPATIENT
Start: 2025-07-29

## 2025-07-29 RX ORDER — LEVOTHYROXINE SODIUM 200 UG/1
200 TABLET ORAL DAILY
Qty: 90 TABLET | Refills: 0 | Status: SHIPPED | OUTPATIENT
Start: 2025-07-29

## 2025-08-01 ENCOUNTER — OFFICE VISIT (OUTPATIENT)
Age: 70
End: 2025-08-01
Payer: MEDICARE

## 2025-08-01 VITALS
HEART RATE: 63 BPM | DIASTOLIC BLOOD PRESSURE: 82 MMHG | OXYGEN SATURATION: 97 % | TEMPERATURE: 98.2 F | BODY MASS INDEX: 40.43 KG/M2 | WEIGHT: 315 LBS | HEIGHT: 74 IN | SYSTOLIC BLOOD PRESSURE: 118 MMHG

## 2025-08-01 DIAGNOSIS — R73.03 PREDIABETES: ICD-10-CM

## 2025-08-01 DIAGNOSIS — E78.5 DYSLIPIDEMIA: ICD-10-CM

## 2025-08-01 DIAGNOSIS — E03.9 ACQUIRED HYPOTHYROIDISM: ICD-10-CM

## 2025-08-01 DIAGNOSIS — I10 ESSENTIAL HYPERTENSION: Primary | ICD-10-CM

## 2025-08-01 PROCEDURE — G2211 COMPLEX E/M VISIT ADD ON: HCPCS | Performed by: FAMILY MEDICINE

## 2025-08-01 PROCEDURE — 99214 OFFICE O/P EST MOD 30 MIN: CPT | Performed by: FAMILY MEDICINE

## 2025-08-06 PROBLEM — Z12.5 SCREENING FOR PROSTATE CANCER: Status: RESOLVED | Noted: 2018-07-25 | Resolved: 2025-08-06

## 2025-08-07 ENCOUNTER — ANESTHESIA (OUTPATIENT)
Dept: GASTROENTEROLOGY | Facility: HOSPITAL | Age: 70
End: 2025-08-07
Payer: MEDICARE

## 2025-08-07 ENCOUNTER — ANESTHESIA EVENT (OUTPATIENT)
Dept: GASTROENTEROLOGY | Facility: HOSPITAL | Age: 70
End: 2025-08-07
Payer: MEDICARE

## 2025-08-07 ENCOUNTER — HOSPITAL ENCOUNTER (OUTPATIENT)
Dept: GASTROENTEROLOGY | Facility: HOSPITAL | Age: 70
Setting detail: OUTPATIENT SURGERY
End: 2025-08-07
Attending: INTERNAL MEDICINE
Payer: MEDICARE

## 2025-08-07 RX ORDER — SODIUM CHLORIDE, SODIUM LACTATE, POTASSIUM CHLORIDE, CALCIUM CHLORIDE 600; 310; 30; 20 MG/100ML; MG/100ML; MG/100ML; MG/100ML
INJECTION, SOLUTION INTRAVENOUS CONTINUOUS PRN
Status: DISCONTINUED | OUTPATIENT
Start: 2025-08-07 | End: 2025-08-07

## 2025-08-07 RX ORDER — PROPOFOL 10 MG/ML
INJECTION, EMULSION INTRAVENOUS AS NEEDED
Status: DISCONTINUED | OUTPATIENT
Start: 2025-08-07 | End: 2025-08-07

## 2025-08-07 RX ADMIN — PROPOFOL 150 MG: 10 INJECTION, EMULSION INTRAVENOUS at 07:31

## 2025-08-07 RX ADMIN — PROPOFOL 120 MCG/KG/MIN: 10 INJECTION, EMULSION INTRAVENOUS at 07:34

## 2025-08-07 RX ADMIN — SODIUM CHLORIDE, SODIUM LACTATE, POTASSIUM CHLORIDE, AND CALCIUM CHLORIDE: .6; .31; .03; .02 INJECTION, SOLUTION INTRAVENOUS at 07:22

## 2025-08-11 ENCOUNTER — OFFICE VISIT (OUTPATIENT)
Age: 70
End: 2025-08-11
Payer: MEDICARE

## 2025-08-11 PROBLEM — L02.414 CUTANEOUS ABSCESS OF LEFT UPPER EXTREMITY: Status: ACTIVE | Noted: 2025-08-11
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
## 2025-08-11 PROBLEM — S40.862A INSECT BITE OF LEFT UPPER ARM: Status: ACTIVE | Noted: 2025-08-11

## (undated) DEVICE — ENDOSCOPIC VALVE WITH ADAPTER.: Brand: SURSEAL® II

## (undated) DEVICE — SUT PDS II 2-0 CT-1 27 IN Z339H

## (undated) DEVICE — GLOVE SRG BIOGEL 7.5

## (undated) DEVICE — Device

## (undated) DEVICE — BULB SYRINGE,IRRIGATION WITH PROTECTIVE CAP: Brand: DOVER

## (undated) DEVICE — URETERAL CATHETER 5 FR CONE TIP

## (undated) DEVICE — PACK TUR

## (undated) DEVICE — Device: Brand: OLYMPUS

## (undated) DEVICE — SCD SEQUENTIAL COMPRESSION COMFORT SLEEVE MEDIUM KNEE LENGTH: Brand: KENDALL SCD

## (undated) DEVICE — PENROSE DRAIN, 18 X 3 8: Brand: CARDINAL HEALTH

## (undated) DEVICE — GLOVE INDICATOR PI UNDERGLOVE SZ 8 BLUE

## (undated) DEVICE — EVACUATOR BLADDER ELLIK DISP STRL

## (undated) DEVICE — SYRINGE 10ML LL CONTROL TOP

## (undated) DEVICE — INVIEW CLEAR LEGGINGS: Brand: CONVERTORS

## (undated) DEVICE — SPONGE LAP STERILE 4X18

## (undated) DEVICE — ASTOUND STANDARD SURGICAL GOWN, XL: Brand: CONVERTORS

## (undated) DEVICE — 3M™ STERI-STRIP™ COMPOUND BENZOIN TINCTURE 40 BAGS/CARTON 4 CARTONS/CASE C1544: Brand: 3M™ STERI-STRIP™

## (undated) DEVICE — UROLOGIC DRAIN BAG: Brand: UNBRANDED

## (undated) DEVICE — TUBING SUCTION 5MM X 12 FT

## (undated) DEVICE — UROCATCH BAG

## (undated) DEVICE — INTENDED FOR TISSUE SEPARATION, AND OTHER PROCEDURES THAT REQUIRE A SHARP SURGICAL BLADE TO PUNCTURE OR CUT.: Brand: BARD-PARKER SAFETY BLADES SIZE 10, STERILE

## (undated) DEVICE — BASIC SINGLE BASIN-LF: Brand: MEDLINE INDUSTRIES, INC.

## (undated) DEVICE — PENCIL ELECTROSURG E-Z CLEAN -0035H

## (undated) DEVICE — BASKET STONE RTRVL ZERO TIP 2.4FR

## (undated) DEVICE — GAUZE SPONGES,16 PLY: Brand: CURITY

## (undated) DEVICE — CYSTO TUBING TUR Y IRRIGATION

## (undated) DEVICE — CATHETER PLUG WITH CAP: Brand: DOVER

## (undated) DEVICE — STERILE POLYISOPRENE POWDER-FREE SURGICAL GLOVES: Brand: PROTEXIS

## (undated) DEVICE — ABSORBENT, WATERPROOF, BACTERIA PROOF FILM DRESSING: Brand: OPSITE POST OP 20X10CM CTN 20

## (undated) DEVICE — GLOVE SRG BIOGEL 7

## (undated) DEVICE — BAG URINE DRAINAGE 4000ML CONTINUOUS IRR

## (undated) DEVICE — GUIDEWIRE STRGHT TIP 0.035 IN  SOLO PLUS

## (undated) DEVICE — PREMIUM DRY TRAY LF: Brand: MEDLINE INDUSTRIES, INC.

## (undated) DEVICE — CATH URET .038 10FR 50CM DUAL LUMEN

## (undated) DEVICE — INTENDED FOR TISSUE SEPARATION, AND OTHER PROCEDURES THAT REQUIRE A SHARP SURGICAL BLADE TO PUNCTURE OR CUT.: Brand: BARD-PARKER SAFETY BLADES SIZE 15, STERILE

## (undated) DEVICE — SYRINGE 20ML LL

## (undated) DEVICE — DISPOSABLE BIOPSY FORCEPS: Brand: DISPOSABLE BIOPSY FORCEPS

## (undated) DEVICE — SKIN MARKER DUAL TIP WITH RULER CAP, FLEXIBLE RULER AND LABELS: Brand: DEVON

## (undated) DEVICE — CHLORAPREP HI-LITE 26ML ORANGE

## (undated) DEVICE — INTENDED FOR TISSUE SEPARATION, AND OTHER PROCEDURES THAT REQUIRE A SHARP SURGICAL BLADE TO PUNCTURE OR CUT.: Brand: BARD-PARKER ®  SAFETY SCALPED

## (undated) DEVICE — GLOVE SRG BIOGEL 8

## (undated) DEVICE — OCCLUSIVE GAUZE STRIP,3% BISMUTH TRIBROMOPHENATE IN PETROLATUM BLEND: Brand: XEROFORM

## (undated) DEVICE — SUT VICRYL 3-0 CT-1 27 IN J258H

## (undated) DEVICE — SYRINGE 30ML LL

## (undated) DEVICE — SPECIMEN CONTAINER STERILE PEEL PACK

## (undated) DEVICE — NEEDLE 25G X 1 1/2

## (undated) DEVICE — GLOVE PI ULTRA TOUCH SZ.7.5

## (undated) DEVICE — SURGICAL CLIPPER BLADE GENERAL USE

## (undated) DEVICE — NDL CNTR 20CT FM MAG: Brand: MEDLINE INDUSTRIES, INC.

## (undated) DEVICE — BASIC PACK: Brand: CONVERTORS

## (undated) DEVICE — SUT VICRYL 2-0 CT-1 27 IN J259H

## (undated) DEVICE — TIBURON TRANSVERSE LAPAROTOMY SHEET: Brand: CONVERTORS

## (undated) DEVICE — CATH URETERAL 5FR X 70 CM FLEX TIP POLYUR BARD

## (undated) DEVICE — SUT PROLENE 2-0 SH 30 IN 8833H

## (undated) DEVICE — SINGLE-USE POLYPECTOMY SNARE: Brand: CAPTIFLEX

## (undated) DEVICE — POOLE SUCTION INSTRUMENT WITH REMOVABLE SHEATH AND PREATTACHED 6 FT. (1.8M) CLEAR PLASTIC TUBING: Brand: POOLE

## (undated) DEVICE — GLOVE PI ULTRA TOUCH SZ.8.0

## (undated) DEVICE — 4-PORT MANIFOLD: Brand: NEPTUNE 2